# Patient Record
Sex: MALE | Race: BLACK OR AFRICAN AMERICAN | Employment: OTHER | URBAN - METROPOLITAN AREA
[De-identification: names, ages, dates, MRNs, and addresses within clinical notes are randomized per-mention and may not be internally consistent; named-entity substitution may affect disease eponyms.]

---

## 2017-10-22 PROBLEM — N18.6 ESRD (END STAGE RENAL DISEASE) (HCC): Status: ACTIVE | Noted: 2017-10-22

## 2017-10-22 PROBLEM — E78.2 MIXED HYPERLIPIDEMIA: Status: ACTIVE | Noted: 2017-10-22

## 2017-10-22 PROBLEM — E55.9 VITAMIN D DEFICIENCY: Status: ACTIVE | Noted: 2017-10-22

## 2017-10-22 PROBLEM — I12.9 HYPERTENSION, RENAL DISEASE: Status: ACTIVE | Noted: 2017-10-22

## 2017-10-22 PROBLEM — K21.9 GASTROESOPHAGEAL REFLUX DISEASE WITHOUT ESOPHAGITIS: Status: ACTIVE | Noted: 2017-10-22

## 2017-10-25 ENCOUNTER — OFFICE VISIT (OUTPATIENT)
Dept: INTERNAL MEDICINE CLINIC | Age: 67
End: 2017-10-25

## 2017-10-25 VITALS
HEART RATE: 79 BPM | DIASTOLIC BLOOD PRESSURE: 85 MMHG | TEMPERATURE: 97.9 F | WEIGHT: 201 LBS | OXYGEN SATURATION: 97 % | SYSTOLIC BLOOD PRESSURE: 132 MMHG | BODY MASS INDEX: 29.77 KG/M2 | RESPIRATION RATE: 16 BRPM | HEIGHT: 69 IN

## 2017-10-25 DIAGNOSIS — Z12.11 COLON CANCER SCREENING: ICD-10-CM

## 2017-10-25 DIAGNOSIS — Z00.00 MEDICARE ANNUAL WELLNESS VISIT, INITIAL: ICD-10-CM

## 2017-10-25 DIAGNOSIS — E78.2 MIXED HYPERLIPIDEMIA: ICD-10-CM

## 2017-10-25 DIAGNOSIS — K21.9 GASTROESOPHAGEAL REFLUX DISEASE WITHOUT ESOPHAGITIS: ICD-10-CM

## 2017-10-25 DIAGNOSIS — Z12.5 PROSTATE CANCER SCREENING: ICD-10-CM

## 2017-10-25 DIAGNOSIS — I12.9 HYPERTENSION, RENAL DISEASE: Primary | ICD-10-CM

## 2017-10-25 DIAGNOSIS — E55.9 VITAMIN D DEFICIENCY: ICD-10-CM

## 2017-10-25 DIAGNOSIS — B18.2 CHRONIC HEPATITIS C WITHOUT HEPATIC COMA (HCC): ICD-10-CM

## 2017-10-25 DIAGNOSIS — N18.6 ESRD (END STAGE RENAL DISEASE) (HCC): ICD-10-CM

## 2017-10-25 DIAGNOSIS — M1A.09X0 CHRONIC GOUT OF MULTIPLE SITES, UNSPECIFIED CAUSE: ICD-10-CM

## 2017-10-25 PROBLEM — M10.9 GOUT OF MULTIPLE SITES: Status: ACTIVE | Noted: 2017-10-25

## 2017-10-25 RX ORDER — OMEPRAZOLE 40 MG/1
CAPSULE, DELAYED RELEASE ORAL
Refills: 11 | COMMUNITY
Start: 2017-10-11 | End: 2018-12-20 | Stop reason: SDUPTHER

## 2017-10-25 RX ORDER — AMOXICILLIN 500 MG/1
CAPSULE ORAL
Refills: 1 | COMMUNITY
Start: 2017-08-17 | End: 2019-06-26

## 2017-10-25 RX ORDER — AMLODIPINE BESYLATE 10 MG/1
TABLET ORAL
Refills: 11 | COMMUNITY
Start: 2017-10-11

## 2017-10-25 NOTE — PROGRESS NOTES
Identified pt with two pt identifiers(name and ). Reviewed record in preparation for visit and have obtained necessary documentation. Chief Complaint   Patient presents with    Hypertension     3 month follow up; patient would like PSA tested        Health Maintenance Due   Topic    Hepatitis C Screening     DTaP/Tdap/Td series (1 - Tdap)    FOBT Q 1 YEAR AGE 54-65     ZOSTER VACCINE AGE 60>     GLAUCOMA SCREENING Q2Y     Pneumococcal 65+ High/Highest Risk (1 of 2 - PCV13)    MEDICARE YEARLY EXAM     INFLUENZA AGE 9 TO ADULT    Patient received flu vaccination at dialysis in early 10/2017. Coordination of Care Questionnaire:  :   1) Have you been to an emergency room, urgent care clinic since your last visit? no   Hospitalized since your last visit? no             2. Have seen or consulted any other health care provider since your last visit? NO  If yes, where when, and reason for visit? 3) Do you have an Advanced Directive/ Living Will in place? NO  If yes, do we have a copy on file NO  If no, would you like information NO    Patient is accompanied by self I have received verbal consent from Bismark Tamayo to discuss any/all medical information while they are present in the room.

## 2017-10-25 NOTE — MR AVS SNAPSHOT
Visit Information Date & Time Provider Department Dept. Phone Encounter #  
 10/25/2017  9:50 AM Vaishali English MD [address] [place] [address] [facility] [phone] [id number] Upcoming Health Maintenance Date Due Hepatitis C Screening 1950 DTaP/Tdap/Td series (1 - Tdap) 9/21/1971 FOBT Q 1 YEAR AGE 50-75 9/21/2000 ZOSTER VACCINE AGE 60> 7/21/2010 GLAUCOMA SCREENING Q2Y 9/21/2015 Pneumococcal 65+ High/Highest Risk (1 of 2 - PCV13) 9/21/2015 MEDICARE YEARLY EXAM 9/21/2015 INFLUENZA AGE 9 TO ADULT 8/1/2017 Allergies as of 10/25/2017  Review Complete On: 85/79/7093 By: Estella Cates RN Severity Noted Reaction Type Reactions Codeine  09/08/2012    Hives Current Immunizations  Never Reviewed Name Date Pneumococcal Conjugate (PCV-13) 10/12/2015 Pneumococcal Polysaccharide (PPSV-23) 9/13/2013 Not reviewed this visit Vitals BP Pulse Temp Resp Height(growth percentile) Weight(growth percentile) 132/85 (BP 1 Location: Right arm, BP Patient Position: Sitting) 79 97.9 °F (36.6 °C) (Oral) 16 5' 9\" (1.753 m) 201 lb (91.2 kg) SpO2 BMI Smoking Status 97% 29.68 kg/m2 Never Smoker Vitals History BMI and BSA Data Body Mass Index Body Surface Area  
 29.68 kg/m 2 2.11 m 2 Your Updated Medication List  
  
   
This list is accurate as of: 10/25/17 11:08 AM.  Always use your most recent med list. amLODIPine 10 mg tablet Commonly known as:  Lucía Croon TAKE 1 TABLET BY MOUTH ONCE DAILY  
  
 amoxicillin 500 mg capsule Commonly known as:  AMOXIL TAKE 4 CAPSULES BY MOUTH 1 HOUR PRIOR TO DENTAL PROCEDURE  
  
 * PriLOSEC 20 mg capsule Generic drug:  omeprazole Take 20 mg by mouth daily. * omeprazole 40 mg capsule Commonly known as:  PRILOSEC  
TAKE 1 CAPSULE BY MOUTH ONCE DAILY RENVELA 800 mg Tab tab Generic drug:  sevelamer carbonate Take 1,600 mg by mouth three (3) times daily (with meals). VITAMIN D2 50,000 unit capsule Generic drug:  ergocalciferol Take 50,000 Units by mouth. * Notice: This list has 2 medication(s) that are the same as other medications prescribed for you. Read the directions carefully, and ask your doctor or other care provider to review them with you. Introducing \[facility]\"" & [facility] SERVICES! José Yeung introduces See[employer] patient portal. Now you can access parts of your medical record, email your doctor's office, and request medication refills online. 1. In your internet browser, go to [email or web address] 2. Click on the First Time User? Click Here link in the Sign In box. You will see the New Member Sign Up page. 3. Enter your See[employer] Access Code exactly as it appears below. You will not need to use this code after youve completed the sign-up process. If you do not sign up before the expiration date, you must request a new code. · See[employer] Access Code: [email or web address] Expires: 1/23/2018  9:51 AM 
 
4. Enter the last four digits of your Social Security Number (xxxx) and Date of Birth (mm/dd/yyyy) as indicated and click Submit. You will be taken to the next sign-up page. 5. Create a SeeMedia ID. This will be your SeeMedia login ID and cannot be changed, so think of one that is secure and easy to remember. 6. Create a See[employer] password. You can change your password at any time. 7. Enter your Password Reset Question and Answer. This can be used at a later time if you forget your password. 8. Enter your e-mail address. You will receive e-mail notification when new information is available in [address] 9. Click Sign Up. You can now view and download portions of your medical record. 10. Click the Download Summary menu link to download a portable copy of your medical information.  
 
If you have questions, please visit the Frequently Asked Questions section of the [employer]. Remember, [facility] is NOT to be used for urgent needs. For medical emergencies, dial 911. Now available from your iPhone and Android! Please provide this summary of care documentation to your next provider. Your primary care clinician is listed as Lucy. If you have any questions after today's visit, please call [phone].

## 2017-10-25 NOTE — PROGRESS NOTES
This is an Initial Medicare Annual Wellness Exam (AWV) (Performed 12 months after IPPE or effective date of Medicare Part B enrollment, Once in a lifetime)    I have reviewed the patient's medical history in detail and updated the computerized patient record. He returns today for his initial annual Medicare wellness examination screening questionnaire. He is also here in follow-up of his medical problems to include hypertension, end-stage renal disease, DJD, gout, GERD, as well as vitamin D deficiency. He is taking his medications and trying to follow his diet. He is still on dialysis 3 times weekly. He denies any  complaints and there are no GI complaints. He denies chest pain shortness breath or cardiorespiratory complaints. He denies headaches or neurologic complaints. There are no other complaints on complete review of systems.     History     Past Medical History:   Diagnosis Date    Arthritis     Chronic kidney disease     Dialysis T, Thur, Sat @ [facility]    Gout     Hepatitis C     Hypertension     Liver disease     Hep C    Other and unspecified alcohol dependence, unspecified drinking behavior     Acid reflux    Other ill-defined conditions(799.89)     gout    Other ill-defined conditions(799.89)     blood transfusion hx      Past Surgical History:   Procedure Laterality Date    HX OTHER SURGICAL      liver biopsy    HX VASCULAR ACCESS      [place] [facility] [place] [facility] [place]    HX VASCULAR ACCESS  7/15/13    CREATION LEFT UPPER ARM BASILIC VEIN TRANSPOSITION    [facility]ULAR [facility]ESS      [facility]     Current Outpatient Prescriptions   Medication Sig Dispense Refill    amLODIPine (NORVASC) 10 mg tablet TAKE 1 TABLET BY MOUTH ONCE DAILY  11    amoxicillin (AMOXIL) 500 mg capsule TAKE 4 CAPSULES BY MOUTH 1 HOUR PRIOR TO DENTAL PROCEDURE  1    omeprazole (PRILOSEC) 40 mg capsule TAKE 1 CAPSULE BY MOUTH ONCE DAILY  11    sevelamer carbonate (RENVELA) 800 mg Tab tab Take 1,600 mg by mouth three (3) times daily (with meals). Allergies   Allergen Reactions    Codeine Hives     History reviewed. No pertinent family history. Social History   Substance Use Topics    Smoking status: Never Smoker    Smokeless tobacco: Never Used    Alcohol use No     Patient Active Problem List   Diagnosis Code    Hypertension, renal disease I12.9    Mixed hyperlipidemia E78.2    ESRD (end stage renal disease) ([facility] N18.6    Gastroesophageal reflux disease without esophagitis K21.9    Vitamin D deficiency E55.9    Prostate cancer screening Z12.5    Gout of multiple sites M10.9    Chronic hepatitis C without hepatic coma ([facility] B18.2    Medicare annual wellness visit, initial Z00.00       Depression Risk Factor Screening:     PHQ over the last two weeks 10/25/2017   Little interest or pleasure in doing things Not at all   Feeling down, depressed or hopeless Not at all   Total Score PHQ 2 0     Alcohol Risk Factor Screening: You do not drink alcohol or very rarely. Functional Ability and Level of Safety:     Hearing Loss  Hearing is good. Activities of Daily Living  The home contains: no safety equipment. Patient does total self care    Fall RiskFall Risk Assessment, last 12 mths 10/25/2017   Able to walk? Yes   Fall in past 12 months? No       Abuse Screen  Patient is not abused    Cognitive Screening   Evaluation of Cognitive Function:  Has your family/caregiver stated any concerns about your memory: no  Normal     ROS:    Constitutional: He denies fevers, weight loss, sweats. Eyes: No blurred or double vision. ENT: No difficulty with swallowing, taste, speech or smell. Neck: no stiffness or swelling  Respiratory: No cough wheezing or shortness of breath. Cardiovascular: Denies chest pain, palpitations, unexplained indigestion or syncope. Gastrointestinal:  No changes in bowel movements, no abdominal pain, no bloating. Genitourinary:  He denies frequency, nocturia or stranguria.   Extremities: No joint pain, stiffness or swelling. Neurological:  No numbness, tingling, burring paresthesias or loss of motor strength. No syncope, dizziness or frequent headache  Lymphatic: no adenopathy noted  Hematologic: no easy bruising or bleeding gums  Skin:  No recent rashes or mole changes. Psychiatric/Behavioral:  Negative for depression. Vitals:    10/25/17 1039   BP: 132/85   Pulse: 79   Resp: 16   Temp: 97.9 °F (36.6 °C)   TempSrc: Oral   SpO2: 97%   Weight: 201 lb (91.2 kg)   Height: 5' 9\" (1.753 m)   PainSc:   0 - No pain        PHYSICAL EXAM:    General appearance - alert, well appearing, and in no distress  Mental status - alert, oriented to person, place, and time  HEENT:  Ears - bilateral TM's and external ear canals clear  Eyes - pupillary responses were normal.  Extraocular muscle function intact. Lids and conjunctiva not injected. Fundoscopic exam revealed sharp disc margins. eye movements intact  Pharynx- clear with teeth in good repair. No masses were noted  Neck - supple without thyromegaly or burit. No JVD noted  Lungs - clear to auscultation and percussion  Cardiac- normal rate, regular rhythm without murmurs. PMI not displaced. No gallop, rub or click  Abdomen - flat, soft, non-tender without palpable organomegaly or mass. No pulsatile mass was felt, and not bruit was heard. Bowel sounds were active  : Circumcised, Testes descended w/o masses  Rectal: normal sphincter tone, prostate normal, no masses, stool brown and hemacult negative  Extremities -  no clubbing cyanosis or edema  Lymphatics - no palpable lymphadenopathy, no hepatosplenomegaly  Hematologic: no petechiae or purpura  Peripheral vascular -Femoral, Dorsalis pedis and posterior tibial pulses felt without difficulty  Skin - no rash or unusual mole change noted  Neurological - Cranial nerves II-XII grossly intact. Motor strength 5/5. DTR's 2+ and symmetric.   Station and gait normal  Back exam - full range of motion, no tenderness, palpable spasm or pain on motion  Musculoskeletal - no joint tenderness, deformity or swelling      Patient Care Team   Patient Care Team:  Terrell Pastor MD as PCP - General (Internal Medicine)    Assessment/Plan   Education and counseling provided:  Are appropriate based on today's review and evaluation    ASSESSMENT:   1. Hypertension, renal disease    2. Mixed hyperlipidemia    3. ESRD (end stage renal disease) ([facility]    4. Gastroesophageal reflux disease without esophagitis    5. Vitamin D deficiency    6. Prostate cancer screening    7. Colon cancer screening    8. Chronic gout of multiple sites, unspecified cause    9. [facility]    10. Medicare annual wellness visit, initial      Impression  1. Hypertension that is controlled we will continue current therapy reviewed with him  2. Hyperlipidemia we will see what the status is and make further recommendations adjustments if necessary. Prior labs reviewed  3. End-stage renal disease on dialysis and which she will continue  4. GERD currently stable  5. Vitamin D deficiency repeat status pending  6. Gout currently has not had any recent symptoms  7. Chronic hepatitis C he does not seem to be having any symptoms from it presently  Medicare annual wellness examination screening questionnaire completed today. The results were reviewed with him and his questions were answered. Lifestyle recommendations and modifications suggested and made. Flu shot is already been received at dialysis. Labs are pending as noted will make further recommendations based on those. Follow stable continue same and recheck schedule III months or sooner should there be a problem.     PLAN:  .  Orders Placed This Encounter    AMB POC T4, FREE    AMB POC LIPID PROFILE    AMB POC TSH    AMB POC URINALYSIS DIP STICK AUTO W/ MICRO     AMB POC COMPREHENSIVE METABOLIC PANEL    AMB POC COMPLETE CBC,AUTOMATED ENTER    AMB POC CK (CPK)    AMB POC PSA  (MEDICARE)    AMB POC VITAMIN D    AMB POC BLOOD OCCULT QUAL FECAL HEMGLBN 1-3    amLODIPine (NORVASC) 10 mg tablet    amoxicillin (AMOXIL) 500 mg capsule    omeprazole (PRILOSEC) 40 mg capsule         ATTENTION:   This medical record was transcribed using an electronic medical records system. Although proofread, it may and can contain electronic and spelling errors. Other human spelling and other errors may be present. Corrections may be executed at a later time. Please feel free to contact us for any clarifications as needed. Follow-up Disposition:  Return in about 3 months (around 1/25/2018).       Melani Fox MD     Health Maintenance Due   Topic Date Due    DTaP/Tdap/Td series (1 - Tdap) 09/21/1971    FOBT Q 1 YEAR AGE 50-75  09/21/2000    ZOSTER VACCINE AGE 60>  07/21/2010    GLAUCOMA SCREENING Q2Y  09/21/2015

## 2017-10-26 LAB
ALBUMIN SERPL-MCNC: 4.1 G/DL (ref 3.9–5.4)
ALKALINE PHOS POC: 146 U/L (ref 38–126)
ALT SERPL-CCNC: 40 U/L (ref 9–52)
AST SERPL-CCNC: 43 U/L (ref 14–36)
BACTERIA UA POCT, BACTPOCT: ABNORMAL
BILIRUB UR QL STRIP: NEGATIVE
BUN BLD-MCNC: 31 MG/DL (ref 9–20)
CALCIUM BLD-MCNC: 9.4 MG/DL (ref 8.4–10.2)
CASTS UA POCT: ABNORMAL
CHLORIDE BLD-SCNC: 100 MMOL/L (ref 98–107)
CHOLEST SERPL-MCNC: 182 MG/DL (ref 0–200)
CK (CPK) POC: 140 U/L (ref 30–135)
CLUE CELLS, CLUEPOCT: NEGATIVE
CO2 POC: 29 MMOL/L (ref 22–32)
CREAT BLD-MCNC: 6 MG/DL (ref 0.8–1.5)
CRYSTALS UA POCT, CRYSPOCT: NEGATIVE
EGFR (POC): 8.9
EPITHELIAL CELLS POCT: ABNORMAL
GLUCOSE POC: 88 MG/DL (ref 75–110)
GLUCOSE UR-MCNC: NEGATIVE MG/DL
GRAN# POC: 4.8 K/UL (ref 2–7.8)
GRAN% POC: 66.7 % (ref 37–92)
HCT VFR BLD CALC: 44.3 % (ref 37–51)
HDLC SERPL-MCNC: 61 MG/DL (ref 35–130)
HGB BLD-MCNC: 14.9 G/DL (ref 12–18)
KETONES P FAST UR STRIP-MCNC: NEGATIVE MG/DL
LDL CHOLESTEROL POC: 99.4 MG/DL (ref 0–130)
LY# POC: 1.8 K/UL (ref 0.6–4.1)
LY% POC: 26.1 % (ref 10–58.5)
MCH RBC QN: 33.7 PG (ref 26–32)
MCHC RBC-ENTMCNC: 33.7 G/DL (ref 30–36)
MCV RBC: 100 FL (ref 80–97)
MID #, POC: 0.5 K/UL (ref 0–1.8)
MID% POC: 7.2 % (ref 0.1–24)
MUCUS UA POCT, MUCPOCT: ABNORMAL
PH UR STRIP: 8 [PH] (ref 5–7)
PLATELET # BLD: 231 K/UL (ref 140–440)
POTASSIUM SERPL-SCNC: 4.4 MMOL/L (ref 3.6–5)
PROT SERPL-MCNC: 9 G/DL (ref 6.3–8.2)
PROT UR QL STRIP: ABNORMAL MG/DL
PSA SERPL-MCNC: 1.3 NG/ML (ref 0–4)
RBC # BLD: 4.43 M/UL (ref 4.2–6.3)
RBC UA POCT, RBCPOCT: ABNORMAL
SODIUM SERPL-SCNC: 142 MMOL/L (ref 137–145)
SP GR UR STRIP: 1.01 (ref 1.01–1.02)
T4 FREE SERPL-MCNC: 1.17 NG/DL (ref 0.71–1.85)
TCHOL/HDL RATIO (POC): 3 (ref 0–4)
TOTAL BILIRUBIN POC: 1.2 MG/DL (ref 0.2–1.3)
TRICH UA POCT, TRICHPOC: NEGATIVE
TRIGL SERPL-MCNC: 108 MG/DL (ref 0–200)
TSH BLD-ACNC: 1.33 UIU/ML (ref 0.4–4.2)
UA UROBILINOGEN AMB POC: NORMAL (ref 0.2–1)
URINALYSIS CLARITY POC: CLEAR
URINALYSIS COLOR POC: ABNORMAL
URINE BLOOD POC: ABNORMAL
URINE CULT COMMENT, POCT: ABNORMAL
URINE LEUKOCYTES POC: ABNORMAL
URINE NITRITES POC: NEGATIVE
VITAMIN D POC: 17.2 NG/ML (ref 30–96)
VLDLC SERPL CALC-MCNC: 21.6 MG/DL
WBC # BLD: 7.1 K/UL (ref 4.1–10.9)
WBC UA POCT, WBCPOCT: ABNORMAL
YEAST UA POCT, YEASTPOC: NEGATIVE

## 2017-11-06 NOTE — PROGRESS NOTES
Lab is okay except vitamin D level is very low so start vitamin D 50,000 units weekly for 12 weeks. Patient informed. Says he will discuss with his dialysis doctor and call back regarding.

## 2017-11-13 ENCOUNTER — HOSPITAL ENCOUNTER (EMERGENCY)
Age: 67
Discharge: HOME | End: 2017-11-13
Attending: EMERGENCY MEDICINE | Admitting: EMERGENCY MEDICINE
Payer: MEDICARE

## 2017-11-13 ENCOUNTER — APPOINTMENT (OUTPATIENT)
Dept: GENERAL RADIOLOGY | Age: 67
End: 2017-11-13
Attending: EMERGENCY MEDICINE
Payer: MEDICARE

## 2017-11-13 VITALS
DIASTOLIC BLOOD PRESSURE: 80 MMHG | TEMPERATURE: 98.6 F | SYSTOLIC BLOOD PRESSURE: 143 MMHG | BODY MASS INDEX: 29.33 KG/M2 | HEIGHT: 69 IN | RESPIRATION RATE: 14 BRPM | HEART RATE: 92 BPM | OXYGEN SATURATION: 100 % | WEIGHT: 198 LBS

## 2017-11-13 DIAGNOSIS — R07.9 ACUTE CHEST PAIN: Primary | ICD-10-CM

## 2017-11-13 LAB
ALBUMIN SERPL-MCNC: 3.4 G/DL (ref 3.5–5)
ALBUMIN/GLOB SERPL: 0.6 {RATIO} (ref 1.1–2.2)
ALP SERPL-CCNC: 134 U/L (ref 45–117)
ALT SERPL-CCNC: 34 U/L (ref 12–78)
ANION GAP SERPL CALC-SCNC: 8 MMOL/L (ref 5–15)
AST SERPL-CCNC: 33 U/L (ref 15–37)
ATRIAL RATE: 89 BPM
BASOPHILS # BLD: 0 K/UL (ref 0–0.1)
BASOPHILS NFR BLD: 0 % (ref 0–1)
BILIRUB SERPL-MCNC: 0.7 MG/DL (ref 0.2–1)
BUN SERPL-MCNC: 42 MG/DL (ref 6–20)
BUN/CREAT SERPL: 5 (ref 12–20)
CALCIUM SERPL-MCNC: 9.1 MG/DL (ref 8.5–10.1)
CALCULATED P AXIS, ECG09: 52 DEGREES
CALCULATED R AXIS, ECG10: -42 DEGREES
CALCULATED T AXIS, ECG11: 56 DEGREES
CHLORIDE SERPL-SCNC: 103 MMOL/L (ref 97–108)
CK MB CFR SERPL CALC: 0.9 % (ref 0–2.5)
CK MB SERPL-MCNC: 1.6 NG/ML (ref 5–25)
CK SERPL-CCNC: 186 U/L (ref 39–308)
CK SERPL-CCNC: 202 U/L (ref 39–308)
CO2 SERPL-SCNC: 27 MMOL/L (ref 21–32)
CREAT SERPL-MCNC: 7.75 MG/DL (ref 0.7–1.3)
DIAGNOSIS, 93000: NORMAL
EOSINOPHIL # BLD: 0.1 K/UL (ref 0–0.4)
EOSINOPHIL NFR BLD: 2 % (ref 0–7)
ERYTHROCYTE [DISTWIDTH] IN BLOOD BY AUTOMATED COUNT: 12.8 % (ref 11.5–14.5)
GLOBULIN SER CALC-MCNC: 5.9 G/DL (ref 2–4)
GLUCOSE SERPL-MCNC: 91 MG/DL (ref 65–100)
HCT VFR BLD AUTO: 44.4 % (ref 36.6–50.3)
HGB BLD-MCNC: 15 G/DL (ref 12.1–17)
LYMPHOCYTES # BLD: 2.3 K/UL (ref 0.8–3.5)
LYMPHOCYTES NFR BLD: 38 % (ref 12–49)
MCH RBC QN AUTO: 32.8 PG (ref 26–34)
MCHC RBC AUTO-ENTMCNC: 33.8 G/DL (ref 30–36.5)
MCV RBC AUTO: 96.9 FL (ref 80–99)
MONOCYTES # BLD: 0.7 K/UL (ref 0–1)
MONOCYTES NFR BLD: 12 % (ref 5–13)
NEUTS SEG # BLD: 2.9 K/UL (ref 1.8–8)
NEUTS SEG NFR BLD: 48 % (ref 32–75)
P-R INTERVAL, ECG05: 146 MS
PLATELET # BLD AUTO: 227 K/UL (ref 150–400)
POTASSIUM SERPL-SCNC: 4.2 MMOL/L (ref 3.5–5.1)
PROT SERPL-MCNC: 9.3 G/DL (ref 6.4–8.2)
Q-T INTERVAL, ECG07: 368 MS
QRS DURATION, ECG06: 84 MS
QTC CALCULATION (BEZET), ECG08: 447 MS
RBC # BLD AUTO: 4.58 M/UL (ref 4.1–5.7)
SODIUM SERPL-SCNC: 138 MMOL/L (ref 136–145)
TROPONIN I SERPL-MCNC: <0.04 NG/ML
TROPONIN I SERPL-MCNC: <0.04 NG/ML
VENTRICULAR RATE, ECG03: 89 BPM
WBC # BLD AUTO: 6 K/UL (ref 4.1–11.1)

## 2017-11-13 PROCEDURE — 36415 COLL VENOUS BLD VENIPUNCTURE: CPT | Performed by: EMERGENCY MEDICINE

## 2017-11-13 PROCEDURE — 82550 ASSAY OF CK (CPK): CPT | Performed by: EMERGENCY MEDICINE

## 2017-11-13 PROCEDURE — 85025 COMPLETE CBC W/AUTO DIFF WBC: CPT | Performed by: EMERGENCY MEDICINE

## 2017-11-13 PROCEDURE — 74011250636 HC RX REV CODE- 250/636: Performed by: EMERGENCY MEDICINE

## 2017-11-13 PROCEDURE — 99284 EMERGENCY DEPT VISIT MOD MDM: CPT

## 2017-11-13 PROCEDURE — 96374 THER/PROPH/DIAG INJ IV PUSH: CPT

## 2017-11-13 PROCEDURE — 93005 ELECTROCARDIOGRAM TRACING: CPT

## 2017-11-13 PROCEDURE — 84484 ASSAY OF TROPONIN QUANT: CPT | Performed by: EMERGENCY MEDICINE

## 2017-11-13 PROCEDURE — 71020 XR CHEST PA LAT: CPT

## 2017-11-13 PROCEDURE — 82553 CREATINE MB FRACTION: CPT | Performed by: EMERGENCY MEDICINE

## 2017-11-13 PROCEDURE — 80053 COMPREHEN METABOLIC PANEL: CPT | Performed by: EMERGENCY MEDICINE

## 2017-11-13 RX ORDER — MORPHINE SULFATE 2 MG/ML
2 INJECTION, SOLUTION INTRAMUSCULAR; INTRAVENOUS
Status: COMPLETED | OUTPATIENT
Start: 2017-11-13 | End: 2017-11-13

## 2017-11-13 RX ORDER — TRAMADOL HYDROCHLORIDE 50 MG/1
50 TABLET ORAL
Qty: 20 TAB | Refills: 0 | Status: SHIPPED | OUTPATIENT
Start: 2017-11-13 | End: 2017-11-20 | Stop reason: ALTCHOICE

## 2017-11-13 RX ADMIN — MORPHINE SULFATE 2 MG: 2 INJECTION, SOLUTION INTRAMUSCULAR; INTRAVENOUS at 09:21

## 2017-11-13 NOTE — ED NOTES
Patient discharged by Dr. Corrinne Mitts. Patient provided with discharge instructions Rx and instructions on follow up care. Patient out of ED ambulatory accompanied by family.

## 2017-11-13 NOTE — DISCHARGE INSTRUCTIONS
Chest Pain: Care Instructions  Your Care Instructions    There are many things that can cause chest pain. Some are not serious and will get better on their own in a few days. But some kinds of chest pain need more testing and treatment. Your doctor may have recommended a follow-up visit in the next 8 to 12 hours. If you are not getting better, you may need more tests or treatment. Even though your doctor has released you, you still need to watch for any problems. The doctor carefully checked you, but sometimes problems can develop later. If you have new symptoms or if your symptoms do not get better, get medical care right away. If you have worse or different chest pain or pressure that lasts more than 5 minutes or you passed out (lost consciousness), call 911 or seek other emergency help right away. A medical visit is only one step in your treatment. Even if you feel better, you still need to do what your doctor recommends, such as going to all suggested follow-up appointments and taking medicines exactly as directed. This will help you recover and help prevent future problems. How can you care for yourself at home? · Rest until you feel better. · Take your medicine exactly as prescribed. Call your doctor if you think you are having a problem with your medicine. · Do not drive after taking a prescription pain medicine. When should you call for help? Call 911 if:  ? · You passed out (lost consciousness). ? · You have severe difficulty breathing. ? · You have symptoms of a heart attack. These may include:  ¨ Chest pain or pressure, or a strange feeling in your chest.  ¨ Sweating. ¨ Shortness of breath. ¨ Nausea or vomiting. ¨ Pain, pressure, or a strange feeling in your back, neck, jaw, or upper belly or in one or both shoulders or arms. ¨ Lightheadedness or sudden weakness. ¨ A fast or irregular heartbeat.   After you call 911, the  may tell you to chew 1 adult-strength or 2 to 4 low-dose aspirin. Wait for an ambulance. Do not try to drive yourself. ?Call your doctor today if:  ? · You have any trouble breathing. ? · Your chest pain gets worse. ? · You are dizzy or lightheaded, or you feel like you may faint. ? · You are not getting better as expected. ? · You are having new or different chest pain. Where can you learn more? Go to http://kirk-angelika.info/. Enter A120 in the search box to learn more about \"Chest Pain: Care Instructions. \"  Current as of: March 20, 2017  Content Version: 11.4  © 1341-0520 [employer]. Care instructions adapted under license by "[employer]" (which disclaims liability or warranty for this information). If you have questions about a medical condition or this instruction, always ask your healthcare professional. [address] any warranty or liability for your use of this information.

## 2017-11-13 NOTE — ED NOTES
Pt arrives to ED via triage c/o CP and LEFT arm pain since Weds. Pt states he did have mild sensations in his LEFT jaw but dismissed it. Pt a/o x 4, monitor x 3, ambulates with steady gait. Pt is a T/TH/S HD patient- compliant.

## 2017-11-13 NOTE — ED PROVIDER NOTES
[facility].  EMERGENCY DEPARTMENT HISTORY AND PHYSICAL EXAM       Date of Service: 11/13/2017   Patient Name: Dulce Maria Zhu   YOB: 1950  Medical Record Number: [id number]    History of Presenting Illness     Chief Complaint   Patient presents with    Arm Pain     left arm pain onset several days ago, denies injury, states he was seen by MD and advised fistual wasn't problem    Chest Pain     intermittent        History Provided By:  patient    Additional History:   Dulce Maria Zhu is a 79 y.o. male who presents ambulatory to the ED with cc of intermittent L arm pain and intermittent mid to left sided CP/pressure x five days. He states both symptoms have been same in intensity and frequency over the five days. Pt reports CP appears gradually after onset of L arm pain. Pt states CP is similar to pain associated with GERD. He states pain is worse when lying down and improves when sitting up. Pt denies having any CP currently but notes having L arm pain now. Pt notes he is scheduled for dialysis TRS. Pt reports getting an US which resulted negative for any blockages. He states he has been taking tylenol three times daily in addition to applying ice as instructed. He also notes he is taking omeprazole. Pt specifically denies any fevers, diaphoresis, SOB, leg swelling, N/V/D, constipation, dysuria, numbness, lightheadedness, weakness, and rashes. PMHx & Surgical hx: HTN, CKD, Hepatitis C, GERD, vascular access  Social Hx: - Tobacco, - EtOH, +(marijuana) Illicit Drugs    There are no other complaints, changes or physical findings at this time.     Primary Care Provider: Terrell Pastor MD     Past History     Past Medical History:   Past Medical History:   Diagnosis Date    Arthritis     Chronic kidney disease     Dialysis T, Thur, Sat @ [facility]    Gout     Hepatitis C     Hypertension     Liver disease     Hep C    Other and unspecified alcohol dependence, unspecified drinking behavior     Acid reflux    Other ill-defined conditions(799.89)     gout    Other ill-defined conditions(799.89)     blood transfusion hx        Past Surgical History:   Past Surgical History:   Procedure Laterality Date    HX OTHER SURGICAL      liver biopsy    [facility]ASCULAR ACCESS      [facility]    HX VASCULAR ACCESS  7/15/13    CREATION LEFT UPPER ARM BASILIC VEIN TRANSPOSITION    [facility]      [facility]        Family History:   No family history on file. Social History:   Social History   Substance Use Topics    Smoking status: Never Smoker    Smokeless tobacco: Never Used    Alcohol use No        Allergies: Allergies   Allergen Reactions    Codeine Hives         Review of Systems   Review of Systems   Constitutional: Negative for diaphoresis, fatigue and fever. HENT: Negative. Eyes: Negative. Respiratory: Negative for shortness of breath and wheezing. Cardiovascular: Positive for chest pain (mid to left). Negative for leg swelling. Gastrointestinal: Negative for blood in stool, constipation, diarrhea, nausea and vomiting. Endocrine: Negative. Genitourinary: Negative for difficulty urinating and dysuria. Musculoskeletal: Positive for myalgias (L arm). Skin: Negative for rash. Allergic/Immunologic: Negative. Neurological: Negative for weakness, light-headedness and numbness. Hematological: Negative. Psychiatric/Behavioral: Negative. Physical Exam  Physical Exam   Constitutional: He is oriented to person, place, and time. He appears well-developed and well-nourished. No distress. HENT:   Head: Normocephalic and atraumatic. Mouth/Throat: Oropharynx is clear and moist.   Eyes: Conjunctivae and EOM are normal.   Neck: Neck supple. No JVD present. No tracheal deviation present. Cardiovascular: Normal rate, regular rhythm and intact distal pulses. Exam reveals no gallop and no friction rub.     No murmur heard.  Pulmonary/Chest: Effort normal and breath sounds normal. No stridor. No respiratory distress. He has no wheezes. Abdominal: Soft. Bowel sounds are normal. He exhibits no distension and no mass. There is no tenderness. There is no guarding. Musculoskeletal: Normal range of motion. He exhibits no edema or tenderness. No peripheral edema. Fistula at the left upper extremity with a palpable thrill. Neurological: He is alert and oriented to person, place, and time. He has normal strength. No focal deficits   Skin: Skin is warm, dry and intact. No rash noted. Psychiatric: He has a normal mood and affect. His behavior is normal. Judgment and thought content normal.   Nursing note and vitals reviewed. Medical Decision Making   I am the first provider for this patient. I reviewed the vital signs, available nursing notes, past medical history, past surgical history, family history and social history. Old Medical Records: Old medical records. Provider Notes:   Pt presenting with intermittent chest pain and L arm pain for the past several days. No cardiac history. DDx includes acs, atypical chest pain, GERD, pneumonia, pulmonary edema, pleural effusion. Will check labs, cardiac enzymes, ekg. Pt already had an US done of his fistula and it was fine. ED Course:  9:01 AM   Initial assessment performed. The patients presenting problems have been discussed, and they are in agreement with the care plan formulated and outlined with them. I have encouraged them to ask questions as they arise throughout their visit.         Diagnostic Study Results   Labs -  Recent Results (from the past 12 hour(s))   EKG, 12 LEAD, INITIAL    Collection Time: 11/13/17  8:27 AM   Result Value Ref Range    Ventricular Rate 89 BPM    Atrial Rate 89 BPM    P-R Interval 146 ms    QRS Duration 84 ms    Q-T Interval 368 ms    QTC Calculation (Bezet) 447 ms    Calculated P Axis 52 degrees    Calculated R Axis -42 degrees Calculated T Axis 56 degrees    Diagnosis       Normal sinus rhythm  Left axis deviation  Abnormal ECG  When compared with ECG of 04-JUN-2014 09:31,  premature ventricular complexes are no longer present     TROPONIN I    Collection Time: 11/13/17  8:37 AM   Result Value Ref Range    Troponin-I, Qt. <0.04 <0.05 ng/mL   CK W/ REFLX CKMB    Collection Time: 11/13/17  8:37 AM   Result Value Ref Range     39 - 308 U/L   CBC WITH AUTOMATED DIFF    Collection Time: 11/13/17  8:37 AM   Result Value Ref Range    WBC 6.0 4.1 - 11.1 K/uL    RBC 4.58 4.10 - 5.70 M/uL    HGB 15.0 12.1 - 17.0 g/dL    HCT 44.4 36.6 - 50.3 %    MCV 96.9 80.0 - 99.0 FL    MCH 32.8 26.0 - 34.0 PG    MCHC 33.8 30.0 - 36.5 g/dL    RDW 12.8 11.5 - 14.5 %    PLATELET 644 180 - 511 K/uL    NEUTROPHILS 48 32 - 75 %    LYMPHOCYTES 38 12 - 49 %    MONOCYTES 12 5 - 13 %    EOSINOPHILS 2 0 - 7 %    BASOPHILS 0 0 - 1 %    ABS. NEUTROPHILS 2.9 1.8 - 8.0 K/UL    ABS. LYMPHOCYTES 2.3 0.8 - 3.5 K/UL    ABS. MONOCYTES 0.7 0.0 - 1.0 K/UL    ABS. EOSINOPHILS 0.1 0.0 - 0.4 K/UL    ABS. BASOPHILS 0.0 0.0 - 0.1 K/UL   METABOLIC PANEL, COMPREHENSIVE    Collection Time: 11/13/17  8:37 AM   Result Value Ref Range    Sodium 138 136 - 145 mmol/L    Potassium 4.2 3.5 - 5.1 mmol/L    Chloride 103 97 - 108 mmol/L    CO2 27 21 - 32 mmol/L    Anion gap 8 5 - 15 mmol/L    Glucose 91 65 - 100 mg/dL    BUN 42 (H) 6 - 20 MG/DL    Creatinine 7.75 (H) 0.70 - 1.30 MG/DL    BUN/Creatinine ratio 5 (L) 12 - 20      GFR est AA 9 (L) >60 ml/min/1.73m2    GFR est non-AA 7 (L) >60 ml/min/1.73m2    Calcium 9.1 8.5 - 10.1 MG/DL    Bilirubin, total 0.7 0.2 - 1.0 MG/DL    ALT (SGPT) 34 12 - 78 U/L    AST (SGOT) 33 15 - 37 U/L    Alk.  phosphatase 134 (H) 45 - 117 U/L    Protein, total 9.3 (H) 6.4 - 8.2 g/dL    Albumin 3.4 (L) 3.5 - 5.0 g/dL    Globulin 5.9 (H) 2.0 - 4.0 g/dL    A-G Ratio 0.6 (L) 1.1 - 2.2     CK W/ CKMB & INDEX    Collection Time: 11/13/17 11:36 AM   Result Value Ref Range  39 - 308 U/L    CK - MB 1.6 <3.6 NG/ML    CK-MB Index 0.9 0 - 2.5     TROPONIN I    Collection Time: 11/13/17 11:36 AM   Result Value Ref Range    Troponin-I, Qt. <0.04 <0.05 ng/mL       Radiologic Studies -  The following have been ordered and reviewed:    CXR Results  (Last 48 hours)               11/13/17 0854  XR CHEST PA LAT Final result    Impression:  IMPRESSION: No acute cardiopulmonary disease. Narrative: Indication: Chest pain. Exam: PA and lateral views of the chest.       Direct comparison is made to prior CXR dated October 2009. Findings: Cardiomediastinal silhouette is within normal limits. Lungs are clear   bilaterally. Pleural spaces are normal. Osseous structures are intact. Vital Signs-Reviewed the patient's vital signs. Patient Vitals for the past 12 hrs:   Temp Pulse Resp BP SpO2   11/13/17 0828 98.6 °F (37 °C) 92 14 - 100 %   11/13/17 0826 - - - 143/80 -       Medications Given in the ED:  Medications   morphine injection 2 mg (2 mg IntraVENous Given 11/13/17 0921)       EKG interpretation: (Preliminary)  8:27 AM  Rhythm: normal sinus rhythm; and regular . Rate (approx.): 89 bpm; Axis: left axis deviation; WV interval: normal; QRS interval: normal ; ST/T wave: no ST changes;   Written by Tania Albert, ED Scribe, as dictated by Lucas Hawkins DO    Diagnosis   Clinical Impression:   1.  Acute chest pain         Plan:  1:   Follow-up Information     Follow up With Details Comments Contact Info    Julieth Noguera MD Schedule an appointment as soon as possible for a visit  [facility]  [phone]      [facility] Schedule an appointment as soon as possible for a visit  [address] [address] EMERGENCY DEPT  As needed, If symptoms worsen [address] [place] [address]  [phone]        2:   Discharge Medication List as of 11/13/2017 12:25 PM      CONTINUE these medications which have NOT CHANGED    Details   amLODIPine (NORVASC) 10 mg tablet TAKE 1 TABLET BY MOUTH ONCE DAILY, Historical Med, R-11      amoxicillin (AMOXIL) 500 mg capsule TAKE 4 CAPSULES BY MOUTH 1 HOUR PRIOR TO DENTAL PROCEDURE, Historical Med, R-1      omeprazole (PRILOSEC) 40 mg capsule TAKE 1 CAPSULE BY MOUTH ONCE DAILY, Historical Med, R-11      sevelamer carbonate (RENVELA) 800 mg Tab tab Take 1,600 mg by mouth three (3) times daily (with meals). , Historical Med           Return to ED if Worse    Disposition Note:  DISCHARGE NOTE  12:25 PM  The patient has been re-evaluated and is ready for discharge. Reviewed available results with patient. Counseled pt on diagnosis and care plan. Pt has expressed understanding, and all questions have been answered. Pt agrees with plan and agrees to F/U as recommended, or return to the ED if their sxs worsen. Discharge instructions have been provided and explained to the pt, along with reasons to return to the ED.    _______________________________     Attestations: This note is prepared by Mary Crabtree acting as Scribe for Jennifer Conner DO. The scribe's documentation has been prepared under my direction and personally reviewed by me in its entirety. I confirm that the note above accurately reflects all work, treatment, procedures, and medical decision making performed by me.   Jennifer Conner DO    _______________________________

## 2017-11-20 ENCOUNTER — OFFICE VISIT (OUTPATIENT)
Dept: INTERNAL MEDICINE CLINIC | Age: 67
End: 2017-11-20

## 2017-11-20 VITALS
TEMPERATURE: 98.1 F | HEART RATE: 84 BPM | SYSTOLIC BLOOD PRESSURE: 138 MMHG | BODY MASS INDEX: 30.24 KG/M2 | HEIGHT: 69 IN | OXYGEN SATURATION: 98 % | RESPIRATION RATE: 16 BRPM | WEIGHT: 204.2 LBS | DIASTOLIC BLOOD PRESSURE: 82 MMHG

## 2017-11-20 DIAGNOSIS — R07.2 PRECORDIAL PAIN: Primary | ICD-10-CM

## 2017-11-20 DIAGNOSIS — M79.602 PAIN IN INFERIOR LEFT UPPER EXTREMITY: ICD-10-CM

## 2017-11-20 DIAGNOSIS — M54.2 NECK PAIN: ICD-10-CM

## 2017-11-20 NOTE — PROGRESS NOTES
1. Have you been to the ER, urgent care clinic since your last visit? Hospitalized since your last visit? Yes- See below. 2. Have you seen or consulted any other health care providers outside of the [address] [place] [address] Cristiano since your last visit? Include any pap smears or colon screening. Dialysis Dr. Grossman Round 11/2017. Chief Complaint   Patient presents with    Arm Pain     For almost 2 weeks, patient has been having left arm pain. Patient states pain is worse with walking.  Chest Pain     Last Monday, patient experienced some chest discomfort; went to \[facility]\"" ER on 11/13/2017 and a workup was done and nothing was found. Fasting    Eye exam was done about 1 month ago at [facility].

## 2017-11-20 NOTE — MR AVS SNAPSHOT
Visit Information Date & Time Provider Department Dept. Phone Encounter #  
 11/20/2017  8:10 AM Mary Lou Wallace MD [place] [address] [phone] [id number] Follow-up Instructions Return if symptoms worsen or fail to improve. Your Appointments 1/29/2018  9:50 AM  
FOLLOW UP 10 with MD MIKHAIL Noriega [facility] ([address]) Appt Note: [address] [facility] [address] [address] [address] [facility] [address] Upcoming Health Maintenance Date Due DTaP/Tdap/Td series (1 - Tdap) 9/21/1971 ZOSTER VACCINE AGE 60> 7/21/2010 GLAUCOMA SCREENING Q2Y 9/21/2015 Pneumococcal 65+ High/Highest Risk (2 of 2 - PPSV23) 9/13/2018 MEDICARE YEARLY EXAM 10/26/2018 COLONOSCOPY 12/18/2023 Allergies as of 11/20/2017  Review Complete On: 11/20/2017 By: Mary Lou Wallace MD  
  
 Severity Noted Reaction Type Reactions Codeine  09/08/2012    Hives Current Immunizations  Never Reviewed Name Date Pneumococcal Conjugate (PCV-13) 10/12/2015 Pneumococcal Polysaccharide (PPSV-23) 9/13/2013 Not reviewed this visit You Were Diagnosed With   
  
 Codes Comments Precordial pain    -  Primary ICD-10-CM: R07.2 ICD-9-CM: 786.51 Pain in inferior left upper extremity     ICD-10-CM: P63.939 ICD-9-CM: 729.5 Neck pain     ICD-10-CM: M54.2 ICD-9-CM: 723.1 Vitals BP Pulse Temp Resp Height(growth percentile) Weight(growth percentile) 138/82 (BP 1 Location: Right arm, BP Patient Position: Sitting) 84 98.1 °F (36.7 °C) (Oral) 16 5' 9\" (1.753 m) 204 lb 3.2 oz (92.6 kg) SpO2 BMI Smoking Status 98% 30.16 kg/m2 Never Smoker Vitals History BMI and BSA Data Body Mass Index Body Surface Area  
 30.16 kg/m 2 2.12 m 2 Preferred Pharmacy Pharmacy Name Phone [facility] [address] [place] [address] [facility], [facility] [address] [place] [address] [phone] Your Updated Medication List  
  
   
This list is accurate as of: 11/20/17  9:21 AM.  Always use your most recent med list. amLODIPine 10 mg tablet Commonly known as:  Voncile [place] TAKE 1 TABLET BY MOUTH ONCE DAILY  
  
 amoxicillin 500 mg capsule Commonly known as:  AMOXIL TAKE 4 CAPSULES BY MOUTH 1 HOUR PRIOR TO DENTAL PROCEDURE  
  
 omeprazole 40 mg capsule Commonly known as:  PRILOSEC  
TAKE 1 CAPSULE BY MOUTH ONCE DAILY RENVELA 800 mg Tab tab Generic drug:  sevelamer carbonate Take 1,600 mg by mouth three (3) times daily (with meals). We Performed the Following AMB POC EKG ROUTINE W/ 12 LEADS, INTER & REP [24909 CPT(R)] REFERRAL TO CARDIOLOGY [SLB98 Custom] Comments:  
 Does not need formal consult but needs urgent stress Cardiolite Follow-up Instructions Return if symptoms worsen or fail to improve. To-Do List   
 11/20/2017 Imaging:  XR HUMERUS LT   
  
 11/20/2017 Imaging:  XR SPINE CERV PA LAT ODONT 3 V MAX Referral Information Referral ID Referred By Referred To  
  
 [id number] Shruthi Paz MD   
   [id number] [address], [address] Phone: [phone] Fax: [phone] Visits Status Start Date End Date 1 New Request 11/20/17 11/20/18 If your referral has a status of pending review or denied, additional information will be sent to support the outcome of this decision. Introducing hospitals & HEALTH SERVICES! [facility] introduces [email or web address] patient portal. Now you can access parts of your medical record, email your doctor's office, and request medication refills online. 1. In your internet browser, go to [email or web address]. Tot[employer] Fit For A Kid!t 2. Click on the First Time User? Click Here link in the Sign In box.  You will see the New Member Sign Up page. 3. Enter your [employer] Access Code exactly as it appears below. You will not need to use this code after youve completed the sign-up process. If you do not sign up before the expiration date, you must request a new code. · [employer] Access Code: [email or web address] Expires: 1/23/2018  8:51 AM 
 
4. Enter the last four digits of your Social Security Number (xxxx) and Date of Birth (mm/dd/yyyy) as indicated and click Submit. You will be taken to the next sign-up page. 5. Create a [employer] ID. This will be your [employer] login ID and cannot be changed, so think of one that is secure and easy to remember. 6. Create a [employer] password. You can change your password at any time. 7. Enter your Password Reset Question and Answer. This can be used at a later time if you forget your password. 8. Enter your e-mail address. You will receive e-mail notification when new information is available in [address] 9. Click Sign Up. You can now view and download portions of your medical record. 10. Click the Download Summary menu link to download a portable copy of your medical information. If you have questions, please visit the Frequently Asked Questions section of the [employer] website. Remember, [employer] is NOT to be used for urgent needs. For medical emergencies, dial 911. Now available from your iPhone and Android! Please provide this summary of care documentation to your next provider. Your primary care clinician is listed as Lucy. If you have any questions after today's visit, please call [phone].

## 2017-11-20 NOTE — PROGRESS NOTES
Chief Complaint   Patient presents with    Arm Pain     For almost 2 weeks, patient has been having left arm pain. Patient states pain is worse with walking.  Chest Pain     Last Monday, patient experienced some chest discomfort; went to [facility] ER on 11/13/2017 and a workup was done and nothing was found. SUBJECTIVE:    Sanna Owens is a 79 y.o. male who presents today complaining of some left arm pain his left arm pain is been going on and off for couple weeks. He actually went to the emergency room a week ago today at 11/13/2017 and had evaluation of the left arm pain because he was also having some intermittent precordial chest pain. He noted that it does seem to come on when he is physically active. But also seems to be position related to his arm hanging down or when he is laying on his left arm. He is also noting some left neck discomfort but that that does not really seem to be exertional.  He denies any associated palpitations shortness of breath PND orthopnea denies any diaphoresis nausea vomiting or dyspnea on exertion. He had workup in the emergency room which I reviewed that today CPK and troponin ×2 were normal EKG was normal as well as a chest x-ray which was normal and CBC. He is concerned because they did not evaluate his arm but he does note that the chest discomfort that he had went to emergency room seems to have gone away. He denies any other complaints on complete review of systems. Current Outpatient Prescriptions   Medication Sig Dispense Refill    amLODIPine (NORVASC) 10 mg tablet TAKE 1 TABLET BY MOUTH ONCE DAILY  11    amoxicillin (AMOXIL) 500 mg capsule TAKE 4 CAPSULES BY MOUTH 1 HOUR PRIOR TO DENTAL PROCEDURE  1    omeprazole (PRILOSEC) 40 mg capsule TAKE 1 CAPSULE BY MOUTH ONCE DAILY  11    sevelamer carbonate (RENVELA) 800 mg Tab tab Take 1,600 mg by mouth three (3) times daily (with meals).        Past Medical History:   Diagnosis Date    Arthritis     Chronic kidney disease     Dialysis T, Thur, Sat @ [facility]    Gout     Hepatitis C     Hypertension     Liver disease     Hep C    Other and unspecified alcohol dependence, unspecified drinking behavior     Acid reflux    Other ill-defined conditions(799.89)     gout    Other ill-defined conditions(799.89)     blood transfusion hx     Past Surgical History:   Procedure Laterality Date    HX OTHER SURGICAL      liver biopsy    HX VASCULAR ACCESS      [facility]    HX VASCULAR ACCESS  7/15/13    CREATION LEFT UPPER ARM BASILIC VEIN TRANSPOSITION    HX VASCULAR ACCESS      [facility]     Allergies   Allergen Reactions    Codeine Hives       REVIEW OF SYSTEMS:  General: negative for - chills or fever, or weight loss or gain  ENT: negative for - headaches, nasal congestion or tinnitus  Eyes: no blurred or visual changes  Neck: No stiffness or swollen nodes. Some pain is noted  Respiratory: negative for - cough, hemoptysis, shortness of breath or wheezing  Cardiovascular : Positive for some chest pain as described above which seems to have improved now. Negative for -  edema, palpitations or shortness of breath  Gastrointestinal: negative for - abdominal pain, blood in stools, heartburn or nausea/vomiting  Genito-Urinary: no dysuria, trouble voiding, or hematuria  Musculoskeletal: negative for - gait disturbance, joint pain, joint stiffness or joint swelling.   Some left upper arm pain  Neurological: no TIA or stroke symptoms  Hematologic: no bruises, no bleeding  Lymphatic: no swollen glands  Integument: no lumps, mole changes, nail changes or rash  Endocrine:no malaise/lethargy poly uria or polydipsia or unexpected weight changes        Social History     Social History    Marital status: SINGLE     Spouse name: N/A    Number of children: N/A    Years of education: N/A     Social History Main Topics    Smoking status: Never Smoker    Smokeless tobacco: Never Used    Alcohol use No    Drug use: Yes     Special: Prescription, Marijuana      Comment: smoked pot 4 years ago    Sexual activity: Not Asked     Other Topics Concern    None     Social History Narrative     History reviewed. No pertinent family history. OBJECTIVE:     Visit Vitals    /82 (BP 1 Location: Right arm, BP Patient Position: Sitting)    Pulse 84    Temp 98.1 °F (36.7 °C) (Oral)    Resp 16    Ht 5' 9\" (1.753 m)    Wt 204 lb 3.2 oz (92.6 kg)    SpO2 98%    BMI 30.16 kg/m2     CONSTITUTIONAL:   well nourished, appears age appropriate  EYES: sclera anicteric, PERRL, EOMI  ENMT:nars clear, moist mucous membranes, pharynx clear  NECK: supple. Thyroid normal, No JVD or bruits  RESPIRATORY: Chest: clear to ascultation and percussion, normal inspiratory effort  CARDIOVASCULAR: Heart: regular rate and rhythm no murmurs, rubs or gallops, PMI not displaced, No thrills  GASTROINTESTINAL: Abdomen: non distended, soft, non tender, bowel sounds normal  HEMATOLOGIC: no purpura, petechiae or bruising  LYMPHATIC: No lymph node enlargemant  MUSCULOSKELETAL: Extremities: no edema or active synovitis, pulse 1+. Particular attention the left shoulder is normal there is dialysis access fistula in his left upper arm which he says was recently evaluated and found to be functioning perfectly normal that was done by ultrasound. INTEGUMENT: No unusual rashes or suspicious skin lesions noted. Nails appear normal.  PERIPHERAL VASCULAR: normal pulses femoral, PT and DP  NEUROLOGIC: non-focal exam, A & O X 3  PSYCHIATRIC:, appropriate affect     ASSESSMENT:   1. Precordial pain    2. Pain in inferior left upper extremity    3. Neck pain      Pression  1. Chest pain at it and EKG today that has new Q waves in leads in the anterior region that were not present before so I will set him up for a stress Cardiolite  2.   Left arm pain could be referred from his chest if the stress Cardiolite is negative then will go with Tylenol as needed for this  3. Neck pain some arthritic changes are noted on the plain x-ray of the cervical spine particularly at the C5-6 and C6-7 regions await official radiology reading on that and if all is negative with the cardiac evaluation may have to pursue that with an MRI. Follow-up to be determined based on results of the stress Cardiolite. I did tell him that should he develop any increased discomfort prior to the stress Cardiolite being done call me or go to emergency room immediately. PLAN:  .  Orders Placed This Encounter    XR SPINE CERV PA LAT ODONT 3 V MAX    XR HUMERUS LT    AMB POC EKG ROUTINE W/ 12 LEADS, INTER & REP         ATTENTION:   This medical record was transcribed using an electronic medical records system. Although proofread, it may and can contain electronic and spelling errors. Other human spelling and other errors may be present. Corrections may be executed at a later time. Please feel free to contact us for any clarifications as needed. Follow-up Disposition:  Return if symptoms worsen or fail to improve. No results found for any visits on 11/20/17. Myrtha Bence, MD    The patient verbalized understanding of the problems and plans as explained.

## 2017-12-06 ENCOUNTER — TELEPHONE (OUTPATIENT)
Dept: INTERNAL MEDICINE CLINIC | Age: 67
End: 2017-12-06

## 2017-12-06 DIAGNOSIS — M54.2 CERVICAL PAIN (NECK): Primary | ICD-10-CM

## 2017-12-06 NOTE — TELEPHONE ENCOUNTER
Informed patient that stress cardiolite was negative. Patient continues to c/o neck and left arm pain. Dr. Hogan Been said it was okay to schedule the MRI of the c spine. Informed patient that the hospital would schedule and call him within 48 hours. Advised to call back if they did not contact him.

## 2017-12-20 ENCOUNTER — HOSPITAL ENCOUNTER (OUTPATIENT)
Dept: MRI IMAGING | Age: 67
Discharge: HOME | End: 2017-12-20
Attending: INTERNAL MEDICINE
Payer: MEDICARE

## 2017-12-20 DIAGNOSIS — M54.2 CERVICAL PAIN (NECK): ICD-10-CM

## 2017-12-20 PROCEDURE — 72141 MRI NECK SPINE W/O DYE: CPT

## 2017-12-20 NOTE — PROGRESS NOTES
MRI shows mild to moderate stenosis at several levels left worse than right so surgical evaluation needs to be arranged

## 2017-12-22 DIAGNOSIS — M48.02 STENOSIS OF CERVICAL SPINE: Primary | ICD-10-CM

## 2017-12-22 NOTE — PROGRESS NOTES
MRI shows mild to moderate stenosis at several levels left worse than right so surgical evaluation needs to be arranged. Patient informed.   Appointment to be scheduled by referral coordinator and call patient with data and time.

## 2018-01-23 RX ORDER — DIPHENHYDRAMINE HCL 25 MG
25 CAPSULE ORAL
COMMUNITY
End: 2018-08-29 | Stop reason: ALTCHOICE

## 2018-06-07 PROBLEM — E66.09 CLASS 1 OBESITY DUE TO EXCESS CALORIES WITHOUT SERIOUS COMORBIDITY WITH BODY MASS INDEX (BMI) OF 32.0 TO 32.9 IN ADULT: Status: ACTIVE | Noted: 2018-06-07

## 2018-06-08 ENCOUNTER — OFFICE VISIT (OUTPATIENT)
Dept: INTERNAL MEDICINE CLINIC | Age: 68
End: 2018-06-08

## 2018-06-08 VITALS
TEMPERATURE: 98.5 F | HEIGHT: 69 IN | DIASTOLIC BLOOD PRESSURE: 80 MMHG | OXYGEN SATURATION: 98 % | BODY MASS INDEX: 30.69 KG/M2 | SYSTOLIC BLOOD PRESSURE: 130 MMHG | RESPIRATION RATE: 16 BRPM | WEIGHT: 207.2 LBS | HEART RATE: 76 BPM

## 2018-06-08 DIAGNOSIS — B18.2 CHRONIC HEPATITIS C WITHOUT HEPATIC COMA (HCC): ICD-10-CM

## 2018-06-08 DIAGNOSIS — I12.9 HYPERTENSION, RENAL DISEASE: Primary | ICD-10-CM

## 2018-06-08 DIAGNOSIS — E78.2 MIXED HYPERLIPIDEMIA: ICD-10-CM

## 2018-06-08 DIAGNOSIS — K21.9 GASTROESOPHAGEAL REFLUX DISEASE WITHOUT ESOPHAGITIS: ICD-10-CM

## 2018-06-08 DIAGNOSIS — E66.09 CLASS 1 OBESITY DUE TO EXCESS CALORIES WITHOUT SERIOUS COMORBIDITY WITH BODY MASS INDEX (BMI) OF 32.0 TO 32.9 IN ADULT: ICD-10-CM

## 2018-06-08 DIAGNOSIS — N18.6 ESRD (END STAGE RENAL DISEASE) (HCC): ICD-10-CM

## 2018-06-08 LAB
CHOLEST SERPL-MCNC: 171 MG/DL (ref 0–200)
HDLC SERPL-MCNC: 61 MG/DL (ref 35–130)
LDL CHOLESTEROL POC: 86 MG/DL (ref 0–130)
TCHOL/HDL RATIO (POC): 2.8 (ref 0–4)
TRIGL SERPL-MCNC: 120 MG/DL (ref 0–200)
VLDLC SERPL CALC-MCNC: 24 MG/DL

## 2018-06-08 NOTE — PROGRESS NOTES
Chief Complaint   Patient presents with    Hypertension     3 month follow up      1. Have you been to the ER, urgent care clinic since your last visit? Hospitalized since your last visit? No    2. Have you seen or consulted any other health care providers outside of the [address] [place] [address] since your last visit? Include any pap smears or colon screening.  No     Fasting

## 2018-06-08 NOTE — PROGRESS NOTES
Chief Complaint   Patient presents with    Hypertension     3 month follow up        SUBJECTIVE:    Keaton Smith is a 79 y.o. male who returns for follow-up of his medical problems include hypertension, end-stage renal disease, GERD, hyperlipidemia, DJD, and chronic hepatitis C. He is taking his medications trying to follow his diet and getting his dialysis Monday Wednesdays and Fridays. He currently denies any chest pain, shortness breath, palpitations or cardiorespiratory commands. He denies any GI or  complaints. He denies any headaches, dizziness or neurologic planes. There are no current arthritic complaints no other complaints on complete review of systems. Current Outpatient Prescriptions   Medication Sig Dispense Refill    ERGOCALCIFEROL, VITAMIN D2, (VITAMIN D2 PO) Take  by mouth.  diphenhydrAMINE (BENADRYL) 25 mg capsule Take 25 mg by mouth every six (6) hours as needed.  amLODIPine (NORVASC) 10 mg tablet TAKE 1 TABLET BY MOUTH ONCE DAILY  11    amoxicillin (AMOXIL) 500 mg capsule TAKE 4 CAPSULES BY MOUTH 1 HOUR PRIOR TO DENTAL PROCEDURE  1    omeprazole (PRILOSEC) 40 mg capsule TAKE 1 CAPSULE BY MOUTH ONCE DAILY  11    sevelamer carbonate (RENVELA) 800 mg Tab tab Take 1,600 mg by mouth three (3) times daily (with meals).        Past Medical History:   Diagnosis Date    Arthritis     Chronic kidney disease     Dialysis T, Thur, Sat @ [facility]    Gout     Hepatitis C     Hypertension     Liver disease     Hep C    Other and unspecified alcohol dependence, unspecified drinking behavior     Acid reflux    Other ill-defined conditions(799.89)     gout    Other ill-defined conditions(799.89)     blood transfusion hx     Past Surgical History:   Procedure Laterality Date    HX OTHER SURGICAL      liver biopsy    HX VASCULAR ACCESS      [place] [facility]    HX VASCULAR ACCESS  7/15/13    CREATION LEFT UPPER ARM BASILIC VEIN TRANSPOSITION    HX VASCULAR ACCESS olivia catgorge     Allergies   Allergen Reactions    Codeine Hives       REVIEW OF SYSTEMS:  General: negative for - chills or fever, or weight loss or gain  ENT: negative for - headaches, nasal congestion or tinnitus  Eyes: no blurred or visual changes  Neck: No stiffness or swollen nodes  Respiratory: negative for - cough, hemoptysis, shortness of breath or wheezing  Cardiovascular : negative for - chest pain, edema, palpitations or shortness of breath  Gastrointestinal: negative for - abdominal pain, blood in stools, heartburn or nausea/vomiting  Genito-Urinary: no dysuria, trouble voiding, or hematuria  Musculoskeletal: negative for - gait disturbance, joint pain, joint stiffness or joint swelling  Neurological: no TIA or stroke symptoms  Hematologic: no bruises, no bleeding  Lymphatic: no swollen glands  Integument: no lumps, mole changes, nail changes or rash  Endocrine:no malaise/lethargy poly uria or polydipsia or unexpected weight changes        Social History     Social History    Marital status: SINGLE     Spouse name: N/A    Number of children: N/A    Years of education: N/A     Social History Main Topics    Smoking status: Never Smoker    Smokeless tobacco: Never Used    Alcohol use No    Drug use: Yes     Special: Prescription, Marijuana      Comment: smoked pot 4 years ago    Sexual activity: Not Asked     Other Topics Concern    None     Social History Narrative     History reviewed. No pertinent family history. OBJECTIVE:     Visit Vitals    /80 (BP 1 Location: Left arm, BP Patient Position: Sitting)    Pulse 76    Temp 98.5 °F (36.9 °C) (Oral)    Resp 16    Ht 5' 9\" (1.753 m)    Wt 207 lb 3.2 oz (94 kg)    SpO2 98%    BMI 30.6 kg/m2     CONSTITUTIONAL:   well nourished, appears age appropriate  EYES: sclera anicteric, PERRL, EOMI  ENMT:nares clear, moist mucous membranes, pharynx clear  NECK: supple.  Thyroid normal, No JVD or bruits  RESPIRATORY: Chest: clear to ascultation and percussion, normal inspiratory effort  CARDIOVASCULAR: Heart: regular rate and rhythm no murmurs, rubs or gallops, PMI not displaced, No thrills  GASTROINTESTINAL: Abdomen: non distended, soft, non tender, bowel sounds normal  HEMATOLOGIC: no purpura, petechiae or bruising  LYMPHATIC: No lymph node enlargemant  MUSCULOSKELETAL: Extremities: no edema or active synovitis, pulse 1+   INTEGUMENT: No unusual rashes or suspicious skin lesions noted. Nails appear normal.  PERIPHERAL VASCULAR: normal pulses femoral, PT and DP  NEUROLOGIC: non-focal exam, A & O X 3  PSYCHIATRIC:, appropriate affect     ASSESSMENT:   1. Hypertension, renal disease    2. Mixed hyperlipidemia    3. Gastroesophageal reflux disease without esophagitis    4. ESRD (end stage renal disease) (HCC)    5. Class 1 obesity due to excess calories without serious comorbidity with body mass index (BMI) of 32.0 to 32.9 in adult    6. Chronic hepatitis C without hepatic coma (HCC)      Impression  1. Hypertension that is controlled continue current therapy reviewed with him  2. Hyperlipidemia reviewed prior labs repeat status pending will make adjustments if necessary  3. GERD that is stable  4. End-stage renal disease continue to be followed by renal with dialysis  5. Obesity discussed diet exercise and weight reduction for [facility] benefit and note weight is up 3 pounds today  6. Chronic hepatitis C followed by Dr. Karlee Rain  I will call the lab make further recommendations adjustments if necessary. Follow stable continue same and follow-up schedule III months or sooner should there be a problem. PLAN:  .  Orders Placed This Encounter    AMB POC LIPID PROFILE         ATTENTION:   This medical record was transcribed using an electronic medical records system. Although proofread, it may and can contain electronic and spelling errors. Other human spelling and other errors may be present.   Corrections may be executed at a later time.  Please feel free to contact us for any clarifications as needed. Follow-up Disposition:  Return in about 3 months (around 9/8/2018). No results found for any visits on 06/08/18. Kenzie Lindsay MD    The patient verbalized understanding of the problems and plans as explained.

## 2018-06-08 NOTE — MR AVS SNAPSHOT
[address] [facility] [address] [id number] [phone] Patient: Thu Hernandez MRN: [id number] Visit Information Date & Time Provider Department Dept. Phone Encounter #  
 6/8/2018  9:10 AM Jimenez Mcpherson MD [place] [address] [phone] [id number] Follow-up Instructions Return in about 3 months (around 9/8/2018). Your Appointments 8/10/2018  9:30 AM  
Any with Edgar Carrera MD  
[facility] [address] [facility]) Appt Note: np, Dr. Tari Sylvester [phone], hep-c  
 [address] [facility] [phone] [facility] [address]  
[phone]  
  
   
 [place] [address]  
  
    
 8/29/2018 10:40 AM  
FOLLOW UP 10 with Jimenez Mcpherson MD  
[facility] ([address]) Appt Note: [address] [id number] [address] [facility] [id number] Upcoming Health Maintenance Date Due DTaP/Tdap/Td series (1 - Tdap) 9/21/1971 ZOSTER VACCINE AGE 60> 7/21/2010 GLAUCOMA SCREENING Q2Y 9/21/2015 Influenza Age 5 to Adult 8/1/2018 Pneumococcal 65+ High/Highest Risk (2 of 2 - PPSV23) 9/13/2018 MEDICARE YEARLY EXAM 10/26/2018 COLONOSCOPY 12/18/2023 Allergies as of 6/8/2018  Review Complete On: 6/8/2018 By: Jimenez Mcpherson MD  
  
 Severity Noted Reaction Type Reactions Codeine  09/08/2012    Hives Current Immunizations  Never Reviewed Name Date Influenza Vaccine 10/12/2015 Pneumococcal Conjugate (PCV-13) 10/12/2015 Pneumococcal Polysaccharide (PPSV-23) 9/13/2013 Not reviewed this visit You Were Diagnosed With   
  
 Codes Comments Hypertension, renal disease    -  Primary ICD-10-CM: I12.9 ICD-9-CM: 403.90, 585.9 Mixed hyperlipidemia     ICD-10-CM: E78.2 ICD-9-CM: 272.2 Gastroesophageal reflux disease without esophagitis     ICD-10-CM: K21.9 ICD-9-CM: 530.81 ESRD (end stage renal disease) ([facility]     ICD-10-CM: N18.6 ICD-9-CM: 659. 6 Class 1 obesity due to excess calories without serious comorbidity with body mass index (BMI) of 32.0 to 32.9 in adult     ICD-10-CM: E66.09, Z68.32 
ICD-9-CM: 278.00, V85.32 Chronic hepatitis C without hepatic coma (HCC)     ICD-10-CM: B18.2 ICD-9-CM: 070.54 Vitals BP Pulse Temp Resp Height(growth percentile) Weight(growth percentile) 130/80 (BP 1 Location: Left arm, BP Patient Position: Sitting) 76 98.5 °F (36.9 °C) (Oral) 16 5' 9\" (1.753 m) 207 lb 3.2 oz (94 kg) SpO2 BMI Smoking Status 98% 30.6 kg/m2 Never Smoker Vitals History BMI and BSA Data Body Mass Index Body Surface Area  
 30.6 kg/m 2 2.14 m 2 Preferred Pharmacy Pharmacy Name Phone [facility] [address] [place] [address] [phone] Your Updated Medication List  
  
   
This list is accurate as of 6/8/18 10:17 AM.  Always use your most recent med list. amLODIPine 10 mg tablet Commonly known as:  Janie Chimes TAKE 1 TABLET BY MOUTH ONCE DAILY  
  
 amoxicillin 500 mg capsule Commonly known as:  AMOXIL TAKE 4 CAPSULES BY MOUTH 1 HOUR PRIOR TO DENTAL PROCEDURE  
  
 BENADRYL 25 mg capsule Generic drug:  diphenhydrAMINE Take 25 mg by mouth every six (6) hours as needed. omeprazole 40 mg capsule Commonly known as:  PRILOSEC  
TAKE 1 CAPSULE BY MOUTH ONCE DAILY RENVELA 800 mg Tab tab Generic drug:  sevelamer carbonate Take 1,600 mg by mouth three (3) times daily (with meals). VITAMIN D2 PO Take  by mouth. We Performed the Following AMB POC LIPID PROFILE [77090 CPT(R)] Follow-up Instructions Return in about 3 months (around 9/8/2018). Patient Instructions Gastroesophageal Reflux Disease (GERD): Care Instructions Your Care Instructions Gastroesophageal reflux disease (GERD) is the backward flow of stomach acid into the esophagus. The esophagus is the tube that leads from your throat to your stomach. A one-way valve prevents the stomach acid from moving up into this tube. When you have GERD, this valve does not close tightly enough. If you have mild GERD symptoms including heartburn, you may be able to control the problem with antacids or over-the-counter medicine. Changing your diet, losing weight, and making other lifestyle changes can also help reduce symptoms. Follow-up care is a key part of your treatment and safety. Be sure to make and go to all appointments, and call your doctor if you are having problems. It's also a good idea to know your test results and keep a list of the medicines you take. How can you care for yourself at home? · Take your medicines exactly as prescribed. Call your doctor if you think you are having a problem with your medicine. · Your doctor may recommend over-the-counter medicine. For mild or occasional indigestion, antacids, such as Tums, Gaviscon, Mylanta, or Maalox, may help. Your doctor also may recommend over-the-counter acid reducers, such as Pepcid AC, Tagamet HB, Zantac 75, or Prilosec. Read and follow all instructions on the label. If you use these medicines often, talk with your doctor. · Change your eating habits. ¨ It's best to eat several small meals instead of two or three large meals. ¨ After you eat, wait 2 to 3 hours before you lie down. ¨ Chocolate, mint, and alcohol can make GERD worse. ¨ Spicy foods, foods that have a lot of acid (like tomatoes and oranges), and coffee can make GERD symptoms worse in some people. If your symptoms are worse after you eat a certain food, you may want to stop eating that food to see if your symptoms get better. · Do not smoke or chew tobacco. Smoking can make GERD worse. If you need help quitting, talk to your doctor about stop-smoking programs and medicines. These can increase your chances of quitting for good. · If you have GERD symptoms at night, raise the head of your bed 6 to 8 inches by putting the frame on blocks or placing a foam wedge under the head of your mattress. (Adding extra pillows does not work.) · Do not wear tight clothing around your middle. · Lose weight if you need to. Losing just 5 to 10 pounds can help. When should you call for help? Call your doctor now or seek immediate medical care if: 
? · You have new or different belly pain. ? · Your stools are black and tarlike or have streaks of blood. ? Watch closely for changes in your health, and be sure to contact your doctor if: 
? · Your symptoms have not improved after 2 days. ? · Food seems to catch in your throat or chest.  
Where can you learn more? Go to http://kirk-angelika.info/. Enter S191 in the search box to learn more about \"Gastroesophageal Reflux Disease (GERD): Care Instructions. \" Current as of: May 12, 2017 Content Version: 11.4 © 9332-7184 [employer]. Care instructions adapted under license by [employer] (which disclaims liability or warranty for this information). If you have questions about a medical condition or this instruction, always ask your healthcare professional. [place] [address] any warranty or liability for your use of this information. Introducing [facility]! Dear aJyleen Nuñez: Thank you for requesting a CloudSponge account. Our records indicate that you already have an active CloudSponge account. You can access your account anytime at [email or web address] Did you know that you can access your hospital and ER discharge instructions at any time in CloudSponge?   You can also review all of your test results from your hospital stay or ER visit. Additional Information If you have questions, please visit the Frequently Asked Questions section of the Pict website at [email or web address] Remember, Pict is NOT to be used for urgent needs. For medical emergencies, dial 911. Now available from your iPhone and Android! Please provide this summary of care documentation to your next provider. Your primary care clinician is listed as Lucy. If you have any questions after today's visit, please call [phone].

## 2018-08-10 ENCOUNTER — OFFICE VISIT (OUTPATIENT)
Dept: HEMATOLOGY | Age: 68
End: 2018-08-10

## 2018-08-10 VITALS
HEIGHT: 69 IN | SYSTOLIC BLOOD PRESSURE: 138 MMHG | OXYGEN SATURATION: 99 % | RESPIRATION RATE: 19 BRPM | HEART RATE: 83 BPM | WEIGHT: 201.2 LBS | BODY MASS INDEX: 29.8 KG/M2 | TEMPERATURE: 98.4 F | DIASTOLIC BLOOD PRESSURE: 78 MMHG

## 2018-08-10 DIAGNOSIS — Z11.59 ENCOUNTER FOR SCREENING FOR OTHER VIRAL DISEASES: ICD-10-CM

## 2018-08-10 DIAGNOSIS — B18.2 CHRONIC HEPATITIS C WITHOUT HEPATIC COMA (HCC): Primary | ICD-10-CM

## 2018-08-10 PROBLEM — M54.2 NECK PAIN: Status: RESOLVED | Noted: 2017-11-20 | Resolved: 2018-08-10

## 2018-08-10 PROBLEM — Z00.00 MEDICARE ANNUAL WELLNESS VISIT, INITIAL: Status: RESOLVED | Noted: 2017-10-25 | Resolved: 2018-08-10

## 2018-08-10 PROBLEM — Z12.5 PROSTATE CANCER SCREENING: Status: RESOLVED | Noted: 2017-10-25 | Resolved: 2018-08-10

## 2018-08-10 PROBLEM — M79.602 PAIN IN INFERIOR LEFT UPPER EXTREMITY: Status: RESOLVED | Noted: 2017-11-20 | Resolved: 2018-08-10

## 2018-08-10 NOTE — MR AVS SNAPSHOT
[address] [phone] [address] [facility] [address] 
[phone] Patient: Troy Mata MRN: [id number] IWQ:2/32/0549 Visit Information Date & Time Provider Department Dept. Phone Encounter #  
 8/10/2018  9:30 AM Alexandra Stevenson Raoul Hammond [place] [address] [id number] Follow-up Instructions Return in about 4 weeks (around 9/7/2018) for April with FS. Your Appointments 8/29/2018 10:40 AM  
FOLLOW UP 10 with MD HEIDI Matthew [facility] ([facility]) Appt Note: [address] [facility] [address] [address] [address] [facility] [address] Upcoming Health Maintenance Date Due DTaP/Tdap/Td series (1 - Tdap) 9/21/1971 ZOSTER VACCINE AGE 60> 7/21/2010 GLAUCOMA SCREENING Q2Y 9/21/2015 Influenza Age 5 to Adult 8/1/2018 Pneumococcal 65+ High/Highest Risk (2 of 2 - PPSV23) 9/13/2018 MEDICARE YEARLY EXAM 10/26/2018 COLONOSCOPY 12/18/2023 Allergies as of 8/10/2018  Review Complete On: 8/10/2018 By: Clay Adams MD  
  
 Severity Noted Reaction Type Reactions Codeine  09/08/2012    Hives Current Immunizations  Never Reviewed Name Date Influenza Vaccine 10/12/2015 Pneumococcal Conjugate (PCV-13) 10/12/2015 Pneumococcal Polysaccharide (PPSV-23) 9/13/2013 Not reviewed this visit You Were Diagnosed With   
  
 Codes Comments Chronic hepatitis C without hepatic coma (HCC)    -  Primary ICD-10-CM: B18.2 ICD-9-CM: 070.54 Encounter for screening for other viral diseases     ICD-10-CM: Z11.59 
ICD-9-CM: V73.89 Vitals BP Pulse Temp Resp Height(growth percentile) Weight(growth percentile) 138/78 (BP 1 Location: Right arm, BP Patient Position: Sitting) 83 98.4 °F (36.9 °C) (Oral) 19 5' 9\" (1.753 m) 201 lb 3.2 oz (91.3 kg) SpO2 BMI Smoking Status 99% 29.71 kg/m2 Never Smoker Vitals History BMI and BSA Data Body Mass Index Body Surface Area  
 29.71 kg/m 2 2.11 m 2 Preferred Pharmacy [facility] Phone [facility] [address] [place] [address] [phone] Your Updated Medication List  
  
   
This list is accurate as of 8/10/18 10:42 AM.  Always use your most recent med list. amLODIPine 10 mg tablet Commonly known as:  Sha Alstrom TAKE 1 TABLET BY MOUTH ONCE DAILY  
  
 amoxicillin 500 mg capsule Commonly known as:  AMOXIL TAKE 4 CAPSULES BY MOUTH 1 HOUR PRIOR TO DENTAL PROCEDURE  
  
 BENADRYL 25 mg capsule Generic drug:  diphenhydrAMINE Take 25 mg by mouth every six (6) hours as needed. omeprazole 40 mg capsule Commonly known as:  PRILOSEC  
TAKE 1 CAPSULE BY MOUTH ONCE DAILY RENVELA 800 mg Tab tab Generic drug:  sevelamer carbonate Take 1,600 mg by mouth three (3) times daily (with meals). VITAMIN D2 PO Take  by mouth. We Performed the Following CBC WITH AUTOMATED DIFF [56522 CPT(R)] HCV RNA BY AMY QL,RFLX TO QT [27486 CPT(R)] HEP A AB, TOTAL [id number] CPT(R)] HEP B SURFACE AB [id number] CPT(R)] HEP B SURFACE AG [id number] CPT(R)] HEP C GENOTYPE [77458 CPT(R)] HEPATIC FUNCTION PANEL [76668 CPT(R)] HEPATITIS B CORE AB, TOTAL [id number] CPT(R)] METABOLIC PANEL, BASIC [60465 CPT(R)] Follow-up Instructions Return in about 4 weeks (around 9/7/2018) for April with FS. To-Do List   
 08/10/2018 Imaging:  US ABD LTD   
  
 10/17/2018 10:30 AM  
  Appointment with Kayce Koehler NP at [place] [address] ([phone]) Introducing [facility]! Dear Ana Darnell: Thank you for requesting a Datahero account. Our records indicate that you already have an active Datahero account. You can access your account anytime at [email or web address]/[email or web address] Did you know that you can access your hospital and ER discharge instructions at any time in SpongeFish? You can also review all of your test results from your hospital stay or ER visit. Additional Information If you have questions, please visit the Frequently Asked Questions section of the SpongeFish website at [email or web address] Remember, SpongeFish is NOT to be used for urgent needs. For medical emergencies, dial 911. Now available from your iPhone and Android! Please provide this summary of care documentation to your next provider. Your primary care clinician is listed as Lucy. If you have any questions after today's visit, please call [phone].

## 2018-08-10 NOTE — PROGRESS NOTES
70 Pricilla Soliz MD, FACP, [place]ite Leela Gucci, [place] April Li Flynn, VEGA Boone ACNP-BC   NAIF DemarcogetNAIF Darren De Tomlin 136    at [place] [id number], [address] [address]    [address] [facility], [facility].    [phone]    FAX: [address]    at [facility]    [address] [facility], [address], [address]    [phone]    FAX: [phone]         Patient Care Team:  Karyna Ogden MD as PCP - General (Internal Medicine)      Problem List  Date Reviewed: 9/3/2018          Codes Class Noted    Gout of multiple sites ICD-10-CM: M10.9  ICD-9-CM: 274.9  10/25/2017        Chronic hepatitis C without hepatic coma ([facility].) ICD-10-CM: B18.2  ICD-9-CM: 070.54  10/25/2017        Hypertension, renal disease ICD-10-CM: I12.9  ICD-9-CM: 403.90, 585.9  10/22/2017        Mixed hyperlipidemia ICD-10-CM: E78.2  ICD-9-CM: 272.2  10/22/2017        ESRD (end stage renal disease) ([facility].) ICD-10-CM: N18.6  ICD-9-CM: 585.6  10/22/2017        Gastroesophageal reflux disease without esophagitis ICD-10-CM: K21.9  ICD-9-CM: 530.81  10/22/2017        Vitamin D deficiency ICD-10-CM: E55.9  ICD-9-CM: 268.9  10/22/2017                The clinicians listed above have asked me to see Abdiel Ayon in consultation regarding chronic HCV and its management. All medical records sent by the referring physicians were reviewed including imaging studies     The patient is a 79 y.o. Black male who was noted to have abnormalities in liver chemistries and subsequently tested positive for chronic HCV in the 2000s. Risk factors for acquiring HCV are IV drug use in 1970s. There was no history of acute icteric hepatitis at the time of these risk factors.       Imaging of the liver was not recently performed. A liver biopsy the early 2000s when I cared for him at [facility]. The results are not known at this time. The patient was treated with peginterferon and ribavirin in the mid-2000s. The patient was treated for 24 weeks. The patient tolerated treatment poorly and had to prematurely discontinue therapy. HCV RNA levels obtained during treatment are not available at this time. The patient notes fatigue,     The patient has not experienced pain in the right side over the liver,     The patient has limitations in functional activities which can be attributed to the liver disease and to other medical problems that are not related to the liver disease. All of the issues listed in the Assessment and Plan were discussed with the patient. All questions were answered. The patient expressed a clear understanding of the above. [address] [facility] in 4 weeks for Fibroscan and to initiate HCV treatment. ASSESSMENT AND PLAN:  Chronic HCV   Chronic HCV of unclear severity. The most recent laboratory studies indicate that the liver transaminases are normal, alkaline phosphatase is normal, tests of hepatic synthetic and metabolic function are normal, and the platelet count is normal.    Based upon laboratory studies and imaging the patient does not appear to have advanced liver disease     Will perform and/or review results of HCV viral load and HCV genotype to define the specific treatment and duration of treatment that will be required. Will perform serologic and virologic studies to assess for other causes of chronic liver disease. The need to perform an assessment of liver fibrosis was discussed with the patient. The FibroScan can assess liver fibrosis and determine if a patient has advanced fibrosis or cirrhosis without the need for liver biopsy. The FibroScan is currently available at liver [place].  This will be performed at the next office visit. The patient has not previously been treated for HCV. The patient has HCV genotype 1B. Discussed the treatment alternatives. The SVR/cure rate for HCV now exceeds 90% with just oral anti-viral therapy and no interferon injections or significant side effects for most patients with HCV. The specific treatment is dependent upon genotype, viral load and histology. The patient should be treated with Harvoni (sofasbuvir and ledipasvir), Mavyret (glecaprevir and piprentasvir), Zepetier (Grazaprevir and Elbasvir) or Epclusa (sofosbuvir and valpitasvir). The SVR/cure rate for is over 95%. Discussed enrolling in the Target HCV database which is nationwide program into which the results of clinical treatment of HCV is reported. The patient has ESRD on dialysis. He is unsure if he is a candidate for a renal transplant. If he is a candidate for a renal transplant HCV treatment should be deferred until after the transplant. He should take an HCV positive donor liver and his wait time to get a renal transplant would be only 1 year compared to 5-7 years. If he is not a candidate for renal transplant he should be treated for HCV now. Treatment of other medical problems in patients with chronic liver disease  There are no contraindications for the patient to take any medications that are necessary for treatment of other medical issues. The patient does not consume alcohol daily. Normal doses of acetaminophen can be used for pain as needed. Normal doses of acetaminophen as recommended on the label are not hepatotoxic, even in patients with cirrhosis. Counseling for alcohol in patients with chronic liver disease  The patient was counseled regarding alcohol consumption and the effect of alcohol on chronic liver disease. The patient does not consume any significant amount of alcohol. Vaccinations   Vaccination for viral hepatitis A and B is not needed.   The patient has serologic evidence of prior exposure or vaccination with immunity. Routine vaccinations against other bacterial and viral agents can be performed as indicated. Annual flu vaccination should be administered if indicated. ALLERGIES  Allergies   Allergen Reactions    Codeine Hives       MEDICATIONS  Current Outpatient Prescriptions   Medication Sig    amLODIPine (NORVASC) 10 mg tablet TAKE 1 TABLET BY MOUTH ONCE DAILY    amoxicillin (AMOXIL) 500 mg capsule TAKE 4 CAPSULES BY MOUTH 1 HOUR PRIOR TO DENTAL PROCEDURE    omeprazole (PRILOSEC) 40 mg capsule TAKE 1 CAPSULE BY MOUTH ONCE DAILY    sevelamer carbonate (RENVELA) 800 mg Tab tab Take 1,600 mg by mouth three (3) times daily (with meals).  ERGOCALCIFEROL, VITAMIN D2, (VITAMIN D2 PO) Take  by mouth. No current facility-administered medications for this visit. SYSTEM REVIEW NOT RELATED TO LIVER DISEASE OR REVIEWED ABOVE:  Constitution systems: Negative for fever, chills, weight gain, weight loss. Eyes: Negative for visual changes. ENT: Negative for sore throat, painful swallowing. Respiratory: Negative for cough, hemoptysis, SOB. Cardiology: Negative for chest pain, palpitations. GI:  Negative for constipation or diarrhea. : Negative for urinary frequency, dysuria, hematuria, nocturia. Skin: Negative for rash. Hematology: Negative for easy bruising, blood clots. Musculo-skelatal: Negative for back pain, muscle pain, weakness. Neurologic: Negative for headaches, dizziness, vertigo, memory problems not related to HE. Psychology: Negative for anxiety, depression. FAMILY HISTORY:  The patient has no knowledge of the father's medical condition. The mother  of CVA. There is no family history of liver disease. SOCIAL HISTORY:  The patient is . The patient has 2 children, and 9 grandchildren. The patient has never used tobacco products. The patient has never consumed significant amounts of alcohol. 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 PHYSICAL EXAMINATION:  Visit Vitals    /78 (BP 1 Location: Right arm, BP Patient Position: Sitting)    Pulse 83    Temp 98.4 °F (36.9 °C) (Oral)    Resp 19    Ht 5' 9\" (1.753 m)    Wt 201 lb 3.2 oz (91.3 kg)    SpO2 99%    BMI 29.71 kg/m2     General: No acute distress. Eyes: Sclera anicteric. ENT: No oral lesions. Thyroid normal.  Nodes: No adenopathy. Skin: No spider angiomata. No jaundice. No palmar erythema. Respiratory: Lungs clear to auscultation. Cardiovascular: Regular heart rate. No murmurs. No JVD. Abdomen: Soft non-tender. Liver size normal to percussion/palpation. Spleen not palpable. No obvious ascites. Extremities: No edema. No muscle wasting. No gross arthritic changes. Neurologic: Alert and oriented. Cranial nerves grossly intact. No asterixis.     LABORATORY STUDIES:  Liver [place] of [address] Units 8/10/2018 11/13/2017   WBC 3.4 - 10.8 x10E3/uL 7.2 6.0   ANC 1.4 - 7.0 x10E3/uL 3.6 2.9   HGB 13.0 - 17.7 g/dL 13.9 15.0   HGB 12.0 - 18.0 g/dL     HGB (iSTAT) 12.0 - 18.0 g/dL      - 379 x10E3/uL 243 227   AST 0 - 40 IU/L 43 (H) 33   ALT 0 - 44 IU/L 25 34   Alk Phos 39 - 117 IU/L 117 134 (H)   Bili, Total 0.0 - 1.2 mg/dL 0.8 0.7   Bili, Direct 0.00 - 0.40 mg/dL 0.23    Albumin 3.6 - 4.8 g/dL 4.2 3.4 (L)   BUN 8 - 27 mg/dL 24 42 (H)   BUN (iSTAT) 9 - 20 mg/dL     Creat 0.76 - 1.27 mg/dL 6.67 (H) 7.75 (H)   Creat (iSTAT) 0.8 - 1.5 mg/dL     Na 134 - 144 mmol/L 139 138   K 3.5 - 5.2 mmol/L 4.6 4.2   Cl 96 - 106 mmol/L 95 (L) 103   CO2 20 - 29 mmol/L 27 27   Glucose 65 - 99 mg/dL 95 91     SEROLOGIES:  Serologies Latest Ref Rng & Units 8/10/2018   Hep A Ab, Total Negative Positive (A)   Hep B Surface Ag Negative Negative   Hep B Core Ab, Total Negative Positive (A)   Hep B Surface AB QL  Reactive   Hep C Genotype  1b   HCV RT-PCR, Quant IU/mL [id number]     LIVER HISTOLOGY:  Not available or performed    ENDOSCOPIC PROCEDURES:  Not available or performed    RADIOLOGY:  8/2018. Ultrasound of liver. Normal appearing liver. No liver mass lesions.     OTHER TESTING:  Not available or performed      MD Regulo Olivia [address] of [address] of [address] [address] [address]  [place]  22.  [phone]

## 2018-08-10 NOTE — PROGRESS NOTES
Health Maintenance Due   Topic Date Due    DTaP/Tdap/Td series (1 - Tdap) 09/21/1971    ZOSTER VACCINE AGE 60>  07/21/2010    GLAUCOMA SCREENING Q2Y  09/21/2015    Influenza Age 5 to Adult  08/01/2018       Chief Complaint   Patient presents with    Hepatitis C       1. Have you been to the ER, urgent care clinic since your last visit? Hospitalized since your last visit? No    2. Have you seen or consulted any other health care providers outside of the [address] [place] [address] since your last visit? Include any pap smears or colon screening. No    3) Do you have an Advance Directive on file? no    4) Are you interested in receiving information on Advance Directives? NO      Patient is accompanied by self I have received verbal consent from Frandy Thurston to discuss any/all medical information while they are present in the room.

## 2018-08-15 LAB
ALBUMIN SERPL-MCNC: 4.2 G/DL (ref 3.6–4.8)
ALP SERPL-CCNC: 117 IU/L (ref 39–117)
ALT SERPL-CCNC: 25 IU/L (ref 0–44)
AST SERPL-CCNC: 43 IU/L (ref 0–40)
BASOPHILS # BLD AUTO: 0 X10E3/UL (ref 0–0.2)
BASOPHILS NFR BLD AUTO: 0 %
BILIRUB DIRECT SERPL-MCNC: 0.23 MG/DL (ref 0–0.4)
BILIRUB SERPL-MCNC: 0.8 MG/DL (ref 0–1.2)
BUN SERPL-MCNC: 24 MG/DL (ref 8–27)
BUN/CREAT SERPL: 4 (ref 10–24)
CALCIUM SERPL-MCNC: 8.8 MG/DL (ref 8.6–10.2)
CHLORIDE SERPL-SCNC: 95 MMOL/L (ref 96–106)
CO2 SERPL-SCNC: 27 MMOL/L (ref 20–29)
CREAT SERPL-MCNC: 6.67 MG/DL (ref 0.76–1.27)
EOSINOPHIL # BLD AUTO: 0.1 X10E3/UL (ref 0–0.4)
EOSINOPHIL NFR BLD AUTO: 2 %
ERYTHROCYTE [DISTWIDTH] IN BLOOD BY AUTOMATED COUNT: 13.6 % (ref 12.3–15.4)
GLUCOSE SERPL-MCNC: 95 MG/DL (ref 65–99)
HAV AB SER QL IA: POSITIVE
HBV CORE AB SERPL QL IA: POSITIVE
HBV SURFACE AB SER QL: REACTIVE
HBV SURFACE AG SERPL QL IA: NEGATIVE
HCT VFR BLD AUTO: 41.8 % (ref 37.5–51)
HCV GENTYP SERPL NAA+PROBE: NORMAL
HCV RNA SERPL NAA+PROBE-ACNC: NORMAL IU/ML
HCV RNA SERPL NAA+PROBE-LOG IU: 6.12 LOG10 IU/ML
HCV RNA SERPL QL NAA+PROBE: POSITIVE
HGB BLD-MCNC: 13.9 G/DL (ref 13–17.7)
IMM GRANULOCYTES # BLD: 0 X10E3/UL (ref 0–0.1)
IMM GRANULOCYTES NFR BLD: 0 %
LYMPHOCYTES # BLD AUTO: 2.4 X10E3/UL (ref 0.7–3.1)
LYMPHOCYTES NFR BLD AUTO: 33 %
MCH RBC QN AUTO: 33.2 PG (ref 26.6–33)
MCHC RBC AUTO-ENTMCNC: 33.3 G/DL (ref 31.5–35.7)
MCV RBC AUTO: 100 FL (ref 79–97)
MONOCYTES # BLD AUTO: 1.1 X10E3/UL (ref 0.1–0.9)
MONOCYTES NFR BLD AUTO: 15 %
NEUTROPHILS # BLD AUTO: 3.6 X10E3/UL (ref 1.4–7)
NEUTROPHILS NFR BLD AUTO: 50 %
PLATELET # BLD AUTO: 243 X10E3/UL (ref 150–379)
PLEASE NOTE, 550474: NORMAL
POTASSIUM SERPL-SCNC: 4.6 MMOL/L (ref 3.5–5.2)
PROT SERPL-MCNC: 8.7 G/DL (ref 6–8.5)
RBC # BLD AUTO: 4.19 X10E6/UL (ref 4.14–5.8)
SODIUM SERPL-SCNC: 139 MMOL/L (ref 134–144)
TEST INFORMATION: NORMAL
WBC # BLD AUTO: 7.2 X10E3/UL (ref 3.4–10.8)

## 2018-08-22 ENCOUNTER — HOSPITAL ENCOUNTER (OUTPATIENT)
Dept: ULTRASOUND IMAGING | Age: 68
Discharge: HOME | End: 2018-08-22
Attending: INTERNAL MEDICINE
Payer: MEDICARE

## 2018-08-22 DIAGNOSIS — B18.2 CHRONIC HEPATITIS C WITHOUT HEPATIC COMA (HCC): ICD-10-CM

## 2018-08-22 PROCEDURE — 76705 ECHO EXAM OF ABDOMEN: CPT

## 2018-08-29 ENCOUNTER — OFFICE VISIT (OUTPATIENT)
Dept: INTERNAL MEDICINE CLINIC | Age: 68
End: 2018-08-29

## 2018-08-29 VITALS
HEART RATE: 83 BPM | RESPIRATION RATE: 16 BRPM | DIASTOLIC BLOOD PRESSURE: 80 MMHG | OXYGEN SATURATION: 97 % | BODY MASS INDEX: 29.21 KG/M2 | WEIGHT: 197.2 LBS | SYSTOLIC BLOOD PRESSURE: 138 MMHG | HEIGHT: 69 IN

## 2018-08-29 DIAGNOSIS — N18.6 ESRD (END STAGE RENAL DISEASE) (HCC): ICD-10-CM

## 2018-08-29 DIAGNOSIS — I12.9 HYPERTENSION, RENAL DISEASE: Primary | ICD-10-CM

## 2018-08-29 DIAGNOSIS — E78.2 MIXED HYPERLIPIDEMIA: ICD-10-CM

## 2018-08-29 DIAGNOSIS — K21.9 GASTROESOPHAGEAL REFLUX DISEASE WITHOUT ESOPHAGITIS: ICD-10-CM

## 2018-08-29 DIAGNOSIS — E66.09 CLASS 1 OBESITY DUE TO EXCESS CALORIES WITHOUT SERIOUS COMORBIDITY WITH BODY MASS INDEX (BMI) OF 32.0 TO 32.9 IN ADULT: ICD-10-CM

## 2018-08-29 NOTE — MR AVS SNAPSHOT
[address] [facility] [address] [address] [phone] Patient: Silvia Matthew MRN: [id number] RRE:[id number] Visit Information Date & Time Provider Department Dept. Phone Encounter #  
 8/29/2018 10:40 AM Katharine Gaspar MD Alva Terrazas  [phone] [id number] Follow-up Instructions Return in about 3 months (around 11/29/2018). Follow-up and Disposition History Your Appointments 11/30/2018 10:30 AM  
FOLLOW UP 10 with MD MIKHAIL Palomo [facility] ([address]) Appt Note: [address] [address] [address] [facility] [address] [address] Upcoming Health Maintenance Date Due DTaP/Tdap/Td series (1 - Tdap) 9/21/1971 ZOSTER VACCINE AGE 60> 7/21/2010 GLAUCOMA SCREENING Q2Y 9/21/2015 Influenza Age 5 to Adult 8/1/2018 Pneumococcal 65+ High/Highest Risk (2 of 2 - PPSV23) 9/13/2018 MEDICARE YEARLY EXAM 10/26/2018 COLONOSCOPY 12/18/2023 Allergies as of 8/29/2018  Review Complete On: 8/29/2018 By: Katharine Gaspar MD  
  
 Severity Noted Reaction Type Reactions Codeine  09/08/2012    Hives Current Immunizations  Never Reviewed Name Date Influenza Vaccine 10/12/2015 Pneumococcal Conjugate (PCV-13) 10/12/2015 Pneumococcal Polysaccharide (PPSV-23) 9/13/2013 Not reviewed this visit You Were Diagnosed With   
  
 Codes Comments Hypertension, renal disease    -  Primary ICD-10-CM: I12.9 ICD-9-CM: 403.90, 585.9 Mixed hyperlipidemia     ICD-10-CM: E78.2 ICD-9-CM: 272.2 Gastroesophageal reflux disease without esophagitis     ICD-10-CM: K21.9 ICD-9-CM: 530.81 ESRD (end stage renal disease) ([facility]     ICD-10-CM: N18.6 ICD-9-CM: 724. 6  Class 1 obesity due to excess calories without serious comorbidity with body mass index (BMI) of 32.0 to 32.9 in adult     ICD-10-CM: E66.09, Z68.32 
ICD-9-CM: 278.00, V85.32 Vitals BP Pulse Resp Height(growth percentile) Weight(growth percentile) SpO2  
 138/80 (BP 1 Location: Right arm, BP Patient Position: Sitting) 83 16 5' 9\" (1.753 m) 197 lb 3.2 oz (89.4 kg) 97% BMI Smoking Status 29.12 kg/m2 Never Smoker BMI and BSA Data Body Mass Index Body Surface Area  
 29.12 kg/m 2 2.09 m 2 Preferred Pharmacy Pharm[facility] [address] [phone] Your Updated Medication List  
  
   
This list is accurate as of 8/29/18 11:45 AM.  Always use your most recent med list. amLODIPine 10 mg tablet Commonly known as:  José Conine TAKE 1 TABLET BY MOUTH ONCE DAILY  
  
 amoxicillin 500 mg capsule Commonly known as:  AMOXIL TAKE 4 CAPSULES BY MOUTH 1 HOUR PRIOR TO DENTAL PROCEDURE  
  
 omeprazole 40 mg capsule Commonly known as:  PRILOSEC  
TAKE 1 CAPSULE BY MOUTH ONCE DAILY RENVELA 800 mg Tab tab Generic drug:  sevelamer carbonate Take 1,600 mg by mouth three (3) times daily (with meals). VITAMIN D2 PO Take  by mouth. We Performed the Following LIPID PANEL [14262 CPT(R)] Follow-up Instructions Return in about 3 months (around 11/29/2018). To-Do List   
 10/17/2018 10:30 AM  
  Appointment with Kayce Kaplan NP at [place] [address] ([phone]) Patient Instructions Gastroesophageal Reflux Disease (GERD): Care Instructions Your Care Instructions Gastroesophageal reflux disease (GERD) is the backward flow of stomach acid into the esophagus. The esophagus is the tube that leads from your throat to your stomach. A one-way valve prevents the stomach acid from moving up into this tube. When you have GERD, this valve does not close tightly enough. If you have mild GERD symptoms including heartburn, you may be able to control the problem with antacids or over-the-counter medicine. Changing your diet, losing weight, and making other lifestyle changes can also help reduce symptoms. Follow-up care is a key part of your treatment and safety. Be sure to make and go to all appointments, and call your doctor if you are having problems. It's also a good idea to know your test results and keep a list of the medicines you take. How can you care for yourself at home? · Take your medicines exactly as prescribed. Call your doctor if you think you are having a problem with your medicine. · Your doctor may recommend over-the-counter medicine. For mild or occasional indigestion, antacids, such as Tums, Gaviscon, Mylanta, or Maalox, may help. Your doctor also may recommend over-the-counter acid reducers, such as Pepcid AC, Tagamet HB, Zantac 75, or Prilosec. Read and follow all instructions on the label. If you use these medicines often, talk with your doctor. · Change your eating habits. ¨ It's best to eat several small meals instead of two or three large meals. ¨ After you eat, wait 2 to 3 hours before you lie down. ¨ Chocolate, mint, and alcohol can make GERD worse. ¨ Spicy foods, foods that have a lot of acid (like tomatoes and oranges), and coffee can make GERD symptoms worse in some people. If your symptoms are worse after you eat a certain food, you may want to stop eating that food to see if your symptoms get better. · Do not smoke or chew tobacco. Smoking can make GERD worse. If you need help quitting, talk to your doctor about stop-smoking programs and medicines. These can increase your chances of quitting for good. · If you have GERD symptoms at night, raise the head of your bed 6 to 8 inches by putting the frame on blocks or placing a foam wedge under the head of your mattress. (Adding extra pillows does not work.) · Do not wear tight clothing around your middle. · Lose weight if you need to. Losing just 5 to 10 pounds can help. When should you call for help? Call your doctor now or seek immediate medical care if: 
  · You have new or different belly pain.  
  · Your stools are black and tarlike or have streaks of blood.  
 Watch closely for changes in your health, and be sure to contact your doctor if: 
  · Your symptoms have not improved after 2 days.  
  · Food seems to catch in your throat or chest.  
Where can you learn more? Go to http://kirk-angelika.info/. Enter W827 in the search box to learn more about \"Gastroesophageal Reflux Disease (GERD): Care Instructions. \" Current as of: May 12, 2017 Content Version: 11.7 © 0261-2434 [employer]. Care instructions adapted under license by Hitsbook (which disclaims liability or warranty for this information). If you have questions about a medical condition or this instruction, always ask your healthcare professional. [place] [address] any warranty or liability for your use of this information. Patient Instructions History Introducing [address]! Dear Erica Ledezma: Thank you for requesting a SensorWave account. Our records indicate that you already have an active SensorWave account. You can access your account anytime at [email or web address] Did you know that you can access your hospital and ER discharge instructions at any time in SensorWave? You can also review all of your test results from your hospital stay or ER visit. Additional Information If you have questions, please visit the Frequently Asked Questions section of the SensorWave website at [email or web address] Remember, SensorWave is NOT to be used for urgent needs. For medical emergencies, dial 911. Now available from your iPhone and Android! Please provide this summary of care documentation to your next provider. Your primary care clinician is listed as Lucy. If you have any questions after today's visit, please call [phone].

## 2018-08-29 NOTE — PROGRESS NOTES
Chief Complaint Patient presents with  Hypertension 3 month follow up SUBJECTIVE: 
 
John Adams is a 79 y.o. male who returns for follow-up of his medical problems include hypertension, end-stage renal disease, GERD, hyperlipidemia, DJD, and chronic hepatitis C. He is taking his medications trying to follow his diet and getting his dialysis Monday Wednesdays and Fridays. He currently denies any chest pain, shortness breath, palpitations or cardiorespiratory commands. He denies any GI or  complaints. He denies any headaches, dizziness or neurologic planes. There are no current arthritic complaints no other complaints on complete review of systems. Current Outpatient Prescriptions Medication Sig Dispense Refill  ERGOCALCIFEROL, VITAMIN D2, (VITAMIN D2 PO) Take  by mouth.  amLODIPine (NORVASC) 10 mg tablet TAKE 1 TABLET BY MOUTH ONCE DAILY  11  
 amoxicillin (AMOXIL) 500 mg capsule TAKE 4 CAPSULES BY MOUTH 1 HOUR PRIOR TO DENTAL PROCEDURE  1  
 omeprazole (PRILOSEC) 40 mg capsule TAKE 1 CAPSULE BY MOUTH ONCE DAILY  11  
 sevelamer carbonate (RENVELA) 800 mg Tab tab Take 1,600 mg by mouth three (3) times daily (with meals). Past Medical History:  
Diagnosis Date  Arthritis  Chronic kidney disease Dialysis T, Thur, Sat @ [facility]  Gout  Hepatitis C   
 Hypertension  Liver disease Hep C  
 Other and unspecified alcohol dependence, unspecified drinking behavior Acid reflux  Other ill-defined conditions(799.89)   
 gout  Other ill-defined conditions(799.89)   
 blood transfusion hx Past Surgical History:  
Procedure Laterality Date  HX OTHER SURGICAL    
 liver biopsy  HX VASCULAR ACCESS [place] [facility]  HX VASCULAR ACCESS  7/15/13 CREATION LEFT UPPER ARM BASILIC VEIN TRANSPOSITION  
 HX VASCULAR ACCESS    
 perma catlh Allergies Allergen Reactions  Codeine Hives REVIEW OF SYSTEMS: 
 General: negative for - chills or fever, or weight loss or gain ENT: negative for - headaches, nasal congestion or tinnitus Eyes: no blurred or visual changes Neck: No stiffness or swollen nodes Respiratory: negative for - cough, hemoptysis, shortness of breath or wheezing Cardiovascular : negative for - chest pain, edema, palpitations or shortness of breath Gastrointestinal: negative for - abdominal pain, blood in stools, heartburn or nausea/vomiting Genito-Urinary: no dysuria, trouble voiding, or hematuria Musculoskeletal: negative for - gait disturbance, joint pain, joint stiffness or joint swelling Neurological: no TIA or stroke symptoms Hematologic: no bruises, no bleeding Lymphatic: no swollen glands Integument: no lumps, mole changes, nail changes or rash Endocrine:no malaise/lethargy poly uria or polydipsia or unexpected weight changes Social History Social History  Marital status: SINGLE Spouse name: N/A  
 Number of children: N/A  
 Years of education: N/A Social History Main Topics  Smoking status: Never Smoker  Smokeless tobacco: Never Used  Alcohol use No  
 Drug use: Yes Special: Prescription, Marijuana Comment: smoked pot 4 years ago  Sexual activity: Not Asked Other Topics Concern  None Social History Narrative History reviewed. No pertinent family history. OBJECTIVE:  
 
Visit Vitals  /80 (BP 1 Location: Right arm, BP Patient Position: Sitting)  Pulse 83  Resp 16  
 Ht 5' 9\" (1.753 m)  Wt 197 lb 3.2 oz (89.4 kg)  SpO2 97%  BMI 29.12 kg/m2 CONSTITUTIONAL:   well nourished, appears age appropriate EYES: sclera anicteric, PERRL, EOMI 
ENMT:nares clear, moist mucous membranes, pharynx clear NECK: supple. Thyroid normal, No JVD or bruits RESPIRATORY: Chest: clear to ascultation and percussion, normal inspiratory effort CARDIOVASCULAR: Heart: regular rate and rhythm no murmurs, rubs or gallops, PMI not displaced, No thrills GASTROINTESTINAL: Abdomen: non distended, soft, non tender, bowel sounds normal 
HEMATOLOGIC: no purpura, petechiae or bruising LYMPHATIC: No lymph node enlargemant MUSCULOSKELETAL: Extremities: no edema or active synovitis, pulse 1+ INTEGUMENT: No unusual rashes or suspicious skin lesions noted. Nails appear normal. 
PERIPHERAL VASCULAR: normal pulses femoral, PT and DP NEUROLOGIC: non-focal exam, A & O X 3 PSYCHIATRIC:, appropriate affect ASSESSMENT:  
1. Hypertension, renal disease 2. Mixed hyperlipidemia 3. Gastroesophageal reflux disease without esophagitis 4. ESRD (end stage renal disease) ([facility] [address] 5. Class 1 obesity due to excess calories without serious comorbidity with body mass index (BMI) of 32.0 to 32.9 in adult Impression 1. Hypertension that is controlled so continue current therapy reviewed with him 2. Hyperlipidemia reviewed prior labs repeat status pending and I will make adjustments if necessary 3. GERD that is stable 4. End-stage renal disease continue to be followed by renal with dialysis 5. Obesity discussed diet exercise and weight reduction for overall health benefit and note weight is down 3 pounds today 6. Chronic hepatitis C followed by Dr. Cornell Galvez I will call the lab make further recommendations adjustments if necessary. Follow stable continue same and follow-up schedule III months or sooner should there be a problem. PLAN: 
. Orders Placed This Encounter  LIPID PANEL  
 
 
 
ATTENTION:  
This medical record was transcribed using an electronic medical records system. Although proofread, it may and can contain electronic and spelling errors. Other human spelling and other errors may be present. Corrections may be executed at a later time. Please feel free to contact us for any clarifications as needed. Follow-up Disposition: 
Return in about 3 months (around 11/29/2018). No results found for any visits on 08/29/18. Marla Cisse MD 
 
The patient verbalized understanding of the problems and plans as explained.

## 2018-08-30 LAB
CHOLEST SERPL-MCNC: 174 MG/DL (ref 100–199)
HDLC SERPL-MCNC: 57 MG/DL
LDLC SERPL CALC-MCNC: 94 MG/DL (ref 0–99)
TRIGL SERPL-MCNC: 116 MG/DL (ref 0–149)
VLDLC SERPL CALC-MCNC: 23 MG/DL (ref 5–40)

## 2018-09-03 PROBLEM — E66.09 CLASS 1 OBESITY DUE TO EXCESS CALORIES WITHOUT SERIOUS COMORBIDITY WITH BODY MASS INDEX (BMI) OF 32.0 TO 32.9 IN ADULT: Status: RESOLVED | Noted: 2018-06-07 | Resolved: 2018-09-03

## 2018-09-03 PROBLEM — R07.2 PRECORDIAL PAIN: Status: RESOLVED | Noted: 2017-11-20 | Resolved: 2018-09-03

## 2018-10-08 ENCOUNTER — OFFICE VISIT (OUTPATIENT)
Dept: INTERNAL MEDICINE CLINIC | Age: 68
End: 2018-10-08

## 2018-10-08 VITALS
TEMPERATURE: 99.1 F | OXYGEN SATURATION: 97 % | DIASTOLIC BLOOD PRESSURE: 81 MMHG | HEART RATE: 84 BPM | BODY MASS INDEX: 29.47 KG/M2 | WEIGHT: 199 LBS | HEIGHT: 69 IN | SYSTOLIC BLOOD PRESSURE: 137 MMHG

## 2018-10-08 DIAGNOSIS — J02.9 ACUTE PHARYNGITIS, UNSPECIFIED ETIOLOGY: Primary | ICD-10-CM

## 2018-10-08 RX ORDER — AZITHROMYCIN 250 MG/1
250 TABLET, FILM COATED ORAL SEE ADMIN INSTRUCTIONS
Qty: 5 TAB | Refills: 0 | Status: SHIPPED | OUTPATIENT
Start: 2018-10-08 | End: 2018-10-13

## 2018-10-08 NOTE — MR AVS SNAPSHOT
[facility] 
 
 
 [address] [id number] [phone] Patient: Veronica Hamilton MRN: [id number] Visit Information Date & Time Provider Department Dept. Phone Encounter #  
 10/8/2018 11:00 AM Satya Manuel NP [facility] [phone] [id number] Follow-up Instructions Return if symptoms worsen or fail to improve. Your Appointments 11/30/2018 10:30 AM  
FOLLOW UP 10 with MD MIKHAIL Block [facility] ([address]) Appt Note: [address] [id number] [address] [phone] Upcoming Health Maintenance Date Due DTaP/Tdap/Td series (1 - Tdap) 9/21/1971 Shingrix Vaccine Age 50> (1 of 2) 9/21/2000 GLAUCOMA SCREENING Q2Y 9/21/2015 Influenza Age 5 to Adult 8/1/2018 Pneumococcal 65+ High/Highest Risk (2 of 2 - PPSV23) 9/13/2018 MEDICARE YEARLY EXAM 10/26/2018 COLONOSCOPY 12/18/2023 Allergies as of 10/8/2018  Review Complete On: 10/8/2018 By: Satya Manuel NP Severity Noted Reaction Type Reactions Codeine  09/08/2012    Hives Current Immunizations  Never Reviewed Name Date Influenza Vaccine 10/12/2015 Pneumococcal Conjugate (PCV-13) 10/12/2015 Pneumococcal Polysaccharide (PPSV-23) 9/13/2013 Not reviewed this visit You Were Diagnosed With   
  
 Codes Comments Acute pharyngitis, unspecified etiology    -  Primary ICD-10-CM: J02.9 ICD-9-CM: 398 Vitals BP Pulse Temp Height(growth percentile) Weight(growth percentile) SpO2  
 137/81 (BP 1 Location: Right arm, BP Patient Position: Sitting) 84 99.1 °F (37.3 °C) (Oral) 5' 9\" (1.753 m) 199 lb (90.3 kg) 97% BMI Smoking Status 29.39 kg/m2 Never Smoker BMI and BSA Data Body Mass Index Body Surface Area  
 29.39 kg/m 2 2.1 m 2 Preferred Pharmacy Pharmacy Name Phone [facility] [address] [phone] Your Updated Medication List  
  
   
This list is accurate as of 10/8/18 12:56 PM.  Always use your most recent med list. amLODIPine 10 mg tablet Commonly known as:  Jilda Sierras TAKE 1 TABLET BY MOUTH ONCE DAILY  
  
 amoxicillin 500 mg capsule Commonly known as:  AMOXIL TAKE 4 CAPSULES BY MOUTH 1 HOUR PRIOR TO DENTAL PROCEDURE  
  
 azithromycin 250 mg tablet Commonly known as:  Emily Roup Take 1 Tab by mouth See Admin Instructions for 5 days. omeprazole 40 mg capsule Commonly known as:  PRILOSEC  
TAKE 1 CAPSULE BY MOUTH ONCE DAILY PARSABIV 5 mg/mL Soln Generic drug:  etelcalcetide  
by IntraVENous route. Indications: 3 x per week RENVELA 800 mg Tab tab Generic drug:  sevelamer carbonate Take 1,600 mg by mouth three (3) times daily (with meals). VITAMIN D2 PO Take  by mouth. Prescriptions Sent to Pharmacy Refills  
 azithromycin (ZITHROMAX) 250 mg tablet 0 Sig: Take 1 Tab by mouth See Admin Instructions for 5 days. Class: Normal  
 Pharmacy: [address] [place] [address] #: [phone] Route: Oral  
  
Follow-up Instructions Return if symptoms worsen or fail to improve. To-Do List   
 10/17/2018 10:30 AM  
  Appointment with Kayce Novak NP at [place] [address] ([phone]) Patient Instructions Sore Throat: Care Instructions Your Care Instructions Infection by bacteria or a virus causes most sore throats. Cigarette smoke, dry air, air pollution, allergies, and yelling can also cause a sore throat. Sore throats can be painful and annoying. Fortunately, most sore throats go away on their own.  If you have a bacterial infection, your doctor may prescribe antibiotics. Follow-up care is a key part of your treatment and safety. Be sure to make and go to all appointments, and call your doctor if you are having problems. It's also a good idea to know your test results and keep a list of the medicines you take. How can you care for yourself at home? · If your doctor prescribed antibiotics, take them as directed. Do not stop taking them just because you feel better. You need to take the full course of antibiotics. · Gargle with warm salt water once an hour to help reduce swelling and relieve discomfort. Use 1 teaspoon of salt mixed in 1 cup of warm water. · Take an over-the-counter pain medicine, such as acetaminophen (Tylenol), ibuprofen (Advil, Motrin), or naproxen (Aleve). Read and follow all instructions on the label. · Be careful when taking over-the-counter cold or flu medicines and Tylenol at the same time. Many of these medicines have acetaminophen, which is Tylenol. Read the labels to make sure that you are not taking more than the recommended dose. Too much acetaminophen (Tylenol) can be harmful. · Drink plenty of fluids. Fluids may help soothe an irritated throat. Hot fluids, such as tea or soup, may help decrease throat pain. · Use over-the-counter throat lozenges to soothe pain. Regular cough drops or hard candy may also help. These should not be given to young children because of the risk of choking. · Do not smoke or allow others to smoke around you. If you need help quitting, talk to your doctor about stop-smoking programs and medicines. These can increase your chances of quitting for good. · Use a vaporizer or humidifier to add moisture to your bedroom. Follow the directions for cleaning the machine. When should you call for help? Call your doctor now or seek immediate medical care if: 
  · You have new or worse trouble swallowing.  
  · Your sore throat gets much worse on one side.  Watch closely for changes in your health, and be sure to contact your doctor if you do not get better as expected. Where can you learn more? Go to http://kirk-angelika.info/. Enter 062 [id number] in the search box to learn more about \"Sore Throat: Care Instructions. \" Current as of: March 28, 2018 Content Version: 11.8 © 5352-5749 [employer]vment. Care instructions adapted under license by [employer] (which disclaims liability or warranty for this information). If you have questions about a medical condition or this instruction, always ask your healthcare professional. [place] [address] any warranty or liability for your use of this information. Introducing [facility] [place] [facility]! Dear Shakeel Loco: Thank you for requesting a Tripeese account. Our records indicate that you already have an active Tripeese account. You can access your account anytime at [email or web address] [email or web address]/[email or web address] IO Did you know that you can access your hospital and ER discharge instructions at any time in Tripeese? You can also review all of your test results from your hospital stay or ER visit. Additional Information If you have questions, please visit the Frequently Asked Questions section of the Tripeese website at [email or web address]/. Remember, Tripeese is NOT to be used for urgent needs. For medical emergencies, dial 911. Now available from your iPhone and Android! Please provide this summary of care documentation to your next provider. Your primary care clinician is listed as Lucy. If you have any questions after today's visit, please call [phone].

## 2018-10-08 NOTE — PATIENT INSTRUCTIONS
Sore Throat: Care Instructions  Your Care Instructions    Infection by bacteria or a virus causes most sore throats. Cigarette smoke, dry air, air pollution, allergies, and yelling can also cause a sore throat. Sore throats can be painful and annoying. Fortunately, most sore throats go away on their own. If you have a bacterial infection, your doctor may prescribe antibiotics. Follow-up care is a key part of your treatment and safety. Be sure to make and go to all appointments, and call your doctor if you are having problems. It's also a good idea to know your test results and keep a list of the medicines you take. How can you care for yourself at home? · If your doctor prescribed antibiotics, take them as directed. Do not stop taking them just because you feel better. You need to take the full course of antibiotics. · Gargle with warm salt water once an hour to help reduce swelling and relieve discomfort. Use 1 teaspoon of salt mixed in 1 cup of warm water. · Take an over-the-counter pain medicine, such as acetaminophen (Tylenol), ibuprofen (Advil, Motrin), or naproxen (Aleve). Read and follow all instructions on the label. · Be careful when taking over-the-counter cold or flu medicines and Tylenol at the same time. Many of these medicines have acetaminophen, which is Tylenol. Read the labels to make sure that you are not taking more than the recommended dose. Too much acetaminophen (Tylenol) can be harmful. · Drink plenty of fluids. Fluids may help soothe an irritated throat. Hot fluids, such as tea or soup, may help decrease throat pain. · Use over-the-counter throat lozenges to soothe pain. Regular cough drops or hard candy may also help. These should not be given to young children because of the risk of choking. · Do not smoke or allow others to smoke around you. If you need help quitting, talk to your doctor about stop-smoking programs and medicines.  These can increase your chances of quitting for good. · Use a vaporizer or humidifier to add moisture to your bedroom. Follow the directions for cleaning the machine. When should you call for help? Call your doctor now or seek immediate medical care if:    · You have new or worse trouble swallowing.     · Your sore throat gets much worse on one side.    Watch closely for changes in your health, and be sure to contact your doctor if you do not get better as expected. Where can you learn more? Go to http://kirk-angelika.info/. Enter 062 [id number] in the search box to learn more about \"Sore Throat: Care Instructions. \"  Current as of: March 28, 2018  Content Version: 11.8  © 6437-6543 Healthwise, Incorporated. Care instructions adapted under license by [employer] (which disclaims liability or warranty for this information). If you have questions about a medical condition or this instruction, always ask your healthcare professional. [address] any warranty or liability for your use of this information.

## 2018-10-08 NOTE — PROGRESS NOTES
Subjective:  Mr. Griffin Adjutant is a pleasant 76year old gentleman, patient of Dr. Elroy Puente, with end stage renal disease, on dialysis [facility]. Comes in today with a six day history of a sore throat and swollen lymph nodes. He's had a little bit of postnasal drainage. He denies any earache. He denies any shortness of breath, cough, wheezing or hemoptysis. He denies any chills or fever. He denies nausea or vomiting. Past Medical History:   Diagnosis Date    Arthritis     Chronic kidney disease     Dialysis T, Thur, Sat @ [facility]    Gout     Hepatitis C     Hypertension     Liver disease     Hep C    Other and unspecified alcohol dependence, unspecified drinking behavior     Acid reflux    Other ill-defined conditions(799.89)     gout    Other ill-defined conditions(799.89)     blood transfusion hx     Past Surgical History:   Procedure Laterality Date    HX OTHER SURGICAL      liver biopsy    HX VASCULAR ACCESS      [place] [facility]    HX VASCULAR ACCESS  7/15/13    CREATION LEFT UPPER ARM BASILIC VEIN TRANSPOSITION    HX VASCULAR ACCESS      [facility] cat[facility]       Current Outpatient Prescriptions on File Prior to Visit   Medication Sig Dispense Refill    amLODIPine (NORVASC) 10 mg tablet TAKE 1 TABLET BY MOUTH ONCE DAILY  11    omeprazole (PRILOSEC) 40 mg capsule TAKE 1 CAPSULE BY MOUTH ONCE DAILY  11    sevelamer carbonate (RENVELA) 800 mg Tab tab Take 1,600 mg by mouth three (3) times daily (with meals).  ERGOCALCIFEROL, VITAMIN D2, (VITAMIN D2 PO) Take  by mouth.  amoxicillin (AMOXIL) 500 mg capsule TAKE 4 CAPSULES BY MOUTH 1 HOUR PRIOR TO DENTAL PROCEDURE  1     No current facility-administered medications on file prior to visit. Allergies   Allergen Reactions    Codeine Hives   Physical Examination:  GENERAL:  Pleasant gentleman in no acute distress. He is alert and oriented. He answers my questions appropriately. VITALS:  BP: 137/81.   P: 84. O2 sat: 97%.  HEENT:  Normocephalic, atraumatic. TMs normal.  Mouth mucosa pink. Tongue midline. Pharynx erythematous without presence of exudates. No sinus tenderness. NECK:  Supple with bilateral mildly tender tonsillar adenopathy. CHEST:  Lungs clear to auscultation, no rales or wheezes. CV:  Heart regular rhythm without murmur or gallop. EXTREMITIES:  No edema or calf tenderness. Distal pulses were present. Impression:  1. Acute pharyngitis. Plan:  1. It was opted to start him on Zithromax 250 mg daily x five days. 2. He is to gargle with salty water. 3. He may use Tylenol as needed. 4. He certainly will contact us should he fail to improve or if his condition worsens.

## 2018-10-17 ENCOUNTER — OFFICE VISIT (OUTPATIENT)
Dept: HEMATOLOGY | Age: 68
End: 2018-10-17

## 2018-10-17 VITALS
HEIGHT: 69 IN | BODY MASS INDEX: 29.21 KG/M2 | DIASTOLIC BLOOD PRESSURE: 73 MMHG | TEMPERATURE: 97.2 F | HEART RATE: 64 BPM | SYSTOLIC BLOOD PRESSURE: 136 MMHG | OXYGEN SATURATION: 99 % | WEIGHT: 197.2 LBS

## 2018-10-17 DIAGNOSIS — B18.2 CHRONIC HEPATITIS C WITHOUT HEPATIC COMA (HCC): Primary | ICD-10-CM

## 2018-10-17 RX ORDER — LISINOPRIL 2.5 MG/1
TABLET ORAL
Refills: 6 | COMMUNITY
Start: 2018-09-28 | End: 2019-10-07 | Stop reason: SDUPTHER

## 2018-10-17 NOTE — PROGRESS NOTES
1. Have you been to the ER, urgent care clinic since your last visit? Hospitalized since your last visit? No    2. Have you seen or consulted any other health care providers outside of the [address] [place] [address] since your last visit? Include any pap smears or colon screening. No     Chief Complaint   Patient presents with    Follow-up     [place]     Visit Vitals  /73 (BP 1 Location: Right arm, BP Patient Position: Sitting)   Pulse 64   Temp 97.2 °F (36.2 °C) (Tympanic)   Ht 5' 9\" (1.753 m)   Wt 197 lb 3.2 oz (89.4 kg)   SpO2 99%   BMI 29.12 kg/m²     PHQ over the last two weeks 10/17/2018   Little interest or pleasure in doing things Not at all   Feeling down, depressed, irritable, or hopeless Not at all   Total Score PHQ 2 0     Fall Risk Assessment, last 12 mths 10/17/2018   Able to walk? Yes   Fall in past 12 months? No       Learning Assessment 10/17/2018   PRIMARY LEARNER Patient   HIGHEST LEVEL OF EDUCATION - PRIMARY LEARNER  -   BARRIERS PRIMARY LEARNER NONE   CO-LEARNER CAREGIVER -   PRIMARY LANGUAGE ENGLISH   LEARNER PREFERENCE PRIMARY LISTENING   ANSWERED BY patient   RELATIONSHIP SELF     Abuse Screening Questionnaire 10/17/2018   Do you ever feel afraid of your partner? N   Are you in a relationship with someone who physically or mentally threatens you? N   Is it safe for you to go home?  Michelet Diaz

## 2018-10-17 NOTE — PROGRESS NOTES
70 Pricilla Soliz MD, [address], [place], [place]       April Sandee Guy, NP Claudene Lobo, PA-C Burnett Peach, RAMÓN-BC   Wiley Hamman, NP Ignacia Fortes, NP [address] [facility] [place] [address]    at [address], [address] Maureen [address]  22.    [phone]    FAX: [phone] hospitals    at [address], [address]    [phone]    FAX: [phone]     Patient Care Team:  Robert Frances MD as PCP - General (Internal Medicine)  Army MD Eric (Nephrology)    Patient Active Problem List   Diagnosis Code    Hypertension, renal disease I12.9    Mixed hyperlipidemia E78.2    ESRD (end stage renal disease) ([address] N18.6    Gastroesophageal reflux disease without esophagitis K21.9    Vitamin D deficiency E55.9    Gout of multiple sites M10.9    Chronic hepatitis C without hepatic coma ([address] B18.2       Tutu Sola returns to the [address] [address] regarding chronic HCV and its management. The active problem list, all pertinent past medical history, medications, radiologic findings and laboratory findings related to the liver disorder were reviewed with the patient. The patient is a 76 y.o.  male who was noted to have abnormalities in liver chemistries and subsequently tested positive for chronic HCV in the 2000s. Risk factors for acquiring HCV are IV drug use in 1970s. There was no history of acute icteric hepatitis at the time of these risk factors. Imaging of the liver was recently performed in August 2018. This ruled out [address] and ascites. Patient presents to the clinic today for a Fibroscan to assess liver fibrosis or scarring.     The patient was previously treated with peginterferon and ribavirin in the mid-2000s. The patient was treated for 24 weeks. The patient tolerated treatment poorly and had to prematurely discontinue therapy. He remains HCV RNA positive. Patient returns to the clinic today to discuss current HCV treatment options. The patient notes fatigue but otherwise feels well. Today, patient denies abdominal pain, change in bowel habits, dark urine, myalgias, arthralgias, pruritus and problems concentrating. The patient has limitations in functional activities which can be attributed to the liver disease and to other medical problems that are not related to the liver disease. ASSESSMENT AND PLAN:  Chronic HCV   Chronic HCV with bridging fibrosis. This was confirmed with FibroScan today. Results were discussed in detail with patient. Liver function is normal. Liver transaminases are mildly elevated. HCV treatment  HCV treatment. The patient has not previously been treated for HCV. The patient has HCV genotype 1b. Discussed treatment alternatives. Recommended treatment with glecaprevir/pibrentasvir for 8 weeks. Side effects, cure rate, visit schedule, duration of therapy and pharmacy process were discussed thoroughly with patient today (25 minutes total, more than half the office vist). Handouts were given. Shaunna Kayser will be ordered. Discussed enrolling in the [employer] HCV database which is nationwide program into which the results of clinical treatment of HCV is reported. Informed consent was signed today. ESRD  The patient has ESRD on dialysis. He is unsure if he is a candidate for a renal transplant. Nephrologist encouraged HCV treatment. Treatment of other medical problems in patients with chronic liver disease  There are no contraindications for the patient to take any medications that are necessary for treatment of other medical issues. The patient does not consume alcohol daily. Normal doses of acetaminophen can be used for pain as needed.   Normal doses of acetaminophen as recommended on the label are not hepatotoxic, even in patients with cirrhosis. Counseling for alcohol in patients with chronic liver disease  The patient was counseled regarding alcohol consumption and the effect of alcohol on chronic liver disease. The patient does not consume any significant amount of alcohol. Vaccinations   Vaccination for viral hepatitis A and B is not needed. The patient has serologic evidence of prior exposure or vaccination with immunity. Routine vaccinations against other bacterial and viral agents can be performed as indicated. Annual flu vaccination should be administered if indicated. ALLERGIES  Allergies   Allergen Reactions    Codeine Hives     MEDICATIONS  Current Outpatient Medications   Medication Sig    lisinopril (PRINIVIL, ZESTRIL) 2.5 mg tablet TAKE 1 TABLET BY MOUTH DAILY. TAKE AFTER DIALYSIS ON HEMODIALYSIS DAYS.  etelcalcetide (PARSABIV) 5 mg/mL soln by IntraVENous route. Indications: 3 x per week    ERGOCALCIFEROL, VITAMIN D2, (VITAMIN D2 PO) Take  by mouth.  amLODIPine (NORVASC) 10 mg tablet TAKE 1 TABLET BY MOUTH ONCE DAILY    amoxicillin (AMOXIL) 500 mg capsule TAKE 4 CAPSULES BY MOUTH 1 HOUR PRIOR TO DENTAL PROCEDURE    omeprazole (PRILOSEC) 40 mg capsule TAKE 1 CAPSULE BY MOUTH ONCE DAILY    sevelamer carbonate (RENVELA) 800 mg Tab tab Take 1,600 mg by mouth three (3) times daily (with meals). No current facility-administered medications for this visit. SYSTEM REVIEW NOT RELATED TO LIVER DISEASE OR REVIEWED ABOVE:  Constitution systems: Negative for fever, chills, weight gain, weight loss. Eyes: Negative for visual changes. ENT: Negative for sore throat, painful swallowing. Respiratory: Negative for cough, hemoptysis, SOB. Cardiology: Negative for chest pain, palpitations. GI:  Negative for constipation or diarrhea. : Negative for urinary frequency, dysuria, hematuria, nocturia. Skin: Negative for rash.   Hematology: Negative for easy bruising, blood clots. Musculo-skelatal: Negative for back pain, muscle pain, weakness. Neurologic: Negative for headaches, dizziness, vertigo, memory problems not related to HE. Psychology: Negative for anxiety, depression. FAMILY HISTORY:  The patient has no knowledge of the father's medical condition. The mother  of CVA. There is no family history of liver disease. SOCIAL HISTORY:  The patient is . The patient has 2 children and 9 grandchildren. The patient has never used tobacco products. The patient has never consumed significant amounts of alcohol. 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 PHYSICAL EXAMINATION:  Visit Vitals  /73 (BP 1 Location: Right arm, BP Patient Position: Sitting)   Pulse 64   Temp 97.2 °F (36.2 °C) (Tympanic)   Ht 5' 9\" (1.753 m)   Wt 197 lb 3.2 oz (89.4 kg)   SpO2 99%   BMI 29.12 kg/m²     General: No acute distress. Eyes: Sclera anicteric. ENT: No oral lesions. Thyroid normal.  Nodes: No adenopathy. Skin: No spider angiomata. No jaundice. No palmar erythema. Respiratory: Lungs clear to auscultation. Cardiovascular: Regular heart rate. No murmurs. No JVD. Abdomen: Soft non-tender. Liver size normal to percussion/palpation. Spleen not palpable. No obvious ascites. Extremities: No edema. No muscle wasting. No gross arthritic changes. Neurologic: Alert and oriented. Cranial nerves grossly intact. No asterixis.     LABORATORY STUDIES:  Liver [place] of [address] Units 8/10/2018 2017   WBC 3.4 - 10.8 x10E3/uL 7.2 6.0   ANC 1.4 - 7.0 x10E3/uL 3.6 2.9   HGB 13.0 - 17.7 g/dL 13.9 15.0   HGB 12.0 - 18.0 g/dL     HGB (iSTAT) 12.0 - 18.0 g/dL      - 379 x10E3/uL 243 227   AST 0 - 40 IU/L 43 (H) 33   ALT 0 - 44 IU/L 25 34   Alk Phos 39 - 117 IU/L 117 134 (H)   Bili, Total 0.0 - 1.2 mg/dL 0.8 0.7   Bili, Direct 0.00 - 0.40 mg/dL 0.23    Albumin 3.6 - 4.8 g/dL 4.2 3.4 (L) BUN 8 - 27 mg/dL 24 42 (H)   BUN (iSTAT) 9 - 20 mg/dL     Creat 0.76 - 1.27 mg/dL 6.67 (H) 7.75 (H)   Creat (iSTAT) 0.8 - 1.5 mg/dL     Na 134 - 144 mmol/L 139 138   K 3.5 - 5.2 mmol/L 4.6 4.2   Cl 96 - 106 mmol/L 95 (L) 103   CO2 20 - 29 mmol/L 27 27   Glucose 65 - 99 mg/dL 95 91     SEROLOGIES:  Serologies Latest Ref Rng & Units 8/10/2018   Hep A Ab, Total Negative Positive (A)   Hep B Surface Ag Negative Negative   Hep B Core Ab, Total Negative Positive (A)   Hep B Surface AB QL  Reactive   Hep C Genotype  1b   HCV RT-PCR, Quant IU/mL [id number]     LIVER HISTOLOGY:  10/2018. FibroScan performed at [address]. EkPa was 9.8. Suggested fibrosis level is F3. CAP score is 345, suggesting significant steatosis. ENDOSCOPIC PROCEDURES:  Not available or performed    RADIOLOGY:  8/2018. Ultrasound of liver. Normal appearing liver. No liver mass lesions. OTHER TESTING:  Not available or performed    All of the issues listed in the Assessment and Plan were discussed with the patient. All questions were answered. The patient expressed a clear understanding of the above. [address] [address] in 4 weeks after initiating HCV treatment.     Veronica Márquez NP  Liver [place] [address], [address] [place] [address]  Ph: [phone]  Fax: [phone]

## 2018-11-30 ENCOUNTER — OFFICE VISIT (OUTPATIENT)
Dept: INTERNAL MEDICINE CLINIC | Age: 68
End: 2018-11-30

## 2018-11-30 DIAGNOSIS — I12.9 HYPERTENSION, RENAL DISEASE: Primary | ICD-10-CM

## 2018-11-30 DIAGNOSIS — Z00.00 MEDICARE ANNUAL WELLNESS VISIT, SUBSEQUENT: ICD-10-CM

## 2018-11-30 DIAGNOSIS — E55.9 VITAMIN D DEFICIENCY: ICD-10-CM

## 2018-11-30 DIAGNOSIS — E78.2 MIXED HYPERLIPIDEMIA: ICD-10-CM

## 2018-11-30 DIAGNOSIS — N18.6 ESRD (END STAGE RENAL DISEASE) (HCC): ICD-10-CM

## 2018-11-30 DIAGNOSIS — M1A.09X0 CHRONIC GOUT OF MULTIPLE SITES, UNSPECIFIED CAUSE: ICD-10-CM

## 2018-11-30 DIAGNOSIS — K21.9 GASTROESOPHAGEAL REFLUX DISEASE WITHOUT ESOPHAGITIS: ICD-10-CM

## 2018-12-03 ENCOUNTER — OFFICE VISIT (OUTPATIENT)
Dept: HEMATOLOGY | Age: 68
End: 2018-12-03

## 2018-12-03 VITALS
DIASTOLIC BLOOD PRESSURE: 78 MMHG | OXYGEN SATURATION: 98 % | BODY MASS INDEX: 28.71 KG/M2 | WEIGHT: 193.8 LBS | HEART RATE: 89 BPM | SYSTOLIC BLOOD PRESSURE: 149 MMHG | HEIGHT: 69 IN | TEMPERATURE: 97.2 F

## 2018-12-03 DIAGNOSIS — B18.2 CHRONIC HEPATITIS C WITHOUT HEPATIC COMA (HCC): Primary | ICD-10-CM

## 2018-12-03 DIAGNOSIS — N18.6 ESRD (END STAGE RENAL DISEASE) (HCC): ICD-10-CM

## 2018-12-03 NOTE — PROGRESS NOTES
70 Pricilla Soliz MD, [address], [place] [facility], [place]       Galilea Leslie, NP    VEGA Del Cid, [facility]   NAIF Goodwin NP [facility] [address] [place] [address]    at [facility]    [address], [address] [address]    [phone]    FAX: [address] [place] [address], [address]    [phone]    FAX: [phone]     Patient Care Team:  Esha Alberto MD as PCP - General (Internal Medicine)  Jeff Mondragon MD (Nephrology)    Patient Active Problem List   Diagnosis Code    Hypertension, renal disease I12.9    Mixed hyperlipidemia E78.2    ESRD (end stage renal disease) ([address] N18.6    Gastroesophageal reflux disease without esophagitis K21.9    Vitamin D deficiency E55.9    Gout of multiple sites M10.9    Chronic hepatitis C without hepatic coma ([address] B18.2    Medicare annual wellness visit, subsequent Z00.00       Blanch Peabody returns to the [facility] regarding chronic HCV and its management. The active problem list, all pertinent past medical history, medications, radiologic findings and laboratory findings related to the liver disorder were reviewed with the patient. The patient is a 76 y.o.  male who was noted to have abnormalities in liver chemistries and subsequently tested positive for chronic HCV in the 2000s. Risk factors for acquiring HCV are IV drug use in 1970s. There was no history of acute icteric hepatitis at the time of these risk factors. Imaging of the liver was recently performed in August 2018. This ruled out [address] and ascites. Fibroscan in October 2018 suggested bridging fibrosis.     The patient was previously treated with peginterferon and ribavirin in the mid-2000s. The patient was treated for 24 weeks. The patient tolerated treatment poorly and had to prematurely discontinue therapy. Patient is currently undergoing HCV treatment with Zepatier x 4 weeks (started-11/3/2018). He has done well on treatment with no significant side effects. He has missed no medications since starting treatment. He returns to the clinic today to monitor his response to treatment. The patient notes fatigue but otherwise feels well. Today, patient denies abdominal pain, change in bowel habits, dark urine, myalgias, arthralgias, pruritus and problems concentrating. The patient has limitations in functional activities which can be attributed to the liver disease and to other medical problems that are not related to the liver disease. ASSESSMENT AND PLAN:  Chronic HCV   Chronic HCV with bridging fibrosis. Liver enzymes are now normal on treatment. Liver function is normal.    HCV treatment  HCV treatment. Currently on treatment with Zepatier x 4 weeks. He is tolerating treatment well. Will review HCV RNA when available to monitor response to treatment. He will complete 12 weeks of therapy. ESRD  The patient has ESRD on dialysis. He is unsure if he is a candidate for a renal transplant. Nephrologist encouraged HCV treatment. Treatment of other medical problems in patients with chronic liver disease  There are no contraindications for the patient to take any medications that are necessary for treatment of other medical issues. The patient does not consume alcohol daily. Normal doses of acetaminophen can be used for pain as needed. Normal doses of acetaminophen as recommended on the label are not hepatotoxic, even in patients with cirrhosis. Counseling for alcohol in patients with chronic liver disease  The patient was counseled regarding alcohol consumption and the effect of alcohol on chronic liver disease.   The patient does not consume any significant amount of alcohol. Vaccinations   Vaccination for viral hepatitis A and B is not needed. The patient has serologic evidence of prior exposure or vaccination with immunity. Routine vaccinations against other bacterial and viral agents can be performed as indicated. Annual flu vaccination should be administered if indicated. ALLERGIES  Allergies   Allergen Reactions    Codeine Hives     MEDICATIONS  Current Outpatient Medications   Medication Sig    elbasvir-grazoprevir (ZEPATIER)  mg tab Take 1 Tab by mouth daily.  lisinopril (PRINIVIL, ZESTRIL) 2.5 mg tablet TAKE 1 TABLET BY MOUTH DAILY. TAKE AFTER DIALYSIS ON HEMODIALYSIS DAYS.  etelcalcetide (PARSABIV) 5 mg/mL soln by IntraVENous route. Indications: 3 x per week    ERGOCALCIFEROL, VITAMIN D2, (VITAMIN D2 PO) Take  by mouth.  amLODIPine (NORVASC) 10 mg tablet TAKE 1 TABLET BY MOUTH ONCE DAILY    amoxicillin (AMOXIL) 500 mg capsule TAKE 4 CAPSULES BY MOUTH 1 HOUR PRIOR TO DENTAL PROCEDURE    omeprazole (PRILOSEC) 40 mg capsule TAKE 1 CAPSULE BY MOUTH ONCE DAILY    sevelamer carbonate (RENVELA) 800 mg Tab tab Take 1,600 mg by mouth three (3) times daily (with meals). No current facility-administered medications for this visit. SYSTEM REVIEW NOT RELATED TO LIVER DISEASE OR REVIEWED ABOVE:  Constitution systems: Negative for fever, chills, weight gain, weight loss. Eyes: Negative for visual changes. ENT: Negative for sore throat, painful swallowing. Respiratory: Negative for cough, hemoptysis, SOB. Cardiology: Negative for chest pain, palpitations. GI:  Negative for constipation or diarrhea. : Negative for urinary frequency, dysuria, hematuria, nocturia. Skin: Negative for rash. Hematology: Negative for easy bruising, blood clots. Musculo-skelatal: Negative for back pain, muscle pain, weakness. Neurologic: Negative for headaches, dizziness, vertigo, memory problems not related to HE.   Psychology: Negative for anxiety, depression. FAMILY HISTORY:  The patient has no knowledge of the father's medical condition. The mother  of CVA. There is no family history of liver disease. SOCIAL HISTORY:  The patient is . The patient has 2 children and 9 grandchildren. The patient has never used tobacco products. The patient has never consumed significant amounts of alcohol. 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 PHYSICAL EXAMINATION:  Visit Vitals  /78 (BP 1 Location: Left arm, BP Patient Position: Sitting)   Pulse 89   Temp 97.2 °F (36.2 °C) (Tympanic)   Ht 5' 9\" (1.753 m)   Wt 193 lb 12.8 oz (87.9 kg)   SpO2 98%   BMI 28.62 kg/m²     General: No acute distress. Eyes: Sclera anicteric. ENT: No oral lesions. Thyroid normal.  Nodes: No adenopathy. Skin: No spider angiomata. No jaundice. No palmar erythema. Respiratory: Lungs clear to auscultation. Cardiovascular: Regular heart rate. No murmurs. No JVD. Abdomen: Soft non-tender. Liver size normal to percussion/palpation. Spleen not palpable. No obvious ascites. Extremities: No edema. No muscle wasting. No gross arthritic changes. Neurologic: Alert and oriented. Cranial nerves grossly intact. No asterixis.     LABORATORY STUDIES:  Liver [place] of [address] Units 12/3/2018 8/10/2018   WBC 3.4 - 10.8 x10E3/uL  7.2   ANC 1.4 - 7.0 x10E3/uL  3.6   HGB 13.0 - 17.7 g/dL  13.9   HGB 12.0 - 18.0 g/dL     HGB (iSTAT) 12.0 - 18.0 g/dL      - 379 x10E3/uL  243   AST 0 - 40 IU/L 23 43 (H)   ALT 0 - 44 IU/L 10 25   Alk Phos 39 - 117 IU/L 120 (H) 117   Bili, Total 0.0 - 1.2 mg/dL 0.5 0.8   Bili, Direct 0.00 - 0.40 mg/dL 0.17 0.23   Albumin 3.6 - 4.8 g/dL 4.1 4.2   BUN 8 - 27 mg/dL  24   BUN (iSTAT) 9 - 20 mg/dL     Creat 0.76 - 1.27 mg/dL  6.67 (H)   Creat (iSTAT) 0.8 - 1.5 mg/dL     Na 134 - 144 mmol/L  139   K 3.5 - 5.2 mmol/L  4.6   Cl 96 - 106 mmol/L  95 (L)   CO2 20 - 29 mmol/L  27   Glucose 65 - 99 mg/dL  95     SEROLOGIES:  Serologies Latest Ref Rng & Units 8/10/2018   Hep A Ab, Total Negative Positive (A)   Hep B Surface Ag Negative Negative   Hep B Core Ab, Total Negative Positive (A)   Hep B Surface AB QL  Reactive   Hep C Genotype  1b   HCV RT-PCR, Quant IU/mL [id number]     LIVER HISTOLOGY:  10/2018. FibroScan performed at [facility]. EkPa was 9.8. Suggested fibrosis level is F3. CAP score is 345, suggesting significant steatosis. ENDOSCOPIC PROCEDURES:  Not available or performed    RADIOLOGY:  8/2018. Ultrasound of liver. Normal appearing liver. No liver mass lesions. OTHER TESTING:  Not available or performed    All of the issues listed in the Assessment and Plan were discussed with the patient. All questions were answered. The patient expressed a clear understanding of the above. [address] [facility] 87 in 2 months.     Brian Rogers NP  Liver [place] of [address], [address] [place] [address]  Ph: [phone]  Fax: [phone]

## 2018-12-03 NOTE — PROGRESS NOTES
1. Have you been to the ER, urgent care clinic since your last visit? Hospitalized since your last visit? No    2. Have you seen or consulted any other health care providers outside of the [address] [place] [address] since your last visit? Include any pap smears or colon screening. No     Chief Complaint   Patient presents with    Follow-up     Visit Vitals  /78 (BP 1 Location: Left arm, BP Patient Position: Sitting)   Pulse 89   Temp 97.2 °F (36.2 °C) (Tympanic)   Ht 5' 9\" (1.753 m)   Wt 193 lb 12.8 oz (87.9 kg)   SpO2 98%   BMI 28.62 kg/m²     PHQ over the last two weeks 12/3/2018   Little interest or pleasure in doing things Not at all   Feeling down, depressed, irritable, or hopeless Not at all   Total Score PHQ 2 0     Fall Risk Assessment, last 12 mths 12/3/2018   Able to walk? Yes   Fall in past 12 months? No       Learning Assessment 12/3/2018   PRIMARY LEARNER Patient   HIGHEST LEVEL OF EDUCATION - PRIMARY LEARNER  -   BARRIERS PRIMARY LEARNER NONE   CO-LEARNER CAREGIVER No   PRIMARY LANGUAGE ENGLISH   LEARNER PREFERENCE PRIMARY LISTENING   ANSWERED BY patient   RELATIONSHIP SELF     Abuse Screening Questionnaire 12/3/2018   Do you ever feel afraid of your partner? N   Are you in a relationship with someone who physically or mentally threatens you? N   Is it safe for you to go home?  Humberto Zheng

## 2018-12-04 LAB
ALBUMIN SERPL-MCNC: 4.1 G/DL (ref 3.6–4.8)
ALP SERPL-CCNC: 120 IU/L (ref 39–117)
ALT SERPL-CCNC: 10 IU/L (ref 0–44)
AST SERPL-CCNC: 23 IU/L (ref 0–40)
BILIRUB DIRECT SERPL-MCNC: 0.17 MG/DL (ref 0–0.4)
BILIRUB SERPL-MCNC: 0.5 MG/DL (ref 0–1.2)
PROT SERPL-MCNC: 8.3 G/DL (ref 6–8.5)

## 2018-12-06 LAB — HCV RNA SERPL QL NAA+PROBE: NEGATIVE

## 2018-12-18 NOTE — PROGRESS NOTES
This is a Subsequent Medicare Annual Wellness Visit providing Personalized Prevention Plan Services (PPPS) (Performed 12 months after initial AWV and PPPS )    I have reviewed the patient's medical history in detail and updated the computerized patient record. He presents today for his Medicare subsequent annual wellness examination screening questionnaire. He is also here in follow-up of his multiple medical problems to include hypertension, end-stage renal disease on dialysis, GERD, hyperlipidemia, vitamin D deficiency, gout, chronic hepatitis C, and other medical problems. In addition to his chronic problems he has an acute problem today of noted some occasional fluttering in his chest like palpitations but no associated lightheadedness or dizziness or chest pain or chest tightness with that. He denies any shortness of breath, PND, orthopnea or other cardiorespiratory complaints. He denies any GI or  complaints. He denies any headaches, dizziness or neurologic complaints. There are no current change of his chronic arthritic complaints. He has no other complaints on complete review of systems.     History     Past Medical History:   Diagnosis Date    Arthritis     Chronic kidney disease     Dialysis T, Thur, Sat @ [facility]    Gout     Hepatitis C     Hypertension     Liver disease     Hep C    Other and unspecified alcohol dependence, unspecified drinking behavior     Acid reflux    Other ill-defined conditions(799.89)     gout    Other ill-defined conditions(799.89)     blood transfusion hx      Past Surgical History:   Procedure Laterality Date    HX OTHER SURGICAL      liver biopsy    HX VASCULAR ACCESS      [place] [facility] [place] [facility] [place]    HX VASCULAR ACCESS  7/15/13    CREATION LEFT UPPER ARM BASILIC VEIN TRANSPOSITION    [facility]      [facility]     Social History     Tobacco Use    Smoking status: Never Smoker    Smokeless tobacco: Never Used   Substance Use Topics    Alcohol use: No    Drug use: Yes     Types: Prescription, Marijuana     Comment: smoked pot 4 years ago     Current Outpatient Medications   Medication Sig Dispense Refill    elbasvir-grazoprevir (ZEPATIER)  mg tab Take 1 Tab by mouth daily. 28 Tab 2    lisinopril (PRINIVIL, ZESTRIL) 2.5 mg tablet TAKE 1 TABLET BY MOUTH DAILY. TAKE AFTER DIALYSIS ON HEMODIALYSIS DAYS. 6    etelcalcetide (PARSABIV) 5 mg/mL soln by IntraVENous route. Indications: 3 x per week      ERGOCALCIFEROL, VITAMIN D2, (VITAMIN D2 PO) Take  by mouth.  amLODIPine (NORVASC) 10 mg tablet TAKE 1 TABLET BY MOUTH ONCE DAILY  11    amoxicillin (AMOXIL) 500 mg capsule TAKE 4 CAPSULES BY MOUTH 1 HOUR PRIOR TO DENTAL PROCEDURE  1    omeprazole (PRILOSEC) 40 mg capsule TAKE 1 CAPSULE BY MOUTH ONCE DAILY  11    sevelamer carbonate (RENVELA) 800 mg Tab tab Take 1,600 mg by mouth three (3) times daily (with meals). Allergies   Allergen Reactions    Codeine Hives     History reviewed. No pertinent family history. Patient Active Problem List    Diagnosis    Hypertension, renal disease    Mixed hyperlipidemia    [facility]) [facility] [address]    Gastroesophageal reflux disease without esophagitis    Palpitations    Bruit of left carotid artery    Gout of multiple sites    Chronic hepatitis C without hepatic coma ([facility] [address] [facility]    Medicare annual wellness visit, subsequent    Vitamin D deficiency       Patient Care Team:  Arianne Hernandez MD as PCP - General (Internal Medicine)  Yaneli Ruiz MD (Nephrology)    Depression Risk Factor Screening:     PHQ over the last two weeks 12/19/2018   Little interest or pleasure in doing things Not at all   Feeling down, depressed, irritable, or hopeless Not at all   Total Score PHQ 2 0     Alcohol Risk Factor Screening: You do not drink alcohol or very rarely. Functional Ability and Level of Safety:     Fall Risk     Fall Risk Assessment, last 12 mths 12/19/2018   Able to walk?  Yes Fall in past 12 months? No       Hearing Loss   mild    Activities of Daily Living   Self-care. ADL Assessment 11/20/2017   Feeding yourself No Help Needed   Getting from bed to chair No Help Needed   Getting dressed No Help Needed   Bathing or showering No Help Needed   Walk across the room (includes cane/walker) No Help Needed   Using the telphone No Help Needed   Taking your medications No Help Needed   Preparing meals No Help Needed   Managing money (expenses/bills) No Help Needed   Moderately strenuous housework (laundry) No Help Needed   Shopping for personal items (toiletries/medicines) No Help Needed   Shopping for groceries No Help Needed   Driving No Help Needed   Climbing a flight of stairs No Help Needed   Getting to places beyond walking distances No Help Needed       Abuse Screen   Patient is not abused    Social History     Social History Narrative    Not on file       Review of Systems    ROS:    Constitutional: He denies fevers, weight loss, sweats. Eyes: No blurred or double vision. ENT: No difficulty with swallowing, taste, speech or smell. Neck: no stiffness or swelling  Respiratory: No cough wheezing or shortness of breath. Cardiovascular: Denies chest pain, unexplained indigestion or syncope. Positive for occasional fluttering in his chest without other associated symptoms  Gastrointestinal:  No changes in bowel movements, no abdominal pain, no bloating. Genitourinary:  He denies frequency, nocturia or stranguria. Extremities: No joint pain, stiffness or swelling. Back: some chronic pain for which she would like to try Lidoderm patch  Neurological:  No numbness, tingling, burring paresthesias or loss of motor strength. No syncope, dizziness or frequent headache  Lymphatic: no adenopathy noted  Hematologic: no easy bruising or bleeding gums  Skin:  No recent rashes or mole changes. Psychiatric/Behavioral:  Negative for depression.       Physical Examination     Evaluation of Cognitive Function:  Mood/affect:  happy  Appearance: age appropriate  Family member/caregiver input: none    Visit Vitals  /74   Pulse 86   Resp 17   Ht 5' 9\" (1.753 m)   Wt 191 lb 6.4 oz (86.8 kg)   SpO2 96%   BMI 28.26 kg/m²     Vitals:    12/19/18 0948 12/19/18 1029   BP: 152/70 138/74   Pulse: 86    Resp: 17    SpO2: 96%    Weight: 191 lb 6.4 oz (86.8 kg)    Height: 5' 9\" (1.753 m)         PHYSICAL EXAM:    General appearance - alert, well appearing, and in no distress  Mental status - alert, oriented to person, place, and time  HEENT:  Ears - bilateral TM's and external ear canals clear  Eyes - pupillary responses were normal.  Extraocular muscle function intact. Lids and conjunctiva not injected. Fundoscopic exam revealed sharp disc margins. eye movements intact  Pharynx- clear with teeth in good repair. No masses were noted  Neck - supple without thyromegaly or burit on the right for left carotid bruit noted. No JVD noted  Lungs - clear to auscultation and percussion  Cardiac- normal rate, regular rhythm without murmurs. PMI not displaced. No gallop, rub or click  Abdomen - flat, soft, non-tender without palpable organomegaly or mass. No pulsatile mass was felt, and not bruit was heard. Bowel sounds were active  : Circumcised, Testes descended w/o masses  Rectal: normal sphincter tone, prostate normal, no masses, stool brown and hemacult negative  Extremities -  no clubbing cyanosis or edema  Lymphatics - no palpable lymphadenopathy, no hepatosplenomegaly  Hematologic: no petechiae or purpura  Peripheral vascular -Femoral, Dorsalis pedis and posterior tibial pulses felt without difficulty  Skin - no rash or unusual mole change noted  Neurological - Cranial nerves II-XII grossly intact. Motor strength 5/5. DTR's 2+ and symmetric.   Station and gait normal  Back exam - full range of motion, no tenderness, palpable spasm or pain on motion  Musculoskeletal - no joint tenderness, deformity or swelling      Results for orders placed or performed in visit on 12/03/18   HEPATIC FUNCTION PANEL   Result Value Ref Range    Protein, total 8.3 6.0 - 8.5 g/dL    Albumin 4.1 3.6 - 4.8 g/dL    Bilirubin, total 0.5 0.0 - 1.2 mg/dL    Bilirubin, direct 0.17 0.00 - 0.40 mg/dL    Alk. phosphatase 120 (H) 39 - 117 IU/L    AST (SGOT) 23 0 - 40 IU/L    ALT (SGPT) 10 0 - 44 IU/L   HCV RNA BY AMY QL,RFLX TO QT   Result Value Ref Range    HCV RNA by AMY, QL Negative Negative       Advice/Referrals/Counseling   Education and counseling provided:  Are appropriate based on today's review and evaluation  End-of-Life planning (with patient's consent)  Pneumococcal Vaccine  Influenza Vaccine  Colorectal cancer screening tests      Assessment/Plan     ASSESSMENT:   1. Hypertension, renal disease    2. Mixed hyperlipidemia    3. Gastroesophageal reflux disease without esophagitis    4. ESRD (end stage renal disease) ([facility] [address]    5. Chronic hepatitis C without hepatic coma ([facility] [address]    6. Chronic gout of multiple sites, unspecified cause    7. Vitamin D deficiency    8. Medicare annual wellness visit, subsequent    9. Palpitations    10. Bruit of left carotid artery    11. Encounter for immunization      Impression  1. Hypertension that is controlled to continue current therapy reviewed with him  2. Hyperlipidemia prior lab reviewed and repeat status pending I will make adjustments if necessary. 3.  GERD that is currently stable  4. End-stage renal disease continue dialysis  5. Chronic hepatitis C currently asymptomatic  6. Gout without recent flares  7. Vitamin D deficiency repeat status pending  8. Palpitations EKG obtained today is normal palpitations, over a problem and then will get a Holter monitor  9. Left carotid bruit I will set him up for carotid Dopplers  Medicare subsequent annual wellness examination screening questionnaire is completed today. The results were reviewed with him and his questions were answered. Lifestyle recommendations and modifications discussed and made. I will call the lab results and make further recommendations or adjustments if necessary. I will also call with results of carotid Dopplers and if he is more palpitations he would notify me and we can set up a Holter monitor  We will use a Lidoderm patch if his insurance will cover it for his back pain  Pneumonia shot updated with pneumococcal 23 today. Follow-up scheduled again for 3 months or sooner should to be a problem. High complexity decision making on this office visit today which was 40 minutes not inclusive of the time spent on Medicare wellness examination. PLAN:  .  Orders Placed This Encounter    DUPLEX CAROTID BILATERAL    PNEUMOCOCCAL POLYSACCHARIDE VACCINE, 23-VALENT, ADULT OR IMMUNOSUPPRESSED PT DOSE,    T4, FREE    METABOLIC PANEL, COMPREHENSIVE    TSH 3RD GENERATION    LIPID PANEL    CK    VITAMIN D, 25 HYDROXY    AMB POC COMPLETE CBC,AUTOMATED ENTER    AMB POC URINALYSIS DIP STICK AUTO W/ MICRO     AMB POC EKG ROUTINE W/ 12 LEADS, INTER & REP         ATTENTION:   This medical record was transcribed using an electronic medical records system. Although proofread, it may and can contain electronic and spelling errors. Other human spelling and other errors may be present. Corrections may be executed at a later time. Please feel free to contact us for any clarifications as needed. Follow-up Disposition:  Return in about 3 months (around 3/19/2019). Ada Patton MD    Recommended healthy diet low in carbohydrates, fats, sodium and cholesterol. Recommended regular cardiovascular exercise 3-6 times per week for 30-60 minutes daily. Current Outpatient Medications   Medication Sig Dispense Refill    elbasvir-grazoprevir (ZEPATIER)  mg tab Take 1 Tab by mouth daily. 28 Tab 2    lisinopril (PRINIVIL, ZESTRIL) 2.5 mg tablet TAKE 1 TABLET BY MOUTH DAILY. TAKE AFTER DIALYSIS ON HEMODIALYSIS DAYS. 6    etelcalcetide (PARSABIV) 5 mg/mL soln by IntraVENous route. Indications: 3 x per week      ERGOCALCIFEROL, VITAMIN D2, (VITAMIN D2 PO) Take  by mouth.  amLODIPine (NORVASC) 10 mg tablet TAKE 1 TABLET BY MOUTH ONCE DAILY  11    amoxicillin (AMOXIL) 500 mg capsule TAKE 4 CAPSULES BY MOUTH 1 HOUR PRIOR TO DENTAL PROCEDURE  1    omeprazole (PRILOSEC) 40 mg capsule TAKE 1 CAPSULE BY MOUTH ONCE DAILY  11    sevelamer carbonate (RENVELA) 800 mg Tab tab Take 1,600 mg by mouth three (3) times daily (with meals). No results found for any visits on 12/19/18. Verbal and written instructions (see AVS) provided. Patient expresses understanding of diagnosis and treatment plan.     Marina Huang MD

## 2018-12-19 ENCOUNTER — OFFICE VISIT (OUTPATIENT)
Dept: INTERNAL MEDICINE CLINIC | Age: 68
End: 2018-12-19

## 2018-12-19 VITALS
RESPIRATION RATE: 17 BRPM | HEART RATE: 86 BPM | BODY MASS INDEX: 28.35 KG/M2 | WEIGHT: 191.4 LBS | HEIGHT: 69 IN | OXYGEN SATURATION: 96 % | SYSTOLIC BLOOD PRESSURE: 138 MMHG | DIASTOLIC BLOOD PRESSURE: 74 MMHG

## 2018-12-19 DIAGNOSIS — E78.2 MIXED HYPERLIPIDEMIA: ICD-10-CM

## 2018-12-19 DIAGNOSIS — K21.9 GASTROESOPHAGEAL REFLUX DISEASE WITHOUT ESOPHAGITIS: ICD-10-CM

## 2018-12-19 DIAGNOSIS — E55.9 VITAMIN D DEFICIENCY: ICD-10-CM

## 2018-12-19 DIAGNOSIS — N18.6 ESRD (END STAGE RENAL DISEASE) (HCC): ICD-10-CM

## 2018-12-19 DIAGNOSIS — Z23 ENCOUNTER FOR IMMUNIZATION: ICD-10-CM

## 2018-12-19 DIAGNOSIS — N39.0 URINARY TRACT INFECTION WITHOUT HEMATURIA, SITE UNSPECIFIED: ICD-10-CM

## 2018-12-19 DIAGNOSIS — Z00.00 MEDICARE ANNUAL WELLNESS VISIT, SUBSEQUENT: ICD-10-CM

## 2018-12-19 DIAGNOSIS — R09.89 BRUIT OF LEFT CAROTID ARTERY: ICD-10-CM

## 2018-12-19 DIAGNOSIS — B18.2 CHRONIC HEPATITIS C WITHOUT HEPATIC COMA (HCC): ICD-10-CM

## 2018-12-19 DIAGNOSIS — R00.2 PALPITATIONS: ICD-10-CM

## 2018-12-19 DIAGNOSIS — M1A.09X0 CHRONIC GOUT OF MULTIPLE SITES, UNSPECIFIED CAUSE: ICD-10-CM

## 2018-12-19 DIAGNOSIS — I12.9 HYPERTENSION, RENAL DISEASE: Primary | ICD-10-CM

## 2018-12-19 LAB
BACTERIA UA POCT, BACTPOCT: ABNORMAL
BILIRUB UR QL STRIP: NEGATIVE
CASTS UA POCT: ABNORMAL
CLUE CELLS, CLUEPOCT: NEGATIVE
CRYSTALS UA POCT, CRYSPOCT: NEGATIVE
EPITHELIAL CELLS POCT: ABNORMAL
GLUCOSE UR-MCNC: NEGATIVE MG/DL
GRAN# POC: 4 K/UL (ref 2–7.8)
GRAN% POC: 60.7 % (ref 37–92)
HCT VFR BLD CALC: 34.5 % (ref 37–51)
HGB BLD-MCNC: 11.7 G/DL (ref 12–18)
KETONES P FAST UR STRIP-MCNC: NEGATIVE MG/DL
LY# POC: 1.9 K/UL (ref 0.6–4.1)
LY% POC: 30 % (ref 10–58.5)
MCH RBC QN: 33 PG (ref 26–32)
MCHC RBC-ENTMCNC: 33.9 G/DL (ref 30–36)
MCV RBC: 98 FL (ref 80–97)
MID #, POC: 0.6 K/UL (ref 0–1.8)
MID% POC: 9.3 % (ref 0.1–24)
MUCUS UA POCT, MUCPOCT: ABNORMAL
PH UR STRIP: 8 [PH] (ref 5–7)
PLATELET # BLD: 243 K/UL (ref 140–440)
PROT UR QL STRIP: ABNORMAL
RBC # BLD: 3.53 M/UL (ref 4.2–6.3)
RBC UA POCT, RBCPOCT: ABNORMAL
SP GR UR STRIP: 1.01 (ref 1.01–1.02)
TRICH UA POCT, TRICHPOC: NEGATIVE
UA UROBILINOGEN AMB POC: NORMAL (ref 0.2–1)
URINALYSIS CLARITY POC: CLEAR
URINALYSIS COLOR POC: YELLOW
URINE BLOOD POC: ABNORMAL
URINE CULT COMMENT, POCT: ABNORMAL
URINE LEUKOCYTES POC: ABNORMAL
URINE NITRITES POC: NEGATIVE
WBC # BLD: 6.5 K/UL (ref 4.1–10.9)
WBC UA POCT, WBCPOCT: ABNORMAL
YEAST UA POCT, YEASTPOC: NEGATIVE

## 2018-12-19 RX ORDER — LIDOCAINE 50 MG/G
1 PATCH TOPICAL EVERY 24 HOURS
Qty: 30 EACH | Status: SHIPPED | OUTPATIENT
Start: 2018-12-19 | End: 2019-08-09 | Stop reason: SDUPTHER

## 2018-12-19 NOTE — PROGRESS NOTES
After obtaining consent, and per verbal order from Dr. Chiki Camacho, patient received pneumoccal 23 vaccine given by Miriam Byrne RN  in Right Deltoid. Influenza Vaccine 0.5 mL IM now. Patient was observed for 15 minutes post injection. Patient tolerated injection well with no adverse effects. VIS given.

## 2018-12-19 NOTE — PROGRESS NOTES
Chief Complaint   Patient presents with    Hypertension     3 month follow up      1. Have you been to the ER, urgent care clinic since your last visit? Hospitalized since your last visit? No    2. Have you seen or consulted any other health care providers outside of the [address] [place] [address] since your last visit? Include any pap smears or colon screening.  No  Visit Vitals  /70 (BP 1 Location: Right arm, BP Patient Position: Sitting)   Pulse 86   Resp 17   Ht 5' 9\" (1.753 m)   Wt 191 lb 6.4 oz (86.8 kg)   SpO2 96%   BMI 28.26 kg/m²

## 2018-12-19 NOTE — PATIENT INSTRUCTIONS

## 2018-12-20 LAB
25(OH)D3+25(OH)D2 SERPL-MCNC: 13.1 NG/ML (ref 30–100)
ALBUMIN SERPL-MCNC: 4.4 G/DL (ref 3.6–4.8)
ALBUMIN/GLOB SERPL: 0.9 {RATIO} (ref 1.2–2.2)
ALP SERPL-CCNC: 126 IU/L (ref 39–117)
ALT SERPL-CCNC: 10 IU/L (ref 0–44)
AST SERPL-CCNC: 19 IU/L (ref 0–40)
BACTERIA UR CULT: NORMAL
BILIRUB SERPL-MCNC: 0.8 MG/DL (ref 0–1.2)
BUN SERPL-MCNC: 25 MG/DL (ref 8–27)
BUN/CREAT SERPL: 3 (ref 10–24)
CALCIUM SERPL-MCNC: 9.4 MG/DL (ref 8.6–10.2)
CHLORIDE SERPL-SCNC: 93 MMOL/L (ref 96–106)
CHOLEST SERPL-MCNC: 200 MG/DL (ref 100–199)
CK SERPL-CCNC: 177 U/L (ref 24–204)
CO2 SERPL-SCNC: 23 MMOL/L (ref 20–29)
CREAT SERPL-MCNC: 7.49 MG/DL (ref 0.76–1.27)
GLOBULIN SER CALC-MCNC: 4.7 G/DL (ref 1.5–4.5)
GLUCOSE SERPL-MCNC: 97 MG/DL (ref 65–99)
HDLC SERPL-MCNC: 55 MG/DL
LDLC SERPL CALC-MCNC: 123 MG/DL (ref 0–99)
POTASSIUM SERPL-SCNC: 4.4 MMOL/L (ref 3.5–5.2)
PROT SERPL-MCNC: 9.1 G/DL (ref 6–8.5)
SODIUM SERPL-SCNC: 139 MMOL/L (ref 134–144)
T4 FREE SERPL-MCNC: 1.32 NG/DL (ref 0.82–1.77)
TRIGL SERPL-MCNC: 111 MG/DL (ref 0–149)
TSH SERPL DL<=0.005 MIU/L-ACNC: 1.83 UIU/ML (ref 0.45–4.5)
VLDLC SERPL CALC-MCNC: 22 MG/DL (ref 5–40)

## 2018-12-20 RX ORDER — OMEPRAZOLE 40 MG/1
CAPSULE, DELAYED RELEASE ORAL
Qty: 30 CAP | Refills: 11 | Status: SHIPPED | OUTPATIENT
Start: 2018-12-20 | End: 2019-02-15 | Stop reason: SDUPTHER

## 2018-12-20 NOTE — PROGRESS NOTES
Labs are okay except for the cholesterol is a little elevated so watch diet. The vitamin D level is very low so I would recommend we go with 50,000 units weekly for 12 weeks.

## 2018-12-21 ENCOUNTER — TELEPHONE (OUTPATIENT)
Dept: INTERNAL MEDICINE CLINIC | Age: 68
End: 2018-12-21

## 2018-12-21 ENCOUNTER — HOSPITAL ENCOUNTER (OUTPATIENT)
Dept: VASCULAR SURGERY | Age: 68
Discharge: HOME | End: 2018-12-21
Attending: INTERNAL MEDICINE
Payer: MEDICARE

## 2018-12-21 DIAGNOSIS — I65.23 BILATERAL CAROTID ARTERY STENOSIS: ICD-10-CM

## 2018-12-21 DIAGNOSIS — R09.89 RIGHT CAROTID BRUIT: ICD-10-CM

## 2018-12-21 DIAGNOSIS — R09.89 BRUIT OF LEFT CAROTID ARTERY: ICD-10-CM

## 2018-12-21 PROCEDURE — 93880 EXTRACRANIAL BILAT STUDY: CPT

## 2018-12-21 RX ORDER — ERGOCALCIFEROL 1.25 MG/1
50000 CAPSULE ORAL
Qty: 12 CAP | Refills: 0 | Status: SHIPPED | OUTPATIENT
Start: 2018-12-21 | End: 2019-03-11 | Stop reason: SDUPTHER

## 2018-12-21 NOTE — PROCEDURES
[facility]  *** FINAL REPORT ***    Name: Cydney Willoughby  MRN: [id number]    Outpatient  : 21 Sep 1950  HIS Order #: [id number]  [address] [facility] Visit #: [id number]  Date: 21 Dec 2018    TYPE OF TEST: Cerebrovascular Duplex    REASON FOR TEST  Carotid bruit (left hemisphere)    Right Carotid:-             Proximal               Mid                 Distal  cm/s  Systolic  Diastolic  Systolic  Diastolic  Systolic  Diastolic  CCA:     95.7      23.0                            72.0      13.0  Bulb:  ECA:     86.0       0.0  ICA:     44.0      13.0       75.0      18.0       83.0      21.0  ICA/CCA:  0.6       1.0    ICA Stenosis: <50%    Right Vertebral:-  Finding: Antegrade  Sys:       17.0  Natali:        7.0    Right Subclavian: Normal    Left Carotid:-            Proximal                Mid                 Distal  cm/s  Systolic  Diastolic  Systolic  Diastolic  Systolic  Diastolic  CCA:     34.1      14.0                            80.0      16.0  Bulb:  ECA:     80.0       7.0  ICA:     78.0      20.0       91.0      20.0       86.0      22.0  ICA/CCA:  1.0       1.3    ICA Stenosis: Normal    Left Vertebral:-  Finding: Antegrade  Sys:       62.0  Natali:       14.0    Left Subclavian:    INTERPRETATION/FINDINGS  PROCEDURE:  Carotid Duplex Examination using B-mode, color and  spectral Doppler of the extracranial cerebrovascular arteries. 1. <50% stenosis in the right internal carotid artery (Low end of  range). 2. No significant stenosis of the left internal carotid artery. 3. No significant stenosis in the external carotid arteries  bilaterally. 4. Antegrade flow in both vertebral arteries. 5. Normal flow in the right subclavian artery. Plaque Morphology:  1. Homogeneous plaque in the bulb and right ICA (Minimal). ADDITIONAL COMMENTS    I have personally reviewed the data relevant to the interpretation of  this  study.     TECHNOLOGIST: Kit Mari RVT  Signed: 2018 02:03 PM    PHYSICIAN: Maryuri Sommers MD  Signed: 12/21/2018 04:41 PM

## 2018-12-21 NOTE — TELEPHONE ENCOUNTER
----- Message from Bree Ramirez MD sent at 12/20/2018  3:52 PM EST -----  Labs are okay except for the cholesterol is a little elevated so watch diet. The vitamin D level is very low so I would recommend we go with 50,000 units weekly for 12 weeks.

## 2018-12-21 NOTE — TELEPHONE ENCOUNTER
RX refill request from the patient/pharmacy. Patient last seen 12- with labs, and next appt. scheduled for 03-  Requested Prescriptions     Pending Prescriptions Disp Refills    ergocalciferol (ERGOCALCIFEROL) 50,000 unit capsule 12 Cap 0     Sig: Take 1 Cap by mouth every seven (7) days. Leonila Pichardo

## 2019-01-02 ENCOUNTER — TELEPHONE (OUTPATIENT)
Dept: INTERNAL MEDICINE CLINIC | Age: 69
End: 2019-01-02

## 2019-01-02 NOTE — TELEPHONE ENCOUNTER
----- Message from Marge Marcus MD sent at 12/24/2018 12:50 PM EST -----  Carotid Dopplers less than 50% stenosis of we will continue to follow yearly

## 2019-01-21 ENCOUNTER — OFFICE VISIT (OUTPATIENT)
Dept: INTERNAL MEDICINE CLINIC | Age: 69
End: 2019-01-21

## 2019-01-21 VITALS
WEIGHT: 187 LBS | HEIGHT: 69 IN | RESPIRATION RATE: 18 BRPM | BODY MASS INDEX: 27.7 KG/M2 | HEART RATE: 97 BPM | DIASTOLIC BLOOD PRESSURE: 84 MMHG | OXYGEN SATURATION: 98 % | SYSTOLIC BLOOD PRESSURE: 138 MMHG

## 2019-01-21 DIAGNOSIS — H65.01 RIGHT ACUTE SEROUS OTITIS MEDIA, RECURRENCE NOT SPECIFIED: Primary | ICD-10-CM

## 2019-01-21 RX ORDER — CEFUROXIME AXETIL 250 MG/1
250 TABLET ORAL 2 TIMES DAILY
Qty: 20 TAB | Refills: 0 | Status: SHIPPED | OUTPATIENT
Start: 2019-01-21 | End: 2019-02-25 | Stop reason: ALTCHOICE

## 2019-01-21 NOTE — PROGRESS NOTES
Subjective:  
Daphne Paez is a 76 y.o. male Chief Complaint Patient presents with  Ear Pain  
  problem with jaw, feeling some unbalance History of present illness: He presents today complaining some problem with ear pain in the right side and feels like his balance is low but often feels like his get some discomfort at the angle of the right jaw. He also wants me to look in the left ear but that does not seem to hurt him. He notes no head or nasal congestion. There is been no fevers or chills. Patient Active Problem List  
Diagnosis Code  Hypertension, renal disease I12.9  Mixed hyperlipidemia E78.2  
 ESRD (end stage renal disease) ([facility] [address] N18.6  Gastroesophageal reflux disease without esophagitis K21.9  Vitamin D deficiency E55.9  Gout of multiple sites M10.9  Chronic hepatitis C without hepatic coma (HCC) B18.2  Medicare annual wellness visit, subsequent Z00.00  Palpitations R00.2  Bruit of left carotid artery R09.89  Bilateral carotid artery stenosis I65.23  
 Right carotid bruit R09.89  Right acute serous otitis media H65.01 Past Medical History:  
Diagnosis Date  Arthritis  Chronic kidney disease Dialysis T, Thur, Sat @ [facility]  Gout  Hepatitis C   
 Hypertension  Liver disease Hep C  
 Other and unspecified alcohol dependence, unspecified drinking behavior Acid reflux  Other ill-defined conditions(799.89)   
 gout  Other ill-defined conditions(799.89)   
 blood transfusion hx Allergies Allergen Reactions  Codeine Hives History reviewed. No pertinent family history. Social History Socioeconomic History  Marital status: SINGLE Spouse name: Not on file  Number of children: Not on file  Years of education: Not on file  Highest education level: Not on file Social Needs  Financial resource strain: Not on file  Food insecurity - worry: Not on file  Food insecurity - inability: Not on file  Transportation needs - medical: Not on file  Transportation needs - non-medical: Not on file Occupational History  Not on file Tobacco Use  Smoking status: Never Smoker  Smokeless tobacco: Never Used Substance and Sexual Activity  Alcohol use: No  
 Drug use: Yes Types: Prescription, Marijuana Comment: smoked pot 4 years ago  Sexual activity: Not on file Other Topics Concern  Not on file Social History Narrative  Not on file Prior to Admission medications Medication Sig Start Date End Date Taking? Authorizing Provider  
cefUROXime (CEFTIN) 250 mg tablet Take 1 Tab by mouth two (2) times a day. 1/21/19  Yes Jose Chaudhari MD  
ergocalciferol (ERGOCALCIFEROL) 50,000 unit capsule Take 1 Cap by mouth every seven (7) days. 12/21/18  Yes Jose Chaudhari MD  
omeprazole (PRILOSEC) 40 mg capsule TAKE 1 CAPSULE BY MOUTH ONCE DAILY 12/20/18  Yes Jose Chaudhari MD  
lidocaine (LIDODERM) 5 % 1 Patch by TransDERmal route every twenty-four (24) hours. Apply patch to the affected area for 12 hours a day and remove for 12 hours a day. 12/19/18  Yes Jose Chaudhari MD  
elbasvir-grazoprevir (ZEPATIER)  mg tab Take 1 Tab by mouth daily. 10/18/18  Yes Kayce Santa NP  
lisinopril (PRINIVIL, ZESTRIL) 2.5 mg tablet TAKE 1 TABLET BY MOUTH DAILY. TAKE AFTER DIALYSIS ON HEMODIALYSIS DAYS. 9/28/18  Yes Provider, Historical  
etelcalcetide (PARSABIV) 5 mg/mL soln by IntraVENous route. Indications: 3 x per week   Yes Provider, Historical  
ERGOCALCIFEROL, VITAMIN D2, (VITAMIN D2 PO) Take  by mouth.    Yes Provider, Historical  
amLODIPine (NORVASC) 10 mg tablet TAKE 1 TABLET BY MOUTH ONCE DAILY 10/11/17  Yes Provider, Historical  
amoxicillin (AMOXIL) 500 mg capsule TAKE 4 CAPSULES BY MOUTH 1 HOUR PRIOR TO DENTAL PROCEDURE 8/17/17  Yes Provider, Historical  
 sevelamer carbonate (RENVELA) 800 mg Tab tab Take 1,600 mg by mouth three (3) times daily (with meals). Yes Provider, Historical  
  
 
Review of Systems Constitutional:  He denies fever, weight loss, sweats or fatigue. EYES: No blurred or double vision, ENT: no nasal congestion, no headache or dizziness. No difficulty with swallowing, taste, speech or smell. Right ear ache and some problems with balance Respiratory:  No cough, wheezing or shortness of breath. No sputum production. Cardiac:  Denies chest pain, palpitations, unexplained indigestion, syncope, edema, PND or orthopnea. GI:  No changes in bowel movements, no abdominal pain, no bloating, anorexia, nausea, vomiting or heartburn. :  No frequency or dysuria. Denies incontinence or sexual dysfunction. Extremities:  No joint pain, stiffness or swelling Back:.no pain or soreness Skin:  No recent rashes or mole changes. Neurological:  No numbness, tingling, burning paresthesias or loss of motor strength. No syncope, dizziness, frequent headaches or memory loss. Hematologic:  No easy bruising Lymphatic: No lymph node enlargement Objective:  
 
Vitals:  
 01/21/19 1513 01/21/19 1553 BP: 160/80 138/84 Pulse: 97 Resp: 18 SpO2: 98% Weight: 187 lb (84.8 kg) Height: 5' 9\" (1.753 m) PainSc:   0 - No pain Body mass index is 27.62 kg/m². Physical Examination:  
           General Appearance:  Well-developed, well-nourished, no acute distress. HEENT:   
  Ears:  The TMs and ear canals were clear. There is marked tenderness in the superior aspect of the right anterior cervical chain consistent with lymphadenopathy from otitis Eyes:  The pupillary responses were normal.  Extraocular muscle function intact. Lids and conjunctiva not injected. Funduscopic exam revealed sharp disc margins. Nares: Clear w/o edema or erythema Pharynx:  Clear with teeth in good repair. No masses were noted. Neck:  Supple without thyromegaly or adenopathy. No JVD noted. No carotid                bruits. Lungs:  Clear to auscultation and percussion. Cardiac:  Regular rate and rhythm without murmur. PMI not displaced. No gallop, rub or click. Abdominal: Soft, non-tender, no hepata-spleenomegally or masses Extremities:  No clubbing, cyanosis or edema. Skin:  No rash or unusual mole changes noted. Lymph Nodes:  None felt in the cervical, supraclavicular, axillary or inguinal region. Neurological: . DTRs 2+ and symmetric. Station and gait normal.  
Hematologic:   No purpura or petechiae Assessment/Plan: 1. Right acute serous otitis media, recurrence not specified Impressions/Plan: 
Impression 1. Right serous Ceftin twice a day for 10 days and he would notify me if this is not effective. Orders Placed This Encounter  cefUROXime (CEFTIN) 250 mg tablet Follow-up Disposition: 
Return if symptoms worsen or fail to improve. No results found for any visits on 01/21/19. Misty Mcpherson MD 
 
The patient was given after the visit summary the patient verbalized an understanding of the plans and problems as explained.

## 2019-01-21 NOTE — PROGRESS NOTES
Chief Complaint Patient presents with  Ear Pain  
  problem with jaw, feeling some unbalance 1. Have you been to the ER, urgent care clinic since your last visit? Hospitalized since your last visit? No 
 
2. Have you seen or consulted any other health care providers outside of the [address] [place] [address] since your last visit? Include any pap smears or colon screening. No 
Visit Vitals /80 (BP 1 Location: Left arm, BP Patient Position: Sitting) Pulse 97 Resp 18 Ht 5' 9\" (1.753 m) Wt 187 lb (84.8 kg) SpO2 98% BMI 27.62 kg/m²

## 2019-01-21 NOTE — PATIENT INSTRUCTIONS
Middle Ear Fluid: Care Instructions Your Care Instructions Fluid often builds up inside the ear during a cold or allergies. Usually the fluid drains away, but sometimes a small tube in the ear, called the eustachian tube, stays blocked for months. Symptoms of fluid buildup may include: · Popping, ringing, or a feeling of fullness or pressure in the ear. · Trouble hearing. · Balance problems and dizziness. In most cases, you can treat yourself at home. Follow-up care is a key part of your treatment and safety. Be sure to make and go to all appointments, and call your doctor if you are having problems. It's also a good idea to know your test results and keep a list of the medicines you take. How can you care for yourself at home? · In most cases, the fluid clears up within a few months without treatment. You may need more tests if the fluid does not clear up after 3 months. · If your doctor prescribed antibiotics, take them as directed. Do not stop taking them just because you feel better. You need to take the full course of antibiotics. When should you call for help? Call your doctor now or seek immediate medical care if: 
  · You have symptoms of infection, such as: 
? Increased pain, swelling, warmth, or redness. ? Pus draining from the area. ? A fever.  
 Watch closely for changes in your health, and be sure to contact your doctor if: 
  · You notice changes in hearing.  
  · You do not get better as expected. Where can you learn more? Go to http://kirk-angelika.info/. Enter U502 in the search box to learn more about \"Middle Ear Fluid: Care Instructions. \" Current as of: March 27, 2018 Content Version: 11.9 © 9060-7890 [employer]. Care instructions adapted under license by [employer] (which disclaims liability or warranty for this information).  If you have questions about a medical condition or this instruction, always ask your healthcare professional. [place] [address] any warranty or liability for your use of this information.

## 2019-02-04 ENCOUNTER — OFFICE VISIT (OUTPATIENT)
Dept: HEMATOLOGY | Age: 69
End: 2019-02-04

## 2019-02-04 VITALS
OXYGEN SATURATION: 100 % | TEMPERATURE: 97 F | WEIGHT: 190.4 LBS | HEART RATE: 90 BPM | SYSTOLIC BLOOD PRESSURE: 163 MMHG | BODY MASS INDEX: 28.12 KG/M2 | DIASTOLIC BLOOD PRESSURE: 80 MMHG

## 2019-02-04 DIAGNOSIS — I10 ESSENTIAL HYPERTENSION: ICD-10-CM

## 2019-02-04 DIAGNOSIS — B18.2 CHRONIC HEPATITIS C WITHOUT HEPATIC COMA (HCC): Primary | ICD-10-CM

## 2019-02-04 LAB
ERYTHROCYTE [DISTWIDTH] IN BLOOD BY AUTOMATED COUNT: 14.6 % (ref 12.3–15.4)
HCT VFR BLD AUTO: 29.9 % (ref 37.5–51)
HGB BLD-MCNC: 10.2 G/DL (ref 13–17.7)
MCH RBC QN AUTO: 32.3 PG (ref 26.6–33)
MCHC RBC AUTO-ENTMCNC: 34.1 G/DL (ref 31.5–35.7)
MCV RBC AUTO: 95 FL (ref 79–97)
PLATELET # BLD AUTO: 175 X10E3/UL (ref 150–379)
RBC # BLD AUTO: 3.16 X10E6/UL (ref 4.14–5.8)
WBC # BLD AUTO: 5.4 X10E3/UL (ref 3.4–10.8)

## 2019-02-04 NOTE — PROGRESS NOTES
1. Have you been to the ER, urgent care clinic since your last visit? Hospitalized since your last visit? No    2. Have you seen or consulted any other health care providers outside of the [address] [place] [address] since your last visit? Include any pap smears or colon screening. No   Chief Complaint   Patient presents with    Follow-up     Follow- Up      Visit Vitals  /80 (BP 1 Location: Right arm, BP Patient Position: Sitting)   Pulse 90   Temp 97 °F (36.1 °C) (Tympanic)   Wt 190 lb 6.4 oz (86.4 kg)   SpO2 100%   BMI 28.12 kg/m²     PHQ over the last two weeks 2/4/2019   Little interest or pleasure in doing things Not at all   Feeling down, depressed, irritable, or hopeless Not at all   Total Score PHQ 2 0     Learning Assessment 2/4/2019   PRIMARY LEARNER Patient   HIGHEST LEVEL OF EDUCATION - PRIMARY LEARNER  -   BARRIERS PRIMARY LEARNER NONE   CO-LEARNER CAREGIVER No   PRIMARY LANGUAGE ENGLISH   LEARNER PREFERENCE PRIMARY LISTENING   ANSWERED BY patient    RELATIONSHIP SELF     Abuse Screening Questionnaire 2/4/2019   Do you ever feel afraid of your partner? N   Are you in a relationship with someone who physically or mentally threatens you? N   Is it safe for you to go home? Y     Fall Risk Assessment, last 12 mths 2/4/2019   Able to walk? Yes   Fall in past 12 months?  No

## 2019-02-04 NOTE — PROGRESS NOTES
[address] [facility] [address] [facility] MD [place], [address], [place], [place]       NAIF Ochoa PA-C [facility], [facility]   NAIF Hopper NP [facility] [address]    at [place] [id number], [address] [address]  22.    [phone]    FAX: [address] [place] [address] Avenue    [address], [address]    [phone]    FAX: [phone]     Patient Care Team:  Lona Agarwal MD as PCP - General (Internal Medicine)  Raj Ontiveros MD (Nephrology)    Patient Active Problem List   Diagnosis Code    Hypertension, renal disease I12.9    Mixed hyperlipidemia E78.2    ESRD (end stage renal disease) ([address] N18.6    Gastroesophageal reflux disease without esophagitis K21.9    Vitamin D deficiency E55.9    Gout of multiple sites M10.9    Chronic hepatitis C without hepatic coma ([address] B18.2    Medicare annual wellness visit, subsequent Z00.00    Palpitations R00.2    Bruit of left carotid artery R09.89    Bilateral carotid artery stenosis I65.23    Right carotid bruit R09.89    Right acute serous otitis media H65.01       Ginger Hall returns to the [address] [place] [address] regarding chronic HCV and its management. The active problem list, all pertinent past medical history, medications, radiologic findings and laboratory findings related to the liver disorder were reviewed with the patient. The patient is a 76 y.o.  male who was noted to have abnormalities in liver chemistries and subsequently tested positive for chronic HCV in the 2000s. Risk factors for acquiring HCV are IV drug use in 1970s. Imaging of the liver was recently performed in August 2018. This ruled out [address] and ascites.       Fibroscan in October 2018 suggested bridging fibrosis. The patient was previously treated with peginterferon and ribavirin in the mid-2000s. The patient was treated for 24 weeks. The patient tolerated treatment poorly and had to prematurely discontinue therapy. Patient completed 12 weeks of HCV treatment with Zepatier (11/2018-2/2019). He did well on treatment with no significant side effects. He returns to the clinic today to monitor his response to treatment. The patient notes fatigue but otherwise feels well. Today, patient denies abdominal pain, change in bowel habits, dark urine, myalgias, arthralgias, pruritus and problems concentrating. The patient has limitations in functional activities which can be attributed to the liver disease and to other medical problems that are not related to the liver disease. ASSESSMENT AND PLAN:  Chronic HCV   Chronic HCV with bridging fibrosis. Liver enzymes are normal. Liver function is normal.    HCV treatment  Completed 12 weeks of Zepatier treatment today (February 2019). Tolerated treatment very well. Will review HCV RNA when available to monitor response to treatment. ESRD  The patient has ESRD on dialysis. He is unsure if he is a candidate for a renal transplant. Nephrologist encouraged HCV treatment. Treatment of other medical problems in patients with chronic liver disease  There are no contraindications for the patient to take any medications that are necessary for treatment of other medical issues. The patient does not consume alcohol daily. Normal doses of acetaminophen can be used for pain as needed. Normal doses of acetaminophen as recommended on the label are not hepatotoxic, even in patients with cirrhosis. Counseling for alcohol in patients with chronic liver disease  The patient was counseled regarding alcohol consumption and the effect of alcohol on chronic liver disease. The patient does not consume any significant amount of alcohol.     Vaccinations Vaccination for viral hepatitis A and B is not needed. The patient has serologic evidence of prior exposure or vaccination with immunity. Routine vaccinations against other bacterial and viral agents can be performed as indicated. Annual flu vaccination should be administered if indicated. ALLERGIES  Allergies   Allergen Reactions    Codeine Hives     MEDICATIONS  Current Outpatient Medications   Medication Sig    cefUROXime (CEFTIN) 250 mg tablet Take 1 Tab by mouth two (2) times a day.  ergocalciferol (ERGOCALCIFEROL) 50,000 unit capsule Take 1 Cap by mouth every seven (7) days.  omeprazole (PRILOSEC) 40 mg capsule TAKE 1 CAPSULE BY MOUTH ONCE DAILY    lidocaine (LIDODERM) 5 % 1 Patch by TransDERmal route every twenty-four (24) hours. Apply patch to the affected area for 12 hours a day and remove for 12 hours a day.  lisinopril (PRINIVIL, ZESTRIL) 2.5 mg tablet TAKE 1 TABLET BY MOUTH DAILY. TAKE AFTER DIALYSIS ON HEMODIALYSIS DAYS.  etelcalcetide (PARSABIV) 5 mg/mL soln by IntraVENous route. Indications: 3 x per week    ERGOCALCIFEROL, VITAMIN D2, (VITAMIN D2 PO) Take  by mouth.  amLODIPine (NORVASC) 10 mg tablet TAKE 1 TABLET BY MOUTH ONCE DAILY    amoxicillin (AMOXIL) 500 mg capsule TAKE 4 CAPSULES BY MOUTH 1 HOUR PRIOR TO DENTAL PROCEDURE    sevelamer carbonate (RENVELA) 800 mg Tab tab Take 1,600 mg by mouth three (3) times daily (with meals). No current facility-administered medications for this visit. SYSTEM REVIEW NOT RELATED TO LIVER DISEASE OR REVIEWED ABOVE:  Constitution systems: Negative for fever, chills, weight gain, weight loss. Eyes: Negative for visual changes. ENT: Negative for sore throat, painful swallowing. Respiratory: Negative for cough, hemoptysis, SOB. Cardiology: Negative for chest pain, palpitations. GI:  Negative for constipation or diarrhea. : Negative for urinary frequency, dysuria, hematuria, nocturia.    Skin: Negative for rash.  Hematology: Negative for easy bruising, blood clots. Musculo-skelatal: Negative for back pain, muscle pain, weakness. Neurologic: Negative for headaches, dizziness, vertigo, memory problems not related to HE. Psychology: Negative for anxiety, depression. FAMILY HISTORY:  The patient has no knowledge of the father's medical condition. The mother  of CVA. There is no family history of liver disease. SOCIAL HISTORY:  The patient is . The patient has 2 children and 9 grandchildren. The patient has never used tobacco products. The patient has never consumed significant amounts of alcohol. 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 PHYSICAL EXAMINATION:  Visit Vitals  /80 (BP 1 Location: Right arm, BP Patient Position: Sitting)   Pulse 90   Temp 97 °F (36.1 °C) (Tympanic)   Wt 190 lb 6.4 oz (86.4 kg)   SpO2 100%   BMI 28.12 kg/m²     General: No acute distress. Eyes: Sclera anicteric. ENT: No oral lesions. Thyroid normal.  Nodes: No adenopathy. Skin: No spider angiomata. No jaundice. No palmar erythema. Respiratory: Lungs clear to auscultation. Cardiovascular: Regular heart rate. No murmurs. No JVD. Abdomen: Soft non-tender. Liver size normal to percussion/palpation. Spleen not palpable. No obvious ascites. Extremities: No edema. No muscle wasting. No gross arthritic changes. Neurologic: Alert and oriented. Cranial nerves grossly intact. No asterixis.     LABORATORY STUDIES:  [facility] [place] of [address] Units 2018   WBC 3.4 - 10.8 x10E3/uL 5.4    ANC 1.4 - 7.0 x10E3/uL     HGB 13.0 - 17.7 g/dL 10.2 (L)    HGB 12.0 - 18.0 g/dL  11.7 (A)   HGB (iSTAT) 12.0 - 18.0 g/dL  11.7 (A)    - 379 x10E3/uL 175    AST 0 - 40 IU/L 22 19   ALT 0 - 44 IU/L 12 10   Alk Phos 39 - 117 IU/L 85 126 (H)   Bili, Total 0.0 - 1.2 mg/dL 0.4 0.8   Bili, Direct 0.00 - 0.40 mg/dL     Albumin 3.6 - 4.8 g/dL 4.2 4.4   BUN 8 - 27 mg/dL 45 (H) 25   BUN (iSTAT) 9 - 20 mg/dL     Creat 0.76 - 1.27 mg/dL 8.90 (H) 7.49 (H)   Creat (iSTAT) 0.8 - 1.5 mg/dL     Na 134 - 144 mmol/L 141 139   K 3.5 - 5.2 mmol/L 4.5 4.4   Cl 96 - 106 mmol/L 98 93 (L)   CO2 20 - 29 mmol/L 24 23   Glucose 65 - 99 mg/dL 83 97     SEROLOGIES:  Serologies Latest Ref Rng & Units 8/10/2018   Hep A Ab, Total Negative Positive (A)   Hep B Surface Ag Negative Negative   Hep B Core Ab, Total Negative Positive (A)   Hep B Surface AB QL  Reactive   Hep C Genotype  1b   HCV RT-PCR, Quant IU/mL [id number]     LIVER HISTOLOGY:  10/2018. FibroScan performed at [address]. EkPa was 9.8. Suggested fibrosis level is F3. CAP score is 345, suggesting significant steatosis. ENDOSCOPIC PROCEDURES:  Not available or performed    RADIOLOGY:  8/2018. Ultrasound of liver. Normal appearing liver. No liver mass lesions. OTHER TESTING:  Not available or performed    All of the issues listed in the Assessment and Plan were discussed with the patient. All questions were answered. The patient expressed a clear understanding of the above. [address] [place] [address] in 3 months.     Bryant Christensen NP  Liver [place] of [address]  Ph: [phone]  Fax: [phone]

## 2019-02-05 LAB
AFP L3 MFR SERPL: 5.9 % (ref 0–9.9)
AFP SERPL-MCNC: 5.3 NG/ML (ref 0–8)
ALBUMIN SERPL-MCNC: 4.2 G/DL (ref 3.6–4.8)
ALBUMIN/GLOB SERPL: 1.1 {RATIO} (ref 1.2–2.2)
ALP SERPL-CCNC: 85 IU/L (ref 39–117)
ALT SERPL-CCNC: 12 IU/L (ref 0–44)
AST SERPL-CCNC: 22 IU/L (ref 0–40)
BILIRUB SERPL-MCNC: 0.4 MG/DL (ref 0–1.2)
BUN SERPL-MCNC: 45 MG/DL (ref 8–27)
BUN/CREAT SERPL: 5 (ref 10–24)
CALCIUM SERPL-MCNC: 8.9 MG/DL (ref 8.6–10.2)
CHLORIDE SERPL-SCNC: 98 MMOL/L (ref 96–106)
CO2 SERPL-SCNC: 24 MMOL/L (ref 20–29)
CREAT SERPL-MCNC: 8.9 MG/DL (ref 0.76–1.27)
GLOBULIN SER CALC-MCNC: 3.8 G/DL (ref 1.5–4.5)
GLUCOSE SERPL-MCNC: 83 MG/DL (ref 65–99)
POTASSIUM SERPL-SCNC: 4.5 MMOL/L (ref 3.5–5.2)
PROT SERPL-MCNC: 8 G/DL (ref 6–8.5)
SODIUM SERPL-SCNC: 141 MMOL/L (ref 134–144)

## 2019-02-07 LAB — HCV RNA SERPL QL NAA+PROBE: NEGATIVE

## 2019-02-15 RX ORDER — OMEPRAZOLE 40 MG/1
CAPSULE, DELAYED RELEASE ORAL
Qty: 90 CAP | Refills: 3 | Status: SHIPPED | OUTPATIENT
Start: 2019-02-15 | End: 2020-03-16

## 2019-02-15 NOTE — TELEPHONE ENCOUNTER
RX refill request from the patient/pharmacy. Patient last seen 02- with labs, and next appt. scheduled for 03-  Requested Prescriptions     Pending Prescriptions Disp Refills    omeprazole (PRILOSEC) 40 mg capsule 90 Cap 3     Sig: TAKE 1 CAPSULE BY MOUTH ONCE DAILY   .

## 2019-02-25 ENCOUNTER — OFFICE VISIT (OUTPATIENT)
Dept: INTERNAL MEDICINE CLINIC | Age: 69
End: 2019-02-25

## 2019-02-25 VITALS
BODY MASS INDEX: 27.52 KG/M2 | HEIGHT: 69 IN | TEMPERATURE: 98.5 F | HEART RATE: 92 BPM | RESPIRATION RATE: 19 BRPM | WEIGHT: 185.8 LBS | OXYGEN SATURATION: 98 % | DIASTOLIC BLOOD PRESSURE: 80 MMHG | SYSTOLIC BLOOD PRESSURE: 150 MMHG

## 2019-02-25 DIAGNOSIS — I12.9 HYPERTENSION, RENAL DISEASE: ICD-10-CM

## 2019-02-25 DIAGNOSIS — N45.1 EPIDIDYMITIS: Primary | ICD-10-CM

## 2019-02-25 RX ORDER — DOXYCYCLINE HYCLATE 100 MG
100 TABLET ORAL 2 TIMES DAILY
Qty: 20 TAB | Refills: 0 | Status: SHIPPED | OUTPATIENT
Start: 2019-02-25 | End: 2019-03-22 | Stop reason: ALTCHOICE

## 2019-02-25 NOTE — PROGRESS NOTES
Subjective:  
Jorge Read is a 76 y.o. male Chief Complaint Patient presents with  Groin Pain History of present illness: He presents today noting we discussed ongoing pain in the left scrotal area that started about 3 days ago and is persisted. It is a little better than it was then. He notes no associated fevers or chills and no urinary frequency or dysuria. There is no history of trauma. Patient Active Problem List  
Diagnosis Code  Hypertension, renal disease I12.9  Mixed hyperlipidemia E78.2  
 ESRD (end stage renal disease) ([facility] N18.6  Gastroesophageal reflux disease without esophagitis K21.9  Vitamin D deficiency E55.9  Gout of multiple sites M10.9  Chronic hepatitis C without hepatic coma ([facility]) B18.2  Medicare annual wellness visit, subsequent Z00.00  Palpitations R00.2  Bruit of left carotid artery R09.89  Bilateral carotid artery stenosis I65.23  
 Right carotid bruit R09.89  Right acute serous otitis media H65.01  
 Epididymitis N45.1 Past Medical History:  
Diagnosis Date  Arthritis  Chronic kidney disease [facility]  Gout  Hepatitis C   
 Hypertension  Liver disease Hep C  
 Other and unspecified alcohol dependence, unspecified drinking behavior Acid reflux  Other ill-defined conditions(799.89)   
 gout  Other ill-defined conditions(799.89)   
 blood transfusion hx Allergies Allergen Reactions  Codeine Hives History reviewed. No pertinent family history. Social History Socioeconomic History  Marital status: SINGLE Spouse name: Not on file  Number of children: Not on file  Years of education: Not on file  Highest education level: Not on file Social Needs  Financial resource strain: Not on file  Food insecurity - worry: Not on file  Food insecurity - inability: Not on file  Transportation needs - medical: Not on file  Transportation needs - non-medical: Not on file Occupational History  Not on file Tobacco Use  Smoking status: Never Smoker  Smokeless tobacco: Never Used Substance and Sexual Activity  Alcohol use: No  
 Drug use: No  
  Comment: smoked pot 4 years ago  Sexual activity: Not on file Other Topics Concern  Not on file Social History Narrative  Not on file Prior to Admission medications Medication Sig Start Date End Date Taking? Authorizing Provider  
doxycycline (VIBRA-TABS) 100 mg tablet Take 1 Tab by mouth two (2) times a day. 2/25/19  Yes Pradeep Nieto MD  
omeprazole (PRILOSEC) 40 mg capsule TAKE 1 CAPSULE BY MOUTH ONCE DAILY 2/15/19  Yes Pradeep Nieto MD  
ergocalciferol (ERGOCALCIFEROL) 50,000 unit capsule Take 1 Cap by mouth every seven (7) days. 12/21/18  Yes Pradeep Nieto MD  
lidocaine (LIDODERM) 5 % 1 Patch by TransDERmal route every twenty-four (24) hours. Apply patch to the affected area for 12 hours a day and remove for 12 hours a day. 12/19/18  Yes Pradeep Nieto MD  
lisinopril (PRINIVIL, ZESTRIL) 2.5 mg tablet TAKE 1 TABLET BY MOUTH DAILY. TAKE AFTER DIALYSIS ON HEMODIALYSIS DAYS. 9/28/18  Yes Provider, Historical  
etelcalcetide (PARSABIV) 5 mg/mL soln by IntraVENous route. Indications: 3 x per week   Yes Provider, Historical  
ERGOCALCIFEROL, VITAMIN D2, (VITAMIN D2 PO) Take  by mouth. Yes Provider, Historical  
amLODIPine (NORVASC) 10 mg tablet TAKE 1 TABLET BY MOUTH ONCE DAILY 10/11/17  Yes Provider, Historical  
amoxicillin (AMOXIL) 500 mg capsule TAKE 4 CAPSULES BY MOUTH 1 HOUR PRIOR TO DENTAL PROCEDURE 8/17/17  Yes Provider, Historical  
sevelamer carbonate (RENVELA) 800 mg Tab tab Take 1,600 mg by mouth three (3) times daily (with meals). Yes Provider, Historical  
  
 
Review of Systems Constitutional:  He denies fever, weight loss, sweats or fatigue.  
            EYES: No blurred or double vision,  
 ENT: no nasal congestion, no headache or dizziness. No difficulty with               swallowing, taste, speech or smell. Respiratory:  No cough, wheezing or shortness of breath. No sputum production. Cardiac:  Denies chest pain, palpitations, unexplained indigestion, syncope, edema, PND or orthopnea. GI:  No changes in bowel movements, no abdominal pain, no bloating, anorexia, nausea, vomiting or heartburn. :  No frequency or dysuria. Denies incontinence or sexual dysfunction. Tenderness left groin Extremities:  No joint pain, stiffness or swelling Back:.no pain or soreness Skin:  No recent rashes or mole changes. Neurological:  No numbness, tingling, burning paresthesias or loss of motor strength. No syncope, dizziness, frequent headaches or memory loss. Hematologic:  No easy bruising Lymphatic: No lymph node enlargement Objective:  
 
Vitals:  
 02/25/19 1358 BP: 150/80 Pulse: 92 Resp: 19 Temp: 98.5 °F (36.9 °C) TempSrc: Oral  
SpO2: 98% Weight: 185 lb 12.8 oz (84.3 kg) Height: 5' 9\" (1.753 m) PainSc:   7 PainLoc: Groin Body mass index is 27.44 kg/m². Physical Examination:  
           General Appearance:  Well-developed, well-nourished, no acute distress. HEENT:   
  Ears:  The TMs and ear canals were clear. Eyes:  The pupillary responses were normal.  Extraocular muscle function intact. Lids and conjunctiva not injected. Funduscopic exam revealed sharp disc margins. Nares: Clear w/o edema or erythema Pharynx:  Clear with teeth in good repair. No masses were noted. Neck:  Supple without thyromegaly or adenopathy. No JVD noted. No carotid                bruits. Lungs:  Clear to auscultation and percussion. Cardiac:  Regular rate and rhythm without murmur. PMI not displaced. No gallop, rub or click. Abdominal: Soft, non-tender, no hepata-spleenomegally or masses Genitourinary: Tenderness left epididymis is slightly swollen Extremities:  No clubbing, cyanosis or edema. Skin:  No rash or unusual mole changes noted. Lymph Nodes:  None felt in the cervical, supraclavicular, axillary or inguinal region. Neurological: . DTRs 2+ and symmetric. Station and gait normal.  
Hematologic:   No purpura or petechiae Assessment/Plan: 1. Epididymitis 2. Hypertension, renal disease Impressions/Plan: 
Impression 1. Epididymitis we will treat this with doxycycline twice a day for 10 days 2. Hypertension blood pressure is up today but some this may be anxiety related so we will see what it is at his next regular scheduled appointment. Orders Placed This Encounter  doxycycline (VIBRA-TABS) 100 mg tablet Follow-up Disposition: Not on File No results found for any visits on 02/25/19. Esthela Jane MD 
 
The patient was given after the visit summary the patient verbalized an understanding of the plans and problems as explained.

## 2019-02-25 NOTE — PROGRESS NOTES
[place]  Identified pt with two pt identifiers(name and ). Chief Complaint Patient presents with  Groin Pain 1. Have you been to the ER, urgent care clinic since your last visit? Hospitalized since your last visit? No 
 
2. Have you seen or consulted any other health care providers outside of the [address] [place] [address] since your last visit? Include any pap smears or colon screening. No 
 
 
Health Maintenance Topics with due status: Overdue Topic Date Due DTaP/Tdap/Td series 1971 Shingrix Vaccine Age 50> 2000 GLAUCOMA SCREENING Q2Y 2015 Health Maintenance Topics with due status: Not Due Topic Last Completion Date COLONOSCOPY 2013 MEDICARE YEARLY EXAM 2018 Health Maintenance Topics with due status: Completed Topic Last Completion Date Influenza Age 5 to Adult 10/15/2018 Pneumococcal 65+ High/Highest Risk 2018 Medication reconciliation up to date and corrected with patient at this time. Today's provider has been notified of reason for visit, vitals and flowsheets obtained on patients. Reviewed record in preparation for visit, huddled with provider and have obtained necessary documentation. Wt Readings from Last 3 Encounters:  
19 185 lb 12.8 oz (84.3 kg) 19 190 lb 6.4 oz (86.4 kg) 19 187 lb (84.8 kg) Temp Readings from Last 3 Encounters:  
19 98.5 °F (36.9 °C) (Oral) 19 97 °F (36.1 °C) (Tympanic) 18 97.2 °F (36.2 °C) (Tympanic) BP Readings from Last 3 Encounters:  
19 150/80  
19 163/80  
19 138/84 Pulse Readings from Last 3 Encounters:  
19 92  
19 90  
19 97 Vitals:  
 19 1358 BP: 150/80 Pulse: 92 Resp: 19 Temp: 98.5 °F (36.9 °C) TempSrc: Oral  
SpO2: 98% Weight: 185 lb 12.8 oz (84.3 kg) Height: 5' 9\" (1.753 m) PainSc:   7 PainLoc: Groin Learning Assessment: 
:  
 
Learning Assessment 2/4/2019 12/3/2018 10/17/2018 11/20/2017 PRIMARY LEARNER Patient Patient Patient Patient HIGHEST LEVEL OF EDUCATION - PRIMARY LEARNER  - - - SOME COLLEGE  
BARRIERS PRIMARY LEARNER NONE NONE NONE NONE  
CO-LEARNER CAREGIVER No No - No  
PRIMARY LANGUAGE ENGLISH ENGLISH ENGLISH ENGLISH  
LEARNER PREFERENCE PRIMARY LISTENING LISTENING LISTENING DEMONSTRATION  
ANSWERED BY patient  patient patient patient RELATIONSHIP SELF SELF SELF SELF Depression Screening: 
:  
 
3 most recent PHQ Screens 2/4/2019 Little interest or pleasure in doing things Not at all Feeling down, depressed, irritable, or hopeless Not at all Total Score PHQ 2 0 No flowsheet data found. Fall Risk Assessment: 
:  
 
Fall Risk Assessment, last 12 mths 2/4/2019 Able to walk? Yes Fall in past 12 months? No  
 
 
Abuse Screening: 
:  
 
Abuse Screening Questionnaire 2/4/2019 12/3/2018 10/17/2018 8/10/2018 11/20/2017 Do you ever feel afraid of your partner? N N N N N Are you in a relationship with someone who physically or mentally threatens you? N N N N N Is it safe for you to go home? Y Y Y Y Y  
 
 
ADL Screening: 
:  
 

## 2019-02-25 NOTE — PATIENT INSTRUCTIONS
Epididymitis and Orchitis: Care Instructions Your Care Instructions Epididymitis is pain and swelling of the tube that is attached to each testicle. This tube is called the epididymis. Orchitis is pain and swelling of the testicle. Infection with bacteria often causes these conditions. Sexually transmitted infections (STIs) also can cause both conditions. This is often the case in men younger than 28. Other causes in boys and older men are infections from surgery or having a catheter that drains urine. The mumps virus also can cause orchitis. Anti-inflammatory or pain medicines can help with the pain. Antibiotics are used if the problem is caused by bacteria. They are not used if a virus is the cause. Your testicle may stay swollen for many days or even a few weeks. The doctor has checked you carefully, but problems can develop later. If you notice any problems or new symptoms, get medical treatment right away. Follow-up care is a key part of your treatment and safety. Be sure to make and go to all appointments, and call your doctor if you are having problems. It's also a good idea to know your test results and keep a list of the medicines you take. How can you care for yourself at home? · If your doctor prescribed antibiotics, take them as directed. Do not stop taking them just because you feel better. You need to take the full course of antibiotics. · Ask your doctor if you can take an over-the-counter pain medicine, such as acetaminophen (Tylenol), ibuprofen (Advil, Motrin), or naproxen (Aleve). Be safe with medicines. Read and follow all instructions on the label. · Limit your activity to what is comfortable. · Wear snug underwear or an athletic supporter. This can help reduce pain. · Apply either cold or heat to the swollen area. Use the one that works best for your pain. Sitting in a warm bath for 15 minutes twice a day will help reduce the swelling more quickly. · If you have been told that an STI may have caused your condition, do not have sex until your doctor says it is safe. This will prevent spreading the infection. Tell your sex partner or partners that they need to be checked. They may need treatment. When should you call for help? Call your doctor now or seek immediate medical care if: 
  · Your pain gets worse.  
  · You have a new or higher fever.  
  · You have new or more swelling of your testicle.  
  · You have new belly pain, or your pain gets worse.  
 Watch closely for changes in your health, and be sure to contact your doctor if: 
  · You do not get better as expected. Where can you learn more? Go to http://kirk-angelika.info/. Enter S360 in the search box to learn more about \"Epididymitis and Orchitis: Care Instructions. \" Current as of: March 20, 2018 Content Version: 11.9 © 6842-5006 [employer]. Care instructions adapted under license by [employer] (which disclaims liability or warranty for this information). If you have questions about a medical condition or this instruction, always ask your healthcare professional. [address] any warranty or liability for your use of this information.

## 2019-03-11 RX ORDER — ERGOCALCIFEROL 1.25 MG/1
50000 CAPSULE ORAL
Qty: 12 CAP | Refills: 3 | Status: SHIPPED | OUTPATIENT
Start: 2019-03-11 | End: 2019-06-26

## 2019-03-11 NOTE — TELEPHONE ENCOUNTER
RX refill request from the patient/pharmacy. Patient last seen 02- with labs, and next appt. scheduled for 03-  Requested Prescriptions     Pending Prescriptions Disp Refills    ergocalciferol (ERGOCALCIFEROL) 50,000 unit capsule 12 Cap 3     Sig: Take 1 Cap by mouth every seven (7) days. James Valero

## 2019-03-21 NOTE — PROGRESS NOTES
Chief Complaint Patient presents with  Hypertension 3 MONTH FOLLOW UP  
 
 
SUBJECTIVE: 
 
Montrell Durán is a 76 y.o. male who returns in follow-up for his medical problems include hypertension, hyperlipidemia, end-stage renal disease on dialysis Tuesday Thursday Friday, GERD, DJD and other medical problems. He is taking his medications and trying to follow his diet and trying to get some exercise on a regular basis. He currently denies any chest pain, shortness of breath, palpitations, PND, orthopnea or other cardiorespiratory complaints. There are no GI or  complaints. He denies any headaches, dizziness or neurologic complaints. He has no other complaints on complete review of systems. Current Outpatient Medications Medication Sig Dispense Refill  ergocalciferol (ERGOCALCIFEROL) 50,000 unit capsule Take 1 Cap by mouth every seven (7) days. 12 Cap 3  
 omeprazole (PRILOSEC) 40 mg capsule TAKE 1 CAPSULE BY MOUTH ONCE DAILY 90 Cap 3  
 lidocaine (LIDODERM) 5 % 1 Patch by TransDERmal route every twenty-four (24) hours. Apply patch to the affected area for 12 hours a day and remove for 12 hours a day. 30 Each prn  lisinopril (PRINIVIL, ZESTRIL) 2.5 mg tablet TAKE 1 TABLET BY MOUTH DAILY. TAKE AFTER DIALYSIS ON HEMODIALYSIS DAYS. 6  
 etelcalcetide (PARSABIV) 5 mg/mL soln by IntraVENous route. Indications: 3 x per week  ERGOCALCIFEROL, VITAMIN D2, (VITAMIN D2 PO) Take  by mouth.  amLODIPine (NORVASC) 10 mg tablet TAKE 1 TABLET BY MOUTH ONCE DAILY  11  
 amoxicillin (AMOXIL) 500 mg capsule TAKE 4 CAPSULES BY MOUTH 1 HOUR PRIOR TO DENTAL PROCEDURE  1  
 sevelamer carbonate (RENVELA) 800 mg Tab tab Take 1,600 mg by mouth three (3) times daily (with meals). Past Medical History:  
Diagnosis Date  Arthritis  Chronic kidney disease Dialysis T, Thfranci, Sat @ [facility]  Gout  Hepatitis C   
 Hypertension  Liver disease  Hep C  
  Other and unspecified alcohol dependence, unspecified drinking behavior Acid reflux  Other ill-defined conditions(799.89)   
 gout  Other ill-defined conditions(799.89)   
 blood transfusion hx Past Surgical History:  
Procedure Laterality Date  HX OTHER SURGICAL    
 liver biopsy  HX VASCULAR ACCESS [facility] / [facility]  HX VASCULAR ACCESS  7/15/13 CREATION LEFT UPPER ARM BASILIC VEIN TRANSPOSITION  
 HX VASCULAR ACCESS    
 [facility] Allergies Allergen Reactions  Codeine Hives REVIEW OF SYSTEMS: 
General: negative for - chills or fever, or weight loss or gain ENT: negative for - headaches, nasal congestion or tinnitus Eyes: no blurred or visual changes Neck: No stiffness or swollen nodes Respiratory: negative for - cough, hemoptysis, shortness of breath or wheezing Cardiovascular : negative for - chest pain, edema, palpitations or shortness of breath Gastrointestinal: negative for - abdominal pain, blood in stools, heartburn or nausea/vomiting Genito-Urinary: no dysuria, trouble voiding, or hematuria Musculoskeletal: negative for - gait disturbance, joint pain, joint stiffness or joint swelling Neurological: no TIA or stroke symptoms Hematologic: no bruises, no bleeding Lymphatic: no swollen glands Integument: no lumps, mole changes, nail changes or rash Endocrine:no malaise/lethargy poly uria or polydipsia or unexpected weight changes Social History Socioeconomic History  Marital status: SINGLE Spouse name: Not on file  Number of children: Not on file  Years of education: Not on file  Highest education level: Not on file Tobacco Use  Smoking status: Never Smoker  Smokeless tobacco: Never Used Substance and Sexual Activity  Alcohol use: No  
 Drug use: No  
  Types: Marijuana Comment: smoked pot 4 years ago History reviewed. No pertinent family history. OBJECTIVE:  
 
Visit Vitals /80 (BP 1 Location: Right arm, BP Patient Position: Sitting) Pulse 78 Temp 98.3 °F (36.8 °C) (Oral) Resp 18 Ht 5' 9\" (1.753 m) Wt 187 lb (84.8 kg) SpO2 98% BMI 27.62 kg/m² CONSTITUTIONAL:   well nourished, appears age appropriate EYES: sclera anicteric, PERRL, EOMI 
ENMT:nares clear, moist mucous membranes, pharynx clear NECK: supple. Thyroid normal, No JVD or bruits RESPIRATORY: Chest: clear to ascultation and percussion, normal inspiratory effort CARDIOVASCULAR: Heart: regular rate and rhythm no murmurs, rubs or gallops, PMI not displaced, No thrills GASTROINTESTINAL: Abdomen: non distended, soft, non tender, bowel sounds normal 
HEMATOLOGIC: no purpura, petechiae or bruising LYMPHATIC: No lymph node enlargemant MUSCULOSKELETAL: Extremities: no edema or active synovitis, pulse 1+ INTEGUMENT: No unusual rashes or suspicious skin lesions noted. Nails appear normal. 
PERIPHERAL VASCULAR: normal pulses femoral, PT and DP NEUROLOGIC: non-focal exam, A & O X 3 PSYCHIATRIC:, appropriate affect ASSESSMENT:  
1. Hypertension, renal disease 2. Mixed hyperlipidemia 3. Gastroesophageal reflux disease without esophagitis 4. ESRD (end stage renal disease) ([facility] [address] 5. Chronic hepatitis C without hepatic coma (HCC) Impression 1. Hypertension that is not controlled. He is on maximum dose of Norvasc at 10 mg daily. He is only on lisinopril 2.5 to help with the loss of protein in his urine and I would like to increase that for blood pressure control but have asked him to check with his dialysis doctor to make sure that it will not affect anything they are doing otherwise I would use hydralazine if it is blood pressure control. 2.  Hyperlipidemia prior lab reviewed and repeat status pending I will make adjustments as necessary. 3   GERD that is stable 4. End-stage renal disease on dialysis 5   Chronic hepatitis C stable I will call with lab results and make further recommendations or adjustments if necessary. Follow-up as scheduled for 3 months or sooner if there is a problem. PLAN: 
. Orders Placed This Encounter  METABOLIC PANEL, COMPREHENSIVE ([facility])  LIPID PANEL ([facility]) ATTENTION:  
This medical record was transcribed using an electronic medical records system. Although proofread, it may and can contain electronic and spelling errors. Other human spelling and other errors may be present. Corrections may be executed at a later time. Please feel free to contact us for any clarifications as needed. No results found for any visits on 03/22/19. Neela Gonzalez MD 
 
The patient verbalized understanding of the problems and plans as explained.

## 2019-03-22 ENCOUNTER — OFFICE VISIT (OUTPATIENT)
Dept: INTERNAL MEDICINE CLINIC | Age: 69
End: 2019-03-22

## 2019-03-22 VITALS
TEMPERATURE: 98.3 F | HEART RATE: 78 BPM | DIASTOLIC BLOOD PRESSURE: 80 MMHG | HEIGHT: 69 IN | WEIGHT: 187 LBS | BODY MASS INDEX: 27.7 KG/M2 | RESPIRATION RATE: 18 BRPM | OXYGEN SATURATION: 98 % | SYSTOLIC BLOOD PRESSURE: 151 MMHG

## 2019-03-22 DIAGNOSIS — K21.9 GASTROESOPHAGEAL REFLUX DISEASE WITHOUT ESOPHAGITIS: ICD-10-CM

## 2019-03-22 DIAGNOSIS — B18.2 CHRONIC HEPATITIS C WITHOUT HEPATIC COMA (HCC): ICD-10-CM

## 2019-03-22 DIAGNOSIS — N18.6 ESRD (END STAGE RENAL DISEASE) (HCC): ICD-10-CM

## 2019-03-22 DIAGNOSIS — I12.9 HYPERTENSION, RENAL DISEASE: Primary | ICD-10-CM

## 2019-03-22 DIAGNOSIS — E78.2 MIXED HYPERLIPIDEMIA: ICD-10-CM

## 2019-03-22 LAB
A-G RATIO,AGRAT: 1 RATIO
ALBUMIN SERPL-MCNC: 4.2 G/DL (ref 3.9–5.4)
ALP SERPL-CCNC: 93 U/L (ref 38–126)
ALT SERPL-CCNC: 16 U/L (ref 9–52)
ANION GAP SERPL CALC-SCNC: 19 MMOL/L
AST SERPL W P-5'-P-CCNC: 28 U/L (ref 14–36)
BILIRUB SERPL-MCNC: 0.6 MG/DL (ref 0.2–1.3)
BUN SERPL-MCNC: 30 MG/DL (ref 9–20)
BUN/CREATININE RATIO,BUCR: 4 RATIO
CALCIUM SERPL-MCNC: 9 MG/DL (ref 8.4–10.2)
CHLORIDE SERPL-SCNC: 95 MMOL/L (ref 98–107)
CHOL/HDL RATIO,CHHD: 3 RATIO (ref 0–4)
CHOLEST SERPL-MCNC: 165 MG/DL (ref 0–200)
CO2 SERPL-SCNC: 26 MMOL/L (ref 22–32)
CREAT SERPL-MCNC: 7.9 MG/DL (ref 0.8–1.5)
GLOBULIN,GLOB: 4.3
GLUCOSE SERPL-MCNC: 97 MG/DL (ref 75–110)
HDLC SERPL-MCNC: 58 MG/DL (ref 35–130)
LDL/HDL RATIO,LDHD: 2 RATIO
LDLC SERPL CALC-MCNC: 95 MG/DL (ref 0–130)
POTASSIUM SERPL-SCNC: 4.9 MMOL/L (ref 3.6–5)
PROT SERPL-MCNC: 8.5 G/DL (ref 6.3–8.2)
SODIUM SERPL-SCNC: 140 MMOL/L (ref 137–145)
TRIGL SERPL-MCNC: 61 MG/DL (ref 0–200)
VLDLC SERPL CALC-MCNC: 12 MG/DL

## 2019-03-22 NOTE — PROGRESS NOTES
Identified pt with two pt identifiers(name and ). Reviewed record in preparation for visit and have obtained necessary documentation. Chief Complaint Patient presents with  Hypertension 3 MONTH FOLLOW UP Health Maintenance Due Topic  DTaP/Tdap/Td series (1 - Tdap)  Shingrix Vaccine Age 50> (1 of 2)  GLAUCOMA SCREENING Q2Y Visit Vitals /80 (BP 1 Location: Right arm, BP Patient Position: Sitting) Pulse 78 Temp 98.3 °F (36.8 °C) (Oral) Resp 18 Ht 5' 9\" (1.753 m) Wt 187 lb (84.8 kg) SpO2 98% BMI 27.62 kg/m² Coordination of Care Questionnaire: 
:  
1) Have you been to an emergency room, urgent care, or hospitalized since your last visit? If yes, where when, and reason for visit? no  
 
 
2. Have seen or consulted any other health care provider since your last visit? If yes, where when, and reason for visit? NO 
 
 
3) Do you have an Advanced Directive/ Living Will in place? NO If yes, do we have a copy on file NO If no, would you like information NO Patient is accompanied by self I have received verbal consent from Avelina Cid to discuss any/all medical information while they are present in the room.

## 2019-03-22 NOTE — PATIENT INSTRUCTIONS
Gastroesophageal Reflux Disease (GERD): Care Instructions Your Care Instructions Gastroesophageal reflux disease (GERD) is the backward flow of stomach acid into the esophagus. The esophagus is the tube that leads from your throat to your stomach. A one-way valve prevents the stomach acid from moving up into this tube. When you have GERD, this valve does not close tightly enough. If you have mild GERD symptoms including heartburn, you may be able to control the problem with antacids or over-the-counter medicine. Changing your diet, losing weight, and making other lifestyle changes can also help reduce symptoms. Follow-up care is a key part of your treatment and safety. Be sure to make and go to all appointments, and call your doctor if you are having problems. It's also a good idea to know your test results and keep a list of the medicines you take. How can you care for yourself at home? · Take your medicines exactly as prescribed. Call your doctor if you think you are having a problem with your medicine. · Your doctor may recommend over-the-counter medicine. For mild or occasional indigestion, antacids, such as Tums, Gaviscon, Mylanta, or Maalox, may help. Your doctor also may recommend over-the-counter acid reducers, such as Pepcid AC, Tagamet HB, Zantac 75, or Prilosec. Read and follow all instructions on the label. If you use these medicines often, talk with your doctor. · Change your eating habits. ? It's best to eat several small meals instead of two or three large meals. ? After you eat, wait 2 to 3 hours before you lie down. ? Chocolate, mint, and alcohol can make GERD worse. ? Spicy foods, foods that have a lot of acid (like tomatoes and oranges), and coffee can make GERD symptoms worse in some people. If your symptoms are worse after you eat a certain food, you may want to stop eating that food to see if your symptoms get better. · Do not smoke or chew tobacco. Smoking can make GERD worse. If you need help quitting, talk to your doctor about stop-smoking programs and medicines. These can increase your chances of quitting for good. · If you have GERD symptoms at night, raise the head of your bed 6 to 8 inches by putting the frame on blocks or placing a foam wedge under the head of your mattress. (Adding extra pillows does not work.) · Do not wear tight clothing around your middle. · Lose weight if you need to. Losing just 5 to 10 pounds can help. When should you call for help? Call your doctor now or seek immediate medical care if: 
  · You have new or different belly pain.  
  · Your stools are black and tarlike or have streaks of blood.  
 Watch closely for changes in your health, and be sure to contact your doctor if: 
  · Your symptoms have not improved after 2 days.  
  · Food seems to catch in your throat or chest.  
Where can you learn more? Go to http://kirk-angelika.info/. Enter J244 in the search box to learn more about \"Gastroesophageal Reflux Disease (GERD): Care Instructions. \" Current as of: March 27, 2018 Content Version: 11.9 © 5206-8063 [employer]. Care instructions adapted under license by [employer] (which disclaims liability or warranty for this information). If you have questions about a medical condition or this instruction, always ask your healthcare professional. [place] [address] any warranty or liability for your use of this information.

## 2019-05-07 ENCOUNTER — TELEPHONE (OUTPATIENT)
Dept: INTERNAL MEDICINE CLINIC | Age: 69
End: 2019-05-07

## 2019-05-07 RX ORDER — ONDANSETRON 4 MG/1
4 TABLET, ORALLY DISINTEGRATING ORAL
Qty: 10 TAB | Refills: 0 | Status: SHIPPED | OUTPATIENT
Start: 2019-05-07 | End: 2019-06-26

## 2019-05-07 NOTE — TELEPHONE ENCOUNTER
Patient called to report he started with loose stools this morning. Recently started with nausea and vomiting. Requesting something to help with the nausea. Discussed with Dr. Angella Blue and per verbal order given Zofran ODT 4 mg q 6 hours prn #10 called to [facility].  Advised to go to ER if symptoms don't resolve or worsen.

## 2019-05-07 NOTE — TELEPHONE ENCOUNTER
RX refill request from the patient/pharmacy. Patient last seen 03- with labs, and next appt. scheduled for 07-  Requested Prescriptions     Pending Prescriptions Disp Refills    ondansetron (ZOFRAN ODT) 4 mg disintegrating tablet 10 Tab 0     Sig: Take 1 Tab by mouth every six (6) hours as needed for Nausea. Nhi Wren

## 2019-05-13 ENCOUNTER — OFFICE VISIT (OUTPATIENT)
Dept: INTERNAL MEDICINE CLINIC | Age: 69
End: 2019-05-13

## 2019-05-13 VITALS
BODY MASS INDEX: 26.66 KG/M2 | TEMPERATURE: 98.5 F | DIASTOLIC BLOOD PRESSURE: 80 MMHG | HEART RATE: 75 BPM | WEIGHT: 180 LBS | HEIGHT: 69 IN | SYSTOLIC BLOOD PRESSURE: 130 MMHG | RESPIRATION RATE: 17 BRPM | OXYGEN SATURATION: 98 %

## 2019-05-13 DIAGNOSIS — M54.50 MIDLINE LOW BACK PAIN WITHOUT SCIATICA, UNSPECIFIED CHRONICITY: ICD-10-CM

## 2019-05-13 DIAGNOSIS — M54.2 NECK PAIN: Primary | ICD-10-CM

## 2019-05-13 RX ORDER — PREDNISONE 5 MG/1
TABLET ORAL
Qty: 48 TAB | Refills: 0 | Status: SHIPPED | OUTPATIENT
Start: 2019-05-13 | End: 2019-06-26

## 2019-05-13 NOTE — PROGRESS NOTES
Troy Mata  Identified pt with two pt identifiers(name and ). Chief Complaint Patient presents with  Neck Pain  Back Pain 1. Have you been to the ER, urgent care clinic since your last visit? Hospitalized since your last visit? No 
 
2. Have you seen or consulted any other health care providers outside of the [address] [place] [address] since your last visit? Include any pap smears or colon screening. No 
 
Health Maintenance Topics with due status: Overdue Topic Date Due DTaP/Tdap/Td series 1971 Shingrix Vaccine Age 50> 2000 GLAUCOMA SCREENING Q2Y 2015 Health Maintenance Topics with due status: Not Due Topic Last Completion Date COLONOSCOPY 2013 Influenza Age 5 to Adult 10/15/2018 MEDICARE YEARLY EXAM 2018 Health Maintenance Topics with due status: Completed Topic Last Completion Date Pneumococcal 65+ years 2018 Medication reconciliation up to date and corrected with patient at this time. Today's provider has been notified of reason for visit, vitals and flowsheets obtained on patients. Reviewed record in preparation for visit, huddled with provider and have obtained necessary documentation. Wt Readings from Last 3 Encounters:  
19 180 lb (81.6 kg) 19 187 lb (84.8 kg) 19 185 lb 12.8 oz (84.3 kg) Temp Readings from Last 3 Encounters:  
19 98.5 °F (36.9 °C) (Oral) 19 98.3 °F (36.8 °C) (Oral) 19 98.5 °F (36.9 °C) (Oral) BP Readings from Last 3 Encounters:  
19 130/80  
19 151/80  
19 150/80 Pulse Readings from Last 3 Encounters:  
19 75  
19 78  
19 92 Vitals:  
 19 1444 BP: 130/80 Pulse: 75 Resp: 17 Temp: 98.5 °F (36.9 °C) TempSrc: Oral  
SpO2: 98% Weight: 180 lb (81.6 kg) Height: 5' 9\" (1.753 m) PainSc:   8 PainLoc: Neck Learning Assessment: 
:  
 
 Learning Assessment 2/4/2019 12/3/2018 10/17/2018 11/20/2017 PRIMARY LEARNER Patient Patient Patient Patient HIGHEST LEVEL OF EDUCATION - PRIMARY LEARNER  - - - SOME COLLEGE  
BARRIERS PRIMARY LEARNER NONE NONE NONE NONE  
CO-LEARNER CAREGIVER No No - No  
PRIMARY LANGUAGE ENGLISH ENGLISH ENGLISH ENGLISH  
LEARNER PREFERENCE PRIMARY LISTENING LISTENING LISTENING DEMONSTRATION  
ANSWERED BY patient  patient patient patient RELATIONSHIP SELF SELF SELF SELF Depression Screening: 
:  
 
3 most recent PHQ Screens 3/22/2019 Little interest or pleasure in doing things Not at all Feeling down, depressed, irritable, or hopeless Not at all Total Score PHQ 2 0 No flowsheet data found. Fall Risk Assessment: 
:  
 
Fall Risk Assessment, last 12 mths 3/22/2019 Able to walk? Yes Fall in past 12 months? No  
 
 
Abuse Screening: 
:  
 
Abuse Screening Questionnaire 2/4/2019 12/3/2018 10/17/2018 8/10/2018 11/20/2017 Do you ever feel afraid of your partner? N N N N N Are you in a relationship with someone who physically or mentally threatens you? N N N N N Is it safe for you to go home? Y Y Y Y Y  
 
 
ADL Screening: 
:  
 

## 2019-05-13 NOTE — PATIENT INSTRUCTIONS
Back Pain: Care Instructions Your Care Instructions Back pain has many possible causes. It is often related to problems with muscles and ligaments of the back. It may also be related to problems with the nerves, discs, or bones of the back. Moving, lifting, standing, sitting, or sleeping in an awkward way can strain the back. Sometimes you don't notice the injury until later. Arthritis is another common cause of back pain. Although it may hurt a lot, back pain usually improves on its own within several weeks. Most people recover in 12 weeks or less. Using good home treatment and being careful not to stress your back can help you feel better sooner. Follow-up care is a key part of your treatment and safety. Be sure to make and go to all appointments, and call your doctor if you are having problems. It's also a good idea to know your test results and keep a list of the medicines you take. How can you care for yourself at home? · Sit or lie in positions that are most comfortable and reduce your pain. Try one of these positions when you lie down: ? Lie on your back with your knees bent and supported by large pillows. ? Lie on the floor with your legs on the seat of a sofa or chair. ? Lie on your side with your knees and hips bent and a pillow between your legs. ? Lie on your stomach if it does not make pain worse. · Do not sit up in bed, and avoid soft couches and twisted positions. Bed rest can help relieve pain at first, but it delays healing. Avoid bed rest after the first day of back pain. · Change positions every 30 minutes. If you must sit for long periods of time, take breaks from sitting. Get up and walk around, or lie in a comfortable position. · Try using a heating pad on a low or medium setting for 15 to 20 minutes every 2 or 3 hours. Try a warm shower in place of one session with the heating pad. · You can also try an ice pack for 10 to 15 minutes every 2 to 3 hours. Put a thin cloth between the ice pack and your skin. · Take pain medicines exactly as directed. ? If the doctor gave you a prescription medicine for pain, take it as prescribed. ? If you are not taking a prescription pain medicine, ask your doctor if you can take an over-the-counter medicine. · Take short walks several times a day. You can start with 5 to 10 minutes, 3 or 4 times a day, and work up to longer walks. Walk on level surfaces and avoid hills and stairs until your back is better. · Return to work and other activities as soon as you can. Continued rest without activity is usually not good for your back. · To prevent future back pain, do exercises to stretch and strengthen your back and stomach. Learn how to use good posture, safe lifting techniques, and proper body mechanics. When should you call for help? Call your doctor now or seek immediate medical care if: 
  · You have new or worsening numbness in your legs.  
  · You have new or worsening weakness in your legs. (This could make it hard to stand up.)  
  · You lose control of your bladder or bowels.  
 Watch closely for changes in your health, and be sure to contact your doctor if: 
  · You have a fever, lose weight, or don't feel well.  
  · You do not get better as expected. Where can you learn more? Go to http://kirk-angelika.info/. Enter U045 in the search box to learn more about \"Back Pain: Care Instructions. \" Current as of: September 20, 2018 Content Version: 11.9 © 2417-5741 [employer]. Care instructions adapted under license by [employer] (which disclaims liability or warranty for this information). If you have questions about a medical condition or this instruction, always ask your healthcare professional. [place] [address] any warranty or liability for your use of this information.

## 2019-05-13 NOTE — PROGRESS NOTES
Subjective:  
Jemima Cam is a 76 y.o. male Chief Complaint Patient presents with  Neck Pain  Back Pain History of present illness: He presents today complaining of some neck pain that seems to have been progressive since he was last evaluated for that as well as now developed some low back pain. The neck pain he was seen and evaluated in 2010 and again in 2017 at which time he had progression of C3-4 spinal stenosis. He was sent to Dr. Pam Bernstein at that time so was referred for physical therapy for which he has continued physical therapy. At that time he was having some radiation to the left arm there is noting if he twists his neck to the right he gets some numbness and tingling in the right arm that will resolve once he straightens his neck back looking forward. He notes no weakness in either arm. He now has developed some low back pain which seems to intermittently radiate to his legs depending on how he moves. There is no bowel or bladder dysfunction. He has had no falls or trauma. Patient Active Problem List  
Diagnosis Code  Hypertension, renal disease I12.9  Mixed hyperlipidemia E78.2  
 ESRD (end stage renal disease) ([facility] [address] N18.6  Gastroesophageal reflux disease without esophagitis K21.9  Vitamin D deficiency E55.9  Gout of multiple sites M10.9  Chronic hepatitis C without hepatic coma (HCC) B18.2  Medicare annual wellness visit, subsequent Z00.00  
 Neck pain M54.2  Palpitations R00.2  Bruit of left carotid artery R09.89  Bilateral carotid artery stenosis I65.23  
 Right carotid bruit R09.89  Right acute serous otitis media H65.01  
 Epididymitis N45.1  Midline low back pain without sciatica M54.5 Past Medical History:  
Diagnosis Date  Arthritis  Chronic kidney disease Dialysis T, Thur, Sat @ [facility]  Gout  Hepatitis C   
 Hypertension  Liver disease  Hep C  
  Other and unspecified alcohol dependence, unspecified drinking behavior Acid reflux  Other ill-defined conditions(799.89)   
 gout  Other ill-defined conditions(799.89)   
 blood transfusion hx Allergies Allergen Reactions  Codeine Hives History reviewed. No pertinent family history. Social History Socioeconomic History  Marital status: SINGLE Spouse name: Not on file  Number of children: Not on file  Years of education: Not on file  Highest education level: Not on file Occupational History  Not on file Social Needs  Financial resource strain: Not on file  Food insecurity:  
  Worry: Not on file Inability: Not on file  Transportation needs:  
  Medical: Not on file Non-medical: Not on file Tobacco Use  Smoking status: Never Smoker  Smokeless tobacco: Never Used Substance and Sexual Activity  Alcohol use: No  
 Drug use: No  
  Types: Marijuana Comment: smoked pot 4 years ago  Sexual activity: Not on file Lifestyle  Physical activity:  
  Days per week: Not on file Minutes per session: Not on file  Stress: Not on file Relationships  Social connections:  
  Talks on phone: Not on file Gets together: Not on file Attends Mandaen service: Not on file Active member of club or organization: Not on file Attends meetings of clubs or organizations: Not on file Relationship status: Not on file  Intimate partner violence:  
  Fear of current or ex partner: Not on file Emotionally abused: Not on file Physically abused: Not on file Forced sexual activity: Not on file Other Topics Concern  Not on file Social History Narrative  Not on file Prior to Admission medications Medication Sig Start Date End Date Taking?  Authorizing Provider  
predniSONE (STERAPRED) 5 mg dose pack See administration instruction per 5mg dose pack 5/13/19  Yes Kelly Daniel MD  
 ondansetron (ZOFRAN ODT) 4 mg disintegrating tablet Take 1 Tab by mouth every six (6) hours as needed for Nausea. 5/7/19  Yes Rossi Merlos MD  
ergocalciferol (ERGOCALCIFEROL) 50,000 unit capsule Take 1 Cap by mouth every seven (7) days. 3/11/19  Yes Rossi Merlos MD  
omeprazole (PRILOSEC) 40 mg capsule TAKE 1 CAPSULE BY MOUTH ONCE DAILY 2/15/19  Yes Rossi Merlos MD  
lidocaine (LIDODERM) 5 % 1 Patch by TransDERmal route every twenty-four (24) hours. Apply patch to the affected area for 12 hours a day and remove for 12 hours a day. 12/19/18  Yes Rossi Merlos MD  
lisinopril (PRINIVIL, ZESTRIL) 2.5 mg tablet TAKE 1 TABLET BY MOUTH DAILY. TAKE AFTER DIALYSIS ON HEMODIALYSIS DAYS. 9/28/18  Yes Provider, Historical  
etelcalcetide (PARSABIV) 5 mg/mL soln by IntraVENous route. Indications: 3 x per week   Yes Provider, Historical  
ERGOCALCIFEROL, VITAMIN D2, (VITAMIN D2 PO) Take  by mouth. Yes Provider, Historical  
amLODIPine (NORVASC) 10 mg tablet TAKE 1 TABLET BY MOUTH ONCE DAILY 10/11/17  Yes Provider, Historical  
amoxicillin (AMOXIL) 500 mg capsule TAKE 4 CAPSULES BY MOUTH 1 HOUR PRIOR TO DENTAL PROCEDURE 8/17/17  Yes Provider, Historical  
sevelamer carbonate (RENVELA) 800 mg Tab tab Take 1,600 mg by mouth three (3) times daily (with meals). Yes Provider, Historical  
  
 
Review of Systems Constitutional:  He denies fever, weight loss, sweats or fatigue. EYES: No blurred or double vision, ENT: no nasal congestion, no headache or dizziness. No difficulty with               swallowing, taste, speech or smell. Respiratory:  No cough, wheezing or shortness of breath. No sputum production. Cardiac:  Denies chest pain, palpitations, unexplained indigestion, syncope, edema, PND or orthopnea. GI:  No changes in bowel movements, no abdominal pain, no bloating, anorexia, nausea, vomiting or heartburn. :  No frequency or dysuria. Denies incontinence or sexual dysfunction. Extremities:  No joint pain, stiffness or swelling Back:. Positive for both low back pain and neck pain as described above Skin:  No recent rashes or mole changes. Neurological:  No numbness, tingling, burning paresthesias or loss of motor strength. No syncope, dizziness, frequent headaches or memory loss. Hematologic:  No easy bruising Lymphatic: No lymph node enlargement Objective:  
 
Vitals:  
 05/13/19 1444 BP: 130/80 Pulse: 75 Resp: 17 Temp: 98.5 °F (36.9 °C) TempSrc: Oral  
SpO2: 98% Weight: 180 lb (81.6 kg) Height: 5' 9\" (1.753 m) PainSc:   8 PainLoc: Neck Body mass index is 26.58 kg/m². Physical Examination:  
           General Appearance:  Well-developed, well-nourished, no acute distress. HEENT:   
  Ears:  The TMs and ear canals were clear. Eyes:  The pupillary responses were normal.  Extraocular muscle function intact. Lids and conjunctiva not injected. Funduscopic exam revealed sharp disc margins. Nares: Clear w/o edema or erythema Pharynx:  Clear with teeth in good repair. No masses were noted. Neck:  Supple without thyromegaly or adenopathy but he does have reproducible numbness in his right arm with rotary motion to the right about 35 to 40 degrees beyond midline. No JVD noted. No carotid                bruits. Lungs:  Clear to auscultation and percussion. Cardiac:  Regular rate and rhythm without murmur. PMI not displaced. No gallop, rub or click. Abdominal: Soft, non-tender, no hepata-spleenomegally or masses Extremities:  No clubbing, cyanosis or edema. Skin:  No rash or unusual mole changes noted. Lymph Nodes:  None felt in the cervical, supraclavicular, axillary or inguinal region. Neurological: . DTRs 2+ and symmetric. Station and gait normal.  
Hematologic:   No purpura or petechiae Assessment/Plan: 1. Neck pain 2. Midline low back pain without sciatica, unspecified chronicity Impressions/Plan: 
Impression 1. Cervicalgia repeat cervical spine film shows moderate severe degenerative changes. I will set him up with an appointment to see Dr. Farrah Mcneil again 2. Low back pain lumbar spine films showed mild degenerative changes I will put him on a prednisone 5 mg 12-day Dosepak in the interim Recheck here at his previously scheduled appointment or sooner if there is a problem. Orders Placed This Encounter  XR SPINE CERV PA LAT ODONT 3 V MAX  XR SPINE LUMB 2 OR 3 V  
 REFERRAL TO NEUROSURGERY  predniSONE (STERAPRED) 5 mg dose pack Follow-up and Dispositions · Return TBD. Results for orders placed or performed in visit on 05/13/19 XR SPINE CERV PA LAT ODONT 3 V MAX Narrative EXAM:  XR SPINE CERV PA LAT ODONT 3 V MAX 
 
INDICATION:  pain COMPARISON: 11/20/2017. FINDINGS:  
 
AP, lateral, and open mouth odontoid views of the cervical spine were obtained. There is unchanged 3 mm degenerative anterolisthesis of C3 on C4 alignment 
elsewhere in the cervical spine is normal..  There is unchanged degenerative 
disc disease at C5-6, C6-7, and to a lesser extent at C4-5 there is facet joint 
hypertrophy at C7-T1. . The prevertebral soft tissues are normal.  The odontoid 
process is intact and the C1-C2 relationship is normal.  .  
  
 Impression IMPRESSION:  
Degenerative changes with a similar appearance when compared to 11/20/2017. Results for orders placed or performed in visit on 05/13/19 XR SPINE LUMB 2 OR 3 V Narrative EXAM:  XR SPINE LUMB 2 OR 3 V 
 
INDICATION:  pain COMPARISON: None. FINDINGS:  
AP, lateral and spot lateral views of the lumbar spine demonstrate normal height 
of the vertebral bodies and discs. There is no fracture, or focal bone 
destruction. Alignment in the lumbar spine in normal. There is minimal spurring at L3-4 without loss of disc height. Impression IMPRESSION:   
No significant abnormalities. Archana Duarte MD 
 
The patient was given after the visit summary the patient verbalized an understanding of the plans and problems as explained.

## 2019-05-16 ENCOUNTER — APPOINTMENT (OUTPATIENT)
Dept: GENERAL RADIOLOGY | Age: 69
End: 2019-05-16
Attending: EMERGENCY MEDICINE
Payer: MEDICARE

## 2019-05-16 ENCOUNTER — HOSPITAL ENCOUNTER (EMERGENCY)
Age: 69
Discharge: HOME | End: 2019-05-16
Attending: EMERGENCY MEDICINE
Payer: MEDICARE

## 2019-05-16 VITALS
SYSTOLIC BLOOD PRESSURE: 162 MMHG | BODY MASS INDEX: 26.78 KG/M2 | TEMPERATURE: 98.9 F | DIASTOLIC BLOOD PRESSURE: 89 MMHG | RESPIRATION RATE: 17 BRPM | HEART RATE: 95 BPM | OXYGEN SATURATION: 100 % | HEIGHT: 69 IN | WEIGHT: 180.78 LBS

## 2019-05-16 DIAGNOSIS — K59.00 CONSTIPATION, UNSPECIFIED CONSTIPATION TYPE: Primary | ICD-10-CM

## 2019-05-16 PROCEDURE — 74011250637 HC RX REV CODE- 250/637: Performed by: EMERGENCY MEDICINE

## 2019-05-16 PROCEDURE — 99284 EMERGENCY DEPT VISIT MOD MDM: CPT

## 2019-05-16 PROCEDURE — 74018 RADEX ABDOMEN 1 VIEW: CPT

## 2019-05-16 RX ORDER — LIDOCAINE 3% TOPICAL ANESTHETIC CREAM 0.03 G/G
1 CREAM TOPICAL 2 TIMES DAILY
Qty: 28.35 G | Refills: 0 | Status: SHIPPED | OUTPATIENT
Start: 2019-05-16 | End: 2020-01-24 | Stop reason: ALTCHOICE

## 2019-05-16 RX ORDER — MAGNESIUM CITRATE
SOLUTION, ORAL ORAL
Qty: 2 BOTTLE | Refills: 0 | Status: SHIPPED | OUTPATIENT
Start: 2019-05-16 | End: 2020-01-24 | Stop reason: ALTCHOICE

## 2019-05-16 RX ORDER — MAGNESIUM CITRATE
296 SOLUTION, ORAL ORAL
Status: COMPLETED | OUTPATIENT
Start: 2019-05-16 | End: 2019-05-16

## 2019-05-16 RX ORDER — SENNOSIDES 8.6 MG/1
1 TABLET ORAL DAILY
Qty: 10 TAB | Refills: 0 | Status: SHIPPED | OUTPATIENT
Start: 2019-05-16 | End: 2020-01-24 | Stop reason: ALTCHOICE

## 2019-05-16 RX ADMIN — MAGNESIUM CITRATE 296 ML: 1.75 LIQUID ORAL at 06:05

## 2019-05-16 NOTE — DISCHARGE INSTRUCTIONS
Patient Education        Constipation: Care Instructions  Your Care Instructions    Constipation means that you have a hard time passing stools (bowel movements). People pass stools from 3 times a day to once every 3 days. What is normal for you may be different. Constipation may occur with pain in the rectum and cramping. The pain may get worse when you try to pass stools. Sometimes there are small amounts of bright red blood on toilet paper or the surface of stools. This is because of enlarged veins near the rectum (hemorrhoids). A few changes in your diet and lifestyle may help you avoid ongoing constipation. Your doctor may also prescribe medicine to help loosen your stool. Some medicines can cause constipation. These include pain medicines and antidepressants. Tell your doctor about all the medicines you take. Your doctor may want to make a medicine change to ease your symptoms. Follow-up care is a key part of your treatment and safety. Be sure to make and go to all appointments, and call your doctor if you are having problems. It's also a good idea to know your test results and keep a list of the medicines you take. How can you care for yourself at home? · Drink plenty of fluids, enough so that your urine is light yellow or clear like water. If you have kidney, heart, or liver disease and have to limit fluids, talk with your doctor before you increase the amount of fluids you drink. · Include high-fiber foods in your diet each day. These include fruits, vegetables, beans, and whole grains. · Get at least 30 minutes of exercise on most days of the week. Walking is a good choice. You also may want to do other activities, such as running, swimming, cycling, or playing tennis or team sports. · Take a fiber supplement, such as Citrucel or Metamucil, every day. Read and follow all instructions on the label. · Schedule time each day for a bowel movement. A daily routine may help.  Take your time having your bowel movement. · Support your feet with a small step stool when you sit on the toilet. This helps flex your hips and places your pelvis in a squatting position. · Your doctor may recommend an over-the-counter laxative to relieve your constipation. Examples are Milk of Magnesia and MiraLax. Read and follow all instructions on the label. Do not use laxatives on a long-term basis. When should you call for help? Call your doctor now or seek immediate medical care if:    · You have new or worse belly pain.     · You have new or worse nausea or vomiting.     · You have blood in your stools.    Watch closely for changes in your health, and be sure to contact your doctor if:    · Your constipation is getting worse.     · You do not get better as expected. Where can you learn more? Go to http://kirk-angelika.info/. Enter 21 [phone] in the search box to learn more about \"Constipation: Care Instructions. \"  Current as of: September 23, 2018  Content Version: 11.9  © 8636-3894 [employer]. Care instructions adapted under license by [employer] (which disclaims liability or warranty for this information). If you have questions about a medical condition or this instruction, always ask your healthcare professional. [place] [address] any warranty or liability for your use of this information.

## 2019-05-16 NOTE — ED PROVIDER NOTES
EMERGENCY DEPARTMENT HISTORY AND PHYSICAL EXAM 
     
 
Date: 5/16/2019 Patient Name: Juvenal Yeboah History of Presenting Illness Chief Complaint Patient presents with  Constipation Pt presents to ED c/o constipation. Unable to have BM for x 3 days. History Provided By: Patient HPI: Juvenal Yeboah is a 76 y.o. male, pmhx end-stage renal disease on dialysis Tuesday Thursday and Saturday, hypertension, hepatitis C, alcohol dependence and gout, who presents ambulatory to the ED c/o decreased stool output Patient was diagnosed with spinal stenosis and given his back pain he was recently started on prednisone. He started the prednisone 3 days ago and since that time has not had a bowel movement. States he has not had any stool output at all in the past 3 days but is still passing gas. He notes he has not lost his appetite and he does not complain of any fevers, chills, nausea or vomiting but notes he is having some intermittent pain across his lower abdomen left side greater than right. Patient is due for dialysis this morning at 6:10 but denies any chest pain, shortness of breath, and leg swelling. He has been taking an over-the-counter stool softener which she started yesterday as well as a Dulcolax which she also started yesterday; they have not relieved his symptoms. PCP: Ilan Gonzalez MD 
 
Allergies: Codeine Social Hx: -tobacco, past EtOH, past illicit Drugs There are no other complaints, changes, or physical findings at this time. Current Outpatient Medications Medication Sig Dispense Refill  magnesium citrate solution Drink one bottle. If you have not had large stool output, drink the second bottle. 2 Bottle 0  
 predniSONE (STERAPRED) 5 mg dose pack See administration instruction per 5mg dose pack 48 Tab 0  
 ondansetron (ZOFRAN ODT) 4 mg disintegrating tablet Take 1 Tab by mouth every six (6) hours as needed for Nausea.  10 Tab 0  
  ergocalciferol (ERGOCALCIFEROL) 50,000 unit capsule Take 1 Cap by mouth every seven (7) days. 12 Cap 3  
 omeprazole (PRILOSEC) 40 mg capsule TAKE 1 CAPSULE BY MOUTH ONCE DAILY 90 Cap 3  
 lidocaine (LIDODERM) 5 % 1 Patch by TransDERmal route every twenty-four (24) hours. Apply patch to the affected area for 12 hours a day and remove for 12 hours a day. 30 Each prn  lisinopril (PRINIVIL, ZESTRIL) 2.5 mg tablet TAKE 1 TABLET BY MOUTH DAILY. TAKE AFTER DIALYSIS ON HEMODIALYSIS DAYS. 6  
 etelcalcetide (PARSABIV) 5 mg/mL soln by IntraVENous route. Indications: 3 x per week  ERGOCALCIFEROL, VITAMIN D2, (VITAMIN D2 PO) Take  by mouth.  amLODIPine (NORVASC) 10 mg tablet TAKE 1 TABLET BY MOUTH ONCE DAILY  11  
 amoxicillin (AMOXIL) 500 mg capsule TAKE 4 CAPSULES BY MOUTH 1 HOUR PRIOR TO DENTAL PROCEDURE  1  
 sevelamer carbonate (RENVELA) 800 mg Tab tab Take 1,600 mg by mouth three (3) times daily (with meals). Past History Past Medical History: 
Past Medical History:  
Diagnosis Date  Arthritis  Chronic kidney disease Dialysis T, Thur, Sat @ [facility]  Gout  Hepatitis C   
 Hypertension  Liver disease Hep C  
 Other and unspecified alcohol dependence, unspecified drinking behavior Acid reflux  Other ill-defined conditions(799.89)   
 gout  Other ill-defined conditions(799.89)   
 blood transfusion hx Past Surgical History: 
Past Surgical History:  
Procedure Laterality Date  HX OTHER SURGICAL    
 liver biopsy  HX VASCULAR ACCESS [facility]  HX VASCULAR ACCESS  7/15/13 CREATION LEFT UPPER ARM BASILIC VEIN TRANSPOSITION  
 HX VASCULAR ACCESS    
 [facility] Family History: 
History reviewed. No pertinent family history. Social History: 
Social History Tobacco Use  Smoking status: Never Smoker  Smokeless tobacco: Never Used Substance Use Topics  Alcohol use: No  
 Drug use:  Yes  
 Types: Marijuana, Heroin Comment: smoked pot 4 years ago, herion in 25s Allergies: Allergies Allergen Reactions  Codeine Hives Review of Systems Review of Systems Constitutional: Negative for activity change, appetite change, chills, fever and unexpected weight change. HENT: Negative for congestion. Eyes: Negative for pain and visual disturbance. Respiratory: Negative for cough and shortness of breath. Cardiovascular: Negative for chest pain. Gastrointestinal: Positive for constipation. Negative for abdominal distention, abdominal pain, anal bleeding, blood in stool, diarrhea, nausea and vomiting. Genitourinary: Negative for dysuria. Musculoskeletal: Negative for back pain. Skin: Negative for rash. Neurological: Negative for headaches. Physical Exam  
Physical Exam  
Constitutional: He is oriented to person, place, and time. He appears well-developed and well-nourished. This is a well-appearing elderly male currently in minimal distress HENT:  
Head: Normocephalic and atraumatic. Mouth/Throat: Oropharynx is clear and moist.  
Eyes: Pupils are equal, round, and reactive to light. Conjunctivae and EOM are normal. Right eye exhibits no discharge. Left eye exhibits no discharge. Neck: Normal range of motion. Neck supple. Cardiovascular: Normal rate, regular rhythm and normal heart sounds. No murmur heard. Pulmonary/Chest: Effort normal and breath sounds normal. No respiratory distress. He has no wheezes. He has no rales. Abdominal: Soft. Bowel sounds are normal. He exhibits no distension and no mass. There is no tenderness. There is no rebound and no guarding. Lower abdominal pain without evidence of focal site of tenderness during palpation Musculoskeletal: Normal range of motion. He exhibits no edema. Neurological: He is alert and oriented to person, place, and time. No cranial nerve deficit. He exhibits normal muscle tone. Skin: Skin is warm and dry. No rash noted. He is not diaphoretic. Nursing note and vitals reviewed. Diagnostic Study Results Labs - No results found for this or any previous visit (from the past 12 hour(s)). Radiologic Studies -  
XR ABD (KUB) Final Result IMPRESSION: Moderate stool. CT Results  (Last 48 hours) None CXR Results  (Last 48 hours) None Medical Decision Making I am the first provider for this patient. I reviewed the vital signs, available nursing notes, past medical history, past surgical history, family history and social history. Vital Signs-Reviewed the patient's vital signs. Patient Vitals for the past 12 hrs: 
 Temp Pulse Resp BP SpO2  
05/16/19 0431 98.9 °F (37.2 °C) 95 17 162/89 100 % Pulse Oximetry Analysis - 100% on RA Records Reviewed: Nursing Notes and Old Medical Records Provider Notes (Medical Decision Making): MDM: Middle-aged male dialysis patient presenting with decreased stool output currently without evidence of peritonitis and sepsis. Patient appears extremely compliant with medical recommendations and does not overuse the emergency room. Recommend he call the dialysis center and attempt to obtain a later chair times today so we can fully evaluate his symptoms. ED Course:  
Initial assessment performed. The patients presenting problems have been discussed, and they are in agreement with the care plan formulated and outlined with them. I have encouraged them to ask questions as they arise throughout their visit. PROGRESS NOTE: 
 5:40 AM 
Pt was able to contact his dialysis center and delay his time for today. X-rays been reviewed without evidence of air-fluid levels but notable for left-sided constipation with large amount of stool in the rectum.   On rectal exam which was chaperoned by nurse Josselyn Perez there was soft yellow-brown stool in the rectum without any obvious hemorrhoids or bleeding. Patient did not tolerate disimpaction will initiate antibiotic and oral medications and attempt to improve symptoms. Discharge note: 
8:28 AM 
 
Pt re-evaluated and noted to be feeling better after large bowel movement following disimpaction, ready for discharge. Updated pt on all final radiology findings. Will follow up as instructed at dialysis today. All questions have been answered, pt voiced understanding and agreement with plan. Specific return precautions provided as well as instructions to return to the ED should sx worsen at any time. Vital signs stable for discharge. Diagnosis Clinical Impression: 1. Constipation, unspecified constipation type PLAN: 
1. Current Discharge Medication List  
  
START taking these medications Details  
magnesium citrate solution Drink one bottle. If you have not had large stool output, drink the second bottle. Qty: 2 Bottle, Refills: 0  
  
  
 
2. Follow-up Information Follow up With Specialties Details Why Contact Info Ra Nuñez MD Internal Medicine  As needed if [facility] not [facility] 
[phone] [facility] EMERGENCY DEPT Emergency Medicine  If symptoms worsen [address] [place] [address] 
[phone] Return to ED if worse Disposition: 
Home Please note, this dictation was completed with [employer], the [employer] voice recognition software. Quite often unanticipated grammatical, syntax, homophones, and other interpretive errors are inadvertently transcribed by the computer software. Please disregard these errors. Please excuse any errors that have escaped final proof reading.

## 2019-05-16 NOTE — ED NOTES
Patient has sat on bedside commode attempting to have a BM, is unable too. Dr. Soo Manrique notified.

## 2019-05-16 NOTE — ED NOTES
Pt called dialysis center for reschedule treatment time, pt told to called back at 530 to speak with scheduling staff.

## 2019-05-16 NOTE — ED TRIAGE NOTES
Pt presents to ED c/o constipation x 3 days. Pt states that he began prednisone treatment for arthritis by primary provider. Since treatment started unable to have BM and denies \"passing a lot of gas\". Pt visibly in discomfort. Pt reports pain 10/10in lower abdominal/rectal area. Pt describes pain as pressure and heaviness. Pt on dialysis and is scheduled later today. Pt unable to provide UA at this time. Denies CP, denies SOB, denies n/v. Pt AOX4 and ambulatory.

## 2019-05-16 NOTE — ED TRIAGE NOTES
Pt arrived ambulatory, pt has not had a BM in 3 days. PT c/o pain in rectum and lower abd (L and R). PT reports starting prednisone 3 days ago. PT states taking OTC stool softeners with no relief. PT has dialysis TRS. Pt reports going to dialysis Tuesday for normal time. Pt stated he has dialysis scheduled for later this morning. Pt reports nausea. PT denies SOB, CP. Dr. Gail Hurtado at bedside.

## 2019-05-16 NOTE — ED NOTES
Pt received enema, during enema pt began to have a red-tinged liquid. Enema stopped 50% complete. Pt sat on bedside chair, had very small BM including 2 cesar the size of a half dollar. Pt states he has more in him. Dr. Heidy Del Cid is aware.

## 2019-06-26 ENCOUNTER — OFFICE VISIT (OUTPATIENT)
Dept: HEMATOLOGY | Age: 69
End: 2019-06-26

## 2019-06-26 VITALS
HEART RATE: 90 BPM | DIASTOLIC BLOOD PRESSURE: 91 MMHG | BODY MASS INDEX: 26.29 KG/M2 | SYSTOLIC BLOOD PRESSURE: 171 MMHG | WEIGHT: 178 LBS | TEMPERATURE: 97 F | OXYGEN SATURATION: 100 %

## 2019-06-26 DIAGNOSIS — B18.2 CHRONIC HEPATITIS C WITHOUT HEPATIC COMA (HCC): Primary | ICD-10-CM

## 2019-06-26 NOTE — PROGRESS NOTES
[address] [facility] MD Rafa, MD Susanne Guevara PA-C Lindi Flank, [facility]     April S [facility]   NAIF [place] NP [facility] [place] [address]    at [address], [address] [facility], [facility] 22.    [phone]    FAX: [address] [place] [address], [address]    [phone]    FAX: [phone]     Patient Care Team:  Tete Case MD as PCP - General (Internal Medicine)  Juan Stover MD (Nephrology)    Patient Active Problem List   Diagnosis Code    Hypertension, renal disease I12.9    Mixed hyperlipidemia E78.2    ESRD (end stage renal disease) ([facility] [address] [facility] N18.6    Gastroesophageal reflux disease without esophagitis K21.9    Vitamin D deficiency E55.9    Gout of multiple sites M10.9    Chronic hepatitis C without hepatic coma ([facility] [address] B18.2    Medicare annual wellness visit, subsequent Z00.00    Neck pain M54.2    Palpitations R00.2    Bruit of left carotid artery R09.89    Bilateral carotid artery stenosis I65.23    Right carotid bruit R09.89    Right acute serous otitis media H65.01    Epididymitis N45.1    Midline low back pain without sciatica M54.5       Miles Lacey returns to the [employer] regarding chronic HCV and its management. The active problem list, all pertinent past medical history, medications, radiologic findings and laboratory findings related to the liver disorder were reviewed with the patient. The patient is a 76 y.o.  male who was noted to have abnormalities in liver chemistries and subsequently tested positive for chronic HCV in the 2000s. Risk factors for acquiring HCV are IV drug use in 1970s. Imaging of the liver was recently performed in August 2018. This showed normal liver texture. Fibroscan in October 2018 suggested bridging fibrosis prior to the initiation of medications for HCV. The patient was previously treated with peginterferon and ribavirin in the mid-2000s. The patient was treated for 24 weeks. The patient tolerated treatment poorly and had to prematurely discontinue therapy. Patient completed 12 weeks of HCV treatment with Zepatier (11/2018-2/2019). He did well on treatment with no significant side effects. He returns to the clinic today to document his response to treatment. The patient notes fatigue but otherwise feels well. Today, patient denies abdominal pain, change in bowel habits, dark urine, myalgias, arthralgias, pruritus and problems concentrating. The patient has limitations in functional activities which can be attributed to the liver disease and to other medical problems that are not related to the liver disease. He reports that dialysis continues to go reasonably well for him, although he has had some access issues with his fistula lately. He is not at this time pursuing renal transplant. ASSESSMENT AND PLAN:  Chronic HCV   Chronic HCV with bridging fibrosis. Liver enzymes are normal. Liver function is normal.    HCV treatment  Completed 12 weeks of Zepatier treatment today (February 2019). Tolerated treatment very well. Will review HCV RNA when available to monitor document that he has achieved a sustained  response to treatment. Will plan for his return to this office in 6 months with repeat Fibroscan at that time as that will signify a full year of viral negativity. We hope to see regression of fibrosis then. ESRD  The patient has ESRD on dialysis. He is not currently pursuing renal transplant. Nephrologist encouraged HCV treatment.      Treatment of other medical problems in patients with chronic liver disease  There are no contraindications for the patient to take any medications that are necessary for treatment of other medical issues. The patient does not consume alcohol daily. Normal doses of acetaminophen can be used for pain as needed. Normal doses of acetaminophen as recommended on the label are not hepatotoxic, even in patients with cirrhosis. Counseling for alcohol in patients with chronic liver disease  The patient was counseled regarding alcohol consumption and the effect of alcohol on chronic liver disease. The patient does not consume any significant amount of alcohol. Vaccinations   Vaccination for viral hepatitis A and B is not needed. The patient has serologic evidence of prior exposure or vaccination with immunity. Routine vaccinations against other bacterial and viral agents can be performed as indicated. Annual flu vaccination should be administered if indicated. ALLERGIES  Allergies   Allergen Reactions    Codeine Hives     MEDICATIONS  Current Outpatient Medications   Medication Sig    magnesium citrate solution Drink one bottle. If you have not had large stool output, drink the second bottle.  senna (SENOKOT) 8.6 mg tablet Take 1 Tab by mouth daily. Indications: constipation    lidocaine 3 % crea 1 Scoop by Apply Externally route two (2) times a day. Apply small amount to anus twice daily for external relief of pain from constipation.  omeprazole (PRILOSEC) 40 mg capsule TAKE 1 CAPSULE BY MOUTH ONCE DAILY    lidocaine (LIDODERM) 5 % 1 Patch by TransDERmal route every twenty-four (24) hours. Apply patch to the affected area for 12 hours a day and remove for 12 hours a day.  lisinopril (PRINIVIL, ZESTRIL) 2.5 mg tablet TAKE 1 TABLET BY MOUTH DAILY. TAKE AFTER DIALYSIS ON HEMODIALYSIS DAYS.  etelcalcetide (PARSABIV) 5 mg/mL soln by IntraVENous route. Indications: 3 x per week    ERGOCALCIFEROL, VITAMIN D2, (VITAMIN D2 PO) Take  by mouth.     amLODIPine (NORVASC) 10 mg tablet TAKE 1 TABLET BY MOUTH ONCE DAILY    sevelamer carbonate (RENVELA) 800 mg Tab tab Take 1,600 mg by mouth three (3) times daily (with meals). No current facility-administered medications for this visit. SYSTEM REVIEW NOT RELATED TO LIVER DISEASE OR REVIEWED ABOVE:  Constitution systems: Negative for fever, chills, weight gain, weight loss. Eyes: Negative for visual changes. ENT: Negative for sore throat, painful swallowing. Respiratory: Negative for cough, hemoptysis, SOB. Cardiology: Negative for chest pain, palpitations. GI:  Negative for constipation or diarrhea. : Negative for urinary frequency, dysuria, hematuria, nocturia. Skin: Negative for rash. Hematology: Negative for easy bruising, blood clots. Musculo-skeletal: Negative for back pain, muscle pain, weakness. Neurologic: Negative for headaches, dizziness, vertigo, memory problems not related to HE. Psychology: Negative for anxiety, depression. FAMILY HISTORY:  The patient has no knowledge of the father's medical condition. The mother  of CVA. There is no family history of liver disease. SOCIAL HISTORY:  The patient is . The patient has 2 children and 9 grandchildren. The patient has never used tobacco products. The patient has never consumed significant amounts of alcohol. 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 PHYSICAL EXAMINATION:  Visit Vitals  BP (!) 171/91 (BP 1 Location: Right arm, BP Patient Position: Sitting)   Pulse 90   Temp 97 °F (36.1 °C) (Tympanic)   Wt 178 lb (80.7 kg)   SpO2 100%   BMI 26.29 kg/m²     General: No acute distress. Eyes: Sclera anicteric. ENT: No oral lesions. Thyroid normal.  Nodes: No adenopathy. Skin: No spider angiomata. No jaundice. No palmar erythema. Respiratory: Lungs clear to auscultation. Cardiovascular: Regular heart rate. No murmurs. No JVD. Abdomen: Soft non-tender. Liver size normal to percussion/palpation. Spleen not palpable.  No obvious ascites. Extremities: No edema. No muscle wasting. No gross arthritic changes. Neurologic: Alert and oriented. Cranial nerves grossly intact. No asterixis. LABORATORY STUDIES:  [address] & Units 3/22/2019 2/4/2019   WBC 3.4 - 10.8 x10E3/uL  5.4   ANC 1.4 - 7.0 x10E3/uL     HGB 13.0 - 17.7 g/dL  10.2 (L)   HGB 12.0 - 18.0 g/dL     HGB (iSTAT) 12.0 - 18.0 g/dL      - 379 x10E3/uL  175   AST 14.0 - 36.0 U/L 28.0 22   ALT 9 - 52 U/L 16 12   Alk Phos 38 - 126 U/L 93 85   Bili, Total 0.2 - 1.3 mg/dL 0.6 0.4   Bili, Direct 0.00 - 0.40 mg/dL     Albumin 3.9 - 5.4 g/dL 4.2 4.2   BUN 9.0 - 20.0 mg/dL 30.0 (H) 45 (H)   BUN (iSTAT) 9 - 20 mg/dL     Creat 0.8 - 1.5 mg/dL 7.9 (H) 8.90 (H)   Creat (iSTAT) 0.8 - 1.5 mg/dL     Na 137 - 145 mmol/L 140 141   K 3.6 - 5.0 mmol/L 4.9 4.5   Cl 98 - 107 mmol/L 95 (L) 98   CO2 22.0 - 32.0 mmol/L 26.0 24   Glucose 75 - 110 mg/dL 97 83     Liver [facility] Latest Ref Rng & Units 12/19/2018   WBC 3.4 - 10.8 x10E3/uL    ANC 1.4 - 7.0 x10E3/uL    HGB 13.0 - 17.7 g/dL    HGB 12.0 - 18.0 g/dL 11.7 (A)   HGB (iSTAT) 12.0 - 18.0 g/dL 11.7 (A)    - 379 x10E3/uL    AST 14.0 - 36.0 U/L 19   ALT 9 - 52 U/L 10   Alk Phos 38 - 126 U/L 126 (H)   Bili, Total 0.2 - 1.3 mg/dL 0.8   Bili, Direct 0.00 - 0.40 mg/dL    Albumin 3.9 - 5.4 g/dL 4.4   BUN 9.0 - 20.0 mg/dL 25   BUN (iSTAT) 9 - 20 mg/dL    Creat 0.8 - 1.5 mg/dL 7.49 (H)   Creat (iSTAT) 0.8 - 1.5 mg/dL    Na 137 - 145 mmol/L 139   K 3.6 - 5.0 mmol/L 4.4   Cl 98 - 107 mmol/L 93 (L)   CO2 22.0 - 32.0 mmol/L 23   Glucose 75 - 110 mg/dL 97     Virology Latest Ref Rng & Units 2/4/2019 12/3/2018   HCV RNA, AMY, QL Negative Negative Negative   Additional lab values drawn at today's office visit are pending at the time of documentation.     SEROLOGIES:  Serologies Latest Ref Rng & Units 8/10/2018   Hep A Ab, Total Negative Positive (A)   Hep B Surface Ag Negative Negative   Hep B Core Ab, Total Negative Positive (A)   Hep B Surface AB QL  Reactive   Hep C Genotype  1b   HCV RT-PCR, Quant IU/mL [id number]     LIVER HISTOLOGY:  10/2018. [facility]. EkPa was 9.8. Suggested fibrosis level is F3. CAP score is 345, suggesting significant steatosis. ENDOSCOPIC PROCEDURES:  Not available or performed    RADIOLOGY:  8/2018. Ultrasound of liver. Normal appearing liver. No liver mass lesions. OTHER TESTING:  Not available or performed    All of the issues listed in the Assessment and Plan were discussed with the patient. All questions were answered. The patient expressed a clear understanding of the above. [address] [address] in 6 months with repeat Fibroscan at that time.     Alia Campbell PA-C  Liver [place] of [address] [place] [address], [address] [address]  22.  [phone]

## 2019-06-27 LAB
AFP L3 MFR SERPL: 5.7 % (ref 0–9.9)
AFP SERPL-MCNC: 5.5 NG/ML (ref 0–8)
ALBUMIN SERPL-MCNC: 4.4 G/DL (ref 3.6–4.8)
ALP SERPL-CCNC: 94 IU/L (ref 39–117)
ALT SERPL-CCNC: 10 IU/L (ref 0–44)
AST SERPL-CCNC: 17 IU/L (ref 0–40)
BILIRUB DIRECT SERPL-MCNC: 0.15 MG/DL (ref 0–0.4)
BILIRUB SERPL-MCNC: 0.6 MG/DL (ref 0–1.2)
BUN SERPL-MCNC: 27 MG/DL (ref 8–27)
BUN/CREAT SERPL: 4 (ref 10–24)
CALCIUM SERPL-MCNC: 8.9 MG/DL (ref 8.6–10.2)
CHLORIDE SERPL-SCNC: 99 MMOL/L (ref 96–106)
CO2 SERPL-SCNC: 27 MMOL/L (ref 20–29)
CREAT SERPL-MCNC: 6.9 MG/DL (ref 0.76–1.27)
ERYTHROCYTE [DISTWIDTH] IN BLOOD BY AUTOMATED COUNT: 12.3 % (ref 12.3–15.4)
GLUCOSE SERPL-MCNC: 93 MG/DL (ref 65–99)
HCT VFR BLD AUTO: 35.7 % (ref 37.5–51)
HGB BLD-MCNC: 11.7 G/DL (ref 13–17.7)
MCH RBC QN AUTO: 33.2 PG (ref 26.6–33)
MCHC RBC AUTO-ENTMCNC: 32.8 G/DL (ref 31.5–35.7)
MCV RBC AUTO: 101 FL (ref 79–97)
PLATELET # BLD AUTO: 230 X10E3/UL (ref 150–450)
POTASSIUM SERPL-SCNC: 4.8 MMOL/L (ref 3.5–5.2)
RBC # BLD AUTO: 3.52 X10E6/UL (ref 4.14–5.8)
SODIUM SERPL-SCNC: 140 MMOL/L (ref 134–144)
WBC # BLD AUTO: 6.1 X10E3/UL (ref 3.4–10.8)

## 2019-06-29 LAB — HCV RNA SERPL QL NAA+PROBE: NEGATIVE

## 2019-07-02 NOTE — PROGRESS NOTES
Pt notified of negative HCV RNA indicating favorable response to therapy. Will return for confirmation in 6 months with FS.

## 2019-07-02 NOTE — PROGRESS NOTES
Chief Complaint   Patient presents with    Hypertension     3 month follow up    Cholesterol Problem       SUBJECTIVE:    Narendra Pandya is a 76 y.o. male who returns in follow-up of his medical problems to include hypertension, hyperlipidemia, end-stage renal disease on dialysis, GERD, DJD, hepatitis C for which she recently completed treatment and other medical problems. He is taking his medications and trying to follow his diet and trying to remain physically active. He currently denies any chest pain, shortness of breath, palpitations, PND, orthopnea or other cardiorespiratory complaints. He notes no GI or  complaints. He notes no headaches, dizziness or neurologic complaints. There are no current change of his chronic arthritic complaints and no other complaints on complete review of systems. Current Outpatient Medications   Medication Sig Dispense Refill    magnesium citrate solution Drink one bottle. If you have not had large stool output, drink the second bottle. 2 Bottle 0    senna (SENOKOT) 8.6 mg tablet Take 1 Tab by mouth daily. Indications: constipation 10 Tab 0    lidocaine 3 % crea 1 Scoop by Apply Externally route two (2) times a day. Apply small amount to anus twice daily for external relief of pain from constipation. 28.35 g 0    omeprazole (PRILOSEC) 40 mg capsule TAKE 1 CAPSULE BY MOUTH ONCE DAILY 90 Cap 3    lidocaine (LIDODERM) 5 % 1 Patch by TransDERmal route every twenty-four (24) hours. Apply patch to the affected area for 12 hours a day and remove for 12 hours a day. 30 Each prn    lisinopril (PRINIVIL, ZESTRIL) 2.5 mg tablet TAKE 1 TABLET BY MOUTH DAILY. TAKE AFTER DIALYSIS ON HEMODIALYSIS DAYS. 6    etelcalcetide (PARSABIV) 5 mg/mL soln by IntraVENous route. Indications: 3 x per week      ERGOCALCIFEROL, VITAMIN D2, (VITAMIN D2 PO) Take  by mouth.       amLODIPine (NORVASC) 10 mg tablet TAKE 1 TABLET BY MOUTH ONCE DAILY  11    sevelamer carbonate (RENVELA) 800 mg Tab tab Take 1,600 mg by mouth three (3) times daily (with meals).        Past Medical History:   Diagnosis Date    Arthritis     Chronic kidney disease     Dialysis Og PARIKH, Sat [facility]    Gout     Hepatitis C     Hypertension     Liver disease     Hep C    Other and unspecified alcohol dependence, unspecified drinking behavior     Acid reflux    Other ill-defined conditions(799.89)     gout    Other ill-defined conditions(799.89)     blood transfusion hx     Past Surgical History:   Procedure Laterality Date    HX OTHER SURGICAL      liver biopsy    HX VASCULAR ACCESS      [facility]    HX VASCULAR ACCESS  7/15/13    CREATION LEFT UPPER ARM BASILIC VEIN TRANSPOSITION    HX VASCULAR ACCESS      [facility]     Allergies   Allergen Reactions    Codeine Hives       REVIEW OF SYSTEMS:  General: negative for - chills or fever, or weight loss or gain  ENT: negative for - headaches, nasal congestion or tinnitus  Eyes: no blurred or visual changes  Neck: No stiffness or swollen nodes  Respiratory: negative for - cough, hemoptysis, shortness of breath or wheezing  Cardiovascular : negative for - chest pain, edema, palpitations or shortness of breath  Gastrointestinal: negative for - abdominal pain, blood in stools, heartburn or nausea/vomiting  Genito-Urinary: no dysuria, trouble voiding, or hematuria  Musculoskeletal: negative for - gait disturbance, change of his chronic joint pain, joint stiffness or joint swelling  Neurological: no TIA or stroke symptoms  Hematologic: no bruises, no bleeding  Lymphatic: no swollen glands  Integument: no lumps, mole changes, nail changes or rash  Endocrine:no malaise/lethargy poly uria or polydipsia or unexpected weight changes        Social History     Socioeconomic History    Marital status: SINGLE     Spouse name: Not on file    Number of children: Not on file    Years of education: Not on file    Highest education level: Not on file   Tobacco Use    Smoking status: Never Smoker    Smokeless tobacco: Never Used   Substance and Sexual Activity    Alcohol use: No    Drug use: Not Currently     Types: Marijuana, Heroin     Comment: smoked pot 4 years ago, herion in 25s     History reviewed. No pertinent family history. OBJECTIVE:     Visit Vitals  /82   Pulse 75   Temp 98 °F (36.7 °C) (Oral)   Resp 17   Ht 5' 9\" (1.753 m)   Wt 178 lb 9.6 oz (81 kg)   SpO2 98%   BMI 26.37 kg/m²     CONSTITUTIONAL:   well nourished, appears age appropriate  EYES: sclera anicteric, PERRL, EOMI  ENMT:nares clear, moist mucous membranes, pharynx clear  NECK: supple. Thyroid normal, No JVD or bruits  RESPIRATORY: Chest: clear to ascultation and percussion, normal inspiratory effort  CARDIOVASCULAR: Heart: regular rate and rhythm no murmurs, rubs or gallops, PMI not displaced, No thrills  GASTROINTESTINAL: Abdomen: non distended, soft, non tender, bowel sounds normal  HEMATOLOGIC: no purpura, petechiae or bruising  LYMPHATIC: No lymph node enlargemant  MUSCULOSKELETAL: Extremities: no edema or active synovitis, pulse 1+   INTEGUMENT: No unusual rashes or suspicious skin lesions noted. Nails appear normal.  PERIPHERAL VASCULAR: normal pulses femoral, PT and DP  NEUROLOGIC: non-focal exam, A & O X 3  PSYCHIATRIC:, appropriate affect     ASSESSMENT:   1. Hypertension, renal disease    2. Mixed hyperlipidemia    3. Gastroesophageal reflux disease without esophagitis    4. ESRD (end stage renal disease) ([facility] [address]        1. Hypertension is controlled so continue current therapy reviewed with him. 2.  Hyperlipidemia prior lab reviewed and repeat status pending I will adjust if needed. 3.  GERD that is stable   4   End-stage renal disease currently on dialysis 3 times weekly he will continue that  I will call with lab results and make further recommendations or adjustments if necessary.   Follow stable continue same and I will recheck him again in 3 months or sooner if there is a problem. PLAN:  .  Orders Placed This Encounter    LIPID PANEL ([facility])         ATTENTION:   This medical record was transcribed using an electronic medical records system. Although proofread, it may and can contain electronic and spelling errors. Other human spelling and other errors may be present. Corrections may be executed at a later time. Please feel free to contact us for any clarifications as needed. Follow-up and Dispositions    · Return in about 3 months (around 10/3/2019). No results found for any visits on 07/03/19. Ninfa Claude, MD    The patient verbalized understanding of the problems and plans as explained.

## 2019-07-03 ENCOUNTER — OFFICE VISIT (OUTPATIENT)
Dept: INTERNAL MEDICINE CLINIC | Age: 69
End: 2019-07-03

## 2019-07-03 VITALS
WEIGHT: 178.6 LBS | DIASTOLIC BLOOD PRESSURE: 82 MMHG | OXYGEN SATURATION: 98 % | SYSTOLIC BLOOD PRESSURE: 138 MMHG | HEART RATE: 75 BPM | RESPIRATION RATE: 17 BRPM | TEMPERATURE: 98 F | HEIGHT: 69 IN | BODY MASS INDEX: 26.45 KG/M2

## 2019-07-03 DIAGNOSIS — I12.9 HYPERTENSION, RENAL DISEASE: Primary | ICD-10-CM

## 2019-07-03 DIAGNOSIS — E78.2 MIXED HYPERLIPIDEMIA: ICD-10-CM

## 2019-07-03 DIAGNOSIS — N18.6 ESRD (END STAGE RENAL DISEASE) (HCC): ICD-10-CM

## 2019-07-03 DIAGNOSIS — K21.9 GASTROESOPHAGEAL REFLUX DISEASE WITHOUT ESOPHAGITIS: ICD-10-CM

## 2019-07-03 LAB
CHOL/HDL RATIO,CHHD: 3 RATIO (ref 0–4)
CHOLEST SERPL-MCNC: 195 MG/DL (ref 0–200)
HDLC SERPL-MCNC: 70 MG/DL (ref 35–130)
LDL/HDL RATIO,LDHD: 2 RATIO
LDLC SERPL CALC-MCNC: 111 MG/DL (ref 0–130)
TRIGL SERPL-MCNC: 72 MG/DL (ref 0–200)
VLDLC SERPL CALC-MCNC: 14 MG/DL

## 2019-07-03 NOTE — PATIENT INSTRUCTIONS
Back Pain: Care Instructions Your Care Instructions Back pain has many possible causes. It is often related to problems with muscles and ligaments of the back. It may also be related to problems with the nerves, discs, or bones of the back. Moving, lifting, standing, sitting, or sleeping in an awkward way can strain the back. Sometimes you don't notice the injury until later. Arthritis is another common cause of back pain. Although it may hurt a lot, back pain usually improves on its own within several weeks. Most people recover in 12 weeks or less. Using good home treatment and being careful not to stress your back can help you feel better sooner. Follow-up care is a key part of your treatment and safety. Be sure to make and go to all appointments, and call your doctor if you are having problems. It's also a good idea to know your test results and keep a list of the medicines you take. How can you care for yourself at home? · Sit or lie in positions that are most comfortable and reduce your pain. Try one of these positions when you lie down: ? Lie on your back with your knees bent and supported by large pillows. ? Lie on the floor with your legs on the seat of a sofa or chair. ? Lie on your side with your knees and hips bent and a pillow between your legs. ? Lie on your stomach if it does not make pain worse. · Do not sit up in bed, and avoid soft couches and twisted positions. Bed rest can help relieve pain at first, but it delays healing. Avoid bed rest after the first day of back pain. · Change positions every 30 minutes. If you must sit for long periods of time, take breaks from sitting. Get up and walk around, or lie in a comfortable position. · Try using a heating pad on a low or medium setting for 15 to 20 minutes every 2 or 3 hours. Try a warm shower in place of one session with the heating pad. · You can also try an ice pack for 10 to 15 minutes every 2 to 3 hours. Put a thin cloth between the ice pack and your skin. · Take pain medicines exactly as directed. ? If the doctor gave you a prescription medicine for pain, take it as prescribed. ? If you are not taking a prescription pain medicine, ask your doctor if you can take an over-the-counter medicine. · Take short walks several times a day. You can start with 5 to 10 minutes, 3 or 4 times a day, and work up to longer walks. Walk on level surfaces and avoid hills and stairs until your back is better. · Return to work and other activities as soon as you can. Continued rest without activity is usually not good for your back. · To prevent future back pain, do exercises to stretch and strengthen your back and stomach. Learn how to use good posture, safe lifting techniques, and proper body mechanics. When should you call for help? Call your doctor now or seek immediate medical care if: 
  · You have new or worsening numbness in your legs.  
  · You have new or worsening weakness in your legs. (This could make it hard to stand up.)  
  · You lose control of your bladder or bowels.  
 Watch closely for changes in your health, and be sure to contact your doctor if: 
  · You have a fever, lose weight, or don't feel well.  
  · You do not get better as expected. Where can you learn more? Go to http://kirk-angelika.info/. Enter Z787 in the search box to learn more about \"Back Pain: Care Instructions. \" Current as of: September 20, 2018 Content Version: 11.9 © 4743-9927 [employer]. Care instructions adapted under license by [employer] (which disclaims liability or warranty for this information). If you have questions about a medical condition or this instruction, always ask your healthcare professional. [address] [address] any warranty or liability for your use of this information.

## 2019-07-03 NOTE — PROGRESS NOTES
Chief Complaint   Patient presents with    Hypertension     3 month follow up    Cholesterol Problem     Visit Vitals  /78 (BP 1 Location: Right arm, BP Patient Position: Sitting)   Pulse 75   Temp 98 °F (36.7 °C) (Oral)   Resp 17   Ht 5' 9\" (1.753 m)   Wt 178 lb 9.6 oz (81 kg)   SpO2 98%   BMI 26.37 kg/m²     1. Have you been to the ER, urgent care clinic since your last visit? Hospitalized since your last visit? No    2. Have you seen or consulted any other health care providers outside of the [address] [place] [address] since your last visit? Include any pap smears or colon screening.  No

## 2019-07-31 ENCOUNTER — OFFICE VISIT (OUTPATIENT)
Dept: INTERNAL MEDICINE CLINIC | Age: 69
End: 2019-07-31

## 2019-07-31 VITALS
BODY MASS INDEX: 25.36 KG/M2 | RESPIRATION RATE: 17 BRPM | TEMPERATURE: 97.9 F | DIASTOLIC BLOOD PRESSURE: 86 MMHG | HEIGHT: 69 IN | WEIGHT: 171.2 LBS | SYSTOLIC BLOOD PRESSURE: 138 MMHG | OXYGEN SATURATION: 98 % | HEART RATE: 88 BPM

## 2019-07-31 DIAGNOSIS — F41.9 ANXIETY: Primary | ICD-10-CM

## 2019-07-31 DIAGNOSIS — M54.2 NECK PAIN: ICD-10-CM

## 2019-07-31 RX ORDER — LORAZEPAM 0.5 MG/1
0.5 TABLET ORAL
Qty: 30 TAB | Refills: 0 | Status: SHIPPED | OUTPATIENT
Start: 2019-07-31 | End: 2019-10-28 | Stop reason: SDUPTHER

## 2019-07-31 NOTE — PROGRESS NOTES
Subhash Mendoza  Identified pt with two pt identifiers(name and ). Chief Complaint   Patient presents with    Medication Evaluation     discuss medication       1. Have you been to the ER, urgent care clinic since your last visit? Hospitalized since your last visit? No    2. Have you seen or consulted any other health care providers outside of the [address] [place] [address] since your last visit? Include any pap smears or colon screening. No      Health Maintenance Topics with due status: Overdue       Topic Date Due    DTaP/Tdap/Td series 1971    Shingrix Vaccine Age 50> 2000    GLAUCOMA SCREENING Q2Y 2015     Health Maintenance Topics with due status: Not Due       Topic Last Completion Date    COLONOSCOPY 2013    Influenza Age 9 to Adult 10/15/2018    MEDICARE YEARLY EXAM 2018     Health Maintenance Topics with due status: Completed       Topic Last Completion Date    Pneumococcal 65+ years 2018           Medication reconciliation up to date and corrected with patient at this time. Today's provider has been notified of reason for visit, vitals and flowsheets obtained on patients. Reviewed record in preparation for visit, huddled with provider and have obtained necessary documentation.         Wt Readings from Last 3 Encounters:   19 171 lb 3.2 oz (77.7 kg)   19 178 lb 9.6 oz (81 kg)   19 178 lb (80.7 kg)     Temp Readings from Last 3 Encounters:   19 97.9 °F (36.6 °C) (Oral)   19 98 °F (36.7 °C) (Oral)   19 97 °F (36.1 °C) (Tympanic)     BP Readings from Last 3 Encounters:   19 160/90   19 138/82   19 (!) 171/91     Pulse Readings from Last 3 Encounters:   19 88   19 75   19 90     Vitals:    19 1352   BP: 160/90   Pulse: 88   Resp: 17   Temp: 97.9 °F (36.6 °C)   TempSrc: Oral   SpO2: 98%   Weight: 171 lb 3.2 oz (77.7 kg)   Height: 5' 9\" (1.753 m)   PainSc:   0 - No pain         Learning Assessment:  :     Learning Assessment 6/26/2019 2/4/2019 12/3/2018 10/17/2018 11/20/2017   PRIMARY LEARNER Patient Patient Patient Patient Patient   HIGHEST LEVEL OF EDUCATION - PRIMARY LEARNER  - - - - SOME COLLEGE   BARRIERS PRIMARY LEARNER NONE NONE NONE NONE NONE   CO-LEARNER CAREGIVER No No No - No   PRIMARY LANGUAGE ENGLISH ENGLISH ENGLISH ENGLISH ENGLISH   LEARNER PREFERENCE PRIMARY LISTENING LISTENING LISTENING LISTENING DEMONSTRATION   ANSWERED BY patient patient  patient patient patient   RELATIONSHIP SELF SELF SELF SELF SELF       Depression Screening:  :     3 most recent PHQ Screens 7/3/2019   Little interest or pleasure in doing things Several days   Feeling down, depressed, irritable, or hopeless Several days   Total Score PHQ 2 2       No flowsheet data found. Fall Risk Assessment:  :     Fall Risk Assessment, last 12 mths 6/26/2019   Able to walk? Yes   Fall in past 12 months? No   Fall with injury? No       Abuse Screening:  :     Abuse Screening Questionnaire 6/26/2019 2/4/2019 12/3/2018 10/17/2018 8/10/2018 11/20/2017   Do you ever feel afraid of your partner? N N N N N N   Are you in a relationship with someone who physically or mentally threatens you? N N N N N N   Is it safe for you to go home?  Y Y Y Y Y Y       ADL Screening:  :     ADL Assessment 2/25/2019   Feeding yourself No Help Needed   Getting from bed to chair No Help Needed   Getting dressed No Help Needed   Bathing or showering No Help Needed   Walk across the room (includes cane/walker) No Help Needed   Using the telphone No Help Needed   Taking your medications No Help Needed   Preparing meals No Help Needed   Managing money (expenses/bills) No Help Needed   Moderately strenuous housework (laundry) No Help Needed   Shopping for personal items (toiletries/medicines) No Help Needed   Shopping for groceries No Help Needed   Driving No Help Needed   Climbing a flight of stairs No Help Needed   Getting to places beyond walking distances No Help Needed

## 2019-07-31 NOTE — PROGRESS NOTES
Subjective:   Remedios Degroot is a 76 y.o. male      Chief Complaint   Patient presents with    Medication Evaluation     discuss medication        History of present illness: He presents today for evaluation of 2 issues one is he has had recurrence of his neck pain which he has had before. He used a lidocaine patch that seemed to work quite well that a friend had but when he went to the pharmacy to get some lidocaine patches his patches did not stick as well as the friends because it was a different brand and he would like us to try to get the brand sticks well. His other complaint seems to be regarding anxiety that same friend had some Ativan 0.5 needed for anxiety and it worked quite well and he is wondering if he can have a prescription for the Ativan to take on as-needed basis himself. He denies any chest pain, shortness of breath, palpitations, PND, orthopnea or cardiorespiratory complaints he just has a recurrence of his right-sided neck pain which he has had before.     Patient Active Problem List   Diagnosis Code    Hypertension, renal disease I12.9    Mixed hyperlipidemia E78.2    ESRD (end stage renal disease) ([facility] [address] N18.6    Gastroesophageal reflux disease without esophagitis K21.9    Vitamin D deficiency E55.9    Gout of multiple sites M10.9    Chronic hepatitis C without hepatic coma ([facility] [address] B18.2    Medicare annual wellness visit, subsequent Z00.00    Neck pain M54.2    Palpitations R00.2    Bruit of left carotid artery R09.89    Bilateral carotid artery stenosis I65.23    Right carotid bruit R09.89    Right acute serous otitis media H65.01    Epididymitis N45.1    Midline low back pain without sciatica M54.5    Anxiety F41.9      Past Medical History:   Diagnosis Date    Arthritis     Chronic kidney disease     Dialysis Og PARIKH, Sat [facility]    Gout     Hepatitis C     Hypertension     Liver disease     Hep C    Other and unspecified alcohol dependence, unspecified drinking behavior     Acid reflux    Other ill-defined conditions(799.89)     gout    Other ill-defined conditions(799.89)     blood transfusion hx      Allergies   Allergen Reactions    Codeine Hives      History reviewed. No pertinent family history. Social History     Socioeconomic History    Marital status: SINGLE     Spouse name: Not on file    Number of children: Not on file    Years of education: Not on file    Highest education level: Not on file   Occupational History    Not on file   Social Needs    Financial resource strain: Not on file    Food insecurity:     Worry: Not on file     Inability: Not on file    Transportation needs:     Medical: Not on file     Non-medical: Not on file   Tobacco Use    Smoking status: Never Smoker    Smokeless tobacco: Never Used   Substance and Sexual Activity    Alcohol use: No    Drug use: Not Currently     Types: Marijuana, Heroin     Comment: smoked pot 4 years ago, herion in 25s    Sexual activity: Not on file   Lifestyle    Physical activity:     Days per week: Not on file     Minutes per session: Not on file    Stress: Not on file   Relationships    Social connections:     Talks on phone: Not on file     Gets together: Not on file     Attends Rastafarian service: Not on file     Active member of club or organization: Not on file     Attends meetings of clubs or organizations: Not on file     Relationship status: Not on file    Intimate partner violence:     Fear of current or ex partner: Not on file     Emotionally abused: Not on file     Physically abused: Not on file     Forced sexual activity: Not on file   Other Topics Concern    Not on file   Social History Narrative    Not on file     Prior to Admission medications    Medication Sig Start Date End Date Taking? Authorizing Provider   LORazepam (ATIVAN) 0.5 mg tablet Take 1 Tab by mouth two (2) times daily as needed for Anxiety.  Max Daily Amount: 1 mg. 7/31/19  Yes Birder Krabbe, MD magnesium citrate solution Drink one bottle. If you have not had large stool output, drink the second bottle. 5/16/19  Yes Trent Dawson., MD   senna (SENOKOT) 8.6 mg tablet Take 1 Tab by mouth daily. Indications: constipation 5/16/19  Yes Keanu Hooker MD   lidocaine 3 % crea 1 Scoop by Apply Externally route two (2) times a day. Apply small amount to anus twice daily for external relief of pain from constipation. 5/16/19  Yes Keanu Hooker MD   omeprazole (PRILOSEC) 40 mg capsule TAKE 1 CAPSULE BY MOUTH ONCE DAILY 2/15/19  Yes Kristin Jiménez MD   lidocaine (LIDODERM) 5 % 1 Patch by TransDERmal route every twenty-four (24) hours. Apply patch to the affected area for 12 hours a day and remove for 12 hours a day. 12/19/18  Yes Kristin Jiménez MD   lisinopril (PRINIVIL, ZESTRIL) 2.5 mg tablet TAKE 1 TABLET BY MOUTH DAILY. TAKE AFTER DIALYSIS ON HEMODIALYSIS DAYS. 9/28/18  Yes Provider, Historical   etelcalcetide (PARSABIV) 5 mg/mL soln by IntraVENous route. Indications: 3 x per week   Yes Provider, Historical   ERGOCALCIFEROL, VITAMIN D2, (VITAMIN D2 PO) Take  by mouth. Yes Provider, Historical   amLODIPine (NORVASC) 10 mg tablet TAKE 1 TABLET BY MOUTH ONCE DAILY 10/11/17  Yes Provider, Historical   sevelamer carbonate (RENVELA) 800 mg Tab tab Take 1,600 mg by mouth three (3) times daily (with meals). Yes Provider, Historical        Review of Systems              Constitutional:  He denies fever, weight loss, sweats or fatigue. EYES: No blurred or double vision,               ENT: no nasal congestion, no headache or dizziness. No difficulty with               swallowing, taste, speech or smell. Respiratory:  No cough, wheezing or shortness of breath. No sputum production. Cardiac:  Denies chest pain, palpitations, unexplained indigestion, syncope, edema, PND or orthopnea.     GI:  No changes in bowel movements, no abdominal pain, no bloating, anorexia, nausea, vomiting or heartburn. :  No frequency or dysuria. Denies incontinence or sexual dysfunction. Extremities:  No joint pain, stiffness or swelling  Back:.no pain or soreness. Positive for neck pain  Skin:  No recent rashes or mole changes. Neurological:  No numbness, tingling, burning paresthesias or loss of motor strength. No syncope, dizziness, frequent headaches or memory loss. Hematologic:  No easy bruising  Lymphatic: No lymph node enlargement  Psychiatric: Increased anxiety    Objective:     Vitals:    07/31/19 1352 07/31/19 1511   BP: 160/90 138/86   Pulse: 88    Resp: 17    Temp: 97.9 °F (36.6 °C)    TempSrc: Oral    SpO2: 98%    Weight: 171 lb 3.2 oz (77.7 kg)    Height: 5' 9\" (1.753 m)    PainSc:   0 - No pain        Body mass index is 25.28 kg/m². Physical Examination:              General Appearance:  Well-developed, well-nourished, no acute distress. HEENT:      Ears:  The TMs and ear canals were clear. Eyes:  The pupillary responses were normal.  Extraocular muscle function intact. Lids and conjunctiva not injected. Funduscopic exam revealed sharp disc margins. Nares: Clear w/o edema or erythema  Pharynx:  Clear with teeth in good repair. No masses were noted. Neck:  Supple without thyromegaly or adenopathy. No JVD noted. No carotid                bruits. Lungs:  Clear to auscultation and percussion. Cardiac:  Regular rate and rhythm without murmur. PMI not displaced. No gallop, rub or click. Abdominal: Soft, non-tender, no hepata-spleenomegally or masses  Extremities:  No clubbing, cyanosis or edema. Skin:  No rash or unusual mole changes noted. Lymph Nodes:  None felt in the cervical, supraclavicular, axillary or inguinal region. Neurological: . DTRs 2+ and symmetric. Station and gait normal.   Hematologic:   No purpura or petechiae        Assessment/Plan:         1. Anxiety    2. Neck pain        Impressions/Plan:  Impression  1.   Anxiety we will try Ativan 0.5 twice daily as needed #30 given  2. Neck pain we have called his pharmacist to see if they can get the appropriate brand of lidocaine patches and see if that will be effective. I will recheck him again as previously scheduled. Orders Placed This Encounter    LORazepam (ATIVAN) 0.5 mg tablet           No results found for any visits on 07/31/19. Trnia Glynn MD    The patient was given after the visit summary the patient verbalized an understanding of the plans and problems as explained.

## 2019-07-31 NOTE — LETTER
Name:Conrad Freeman MARY:[birth year] MR #:[id number] Provider Duane Siegel MD  
*[id number]* BSMG-491 (5/16) Page 1 of 5 Initial [employer] CONTROLLED SUBSTANCE AGREEMENT I may be prescribed medications that are controlled substances as part  of my treatment plan for management of my medical condition(s). The goal of my treatment plan is to maintain and/or improve my health and wellbeing. Because controlled substances have an increased risk of abuse or harm, continual re-evaluation is needed determine if the goals of my treatment plan are being met for my safety and the safety of others. Erik Walker  am entering into this Controlled Substance Agreement with my provider, Patti Pretty MD at [address] [place] [address] . I understand that successful treatment requires mutual trust and honesty between me and my provider. I understand that there are state and federal laws and regulations which apply to the medications that my provider may prescribe that must be followed. I understand there are risks and benefits ts of taking the medicines that my provider may prescribe. I understand and agree that following this Agreement is necessary in continuing my provider-patient relationship and success of my treatment plan. As a part of my treatment plan, I agree to the following: COMMUNICATION: 
 
1. I will communicate fully with my provider about my medical condition(s), including the effect on my daily life and how well my medications are helping. I will tell my provider all of the medications that I take for any reason, including medications I receive from another health care provider, and will notify my provider about all issues, problems or concerns, including any side effects, which may be related to my medications.  
 
I understand that this information allows my provider to adjust my treatment plan to help manage my medical condition. I understand that this information will become part of my permanent medical record. 2. I will notify my provider if I have a history of alcohol/drug misuse/addiction or if I have had treatment for alcohol/drug addiction in the past, or if I have a new problem with or concern about alcohol/drug use/addiction, because this increases the likelihood of high risk behaviors and may lead to serious medical conditions. 3. Females Only: I will notify my provider if I am or become pregnant, or if I intend to become pregnant, or if I intend to breastfeed. I understand that communication of these issues with my provider is important, due to possible effects my medication could have on an unborn fetus or breastfeeding child. Name:.Estiven Iverson E:[birth year] MR #:[id number] Provider Danny Jamison MD  
*[id number]* BSMG-491 (5/16) Page 2 of 5 Initial [employer] MISUSE OF MEDICATIONS / DRUGS: 
 
1. I agree to take all controlled substances as prescribed, and will not misuse or abuse any controlled substances prescribed by my provider. For my safety, I will not increase the amount of medicine I take without first talking with and getting permission from my provider. 2. If I have a medical emergency, another health care provider may prescribe me medication. If I seek emergency treatment, I will notify my provider within seventy-two (72) hours. 3. I understand that my provider may discuss my use and/or possible misuse/abuse of controlled substances and alcohol, as appropriate, with any health care provider involved in my care, pharmacist or legal authority. ILLEGAL DRUGS: 
 
1. I will not use illegal drugs of any kind, including but not limited to marijuana, heroin, cocaine, or any prescription drug which is not prescribed to me.  
 
DRUG DIVERSION / PRESCRIPTION FRAUD: 
 
 1. I will not share, sell, trade, give away, or otherwise misuse my prescriptions or medications. 2. I will not alter any prescriptions provided to me by my provider. SINGLE PROVIDER: 
 
1. I agree that all controlled substances that I take will be prescribed only by my provider (or his/her covering provider) under this Agreement. This agreement does not prevent me from seeking emergency medical treatment or receiving pain management related to a surgery. PROTECTING MEDICATIONS: 
 
1. I am responsible for keeping my prescriptions and medications in a safe and secure place including safeguarding them from loss or theft. I understand that lost, stolen or damaged/destroyed prescriptions or medications will not be replaced. Name:.Estiven Baxter YVR:[birth year] MR #:[id number] Provider Sherry Donaldson MD  
*[id number]* BSMG-491 (5/16) Page 3 of 5 Initial [employer] PRESCRIPTION RENEWALS/REFILLS: 
 
 
1. I authorize my provider and my pharmacy to cooperate fully with any local, state, or federal law enforcement agency in the investigation of any possible misuse, sale, or other diversion of my controlled substance prescriptions or medications. RISKS: 
 
 
1. I understand that if I do not adhere to this Agreement in any way, my provider may change my prescriptions, stop prescribing controlled substances or end our provider-patient relationship. 2. If my provider decides to stop prescribing medication, or decides to end our provider-patient relationship,my provider may require that I taper my medications slowly. If necessary, my provider may also provide a prescription for other medications to treat my withdrawal symptoms. UNDERSTANDING THIS AGREEMENT: 
 
I understand that my provider may adjust or stop my prescriptions for controlled substances based on my medical condition and my treatment plan. I understand that this Agreement does not guarantee that I will be prescribed medications or controlled substances. I understand that controlled substances may be just one part 
of my treatment plan. My initial on each page and my signature below shows that I have read each page of this Agreement, I have had an opportunity to ask questions, and all of my questions have been answered to my satisfaction by my provider. By signing below, I agree to comply with this Agreement, and I understand that if I do not follow the Agreements listed above, my provider may stop 
 
 
 
_________________________________________  Date/Time 7/31/2019 3:14 PM   
             (Patient Signature)

## 2019-07-31 NOTE — PATIENT INSTRUCTIONS

## 2019-08-09 RX ORDER — LIDOCAINE 50 MG/G
1 PATCH TOPICAL EVERY 24 HOURS
Qty: 30 EACH | OUTPATIENT
Start: 2019-08-09 | End: 2020-05-23

## 2019-08-09 NOTE — TELEPHONE ENCOUNTER
PCP: Patti Pretty MD    Last appt: 2019  Future Appointments   Date Time Provider Major Barnett   10/2/2019  9:50 AM [facility]   2019 10:00 AM [facility]   2019 11:00 AM ELLYN [facility] SCHED       Requested Prescriptions     Pending Prescriptions Disp Refills    lidocaine (LIDODERM) 5 % 30 Each prn     Si Patch by TransDERmal route every twenty-four (24) hours. Apply patch to the affected area for 12 hours a day and remove for 12 hours a day.

## 2019-08-13 ENCOUNTER — OFFICE VISIT (OUTPATIENT)
Dept: INTERNAL MEDICINE CLINIC | Age: 69
End: 2019-08-13

## 2019-08-13 VITALS
TEMPERATURE: 98.6 F | SYSTOLIC BLOOD PRESSURE: 138 MMHG | BODY MASS INDEX: 25 KG/M2 | WEIGHT: 168.8 LBS | OXYGEN SATURATION: 99 % | DIASTOLIC BLOOD PRESSURE: 78 MMHG | HEART RATE: 84 BPM | HEIGHT: 69 IN | RESPIRATION RATE: 18 BRPM

## 2019-08-13 DIAGNOSIS — R31.21 ASYMPTOMATIC MICROSCOPIC HEMATURIA: ICD-10-CM

## 2019-08-13 DIAGNOSIS — M54.2 NECK PAIN: ICD-10-CM

## 2019-08-13 DIAGNOSIS — M54.50 MIDLINE LOW BACK PAIN WITHOUT SCIATICA, UNSPECIFIED CHRONICITY: Primary | ICD-10-CM

## 2019-08-13 DIAGNOSIS — G89.29 CHRONIC MIDLINE THORACIC BACK PAIN: ICD-10-CM

## 2019-08-13 DIAGNOSIS — M54.6 CHRONIC MIDLINE THORACIC BACK PAIN: ICD-10-CM

## 2019-08-13 LAB
BACTERIA,BACTU: ABNORMAL
BILIRUB UR QL: NEGATIVE
CLARITY: CLEAR
COLOR UR: ABNORMAL
GLUCOSE 24H UR-MRATE: NEGATIVE G/(24.H)
HGB UR QL STRIP: ABNORMAL
KETONES UR QL STRIP.AUTO: NEGATIVE
LEUKOCYTE ESTERASE: NEGATIVE
NITRITE UR QL STRIP.AUTO: NEGATIVE
PH UR STRIP: 8 [PH] (ref 5–7)
PROT UR STRIP-MCNC: ABNORMAL MG/DL
RBC #/AREA URNS HPF: ABNORMAL #/HPF
SP GR UR REFRACTOMETRY: 1.02 (ref 1–1.03)
UROBILINOGEN UR QL STRIP.AUTO: NEGATIVE
WBC URNS QL MICRO: 0 #/HPF

## 2019-08-13 RX ORDER — PREDNISOLONE ACETATE 10 MG/ML
1 SUSPENSION/ DROPS OPHTHALMIC 4 TIMES DAILY
COMMUNITY
End: 2020-07-01 | Stop reason: ALTCHOICE

## 2019-08-13 RX ORDER — CIPROFLOXACIN 500 MG/1
TABLET ORAL
Refills: 0 | COMMUNITY
Start: 2019-08-01 | End: 2019-08-16 | Stop reason: ALTCHOICE

## 2019-08-13 NOTE — PATIENT INSTRUCTIONS
Back Pain: Care Instructions Your Care Instructions Back pain has many possible causes. It is often related to problems with muscles and ligaments of the back. It may also be related to problems with the nerves, discs, or bones of the back. Moving, lifting, standing, sitting, or sleeping in an awkward way can strain the back. Sometimes you don't notice the injury until later. Arthritis is another common cause of back pain. Although it may hurt a lot, back pain usually improves on its own within several weeks. Most people recover in 12 weeks or less. Using good home treatment and being careful not to stress your back can help you feel better sooner. Follow-up care is a key part of your treatment and safety. Be sure to make and go to all appointments, and call your doctor if you are having problems. It's also a good idea to know your test results and keep a list of the medicines you take. How can you care for yourself at home? · Sit or lie in positions that are most comfortable and reduce your pain. Try one of these positions when you lie down: ? Lie on your back with your knees bent and supported by large pillows. ? Lie on the floor with your legs on the seat of a sofa or chair. ? Lie on your side with your knees and hips bent and a pillow between your legs. ? Lie on your stomach if it does not make pain worse. · Do not sit up in bed, and avoid soft couches and twisted positions. Bed rest can help relieve pain at first, but it delays healing. Avoid bed rest after the first day of back pain. · Change positions every 30 minutes. If you must sit for long periods of time, take breaks from sitting. Get up and walk around, or lie in a comfortable position. · Try using a heating pad on a low or medium setting for 15 to 20 minutes every 2 or 3 hours. Try a warm shower in place of one session with the heating pad. · You can also try an ice pack for 10 to 15 minutes every 2 to 3 hours. Put a thin cloth between the ice pack and your skin. · Take pain medicines exactly as directed. ? If the doctor gave you a prescription medicine for pain, take it as prescribed. ? If you are not taking a prescription pain medicine, ask your doctor if you can take an over-the-counter medicine. · Take short walks several times a day. You can start with 5 to 10 minutes, 3 or 4 times a day, and work up to longer walks. Walk on level surfaces and avoid hills and stairs until your back is better. · Return to work and other activities as soon as you can. Continued rest without activity is usually not good for your back. · To prevent future back pain, do exercises to stretch and strengthen your back and stomach. Learn how to use good posture, safe lifting techniques, and proper body mechanics. When should you call for help? Call your doctor now or seek immediate medical care if: 
  · You have new or worsening numbness in your legs.  
  · You have new or worsening weakness in your legs. (This could make it hard to stand up.)  
  · You lose control of your bladder or bowels.  
 Watch closely for changes in your health, and be sure to contact your doctor if: 
  · You have a fever, lose weight, or don't feel well.  
  · You do not get better as expected. Where can you learn more? Go to http://kirk-angelika.info/. Enter X818 in the search box to learn more about \"Back Pain: Care Instructions. \" Current as of: September 20, 2018 Content Version: 12.1 © 5061-9491 [employer]. Care instructions adapted under license by [employer] (which disclaims liability or warranty for this information). If you have questions about a medical condition or this instruction, always ask your healthcare professional. [place] [address] any warranty or liability for your use of this information.

## 2019-08-13 NOTE — PROGRESS NOTES
Subjective:   Narendra Pandya is a 76 y.o. male      Chief Complaint   Patient presents with    Back Pain    Shoulder Pain    Flank Pain     bilateral        History of present illness: He presents complaints of pain in his back and several of the areas he has noted some pain in his cervical region for some time where it hurts to turn toward the right and we previously did an MRI several years ago before referring him to neurosurgical evaluation at which time surgery was given as an option but he elected not to go with surgery. He recently went back and saw the neurosurgery nurse practitioner at which time they recommend taken some Tylenol arthritis since he did not want to proceed with surgery and he has not had a recent MRI. He still continues with that pain in the neck and hurts to move the right. He also notes some pain in the right side of his mid back area around the scapular region but it does not hurt to take a deep breath and there is no cough or chest pain other than that in the right scapular region. He is now noting some pain in the low back area when initially seem to have some discomfort in the left side and the nurse practitioner at dialysis told him she thought it was a kidney infection so she is treated with antibiotic for 2 weeks. That pain seemed to get better but now he has pain in the right side and is just recently finished antibiotics. He notes no urinary frequency or dysuria although his urine output overall was decreased because of his renal failure and dialysis. He has noted no fevers or chills. He notes no radiation of the pain into his legs.     Patient Active Problem List   Diagnosis Code    Hypertension, renal disease I12.9    Mixed hyperlipidemia E78.2    ESRD (end stage renal disease) ([facility] [address] N18.6    Gastroesophageal reflux disease without esophagitis K21.9    Vitamin D deficiency E55.9    Gout of multiple sites M10.9    Chronic hepatitis C without hepatic coma ([facility] [address] B18.2    Medicare annual wellness visit, subsequent Z00.00    Neck pain M54.2    Palpitations R00.2    Bruit of left carotid artery R09.89    Bilateral carotid artery stenosis I65.23    Right carotid bruit R09.89    Right acute serous otitis media H65.01    Epididymitis N45.1    Midline low back pain without sciatica M54.5    Anxiety F41.9    Chronic midline thoracic back pain M54.6, G89.29      Past Medical History:   Diagnosis Date    Arthritis     Chronic kidney disease     Dialysis T, Thur, Sat @ [facility]    Gout     Hepatitis C     Hypertension     Liver disease     Hep C    Other and unspecified alcohol dependence, unspecified drinking behavior     Acid reflux    Other ill-defined conditions(799.89)     gout    Other ill-defined conditions(799.89)     blood transfusion hx      Allergies   Allergen Reactions    Codeine Hives      History reviewed. No pertinent family history.    Social History     Socioeconomic History    Marital status: SINGLE     Spouse name: Not on file    Number of children: Not on file    Years of education: Not on file    Highest education level: Not on file   Occupational History    Not on file   Social Needs    Financial resource strain: Not on file    Food insecurity:     Worry: Not on file     Inability: Not on file    Transportation needs:     Medical: Not on file     Non-medical: Not on file   Tobacco Use    Smoking status: Never Smoker    Smokeless tobacco: Never Used   Substance and Sexual Activity    Alcohol use: No    Drug use: Not Currently     Types: Marijuana, Heroin     Comment: smoked pot 4 years ago, herion in 25s    Sexual activity: Not on file   Lifestyle    Physical activity:     Days per week: Not on file     Minutes per session: Not on file    Stress: Not on file   Relationships    Social connections:     Talks on phone: Not on file     Gets together: Not on file     Attends Islam service: Not on file     Active member of club or organization: Not on file     Attends meetings of clubs or organizations: Not on file     Relationship status: Not on file    Intimate partner violence:     Fear of current or ex partner: Not on file     Emotionally abused: Not on file     Physically abused: Not on file     Forced sexual activity: Not on file   Other Topics Concern    Not on file   Social History Narrative    Not on file     Prior to Admission medications    Medication Sig Start Date End Date Taking? Authorizing Provider   ciprofloxacin HCl (CIPRO) 500 mg tablet TAKE 1 TABLET BY MOUTH EVERY EVENING FOR 14 DAYS 8/1/19  Yes Provider, Historical   prednisoLONE acetate (PRED FORTE) 1 % ophthalmic suspension Administer 1 Drop to both eyes four (4) times daily. Yes Provider, Historical   lidocaine (LIDODERM) 5 % 1 Patch by TransDERmal route every twenty-four (24) hours. Apply patch to the affected area for 12 hours a day and remove for 12 hours a day. 8/9/19  Yes Toshia Smith MD   LORazepam (ATIVAN) 0.5 mg tablet Take 1 Tab by mouth two (2) times daily as needed for Anxiety. Max Daily Amount: 1 mg. 7/31/19  Yes Toshia Smith MD   magnesium citrate solution Drink one bottle. If you have not had large stool output, drink the second bottle. 5/16/19  Yes Allyson Dawson MD   senna (SENOKOT) 8.6 mg tablet Take 1 Tab by mouth daily. Indications: constipation 5/16/19  Yes Ruth Flores MD   lidocaine 3 % crea 1 Scoop by Apply Externally route two (2) times a day. Apply small amount to anus twice daily for external relief of pain from constipation. 5/16/19  Yes Ruth Flores MD   omeprazole (PRILOSEC) 40 mg capsule TAKE 1 CAPSULE BY MOUTH ONCE DAILY 2/15/19  Yes Toshia Smith MD   lisinopril (PRINIVIL, ZESTRIL) 2.5 mg tablet TAKE 1 TABLET BY MOUTH DAILY. TAKE AFTER DIALYSIS ON HEMODIALYSIS DAYS. 9/28/18  Yes Provider, Historical   etelcalcetide (PARSABIV) 5 mg/mL soln by IntraVENous route.  Indications: 3 x per week Yes Provider, Historical   ERGOCALCIFEROL, VITAMIN D2, (VITAMIN D2 PO) Take  by mouth. Yes Provider, Historical   amLODIPine (NORVASC) 10 mg tablet TAKE 1 TABLET BY MOUTH ONCE DAILY 10/11/17  Yes Provider, Historical   sevelamer carbonate (RENVELA) 800 mg Tab tab Take 1,600 mg by mouth three (3) times daily (with meals). Yes Provider, Historical        Review of Systems              Constitutional:  He denies fever, weight loss, sweats or fatigue. EYES: No blurred or double vision,               ENT: no nasal congestion, no headache or dizziness. No difficulty with               swallowing, taste, speech or smell. Respiratory:  No cough, wheezing or shortness of breath. No sputum production. Cardiac:  Denies chest pain, palpitations, unexplained indigestion, syncope, edema, PND or orthopnea. GI:  No changes in bowel movements, no abdominal pain, no bloating, anorexia, nausea, vomiting or heartburn. :  No frequency or dysuria. Denies incontinence or sexual dysfunction. Extremities:  No joint pain, stiffness or swelling  Back:.Pain as described above  Skin:  No recent rashes or mole changes. Neurological:  No numbness, tingling, burning paresthesias or loss of motor strength. No syncope, dizziness, frequent headaches or memory loss. Hematologic:  No easy bruising  Lymphatic: No lymph node enlargement    Objective:     Vitals:    08/13/19 1506   BP: 138/78   Pulse: 84   Resp: 18   Temp: 98.6 °F (37 °C)   TempSrc: Oral   SpO2: 99%   Weight: 168 lb 12.8 oz (76.6 kg)   Height: 5' 9\" (1.753 m)   PainSc:   7   PainLoc: Generalized       Body mass index is 24.93 kg/m². Physical Examination:              General Appearance:  Well-developed, well-nourished, no acute distress. HEENT:      Ears:  The TMs and ear canals were clear. Eyes:  The pupillary responses were normal.  Extraocular muscle function intact. Lids and conjunctiva not injected.   Funduscopic exam revealed sharp disc margins. Nares: Clear w/o edema or erythema  Pharynx:  Clear with teeth in good repair. No masses were noted. Neck:  Supple without thyromegaly or adenopathy mild discomfort on rotation to the right. No JVD noted. No carotid                bruits. Lungs:  Clear to auscultation and percussion. Cardiac:  Regular rate and rhythm without murmur. PMI not displaced. No gallop, rub or click. Abdominal: Soft, non-tender, no hepata-spleenomegally or masses  Extremities:  No clubbing, cyanosis or edema. Back: Mild discomfort in the area of the right posterior superior iliac crest region. Skin:  No rash or unusual mole changes noted. Lymph Nodes:  None felt in the cervical, supraclavicular, axillary or inguinal region. Neurological: . DTRs 2+ and symmetric. Station and gait normal.   Hematologic:   No purpura or petechiae        Assessment/Plan:         1. Midline low back pain without sciatica, unspecified chronicity    2. Chronic midline thoracic back pain    3. Neck pain    4. Asymptomatic microscopic hematuria        Impressions/Plan:  Impression  1. Low back pain x-ray obtained lumbar spine is normal  2. Thoracic back pain x-ray obtained thoracic spine reveals no acute abnormalities  3. Neck pain cervical spine film reviewed does not reveal any acute change from what was done 3 months ago when he has moderate arthritic changes. I again discussed with him doing an MRI of the cervical spine and he wants to think about that. 4.  Asymptomatic microscopic hematuria 5-10 red cells in the urine with bilateral flank pain I think we need to look at an ultrasound and if that is negative probably needs to have a cystoscopy. Note he did have a ultrasound on August 2018 that did not reveal any abnormalities but that there was a year ago. Recheck to be determined based upon the above findings.   I did send a urine culture    Orders Placed This Encounter    CULTURE, URINE    XR SPINE CERV PA LAT ODONT 3 V MAX    XR SPINE THORAC 2 V    XR SPINE LUMB 2 OR 3 V    US ABD COMP    URINALYSIS W/O MICRO ([facility])    URINALYSIS; MICROSCOPIC ONLY    ciprofloxacin HCl (CIPRO) 500 mg tablet    prednisoLONE acetate (PRED FORTE) 1 % ophthalmic suspension       Follow-up and Dispositions    · Return TBD. Results for orders placed or performed in visit on 08/13/19   URINALYSIS W/O MICRO   Result Value Ref Range    Color Pale Yellow Pale Yellow - Yellow    CLARITY clear Clear    Glucose urine, 24 hr Negative Negative    Ketone Negative Negative    Bilirubin Negative Negative    Specific gravity 1.020 1.000 - 1.030    pH (UA) 8 (A) 5 - 7    Blood 3+ (A) Negative    Protein 2+ (A) Negative    Urobilinogen Negative Negative    Nitrites Negative Negative    Leukocyte Esterase Negative Negative   URINALYSIS; MICROSCOPIC ONLY   Result Value Ref Range    WBC 0 0 #/HPF    RBC 5-10 (A) 0 #/HPF    Bacteria Occasional (A) None Seen         Maryuri Crenshaw MD    The patient was given after the visit summary the patient verbalized an understanding of the plans and problems as explained.

## 2019-08-13 NOTE — PROGRESS NOTES
Chief Complaint   Patient presents with    Back Pain    Shoulder Pain    Flank Pain     bilateral     Visit Vitals  /78 (BP 1 Location: Left arm, BP Patient Position: Sitting)   Pulse 84   Temp 98.6 °F (37 °C) (Oral)   Resp 18   Ht 5' 9\" (1.753 m)   Wt 168 lb 12.8 oz (76.6 kg)   SpO2 99%   BMI 24.93 kg/m²     1. Have you been to the ER, urgent care clinic since your last visit? Hospitalized since your last visit? No    2. Have you seen or consulted any other health care providers outside of the [address] [place] [address] since your last visit? Include any pap smears or colon screening.  Dr. Oconnell Prime

## 2019-08-14 ENCOUNTER — HOSPITAL ENCOUNTER (OUTPATIENT)
Dept: ULTRASOUND IMAGING | Age: 69
Discharge: HOME | End: 2019-08-14
Attending: INTERNAL MEDICINE
Payer: MEDICARE

## 2019-08-14 DIAGNOSIS — R31.21 ASYMPTOMATIC MICROSCOPIC HEMATURIA: ICD-10-CM

## 2019-08-14 PROCEDURE — 76700 US EXAM ABDOM COMPLETE: CPT

## 2019-08-14 NOTE — PROGRESS NOTES
Ultrasound is okay, it shows the known kidney disease so therefore no new findings. Patient informed.

## 2019-08-15 PROBLEM — R31.21 ASYMPTOMATIC MICROSCOPIC HEMATURIA: Status: ACTIVE | Noted: 2019-08-15

## 2019-08-15 LAB — BACTERIA UR CULT: NORMAL

## 2019-08-15 NOTE — PROGRESS NOTES
Chief Complaint   Patient presents with    Urinary Pain    Urinary Frequency    Weight Loss     pt states he has lost weight 20 lbs since December 2018       SUBJECTIVE:    Aileen Villavicencio is a 76 y.o. male who returns in follow-up for urinary frequency with some microscopic hematuria and weight loss and he also is concerned about a lot of joint aches and pains and when he has some connective tissue disease as he also has some dry eyes and dry mouth. He notes no nausea vomiting. Appetite is fairly good. He notes no change in bowel habits. He denies any other GI complaints. He denies any other  complaints. There are currently no complaints of chest pain, shortness of breath or cardiorespiratory complaints. He is receiving dialysis 3 times weekly for his end-stage renal disease. Current Outpatient Medications   Medication Sig Dispense Refill    prednisoLONE acetate (PRED FORTE) 1 % ophthalmic suspension Administer 1 Drop to both eyes four (4) times daily.  lidocaine (LIDODERM) 5 % 1 Patch by TransDERmal route every twenty-four (24) hours. Apply patch to the affected area for 12 hours a day and remove for 12 hours a day. 30 Each prn    LORazepam (ATIVAN) 0.5 mg tablet Take 1 Tab by mouth two (2) times daily as needed for Anxiety. Max Daily Amount: 1 mg. 30 Tab 0    magnesium citrate solution Drink one bottle. If you have not had large stool output, drink the second bottle. 2 Bottle 0    senna (SENOKOT) 8.6 mg tablet Take 1 Tab by mouth daily. Indications: constipation 10 Tab 0    lidocaine 3 % crea 1 Scoop by Apply Externally route two (2) times a day. Apply small amount to anus twice daily for external relief of pain from constipation. 28.35 g 0    omeprazole (PRILOSEC) 40 mg capsule TAKE 1 CAPSULE BY MOUTH ONCE DAILY 90 Cap 3    lisinopril (PRINIVIL, ZESTRIL) 2.5 mg tablet TAKE 1 TABLET BY MOUTH DAILY. TAKE AFTER DIALYSIS ON HEMODIALYSIS DAYS.   6    etelcalcetide (PARSABIV) 5 mg/mL soln by IntraVENous route. Indications: 3 x per week      ERGOCALCIFEROL, VITAMIN D2, (VITAMIN D2 PO) Take  by mouth.  amLODIPine (NORVASC) 10 mg tablet TAKE 1 TABLET BY MOUTH ONCE DAILY  11    sevelamer carbonate (RENVELA) 800 mg Tab tab Take 1,600 mg by mouth three (3) times daily (with meals). Past Medical History:   Diagnosis Date    Arthritis     Chronic kidney disease     [facility] T, Thur, Sat @ [facility]    Gout     Hepatitis C     Hypertension     Liver disease     Hep C    Other and unspecified alcohol dependence, unspecified drinking behavior     Acid reflux    Other ill-defined conditions(799.89)     gout    Other ill-defined conditions(799.89)     blood transfusion hx     Past Surgical History:   Procedure Laterality Date    HX OTHER SURGICAL      liver biopsy    [facility]      [facility]    HX VASCULAR ACCESS  7/15/13    CREATION LEFT UPPER ARM BASILIC VEIN TRANSPOSITION    H[facility] VASCULAR ACCESS      [facility]     Allergies   Allergen Reactions    Codeine Hives       REVIEW OF SYSTEMS:  General: negative for - chills or fever, or weight loss or gain  ENT: negative for - headaches, nasal congestion or tinnitus  Eyes: no blurred or visual changes  Neck: No stiffness or swollen nodes  Respiratory: negative for - cough, hemoptysis, shortness of breath or wheezing  Cardiovascular : negative for - chest pain, edema, palpitations or shortness of breath  Gastrointestinal: negative for - abdominal pain, blood in stools, heartburn or nausea/vomiting  Genito-Urinary:  trouble voiding, or hematuria.   Positive for some frequency  Musculoskeletal: negative for - gait disturbance, joint pain, joint stiffness or joint swelling  Neurological: no TIA or stroke symptoms  Hematologic: no bruises, no bleeding  Lymphatic: no swollen glands  Integument: no lumps, mole changes, nail changes or rash  Endocrine:no malaise/lethargy poly uria or polydipsia or unexpected weight changes        Social History     Socioeconomic History    Marital status: SINGLE     Spouse name: Not on file    Number of children: Not on file    Years of education: Not on file    Highest education level: Not on file   Tobacco Use    Smoking status: Never Smoker    Smokeless tobacco: Never Used   Substance and Sexual Activity    Alcohol use: No    Drug use: Not Currently     Types: Marijuana, Heroin     Comment: smoked pot 4 years ago, herion in 25s     History reviewed. No pertinent family history. OBJECTIVE:     Visit Vitals  /78   Pulse 87   Temp 98.6 °F (37 °C) (Oral)   Resp 17   Ht 5' 9\" (1.753 m)   Wt 171 lb (77.6 kg)   SpO2 98%   BMI 25.25 kg/m²     CONSTITUTIONAL:   well nourished, appears age appropriate  EYES: sclera anicteric, PERRL, EOMI  ENMT:nares clear, moist mucous membranes, pharynx clear  NECK: supple. Thyroid normal, No JVD or bruits  RESPIRATORY: Chest: clear to ascultation and percussion, normal inspiratory effort  CARDIOVASCULAR: Heart: regular rate and rhythm no murmurs, rubs or gallops, PMI not displaced, No thrills  GASTROINTESTINAL: Abdomen: non distended, soft, non tender, bowel sounds normal  : Prostate 1+ enlarged smooth nontender without nodules  HEMATOLOGIC: no purpura, petechiae or bruising  LYMPHATIC: No lymph node enlargemant  MUSCULOSKELETAL: Extremities: no edema or active synovitis, pulse 1+   INTEGUMENT: No unusual rashes or suspicious skin lesions noted. Nails appear normal.  PERIPHERAL VASCULAR: normal pulses femoral, PT and DP  NEUROLOGIC: non-focal exam, A & O X 3  PSYCHIATRIC:, appropriate affect     ASSESSMENT:   1. Asymptomatic microscopic hematuria    2. Arthralgia, unspecified joint      Impression  1. Asymptomatic microscopic hematuria except for some frequency prostate is consistent with BPH. PSA is pending. As well as urinalysis.   I still think he needs to have a cystoscopy and urology evaluation but will see what the urine looks like today and PSA. 2.  Arthralgias unclear etiology with dry mouth and dry eyes I will send him for rheumatology evaluation to make sure he does not have Sjogren's although I do not think that is the case. I will call results of PSA as well as urine other prostate exam is not markedly abnormal with age-related mild enlargement on exam.    PLAN:  .  Orders Placed This Encounter    PSA SCREENING (SCREENING) ([facility])    REFERRAL TO RHEUMATOLOGY         ATTENTION:   This medical record was transcribed using an electronic medical records system. Although proofread, it may and can contain electronic and spelling errors. Other human spelling and other errors may be present. Corrections may be executed at a later time. Please feel free to contact us for any clarifications as needed. No results found for any visits on 08/16/19. Kayla Chavez MD    The patient verbalized understanding of the problems and plans as explained.

## 2019-08-16 ENCOUNTER — OFFICE VISIT (OUTPATIENT)
Dept: INTERNAL MEDICINE CLINIC | Age: 69
End: 2019-08-16

## 2019-08-16 VITALS
WEIGHT: 171 LBS | OXYGEN SATURATION: 98 % | HEIGHT: 69 IN | SYSTOLIC BLOOD PRESSURE: 138 MMHG | RESPIRATION RATE: 17 BRPM | DIASTOLIC BLOOD PRESSURE: 78 MMHG | BODY MASS INDEX: 25.33 KG/M2 | HEART RATE: 87 BPM | TEMPERATURE: 98.6 F

## 2019-08-16 DIAGNOSIS — M25.50 ARTHRALGIA, UNSPECIFIED JOINT: ICD-10-CM

## 2019-08-16 DIAGNOSIS — R31.21 ASYMPTOMATIC MICROSCOPIC HEMATURIA: Primary | ICD-10-CM

## 2019-08-16 NOTE — PATIENT INSTRUCTIONS
Blood in the Urine: Care Instructions  Your Care Instructions    Blood in the urine, or hematuria, may make the urine look red, brown, or pink. There may be blood every time you urinate or just from time to time. You cannot always see blood in the urine, but it will show up in a urine test.  Blood in the urine may be serious. It should always be checked by a doctor. Your doctor may recommend more tests, including an X-ray, a CT scan, or a cystoscopy (which lets a doctor look inside the urethra and bladder). Blood in the urine can be a sign of another problem. Common causes are bladder infections and kidney stones. An injury to your groin or your genital area can also cause bleeding in the urinary tract. Very hard exercisesuch as running a marathoncan cause blood in the urine. Blood in the urine can also be a sign of kidney disease or cancer in the bladder or kidney. Many cases of blood in the urine are caused by a harmless condition that runs in families. This is called benign familial hematuria. It does not need any treatment. Sometimes your urine may look red or brown even though it does not contain blood. For example, not getting enough fluids (dehydration), taking certain medicines, or having a liver problem can change the color of your urine. Eating foods such as beets, rhubarb, or blackberries or foods with red food coloring can make your urine look red or pink. Follow-up care is a key part of your treatment and safety. Be sure to make and go to all appointments, and call your doctor if you are having problems. It's also a good idea to know your test results and keep a list of the medicines you take. When should you call for help? Call your doctor now or seek immediate medical care if:    · You have symptoms of a urinary infection. For example:  ? You have pus in your urine. ? You have pain in your back just below your rib cage. This is called flank pain. ?  You have a fever, chills, or body aches.  ? It hurts to urinate. ? You have groin or belly pain.     · You have more blood in your urine.    Watch closely for changes in your health, and be sure to contact your doctor if:    · You have new urination problems.     · You do not get better as expected. Where can you learn more? Go to http://kirk-angelika.info/. Enter Z310 in the search box to learn more about \"Blood in the Urine: Care Instructions. \"  Current as of: December 19, 2018  Content Version: 12.1  © 0655-6354 [employer]. Care instructions adapted under license by [employer] (which disclaims liability or warranty for this information). If you have questions about a medical condition or this instruction, always ask your healthcare professional. [address] any warranty or liability for your use of this information.

## 2019-08-16 NOTE — PROGRESS NOTES
Chief Complaint   Patient presents with    Urinary Pain    Urinary Frequency    Weight Loss     pt states he has lost weight 20 lbs since December 2018     Visit Vitals  /88 (BP 1 Location: Right arm, BP Patient Position: Sitting)   Pulse 87   Temp 98.6 °F (37 °C) (Oral)   Resp 17   Ht 5' 9\" (1.753 m)   Wt 171 lb (77.6 kg)   SpO2 98%   BMI 25.25 kg/m²     1. Have you been to the ER, urgent care clinic since your last visit? Hospitalized since your last visit? No    2. Have you seen or consulted any other health care providers outside of the [address] [place] [address] since your last visit? Include any pap smears or colon screening.  8/15/19 [facility]

## 2019-08-19 LAB
BILIRUB UR QL: NEGATIVE
CLARITY: CLEAR
COLOR UR: ABNORMAL
GLUCOSE 24H UR-MRATE: NEGATIVE G/(24.H)
HGB UR QL STRIP: ABNORMAL
KETONES UR QL STRIP.AUTO: NEGATIVE
LEUKOCYTE ESTERASE: NEGATIVE
NITRITE UR QL STRIP.AUTO: NEGATIVE
PH UR STRIP: 8 [PH] (ref 5–7)
PROT UR STRIP-MCNC: ABNORMAL MG/DL
RBC #/AREA URNS HPF: 0 #/HPF
SP GR UR REFRACTOMETRY: 1.01 (ref 1–1.03)
SPERM,SPRMU: ABNORMAL
UROBILINOGEN UR QL STRIP.AUTO: NEGATIVE
WBC URNS QL MICRO: 0 #/HPF

## 2019-08-20 LAB — PSA, TEST22: 0.9 NG/ML (ref 0–4)

## 2019-08-20 NOTE — PROGRESS NOTES
Follow-up urinalysis has no significant red blood cells however with recurrent red blood cells I still think urology performed cystoscopy is indicated. PSA is pending.

## 2019-08-20 NOTE — PROGRESS NOTES
PSA is well within normal limits once again I would recommend urology evaluation for consideration of a cystoscopy and further work-up of microscopic hematuria.

## 2019-08-21 DIAGNOSIS — R31.9 HEMATURIA, UNSPECIFIED TYPE: Primary | ICD-10-CM

## 2019-08-21 NOTE — PROGRESS NOTES
PSA is well within normal limits once again I would recommend urology evaluation for consideration of a cystoscopy and further work-up of microscopic hematuria. Discussed with earlier message with patient.

## 2019-08-21 NOTE — PROGRESS NOTES
Follow-up urinalysis has no significant red blood cells however with recurrent red blood cells I still think urology performed cystoscopy is indicated.  PSA is pending. Discussed with patient. Referral to urology generated.

## 2019-09-23 PROBLEM — Z00.00 MEDICARE ANNUAL WELLNESS VISIT, SUBSEQUENT: Status: RESOLVED | Noted: 2017-10-25 | Resolved: 2019-09-23

## 2019-10-07 ENCOUNTER — OFFICE VISIT (OUTPATIENT)
Dept: INTERNAL MEDICINE CLINIC | Age: 69
End: 2019-10-07

## 2019-10-07 VITALS
HEIGHT: 69 IN | SYSTOLIC BLOOD PRESSURE: 152 MMHG | OXYGEN SATURATION: 98 % | HEART RATE: 79 BPM | DIASTOLIC BLOOD PRESSURE: 78 MMHG | WEIGHT: 179.8 LBS | TEMPERATURE: 97.7 F | RESPIRATION RATE: 18 BRPM | BODY MASS INDEX: 26.63 KG/M2

## 2019-10-07 DIAGNOSIS — I12.9 HYPERTENSION, RENAL DISEASE: Primary | ICD-10-CM

## 2019-10-07 DIAGNOSIS — K21.9 GASTROESOPHAGEAL REFLUX DISEASE WITHOUT ESOPHAGITIS: ICD-10-CM

## 2019-10-07 DIAGNOSIS — E78.2 MIXED HYPERLIPIDEMIA: ICD-10-CM

## 2019-10-07 DIAGNOSIS — I65.23 BILATERAL CAROTID ARTERY STENOSIS: ICD-10-CM

## 2019-10-07 DIAGNOSIS — F41.9 ANXIETY: ICD-10-CM

## 2019-10-07 DIAGNOSIS — N18.6 ESRD (END STAGE RENAL DISEASE) (HCC): ICD-10-CM

## 2019-10-07 LAB
A-G RATIO,AGRAT: 1 RATIO
ALBUMIN SERPL-MCNC: 4.4 G/DL (ref 3.9–5.4)
ALP SERPL-CCNC: 110 U/L (ref 38–126)
ALT SERPL-CCNC: 15 U/L (ref 0–50)
ANION GAP SERPL CALC-SCNC: 15 MMOL/L
AST SERPL W P-5'-P-CCNC: 27 U/L (ref 14–36)
BILIRUB SERPL-MCNC: 0.4 MG/DL (ref 0.2–1.3)
BUN SERPL-MCNC: 62 MG/DL (ref 9–20)
BUN/CREATININE RATIO,BUCR: 7 RATIO
CALCIUM SERPL-MCNC: 8.7 MG/DL (ref 8.4–10.2)
CHLORIDE SERPL-SCNC: 97 MMOL/L (ref 98–107)
CHOL/HDL RATIO,CHHD: 3 RATIO (ref 0–4)
CHOLEST SERPL-MCNC: 184 MG/DL (ref 0–200)
CK SERPL-CCNC: 193 U/L (ref 30–135)
CO2 SERPL-SCNC: 28 MMOL/L (ref 22–32)
CREAT SERPL-MCNC: 9.3 MG/DL (ref 0.8–1.5)
GLOBULIN,GLOB: 4.3
GLUCOSE SERPL-MCNC: 94 MG/DL (ref 75–110)
HDLC SERPL-MCNC: 71 MG/DL (ref 35–130)
LDL/HDL RATIO,LDHD: 1 RATIO
LDLC SERPL CALC-MCNC: 99 MG/DL (ref 0–130)
POTASSIUM SERPL-SCNC: 6.1 MMOL/L (ref 3.6–5)
PROT SERPL-MCNC: 8.7 G/DL (ref 6.3–8.2)
SODIUM SERPL-SCNC: 140 MMOL/L (ref 137–145)
TRIGL SERPL-MCNC: 69 MG/DL (ref 0–200)
VLDLC SERPL CALC-MCNC: 14 MG/DL

## 2019-10-07 RX ORDER — LISINOPRIL 20 MG/1
20 TABLET ORAL DAILY
Qty: 30 TAB | Status: SHIPPED | OUTPATIENT
Start: 2019-10-07

## 2019-10-07 NOTE — PROGRESS NOTES
Chief Complaint   Patient presents with    Hypertension     3 month follow up    Cholesterol Problem     Visit Vitals  /78 (BP 1 Location: Right arm, BP Patient Position: Sitting)   Pulse 79   Temp 97.7 °F (36.5 °C) (Oral)   Resp 18   Ht 5' 9\" (1.753 m)   Wt 179 lb 12.8 oz (81.6 kg)   SpO2 98%   BMI 26.55 kg/m²     1. Have you been to the ER, urgent care clinic since your last visit? Hospitalized since your last visit? No    2. Have you seen or consulted any other health care providers outside of the [address] [place] [address] since your last visit? Include any pap smears or colon screening.  No

## 2019-10-07 NOTE — PATIENT INSTRUCTIONS

## 2019-10-07 NOTE — PROGRESS NOTES
Chief Complaint   Patient presents with    Hypertension     3 month follow up    Cholesterol Problem       SUBJECTIVE:    Beckie Valentino is a 71 y.o. male who returns in follow-up of his medical problems include hypertension, hyperlipidemia, end-stage renal disease on dialysis, GERD, bilateral carotid bruits with mild stenosis bilateral, anxiety and other medical problems. He is taking his medications and trying to follow his diet and trying to remain physically active. He currently denies any chest pain, shortness of breath, palpitations, PND, orthopnea or other cardiorespiratory complaints. There are no GI or  complaints. He notes no headaches, dizziness or neurologic complaints. He has no current arthritic complaints and no other complaints on complete review of systems. Current Outpatient Medications   Medication Sig Dispense Refill    lisinopril (PRINIVIL, ZESTRIL) 20 mg tablet Take 1 Tab by mouth daily. 30 Tab prn    prednisoLONE acetate (PRED FORTE) 1 % ophthalmic suspension Administer 1 Drop to both eyes four (4) times daily.  lidocaine (LIDODERM) 5 % 1 Patch by TransDERmal route every twenty-four (24) hours. Apply patch to the affected area for 12 hours a day and remove for 12 hours a day. 30 Each prn    LORazepam (ATIVAN) 0.5 mg tablet Take 1 Tab by mouth two (2) times daily as needed for Anxiety. Max Daily Amount: 1 mg. 30 Tab 0    magnesium citrate solution Drink one bottle. If you have not had large stool output, drink the second bottle. 2 Bottle 0    senna (SENOKOT) 8.6 mg tablet Take 1 Tab by mouth daily. Indications: constipation 10 Tab 0    lidocaine 3 % crea 1 Scoop by Apply Externally route two (2) times a day. Apply small amount to anus twice daily for external relief of pain from constipation. 28.35 g 0    omeprazole (PRILOSEC) 40 mg capsule TAKE 1 CAPSULE BY MOUTH ONCE DAILY 90 Cap 3    etelcalcetide (PARSABIV) 5 mg/mL soln by IntraVENous route.  Indications: 3 x per week      ERGOCALCIFEROL, VITAMIN D2, (VITAMIN D2 PO) Take  by mouth.  amLODIPine (NORVASC) 10 mg tablet TAKE 1 TABLET BY MOUTH ONCE DAILY  11    sevelamer carbonate (RENVELA) 800 mg Tab tab Take 1,600 mg by mouth three (3) times daily (with meals).        Past Medical History:   Diagnosis Date    Arthritis     Chronic kidney disease     Dialysis T, Thur, Sat @ [facility]    Gout     Hepatitis C     Hypertension     Liver disease     Hep C    Other and unspecified alcohol dependence, unspecified drinking behavior     Acid reflux    Other ill-defined conditions(799.89)     gout    Other ill-defined conditions(799.89)     blood transfusion hx     Past Surgical History:   Procedure Laterality Date    HX OTHER SURGICAL      liver biopsy    [facility]      [facility]    HX VASCULAR ACCESS  7/15/13    CREATION LEFT UPPER ARM BASILIC VEIN TRANSPOSITION    [facility]     Allergies   Allergen Reactions    Codeine Hives       REVIEW OF SYSTEMS:  General: negative for - chills or fever, or weight loss or gain  ENT: negative for - headaches, nasal congestion or tinnitus  Eyes: no blurred or visual changes  Neck: No stiffness or swollen nodes  Respiratory: negative for - cough, hemoptysis, shortness of breath or wheezing  Cardiovascular : negative for - chest pain, edema, palpitations or shortness of breath  Gastrointestinal: negative for - abdominal pain, blood in stools, heartburn or nausea/vomiting  Genito-Urinary: no dysuria, trouble voiding, or hematuria  Musculoskeletal: negative for - gait disturbance, joint pain, joint stiffness or joint swelling  Neurological: no TIA or stroke symptoms  Hematologic: no bruises, no bleeding  Lymphatic: no swollen glands  Integument: no lumps, mole changes, nail changes or rash  Endocrine:no malaise/lethargy poly uria or polydipsia or unexpected weight changes        Social History     Socioeconomic History    Marital status: SINGLE     Spouse name: Not on file    Number of children: Not on file    Years of education: Not on file    Highest education level: Not on file   Tobacco Use    Smoking status: Never Smoker    Smokeless tobacco: Never Used   Substance and Sexual Activity    Alcohol use: No    Drug use: Not Currently     Types: Marijuana, Heroin     Comment: smoked pot 4 years ago, herion in 25s     History reviewed. No pertinent family history. OBJECTIVE:     Visit Vitals  /78 (BP 1 Location: Right arm, BP Patient Position: Sitting)   Pulse 79   Temp 97.7 °F (36.5 °C) (Oral)   Resp 18   Ht 5' 9\" (1.753 m)   Wt 179 lb 12.8 oz (81.6 kg)   SpO2 98%   BMI 26.55 kg/m²     CONSTITUTIONAL:   well nourished, appears age appropriate  EYES: sclera anicteric, PERRL, EOMI  ENMT:nares clear, moist mucous membranes, pharynx clear  NECK: supple. Thyroid normal, No JVD or bruits  RESPIRATORY: Chest: clear to ascultation and percussion, normal inspiratory effort  CARDIOVASCULAR: Heart: regular rate and rhythm no murmurs, rubs or gallops, PMI not displaced, No thrills  GASTROINTESTINAL: Abdomen: non distended, soft, non tender, bowel sounds normal  HEMATOLOGIC: no purpura, petechiae or bruising  LYMPHATIC: No lymph node enlargemant  MUSCULOSKELETAL: Extremities: no edema or active synovitis, pulse 1+   INTEGUMENT: No unusual rashes or suspicious skin lesions noted. Nails appear normal.  PERIPHERAL VASCULAR: normal pulses femoral, PT and DP  NEUROLOGIC: non-focal exam, A & O X 3  PSYCHIATRIC:, appropriate affect     ASSESSMENT:   1. Hypertension, renal disease    2. Mixed hyperlipidemia    3. ESRD (end stage renal disease) ([facility] [address]    4. Gastroesophageal reflux disease without esophagitis    5. Bilateral carotid artery stenosis    6. Anxiety      Impression  1. Hypertension that is not controlled so increase lisinopril from 10 mg daily up to 20 mg daily.   2.  Hyperlipidemia prior lab reviewed and repeat status pending and I will adjust if needed. 3.  End-stage renal disease still on dialysis  4. GERD that is stable  5. Bilateral carotid artery stenosis we discussed that with him and told him we need to recheck his ultrasound of the carotids every 6 months or year  6. Anxiety now on  PRN Ativan so continue same  I will call with lab results and make further recommendations or adjustments if necessary. Follow-up in 3 months or sooner if there is a problem. I will check him in 1 month regarding his hypertension since I am increasing the dose of his medicine. PLAN:  .  Orders Placed This Encounter    METABOLIC PANEL, COMPREHENSIVE ([facility])    LIPID PANEL ([facility])    CK ([facility])    lisinopril (PRINIVIL, ZESTRIL) 20 mg tablet         ATTENTION:   This medical record was transcribed using an electronic medical records system. Although proofread, it may and can contain electronic and spelling errors. Other human spelling and other errors may be present. Corrections may be executed at a later time. Please feel free to contact us for any clarifications as needed. Follow-up and Dispositions    · Return in about 4 weeks (around 11/4/2019). No results found for any visits on 10/07/19. Phil Romero MD    The patient verbalized understanding of the problems and plans as explained.

## 2019-10-25 NOTE — PROGRESS NOTES
Chief Complaint   Patient presents with    Hypertension     1 month f/up       SUBJECTIVE:    Loi Monk is a 71 y.o. male who returns in follow-up of his hypertension not having been seen here recently which time his blood pressure was significantly elevated 152/78. We increase his lisinopril at that point from 10 mg daily to 20 mg daily and his blood pressures been running okay by his cuff at home. It has been somewhat variable on dialysis. He notes no headaches. He also needs a refill on his Ativan which is working well for him. Current Outpatient Medications   Medication Sig Dispense Refill    LORazepam (ATIVAN) 0.5 mg tablet Take 1 Tab by mouth two (2) times daily as needed for Anxiety. Max Daily Amount: 1 mg. 30 Tab 2    lisinopril (PRINIVIL, ZESTRIL) 20 mg tablet Take 1 Tab by mouth daily. 30 Tab prn    prednisoLONE acetate (PRED FORTE) 1 % ophthalmic suspension Administer 1 Drop to both eyes four (4) times daily.  lidocaine (LIDODERM) 5 % 1 Patch by TransDERmal route every twenty-four (24) hours. Apply patch to the affected area for 12 hours a day and remove for 12 hours a day. 30 Each prn    senna (SENOKOT) 8.6 mg tablet Take 1 Tab by mouth daily. Indications: constipation 10 Tab 0    lidocaine 3 % crea 1 Scoop by Apply Externally route two (2) times a day. Apply small amount to anus twice daily for external relief of pain from constipation. 28.35 g 0    omeprazole (PRILOSEC) 40 mg capsule TAKE 1 CAPSULE BY MOUTH ONCE DAILY 90 Cap 3    etelcalcetide (PARSABIV) 5 mg/mL soln by IntraVENous route. Indications: 3 x per week      ERGOCALCIFEROL, VITAMIN D2, (VITAMIN D2 PO) Take  by mouth.  amLODIPine (NORVASC) 10 mg tablet TAKE 1 TABLET BY MOUTH ONCE DAILY  11    sevelamer carbonate (RENVELA) 800 mg Tab tab Take 1,600 mg by mouth three (3) times daily (with meals).  magnesium citrate solution Drink one bottle.  If you have not had large stool output, drink the second bottle.  2 Bottle 0     Past Medical History:   Diagnosis Date    Arthritis     Chronic kidney disease     Dialysis Og PARIKH, Sat @ [facility]    Gout     Hepatitis C     Hypertension     Liver disease     Hep C    Other and unspecified alcohol dependence, unspecified drinking behavior     Acid reflux    Other ill-defined conditions(799.89)     gout    Other ill-defined conditions(799.89)     blood transfusion hx     Past Surgical History:   Procedure Laterality Date    HX OTHER SURGICAL      liver biopsy    [facility]    HX VASCULAR ACCESS  7/15/13    CREATION LEFT UPPER ARM BASILIC VEIN TRANSPOSITION    [facility]     Allergies   Allergen Reactions    Codeine Hives       REVIEW OF SYSTEMS:  General: negative for - chills or fever, or weight loss or gain  ENT: negative for - headaches, nasal congestion or tinnitus  Eyes: no blurred or visual changes  Neck: No stiffness or swollen nodes  Respiratory: negative for - cough, hemoptysis, shortness of breath or wheezing  Cardiovascular : negative for - chest pain, edema, palpitations or shortness of breath  Gastrointestinal: negative for - abdominal pain, blood in stools, heartburn or nausea/vomiting  Genito-Urinary: no dysuria, trouble voiding, or hematuria  Musculoskeletal: negative for - gait disturbance, joint pain, joint stiffness or joint swelling  Neurological: no TIA or stroke symptoms  Hematologic: no bruises, no bleeding  Lymphatic: no swollen glands  Integument: no lumps, mole changes, nail changes or rash  Endocrine:no malaise/lethargy poly uria or polydipsia or unexpected weight changes        Social History     Socioeconomic History    Marital status: SINGLE     Spouse name: Not on file    Number of children: Not on file    Years of education: Not on file    Highest education level: Not on file   Tobacco Use    Smoking status: Never Smoker    Smokeless tobacco: Never Used Substance and Sexual Activity    Alcohol use: No    Drug use: Not Currently     Types: Marijuana, Heroin     Comment: smoked pot 4 years ago, herion in 25s     No family history on file. OBJECTIVE:     Visit Vitals  /80   Pulse 80   Temp 98.7 °F (37.1 °C)   Resp 16   Ht 5' 9\" (1.753 m)   Wt 179 lb 12.8 oz (81.6 kg)   SpO2 98%   BMI 26.55 kg/m²     CONSTITUTIONAL:   well nourished, appears age appropriate  EYES: sclera anicteric, PERRL, EOMI  ENMT:nares clear, moist mucous membranes, pharynx clear  NECK: supple. Thyroid normal, No JVD or bruits  RESPIRATORY: Chest: clear to ascultation and percussion, normal inspiratory effort  CARDIOVASCULAR: Heart: regular rate and rhythm no murmurs, rubs or gallops, PMI not displaced, No thrills  GASTROINTESTINAL: Abdomen: non distended, soft, non tender, bowel sounds normal  HEMATOLOGIC: no purpura, petechiae or bruising  LYMPHATIC: No lymph node enlargemant  MUSCULOSKELETAL: Extremities: no edema or active synovitis, pulse 1+   INTEGUMENT: No unusual rashes or suspicious skin lesions noted. Nails appear normal.  PERIPHERAL VASCULAR: normal pulses femoral, PT and DP  NEUROLOGIC: non-focal exam, A & O X 3  PSYCHIATRIC:, appropriate affect     ASSESSMENT:   1. Hypertension, renal disease    2. ESRD (end stage renal disease) ([facility] [address]    3. Anxiety      Impression  1. Hypertension that is controlled by my check so continue current therapy  2. End-stage renal disease on dialysis  3. Anxiety stable with Ativan to renew that  I will recheck him as previously scheduled in December or sooner if there is a problem. PLAN:  .  Orders Placed This Encounter    LORazepam (ATIVAN) 0.5 mg tablet         ATTENTION:   This medical record was transcribed using an electronic medical records system. Although proofread, it may and can contain electronic and spelling errors. Other human spelling and other errors may be present. Corrections may be executed at a later time.   Please feel free to contact us for any clarifications as needed. Follow-up and Dispositions    · Return in about 2 months (around 12/28/2019). No results found for any visits on 10/28/19. Adalid Hernandez MD    The patient verbalized understanding of the problems and plans as explained.

## 2019-10-28 ENCOUNTER — OFFICE VISIT (OUTPATIENT)
Dept: INTERNAL MEDICINE CLINIC | Age: 69
End: 2019-10-28

## 2019-10-28 VITALS
RESPIRATION RATE: 16 BRPM | BODY MASS INDEX: 26.63 KG/M2 | HEART RATE: 80 BPM | HEIGHT: 69 IN | SYSTOLIC BLOOD PRESSURE: 134 MMHG | DIASTOLIC BLOOD PRESSURE: 80 MMHG | OXYGEN SATURATION: 98 % | WEIGHT: 179.8 LBS | TEMPERATURE: 98.7 F

## 2019-10-28 DIAGNOSIS — I12.9 HYPERTENSION, RENAL DISEASE: Primary | ICD-10-CM

## 2019-10-28 DIAGNOSIS — F41.9 ANXIETY: ICD-10-CM

## 2019-10-28 DIAGNOSIS — N18.6 ESRD (END STAGE RENAL DISEASE) (HCC): ICD-10-CM

## 2019-10-28 RX ORDER — LORAZEPAM 0.5 MG/1
0.5 TABLET ORAL
Qty: 30 TAB | Refills: 2 | Status: SHIPPED | OUTPATIENT
Start: 2019-10-28 | End: 2020-11-16 | Stop reason: SDUPTHER

## 2019-10-28 NOTE — PATIENT INSTRUCTIONS

## 2020-01-06 ENCOUNTER — OFFICE VISIT (OUTPATIENT)
Dept: HEMATOLOGY | Age: 70
End: 2020-01-06

## 2020-01-06 VITALS
BODY MASS INDEX: 26.98 KG/M2 | WEIGHT: 182.2 LBS | OXYGEN SATURATION: 100 % | RESPIRATION RATE: 17 BRPM | HEART RATE: 83 BPM | DIASTOLIC BLOOD PRESSURE: 80 MMHG | TEMPERATURE: 97.5 F | HEIGHT: 69 IN | SYSTOLIC BLOOD PRESSURE: 150 MMHG

## 2020-01-06 DIAGNOSIS — B18.2 CHRONIC HEPATITIS C WITHOUT HEPATIC COMA (HCC): Primary | ICD-10-CM

## 2020-01-06 NOTE — PROGRESS NOTES
Chief Complaint   Patient presents with    Follow-up     fibroscan     Visit Vitals  /80 (BP 1 Location: Left arm, BP Patient Position: Sitting)   Pulse 83   Temp 97.5 °F (36.4 °C) (Tympanic)   Resp 17   Ht 5' 9\" (1.753 m)   Wt 182 lb 3.2 oz (82.6 kg)   SpO2 100%   BMI 26.91 kg/m²     3 most recent PHQ Screens 10/28/2019   Little interest or pleasure in doing things Not at all   Feeling down, depressed, irritable, or hopeless Not at all   Total Score PHQ 2 0     Abuse Screening Questionnaire 6/26/2019   Do you ever feel afraid of your partner? N   Are you in a relationship with someone who physically or mentally threatens you? N   Is it safe for you to go home? Y     Learning Assessment 6/26/2019   PRIMARY LEARNER Patient   HIGHEST LEVEL OF EDUCATION - PRIMARY LEARNER  -   BARRIERS PRIMARY LEARNER NONE   CO-LEARNER CAREGIVER No   PRIMARY LANGUAGE ENGLISH   LEARNER PREFERENCE PRIMARY LISTENING   ANSWERED BY patient   RELATIONSHIP SELF     1. Have you been to the ER, urgent care clinic since your last visit? Hospitalized since your last visit? No    2. Have you seen or consulted any other health care providers outside of the [address] [place] [address] since your last visit? Include any pap smears or colon screening.  No

## 2020-01-06 NOTE — PROGRESS NOTES
[facility], MD, MD Rica Adam PA-C Suan Goes, ACNP-BC     Kayce Barrett [facility]-BC   NAIF Hess NP [address]    at [address], [id number] [place] [address], [address].    [phone]    FAX: [address] [place] [address], [address]    [phone]    FAX: [phone]     Patient Care Team:  Rey Esquivel MD as PCP - General (Internal Medicine)  Rey Esquivel MD as PCP - [facility]  Ralf Tomlinson MD (Nephrology)    Patient Active Problem List   Diagnosis Code    Hypertension, renal disease I12.9    Mixed hyperlipidemia E78.2    ESRD (end stage renal disease) ([facility] [address] N18.6    Gastroesophageal reflux disease without esophagitis K21.9    Vitamin D deficiency E55.9    Gout of multiple sites M10.9    Chronic hepatitis C without hepatic coma ([facility] [address] B18.2    Neck pain M54.2    Palpitations R00.2    Bruit of left carotid artery R09.89    Bilateral carotid artery stenosis I65.23    Right carotid bruit R09.89    Right acute serous otitis media H65.01    Epididymitis N45.1    Midline low back pain without sciatica M54.5    Anxiety F41.9    Chronic midline thoracic back pain M54.6, G89.29    Asymptomatic microscopic hematuria R31.21    Joint pain M25.50       Johnathan Rinne returns to the [facility] [employer] regarding chronic HCV and its management. The active problem list, all pertinent past medical history, medications, radiologic findings and laboratory findings related to the liver disorder were reviewed with the patient. The patient is a 71 y.o.   male who was noted to have abnormalities in liver chemistries and subsequently tested positive for chronic HCV in the 2000s. Risk factors for acquiring HCV are IV drug use in 1970s. The patient was previously treated with peginterferon and ribavirin in the mid-2000s. The patient was treated for 24 weeks. The patient tolerated treatment poorly and had to prematurely discontinue therapy. Patient completed 12 weeks of HCV treatment with Zepatier (11/2018-2/2019). He did well on treatment with no significant side effects. He has been shown at last office visit in 6/2019 to have achieved a sustained viral response to medications. Imaging of the liver was recently performed in August 2018. This showed normal liver texture. Fibroscan in October 2018 suggested bridging fibrosis prior to the initiation of medications for HCV. We plan to repeat this study today now that he is HCV negative. The patient notes fatigue but otherwise feels well. Today, patient denies abdominal pain, change in bowel habits, dark urine, myalgias, arthralgias, pruritus and problems concentrating. The patient has limitations in functional activities which can be attributed to the liver disease and to other medical problems that are not related to the liver disease. He reports that dialysis continues to go reasonably well for him. He is not at this time pursuing renal transplant. ASSESSMENT AND PLAN:  Chronic HCV   Chronic HCV with bridging fibrosis prior to the start of HCV medications. Completed 12 weeks of Zepatier treatment today (February 2019). Repeat Fibroscan in the office today shows a reduced score of 6.3, indicating that he appears to have had regression of fibrosis with eradication of HCV. Liver enzymes are normal. Liver function is normal. Will confirm HCV status at this time. If this remains undetected, will discharge patient from future follow-up in this office.      I have reviewed with him that he will continue to test positive for HCV Ab, but that this is not a protective antibody and that he could contract HCV again if exposed. We also discussed the importance of maintaining healthy weight and avoid fatty liver or alcohol injury in the future. I have recommended that he continue follow-up with her PCP for routine monitoring and if he has future elevation of liver enzymes, we would be happy to see him back in follow-up. He otherwise will be discharged from the practice at this point. He is in agreement with this plan. ESRD  The patient has ESRD on dialysis. He is not currently pursuing renal transplant. Nephrologist encouraged HCV treatment. Treatment of other medical problems in patients with chronic liver disease  There are no contraindications for the patient to take any medications that are necessary for treatment of other medical issues. The patient does not consume alcohol daily. Normal doses of acetaminophen can be used for pain as needed. Normal doses of acetaminophen as recommended on the label are not hepatotoxic, even in patients with cirrhosis. Counseling for alcohol in patients with chronic liver disease  The patient was counseled regarding alcohol consumption and the effect of alcohol on chronic liver disease. The patient does not consume any significant amount of alcohol. Vaccinations   Vaccination for viral hepatitis A and B is not needed. The patient has serologic evidence of prior exposure or vaccination with immunity. Routine vaccinations against other bacterial and viral agents can be performed as indicated. Annual flu vaccination should be administered if indicated. ALLERGIES  Allergies   Allergen Reactions    Codeine Hives     MEDICATIONS  Current Outpatient Medications   Medication Sig    LORazepam (ATIVAN) 0.5 mg tablet Take 1 Tab by mouth two (2) times daily as needed for Anxiety. Max Daily Amount: 1 mg.  lisinopril (PRINIVIL, ZESTRIL) 20 mg tablet Take 1 Tab by mouth daily.     prednisoLONE acetate (PRED FORTE) 1 % ophthalmic suspension Administer 1 Drop to both eyes four (4) times daily.  lidocaine (LIDODERM) 5 % 1 Patch by TransDERmal route every twenty-four (24) hours. Apply patch to the affected area for 12 hours a day and remove for 12 hours a day.  magnesium citrate solution Drink one bottle. If you have not had large stool output, drink the second bottle.  senna (SENOKOT) 8.6 mg tablet Take 1 Tab by mouth daily. Indications: constipation    lidocaine 3 % crea 1 Scoop by Apply Externally route two (2) times a day. Apply small amount to anus twice daily for external relief of pain from constipation.  omeprazole (PRILOSEC) 40 mg capsule TAKE 1 CAPSULE BY MOUTH ONCE DAILY    etelcalcetide (PARSABIV) 5 mg/mL soln by IntraVENous route. Indications: 3 x per week    ERGOCALCIFEROL, VITAMIN D2, (VITAMIN D2 PO) Take  by mouth.  amLODIPine (NORVASC) 10 mg tablet TAKE 1 TABLET BY MOUTH ONCE DAILY    sevelamer carbonate (RENVELA) 800 mg Tab tab Take 1,600 mg by mouth three (3) times daily (with meals). No current facility-administered medications for this visit. SYSTEM REVIEW NOT RELATED TO LIVER DISEASE OR REVIEWED ABOVE:  Constitution systems: Negative for fever, chills, weight gain, weight loss. Eyes: Negative for visual changes. ENT: Negative for sore throat, painful swallowing. Respiratory: Negative for cough, hemoptysis, SOB. Cardiology: Negative for chest pain, palpitations. GI:  Negative for constipation or diarrhea. : Negative for urinary frequency, dysuria, hematuria, nocturia. Skin: Negative for rash. Hematology: Negative for easy bruising, blood clots. Musculo-skeletal: Negative for back pain, muscle pain, weakness. Neurologic: Negative for headaches, dizziness, vertigo, memory problems not related to HE. Psychology: Negative for anxiety, depression. FAMILY HISTORY:  The patient has no knowledge of the father's medical condition. The mother  of CVA. There is no family history of liver disease. SOCIAL HISTORY:  The patient is . The patient has 2 children and 9 grandchildren. The patient has never used tobacco products. The patient has never consumed significant amounts of alcohol. 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 PHYSICAL EXAMINATION:  Visit Vitals  /80 (BP 1 Location: Left arm, BP Patient Position: Sitting)   Pulse 83   Temp 97.5 °F (36.4 °C) (Tympanic)   Resp 17   Ht 5' 9\" (1.753 m)   Wt 182 lb 3.2 oz (82.6 kg)   SpO2 100%   BMI 26.91 kg/m²     General: No acute distress. Eyes: Sclera anicteric. ENT: No oral lesions. Thyroid normal.  Nodes: No adenopathy. Skin: No spider angiomata. No jaundice. No palmar erythema. Respiratory: Lungs clear to auscultation. Cardiovascular: Regular heart rate. No murmurs. No JVD. Abdomen: Soft non-tender. Liver size normal to percussion/palpation. Spleen not palpable. No obvious ascites. Extremities: No edema. No muscle wasting. No gross arthritic changes. Neurologic: Alert and oriented. Cranial nerves grossly intact. No asterixis.     LABORATORY STUDIES:  [facility] [address] [place] [address] 10/7/2019 6/26/2019   WBC 3.4 - 10.8 x10E3/uL  6.1   ANC 1.4 - 7.0 x10E3/uL     HGB 13.0 - 17.7 g/dL  11.7 (L)   HGB 12.0 - 18.0 g/dL     HGB (iSTAT) 12.0 - 18.0 g/dL      - 450 x10E3/uL  230   AST 14.0 - 36.0 U/L 27.0 17   ALT 0 - 50 U/L 15 10   Alk Phos 38 - 126 U/L 110 94   Bili, Total 0.2 - 1.3 mg/dL 0.4 0.6   Bili, Direct 0.00 - 0.40 mg/dL  0.15   Albumin 3.9 - 5.4 g/dL 4.4 4.4   BUN 9.0 - 20.0 mg/dL 62.0 (H) 27   BUN (iSTAT) 9 - 20 mg/dL     Creat 0.8 - 1.5 mg/dL 9.3 (H) 6.90 (H)   Creat (iSTAT) 0.8 - 1.5 mg/dL     Na 137 - 145 mmol/L 140 140   K 3.6 - 5.0 mmol/L 6.1 (HH) 4.8   Cl 98 - 107 mmol/L 97 (L) 99   CO2 22.0 - 32.0 mmol/L 28.0 27   Glucose 75 - 110 mg/dL 94 93     [facility] [address] Latest Ref Rng & Units 3/22/2019   WBC 3.4 - 10.8 x10E3/uL    ANC 1.4 - 7.0 x10E3/uL    HGB 13.0 - 17.7 g/dL    HGB 12.0 - 18.0 g/dL    HGB (iSTAT) 12.0 - 18.0 g/dL     - 450 x10E3/uL    AST 14.0 - 36.0 U/L 28.0   ALT 0 - 50 U/L 16   Alk Phos 38 - 126 U/L 93   Bili, Total 0.2 - 1.3 mg/dL 0.6   Bili, Direct 0.00 - 0.40 mg/dL    Albumin 3.9 - 5.4 g/dL 4.2   BUN 9.0 - 20.0 mg/dL 30.0 (H)   BUN (iSTAT) 9 - 20 mg/dL    Creat 0.8 - 1.5 mg/dL 7.9 (H)   Creat (iSTAT) 0.8 - 1.5 mg/dL    Na 137 - 145 mmol/L 140   K 3.6 - 5.0 mmol/L 4.9   Cl 98 - 107 mmol/L 95 (L)   CO2 22.0 - 32.0 mmol/L 26.0   Glucose 75 - 110 mg/dL 97     Virology Latest Ref Rng & Units 6/26/2019   HCV RNA, AMY, QL Negative Negative   Additional lab values drawn at today's office visit are pending at the time of documentation. SEROLOGIES:  Serologies Latest Ref Rng & Units 8/10/2018   Hep A Ab, Total Negative Positive (A)   Hep B Surface Ag Negative Negative   Hep B Core Ab, Total Negative Positive (A)   Hep B Surface AB QL  Reactive   Hep C Genotype  1b   HCV RT-PCR, Quant IU/mL [id number]     LIVER HISTOLOGY:  10/2018. [facility] EkPa was 9.8. Suggested fibrosis level is F3. CAP score is 345, suggesting significant steatosis. 1/2020. FibroScan performed at [facility]. EkPa was 6.3. Suggested fibrosis level is F0-1. CAP score is 248, suggesting no significant steatosis. ENDOSCOPIC PROCEDURES:  Not available or performed    RADIOLOGY:  8/2018. Ultrasound of liver. Normal appearing liver. No liver mass lesions. OTHER TESTING:  Not available or performed    All of the issues listed in the Assessment and Plan were discussed with the patient. All questions were answered. The patient expressed a clear understanding of the above. Follow-up Miguel [address]all[address] 32 prn only pending review of labs.      Karena Looney PA-C  [facility] [place] [address] [address]  [phone]

## 2020-01-07 LAB
ALBUMIN SERPL-MCNC: 4.3 G/DL (ref 3.6–4.8)
ALP SERPL-CCNC: 129 IU/L (ref 39–117)
ALT SERPL-CCNC: 10 IU/L (ref 0–44)
AST SERPL-CCNC: 21 IU/L (ref 0–40)
BILIRUB DIRECT SERPL-MCNC: 0.12 MG/DL (ref 0–0.4)
BILIRUB SERPL-MCNC: 0.6 MG/DL (ref 0–1.2)

## 2020-01-08 LAB — HCV RNA SERPL QL NAA+PROBE: NEGATIVE

## 2020-01-09 NOTE — PROGRESS NOTES
Pt notified via letter of stable functions and continued negative HCV RNA, no indication for long-term follow-up in this office. D/c to PCP.

## 2020-01-24 ENCOUNTER — OFFICE VISIT (OUTPATIENT)
Dept: INTERNAL MEDICINE CLINIC | Age: 70
End: 2020-01-24

## 2020-01-24 VITALS
RESPIRATION RATE: 18 BRPM | HEART RATE: 82 BPM | OXYGEN SATURATION: 99 % | BODY MASS INDEX: 26.64 KG/M2 | WEIGHT: 179.9 LBS | DIASTOLIC BLOOD PRESSURE: 86 MMHG | HEIGHT: 69 IN | SYSTOLIC BLOOD PRESSURE: 136 MMHG | TEMPERATURE: 98.6 F

## 2020-01-24 DIAGNOSIS — E55.9 VITAMIN D DEFICIENCY: ICD-10-CM

## 2020-01-24 DIAGNOSIS — F41.9 ANXIETY: ICD-10-CM

## 2020-01-24 DIAGNOSIS — Z13.39 ALCOHOL SCREENING: ICD-10-CM

## 2020-01-24 DIAGNOSIS — N18.6 ESRD (END STAGE RENAL DISEASE) (HCC): ICD-10-CM

## 2020-01-24 DIAGNOSIS — R09.89 RIGHT CAROTID BRUIT: ICD-10-CM

## 2020-01-24 DIAGNOSIS — K21.9 GASTROESOPHAGEAL REFLUX DISEASE WITHOUT ESOPHAGITIS: ICD-10-CM

## 2020-01-24 DIAGNOSIS — M1A.09X0 CHRONIC GOUT OF MULTIPLE SITES, UNSPECIFIED CAUSE: ICD-10-CM

## 2020-01-24 DIAGNOSIS — Z00.00 MEDICARE ANNUAL WELLNESS VISIT, SUBSEQUENT: ICD-10-CM

## 2020-01-24 DIAGNOSIS — J01.00 ACUTE NON-RECURRENT MAXILLARY SINUSITIS: ICD-10-CM

## 2020-01-24 DIAGNOSIS — I12.9 HYPERTENSION, RENAL DISEASE: Primary | ICD-10-CM

## 2020-01-24 DIAGNOSIS — I65.23 BILATERAL CAROTID ARTERY STENOSIS: ICD-10-CM

## 2020-01-24 DIAGNOSIS — E78.2 MIXED HYPERLIPIDEMIA: ICD-10-CM

## 2020-01-24 DIAGNOSIS — R09.89 BRUIT OF LEFT CAROTID ARTERY: ICD-10-CM

## 2020-01-24 LAB
A-G RATIO,AGRAT: 1 RATIO
ALBUMIN SERPL-MCNC: 4.3 G/DL (ref 3.9–5.4)
ALP SERPL-CCNC: 147 U/L (ref 38–126)
ALT SERPL-CCNC: 11 U/L (ref 0–50)
ANION GAP SERPL CALC-SCNC: 15 MMOL/L
AST SERPL W P-5'-P-CCNC: 25 U/L (ref 14–36)
BILIRUB SERPL-MCNC: 0.9 MG/DL (ref 0.2–1.3)
BILIRUB UR QL: NEGATIVE
BUN SERPL-MCNC: 23 MG/DL (ref 9–20)
BUN/CREATININE RATIO,BUCR: 3 RATIO
CALCIUM SERPL-MCNC: 8.4 MG/DL (ref 8.4–10.2)
CHLORIDE SERPL-SCNC: 91 MMOL/L (ref 98–107)
CHOL/HDL RATIO,CHHD: 3 RATIO (ref 0–4)
CHOLEST SERPL-MCNC: 158 MG/DL (ref 0–200)
CK SERPL-CCNC: 198 U/L (ref 30–135)
CLARITY: CLEAR
CO2 SERPL-SCNC: 28 MMOL/L (ref 22–32)
COLOR UR: ABNORMAL
CREAT SERPL-MCNC: 8 MG/DL (ref 0.8–1.5)
GLOBULIN,GLOB: 4.3
GLUCOSE 24H UR-MRATE: NEGATIVE G/(24.H)
GLUCOSE SERPL-MCNC: 94 MG/DL (ref 75–110)
HDLC SERPL-MCNC: 49 MG/DL (ref 35–130)
HGB UR QL STRIP: ABNORMAL
KETONES UR QL STRIP.AUTO: NEGATIVE
LDL/HDL RATIO,LDHD: 2 RATIO
LDLC SERPL CALC-MCNC: 92 MG/DL (ref 0–130)
LEUKOCYTE ESTERASE: NEGATIVE
NITRITE UR QL STRIP.AUTO: NEGATIVE
PH UR STRIP: 8 [PH] (ref 5–7)
POTASSIUM SERPL-SCNC: 4.1 MMOL/L (ref 3.6–5)
PROT SERPL-MCNC: 8.6 G/DL (ref 6.3–8.2)
PROT UR STRIP-MCNC: ABNORMAL MG/DL
RBC #/AREA URNS HPF: ABNORMAL #/HPF
SODIUM SERPL-SCNC: 134 MMOL/L (ref 137–145)
SP GR UR REFRACTOMETRY: 1.01 (ref 1–1.03)
SPERM,SPRMU: ABNORMAL
TRIGL SERPL-MCNC: 87 MG/DL (ref 0–200)
UROBILINOGEN UR QL STRIP.AUTO: NEGATIVE
VLDLC SERPL CALC-MCNC: 17 MG/DL
WBC URNS QL MICRO: 0 #/HPF

## 2020-01-24 RX ORDER — AZITHROMYCIN 250 MG/1
250 TABLET, FILM COATED ORAL SEE ADMIN INSTRUCTIONS
Qty: 6 TAB | Refills: 0 | Status: SHIPPED | OUTPATIENT
Start: 2020-01-24 | End: 2020-01-29

## 2020-01-24 NOTE — PROGRESS NOTES
This is a Subsequent Medicare Annual Wellness Visit providing Personalized Prevention Plan Services (PPPS) (Performed 12 months after initial AWV and PPPS )    I have reviewed the patient's medical history in detail and updated the computerized patient record. He presents today for his Medicare subsequent annual wellness examination and screening questionnaire. He is also in follow-up of his chronic medical problems include hypertension, hyperlipidemia, end-stage renal disease on dialysis, GERD, bilateral noncritical carotid stenosis, chronic hepatitis C, vitamin D deficiency, DJD and other medical problems. In addition to his chronic problems he has an acute problem today also head and nasal congestion been going on for some time. This is probably been present for a week. He denies any fevers or chills. He notes no shortness of breath but does have some cough. He denies any chest pain, palpitations, PND, orthopnea or other cardiorespiratory complaints. There are no GI or  complaints. He notes no headaches, dizziness or neurologic complaints. He has no current arthritic complaints and there are no other complaints on complete review of systems.     History     Past Medical History:   Diagnosis Date    Arthritis     Chronic kidney disease     Dialysis T, Thur, Sat @ [facility]    Gout     Hepatitis C     Hypertension     Liver disease     Hep C    Other and unspecified alcohol dependence, unspecified drinking behavior     Acid reflux    Other ill-defined conditions(799.89)     gout    Other ill-defined conditions(799.89)     blood transfusion hx      Past Surgical History:   Procedure Laterality Date    HX OTHER SURGICAL      liver biopsy    HX VASCULAR ACCESS      [facility]    HX VASCULAR ACCESS  7/15/13    CREATION LEFT UPPER ARM BASILIC VEIN TRANSPOSITION    [facility]ULAR ACCESS      [facility]     Social History     Tobacco Use    Smoking status: Never Smoker    Smokeless tobacco: Never Used   Substance Use Topics    Alcohol use: No    Drug use: Not Currently     Types: Marijuana, Heroin     Comment: smoked pot 4 years ago, herion in 20s     Current Outpatient Medications   Medication Sig Dispense Refill    azithromycin (ZITHROMAX) 250 mg tablet Take 1 Tab by mouth See Admin Instructions for 5 days. Take 2 tablets the first day, then 1 tablet daily for the next four days. 6 Tab 0    LORazepam (ATIVAN) 0.5 mg tablet Take 1 Tab by mouth two (2) times daily as needed for Anxiety. Max Daily Amount: 1 mg. 30 Tab 2    lisinopril (PRINIVIL, ZESTRIL) 20 mg tablet Take 1 Tab by mouth daily. 30 Tab prn    prednisoLONE acetate (PRED FORTE) 1 % ophthalmic suspension Administer 1 Drop to both eyes four (4) times daily.  lidocaine (LIDODERM) 5 % 1 Patch by TransDERmal route every twenty-four (24) hours. Apply patch to the affected area for 12 hours a day and remove for 12 hours a day. 30 Each prn    omeprazole (PRILOSEC) 40 mg capsule TAKE 1 CAPSULE BY MOUTH ONCE DAILY 90 Cap 3    etelcalcetide (PARSABIV) 5 mg/mL soln by IntraVENous route. Indications: 3 x per week      amLODIPine (NORVASC) 10 mg tablet TAKE 1 TABLET BY MOUTH ONCE DAILY  11    sevelamer carbonate (RENVELA) 800 mg Tab tab Take 1,600 mg by mouth three (3) times daily (with meals). Allergies   Allergen Reactions    Codeine Hives     History reviewed. No pertinent family history.     Patient Active Problem List    Diagnosis    Hypertension, renal disease    Mixed hyperlipidemia    [facility] stage [facility]) [facility] [address]    Gastroesophageal reflux disease without esophagitis    Alcohol screening    Joint pain    Asymptomatic microscopic hematuria    Chronic midline thoracic back pain    Anxiety    Midline low back pain without sciatica    Epididymitis    Right acute serous otitis media    Bilateral carotid artery stenosis    Right carotid bruit    Palpitations    Bruit of left carotid artery    Neck pain    Gout of multiple sites    Chronic hepatitis C without hepatic coma ([facility] [address]    Medicare annual wellness visit, subsequent    Vitamin D deficiency       Patient Care Team:  Kristyn Araya MD as PCP - General (Internal Medicine)  Kristyn Araya MD as PCP - [facility] Empaneled Provider  Edwin Redmond MD (Nephrology)    Depression Risk Factor Screening:     3 most recent [facility] Screens 1/24/2020   Little interest or pleasure in doing things Not at all   Feeling down, depressed, irritable, or hopeless Not at all   Total Score PHQ 2 0     Alcohol Risk Factor Screening: You do not drink alcohol or very rarely. Functional Ability and Level of Safety:     Fall Risk     Fall Risk Assessment, last 12 mths 1/24/2020   Able to walk? Yes   Fall in past 12 months? No   Fall with injury? -       Hearing Loss   mild    Activities of Daily Living   Self-care. ADL Assessment 1/24/2020   Feeding yourself No Help Needed   Getting from bed to chair No Help Needed   Getting dressed No Help Needed   Bathing or showering No Help Needed   Walk across the room (includes cane/walker) No Help Needed   Using the telphone No Help Needed   Taking your medications No Help Needed   Preparing meals No Help Needed   Managing money (expenses/bills) No Help Needed   Moderately strenuous housework (laundry) No Help Needed   Shopping for personal items (toiletries/medicines) No Help Needed   Shopping for groceries No Help Needed   Driving No Help Needed   Climbing a flight of stairs No Help Needed   Getting to places beyond walking distances No Help Needed       Abuse Screen   Patient is not abused    Social History     Patient does not qualify to have social determinant information on file (likely too young). Social History Narrative    Not on file       Review of Systems      ROS:    Constitutional: He denies fevers, weight loss, sweats. Eyes: No blurred or double vision.   ENT: No difficulty with swallowing, taste, speech or smell.. Positive for head and nasal congestion but no sore throat  Neck: no stiffness or swelling  Respiratory: No cough wheezing or shortness of breath. Cardiovascular: Denies chest pain, palpitations, unexplained indigestion or syncope. Gastrointestinal:  No changes in bowel movements, no abdominal pain, no bloating. Genitourinary:  He denies frequency, nocturia or stranguria. Extremities: No joint pain, stiffness or swelling. Neurological:  No numbness, tingling, burring paresthesias or loss of motor strength. No syncope, dizziness or frequent headache  Lymphatic: no adenopathy noted  Hematologic: no easy bruising or bleeding gums  Skin:  No recent rashes or mole changes. Psychiatric/Behavioral:  Negative for depression. Physical Examination     Evaluation of Cognitive Function:  Mood/affect:  happy  Appearance: age appropriate  Family member/caregiver input: none    Visit Vitals  /86   Pulse 82   Temp 98.6 °F (37 °C)   Resp 18   Ht 5' 9\" (1.753 m)   Wt 179 lb 14.4 oz (81.6 kg)   SpO2 99%   BMI 26.57 kg/m²     Vitals:    01/24/20 1040 01/24/20 1106   BP: 152/80 136/86   Pulse: 82    Resp: 18    Temp: 98.6 °F (37 °C)    SpO2: 99%    Weight: 179 lb 14.4 oz (81.6 kg)    Height: 5' 9\" (1.753 m)    PainSc:   0 - No pain         PHYSICAL EXAM:    General appearance - alert, well appearing, and in no distress  Mental status - alert, oriented to person, place, and time  HEENT:    Ears - bilateral TM's and external ear canals clear  Eyes - pupillary responses were normal.  Extraocular muscle function intact. Lids and conjunctiva not injected. Fundoscopic exam revealed sharp disc margins. eye movements intact  Pharynx- clear with teeth in good repair. No masses were noted  Neck - supple without thyromegaly bilateral carotid bruits. No JVD noted  Lungs - clear to auscultation and percussion  Cardiac- normal rate, regular rhythm without murmurs. PMI not displaced.   No gallop, rub or click  Abdomen - flat, soft, non-tender without palpable organomegaly or mass. No pulsatile mass was felt, and not bruit was heard. Bowel sounds were active  : Circumcised, Testes descended w/o masses  Rectal: normal sphincter tone, prostate normal, no masses, stool brown and hemacult negative  Extremities -  no clubbing cyanosis or edema  Lymphatics - no palpable lymphadenopathy, no hepatosplenomegaly  Hematologic: no petechiae or purpura  Peripheral vascular -Femoral, Dorsalis pedis and posterior tibial pulses felt without difficulty  Skin - no rash or unusual mole change noted  Neurological - Cranial nerves II-XII grossly intact. Motor strength 5/5. DTR's 2+ and symmetric. Station and gait normal  Back exam - full range of motion, no tenderness, palpable spasm or pain on motion  Musculoskeletal - no joint tenderness, deformity or swelling      Results for orders placed or performed in visit on 01/06/20   HCV RNA BY AMY QL,RFLX TO QT   Result Value Ref Range    HCV RNA by AMY, QL Negative Negative   HEPATIC FUNCTION PANEL (6)   Result Value Ref Range    Albumin 4.3 3.6 - 4.8 g/dL    Bilirubin, total 0.6 0.0 - 1.2 mg/dL    Bilirubin, direct 0.12 0.00 - 0.40 mg/dL    Alk. phosphatase 129 (H) 39 - 117 IU/L    AST (SGOT) 21 0 - 40 IU/L    ALT (SGPT) 10 0 - 44 IU/L       Advice/Referrals/Counseling   Education and counseling provided:  Are appropriate based on today's review and evaluation  End-of-Life planning (with patient's consent)  Pneumococcal Vaccine  Influenza Vaccine  Colorectal cancer screening tests      Assessment/Plan     ASSESSMENT:   1. Hypertension, renal disease    2. Mixed hyperlipidemia    3. Gastroesophageal reflux disease without esophagitis    4. ESRD (end stage renal disease) ([facility] [address]    5. Vitamin D deficiency    6. Right carotid bruit    7. Chronic gout of multiple sites, unspecified cause    8. Bruit of left carotid artery    9. Bilateral carotid artery stenosis    10. Anxiety    11.  Alcohol screening 12. Medicare annual wellness visit, subsequent    13. Acute non-recurrent maxillary sinusitis      Impression  1. Hypertension is controlled so continue current therapy reviewed with him. 2.  Hyperlipidemia prior lab reviewed and repeat status pending I will adjust if needed. 3.  GERD that is stable  4. End-stage renal disease repeat status of kidneys pending on dialysis 3 days weekly but still making some urine  5. Vitamin D deficiency repeat status pending  6. Bilateral carotid bruits with noncritical stenosis schedule follow-up Doppler test  7. Gout that is stable  8. Anxiety currently stable  9. Annual alcohol screening is done and he does not drink alcohol at present time. I did caution regarding more than 2 drinks per day in males with increased cardiovascular risk as well as increased risk of liver disease and other GI complications. 10.  Sinusitis we will treat with Zithromax  Medicare subsequent annual wellness examination screening questionnaire is completed today. The results were reviewed with him and his questions were answered. Lifestyle recommendations and modifications discussed and made. I will call with lab results and make further recommendations or adjustments if necessary. Follow-up in 3 months or sooner if there is a problem. PLAN:  .  Orders Placed This Encounter    CBC WITH AUTOMATED DIFF    METABOLIC PANEL, COMPREHENSIVE ([facility])    LIPID PANEL ([facility])    CK ([facility])    T4, FREE ([facility])    TSH 3RD GENERATION ([facility])    VITAMIN D, 25 HYDROXY ([facility])    URINALYSIS W/O MICRO ([facility])    azithromycin (ZITHROMAX) 250 mg tablet         ATTENTION:   This medical record was transcribed using an electronic medical records system. Although proofread, it may and can contain electronic and spelling errors. Other human spelling and other errors may be present.   Corrections may be executed at a later time.  Please feel free to contact us for any clarifications as needed. Follow-up and Dispositions    · Return in about 3 months (around 4/24/2020). Archana Duarte MD    Recommended healthy diet low in carbohydrates, fats, sodium and cholesterol. Recommended regular cardiovascular exercise 3-6 times per week for 30-60 minutes daily. Current Outpatient Medications   Medication Sig Dispense Refill    azithromycin (ZITHROMAX) 250 mg tablet Take 1 Tab by mouth See Admin Instructions for 5 days. Take 2 tablets the first day, then 1 tablet daily for the next four days. 6 Tab 0    LORazepam (ATIVAN) 0.5 mg tablet Take 1 Tab by mouth two (2) times daily as needed for Anxiety. Max Daily Amount: 1 mg. 30 Tab 2    lisinopril (PRINIVIL, ZESTRIL) 20 mg tablet Take 1 Tab by mouth daily. 30 Tab prn    prednisoLONE acetate (PRED FORTE) 1 % ophthalmic suspension Administer 1 Drop to both eyes four (4) times daily.  lidocaine (LIDODERM) 5 % 1 Patch by TransDERmal route every twenty-four (24) hours. Apply patch to the affected area for 12 hours a day and remove for 12 hours a day. 30 Each prn    omeprazole (PRILOSEC) 40 mg capsule TAKE 1 CAPSULE BY MOUTH ONCE DAILY 90 Cap 3    etelcalcetide (PARSABIV) 5 mg/mL soln by IntraVENous route. Indications: 3 x per week      amLODIPine (NORVASC) 10 mg tablet TAKE 1 TABLET BY MOUTH ONCE DAILY  11    sevelamer carbonate (RENVELA) 800 mg Tab tab Take 1,600 mg by mouth three (3) times daily (with meals). No results found for any visits on 01/24/20. Verbal and written instructions (see AVS) provided. Patient expresses understanding of diagnosis and treatment plan.     Archana Duarte MD

## 2020-01-24 NOTE — PATIENT INSTRUCTIONS

## 2020-01-24 NOTE — PROGRESS NOTES
Sandro Matos  Identified pt with two pt identifiers(name and ). Chief Complaint   Patient presents with   [facility] Annual Wellness Visit    Cold       1. Have you been to the ER, urgent care clinic since your last visit? Hospitalized since your last visit? no    2. Have you seen or consulted any other health care providers outside of the [address] [place] [address] since your last visit? Include any pap smears or colon screening. no      Health Maintenance Topics with due status: Overdue       Topic Date Due    DTaP/Tdap/Td series 1961    Shingrix Vaccine Age 50> 2000    GLAUCOMA SCREENING Q2Y 2015    MEDICARE YEARLY EXAM 2019     Health Maintenance Topics with due status: Not Due       Topic Last Completion Date    COLONOSCOPY 2013     Health Maintenance Topics with due status: Completed       Topic Last Completion Date    Pneumococcal 65+ years 2018    Influenza Age 5 to Adult 10/03/2019           Medication reconciliation up to date and corrected with patient at this time. Today's provider has been notified of reason for visit, vitals and flowsheets obtained on patients. Reviewed record in preparation for visit, huddled with provider and have obtained necessary documentation.         Wt Readings from Last 3 Encounters:   20 179 lb 14.4 oz (81.6 kg)   20 182 lb 3.2 oz (82.6 kg)   10/28/19 179 lb 12.8 oz (81.6 kg)     Temp Readings from Last 3 Encounters:   20 98.6 °F (37 °C)   20 97.5 °F (36.4 °C) (Tympanic)   10/28/19 98.7 °F (37.1 °C)     BP Readings from Last 3 Encounters:   20 152/80   20 150/80   10/28/19 134/80     Pulse Readings from Last 3 Encounters:   20 82   20 83   10/28/19 80     Vitals:    20 1040   BP: 152/80   Pulse: 82   Resp: 18   Temp: 98.6 °F (37 °C)   SpO2: 99%   Weight: 179 lb 14.4 oz (81.6 kg)   Height: 5' 9\" (1.753 m)   PainSc:   0 - No pain         Learning Assessment:  :     Learning Assessment 6/26/2019 2/4/2019 12/3/2018 10/17/2018 11/20/2017   PRIMARY LEARNER Patient Patient Patient Patient Patient   HIGHEST LEVEL OF EDUCATION - PRIMARY LEARNER  - - - - SOME COLLEGE   BARRIERS PRIMARY LEARNER NONE NONE NONE NONE NONE   CO-LEARNER CAREGIVER No No No - No   PRIMARY LANGUAGE ENGLISH ENGLISH ENGLISH ENGLISH ENGLISH   LEARNER PREFERENCE PRIMARY LISTENING LISTENING LISTENING LISTENING DEMONSTRATION   ANSWERED BY patient patient  patient patient patient   RELATIONSHIP SELF SELF SELF SELF SELF       Depression Screening:  :     3 most recent PHQ Screens 1/24/2020   Little interest or pleasure in doing things Not at all   Feeling down, depressed, irritable, or hopeless Not at all   Total Score PHQ 2 0       No flowsheet data found. Fall Risk Assessment:  :     Fall Risk Assessment, last 12 mths 1/24/2020   Able to walk? Yes   Fall in past 12 months? No   Fall with injury? -       Abuse Screening:  :     Abuse Screening Questionnaire 1/24/2020 6/26/2019 2/4/2019 12/3/2018 10/17/2018 8/10/2018 11/20/2017   Do you ever feel afraid of your partner? N N N N N N N   Are you in a relationship with someone who physically or mentally threatens you? N N N N N N N   Is it safe for you to go home?  Y Y Y Y Y Y Y       ADL Screening:  :     ADL Assessment 1/24/2020   Feeding yourself No Help Needed   Getting from bed to chair No Help Needed   Getting dressed No Help Needed   Bathing or showering No Help Needed   Walk across the room (includes cane/walker) No Help Needed   Using the telphone No Help Needed   Taking your medications No Help Needed   Preparing meals No Help Needed   Managing money (expenses/bills) No Help Needed   Moderately strenuous housework (laundry) No Help Needed   Shopping for personal items (toiletries/medicines) No Help Needed   Shopping for groceries No Help Needed   Driving No Help Needed   Climbing a flight of stairs No Help Needed   Getting to places beyond walking distances No Help Needed

## 2020-01-25 LAB
BASOPHILS # BLD AUTO: 0.1 X10E3/UL (ref 0–0.2)
BASOPHILS NFR BLD AUTO: 1 %
EOSINOPHIL # BLD AUTO: 0.1 X10E3/UL (ref 0–0.4)
EOSINOPHIL NFR BLD AUTO: 1 %
ERYTHROCYTE [DISTWIDTH] IN BLOOD BY AUTOMATED COUNT: 12.3 % (ref 11.6–15.4)
HCT VFR BLD AUTO: 32.4 % (ref 37.5–51)
HGB BLD-MCNC: 11.4 G/DL (ref 13–17.7)
IMM GRANULOCYTES # BLD AUTO: 0 X10E3/UL (ref 0–0.1)
IMM GRANULOCYTES NFR BLD AUTO: 0 %
LYMPHOCYTES # BLD AUTO: 1.8 X10E3/UL (ref 0.7–3.1)
LYMPHOCYTES NFR BLD AUTO: 21 %
MCH RBC QN AUTO: 34.2 PG (ref 26.6–33)
MCHC RBC AUTO-ENTMCNC: 35.2 G/DL (ref 31.5–35.7)
MCV RBC AUTO: 97 FL (ref 79–97)
MONOCYTES # BLD AUTO: 0.8 X10E3/UL (ref 0.1–0.9)
MONOCYTES NFR BLD AUTO: 10 %
NEUTROPHILS # BLD AUTO: 5.5 X10E3/UL (ref 1.4–7)
NEUTROPHILS NFR BLD AUTO: 67 %
PLATELET # BLD AUTO: 205 X10E3/UL (ref 150–450)
RBC # BLD AUTO: 3.33 X10E6/UL (ref 4.14–5.8)
WBC # BLD AUTO: 8.3 X10E3/UL (ref 3.4–10.8)

## 2020-01-28 LAB
25(OH)D3 SERPL-MCNC: 17 NG/ML (ref 30–96)
T4 FREE SERPL-MCNC: 1.06 NG/DL (ref 0.58–2.3)
TSH SERPL DL<=0.05 MIU/L-ACNC: 1.35 UIU/ML (ref 0.34–5.6)

## 2020-02-10 ENCOUNTER — HOSPITAL ENCOUNTER (OUTPATIENT)
Dept: VASCULAR SURGERY | Age: 70
Discharge: HOME | End: 2020-02-10
Attending: INTERNAL MEDICINE
Payer: MEDICARE

## 2020-02-10 DIAGNOSIS — I65.23 BILATERAL CAROTID ARTERY STENOSIS: ICD-10-CM

## 2020-02-10 PROCEDURE — 93880 EXTRACRANIAL BILAT STUDY: CPT

## 2020-02-11 LAB
LEFT CCA DIST DIAS: 13.4 CM/S
LEFT CCA DIST SYS: 70.5 CM/S
LEFT CCA PROX DIAS: 13.4 CM/S
LEFT CCA PROX SYS: 83.7 CM/S
LEFT ECA DIAS: 0 CM/S
LEFT ECA SYS: 62.9 CM/S
LEFT ICA DIST DIAS: 27.8 CM/S
LEFT ICA DIST SYS: 97.2 CM/S
LEFT ICA MID DIAS: 33.2 CM/S
LEFT ICA MID SYS: 128.2 CM/S
LEFT ICA PROX DIAS: 18.9 CM/S
LEFT ICA PROX SYS: 67.2 CM/S
LEFT ICA/CCA SYS: 1.8
LEFT SUBCLAVIAN DIAS: 51.1 CM/S
LEFT SUBCLAVIAN SYS: 166.2 CM/S
LEFT VERTEBRAL DIAS: 11.5 CM/S
LEFT VERTEBRAL SYS: 48.4 CM/S
RIGHT CCA DIST DIAS: 18.1 CM/S
RIGHT CCA DIST SYS: 75.5 CM/S
RIGHT CCA PROX DIAS: 15.5 CM/S
RIGHT CCA PROX SYS: 70.3 CM/S
RIGHT ECA DIAS: 12.2 CM/S
RIGHT ECA SYS: 80.9 CM/S
RIGHT ICA DIST DIAS: 25.7 CM/S
RIGHT ICA DIST SYS: 109.2 CM/S
RIGHT ICA MID DIAS: 25.7 CM/S
RIGHT ICA MID SYS: 79.7 CM/S
RIGHT ICA PROX DIAS: 15.8 CM/S
RIGHT ICA PROX SYS: 51.5 CM/S
RIGHT ICA/CCA SYS: 1.4
RIGHT SUBCLAVIAN DIAS: 5.6 CM/S
RIGHT SUBCLAVIAN SYS: 42.7 CM/S
RIGHT VERTEBRAL DIAS: 6.5 CM/S
RIGHT VERTEBRAL SYS: 25.6 CM/S

## 2020-02-12 NOTE — PROGRESS NOTES
Carotid Dopplers revealed some tortuosity but no evidence of significant blockage. Patient informed.

## 2020-02-23 PROBLEM — Z00.00 MEDICARE ANNUAL WELLNESS VISIT, SUBSEQUENT: Status: RESOLVED | Noted: 2017-10-25 | Resolved: 2020-02-23

## 2020-03-16 RX ORDER — OMEPRAZOLE 40 MG/1
CAPSULE, DELAYED RELEASE ORAL
Qty: 90 CAP | Refills: 3 | Status: SHIPPED | OUTPATIENT
Start: 2020-03-16 | End: 2021-02-22

## 2020-03-16 NOTE — TELEPHONE ENCOUNTER
RX refill request from the patient/pharmacy. Patient last seen 01- with labs, and next appt. scheduled for 04-  Requested Prescriptions     Pending Prescriptions Disp Refills    omeprazole (PRILOSEC) 40 mg capsule [Pharmacy Med Name: OMEPRAZOLE DR CAPS 40MG] 90 Cap 3     Sig: TAKE 1 CAPSULE DAILY   .

## 2020-05-22 ENCOUNTER — TELEPHONE (OUTPATIENT)
Dept: INTERNAL MEDICINE CLINIC | Age: 70
End: 2020-05-22

## 2020-05-22 NOTE — TELEPHONE ENCOUNTER
Patient calls stating he has had intermittent pain in his right side for a week on and off, becoming more noticeable and more frequent. Patient denies nausea and vomiting or fever, but he is in dialysis 3 days a week. Gave him an appt with Dr. Ed Salcedo for Wednesday the 27th, as patient states he cannot get  in sooner, due to dialysis appts. Advised patient to go to [facility] if sxs get worse, but wanted to send a message in case he needs to be evaluated.

## 2020-05-23 ENCOUNTER — APPOINTMENT (OUTPATIENT)
Dept: CT IMAGING | Age: 70
End: 2020-05-23
Attending: EMERGENCY MEDICINE
Payer: MEDICARE

## 2020-05-23 ENCOUNTER — HOSPITAL ENCOUNTER (EMERGENCY)
Age: 70
Discharge: HOME | End: 2020-05-23
Attending: EMERGENCY MEDICINE
Payer: MEDICARE

## 2020-05-23 VITALS
WEIGHT: 175.93 LBS | BODY MASS INDEX: 26.06 KG/M2 | RESPIRATION RATE: 16 BRPM | TEMPERATURE: 98.7 F | HEIGHT: 69 IN | SYSTOLIC BLOOD PRESSURE: 173 MMHG | HEART RATE: 74 BPM | DIASTOLIC BLOOD PRESSURE: 89 MMHG | OXYGEN SATURATION: 100 %

## 2020-05-23 DIAGNOSIS — M79.18 MUSCULAR ABDOMINAL PAIN IN RIGHT FLANK: Primary | ICD-10-CM

## 2020-05-23 LAB
ALBUMIN SERPL-MCNC: 3.5 G/DL (ref 3.5–5)
ALBUMIN/GLOB SERPL: 0.8 {RATIO} (ref 1.1–2.2)
ALP SERPL-CCNC: 71 U/L (ref 45–117)
ALT SERPL-CCNC: 12 U/L (ref 12–78)
ANION GAP SERPL CALC-SCNC: 6 MMOL/L (ref 5–15)
APPEARANCE UR: CLEAR
AST SERPL-CCNC: 19 U/L (ref 15–37)
BACTERIA URNS QL MICRO: NEGATIVE /HPF
BASOPHILS # BLD: 0 K/UL (ref 0–0.1)
BASOPHILS NFR BLD: 1 % (ref 0–1)
BILIRUB SERPL-MCNC: 0.8 MG/DL (ref 0.2–1)
BILIRUB UR QL: NEGATIVE
BUN SERPL-MCNC: 20 MG/DL (ref 6–20)
BUN/CREAT SERPL: 3 (ref 12–20)
CALCIUM SERPL-MCNC: 8.1 MG/DL (ref 8.5–10.1)
CHLORIDE SERPL-SCNC: 95 MMOL/L (ref 97–108)
CO2 SERPL-SCNC: 33 MMOL/L (ref 21–32)
COLOR UR: ABNORMAL
CREAT SERPL-MCNC: 6.61 MG/DL (ref 0.7–1.3)
DIFFERENTIAL METHOD BLD: ABNORMAL
EOSINOPHIL # BLD: 0.1 K/UL (ref 0–0.4)
EOSINOPHIL NFR BLD: 1 % (ref 0–7)
EPITH CASTS URNS QL MICRO: ABNORMAL /LPF
ERYTHROCYTE [DISTWIDTH] IN BLOOD BY AUTOMATED COUNT: 12.6 % (ref 11.5–14.5)
GLOBULIN SER CALC-MCNC: 4.5 G/DL (ref 2–4)
GLUCOSE SERPL-MCNC: 97 MG/DL (ref 65–100)
GLUCOSE UR STRIP.AUTO-MCNC: NEGATIVE MG/DL
HCT VFR BLD AUTO: 31.6 % (ref 36.6–50.3)
HGB BLD-MCNC: 10.7 G/DL (ref 12.1–17)
HGB UR QL STRIP: ABNORMAL
HYALINE CASTS URNS QL MICRO: ABNORMAL /LPF (ref 0–5)
IMM GRANULOCYTES # BLD AUTO: 0 K/UL (ref 0–0.04)
IMM GRANULOCYTES NFR BLD AUTO: 0 % (ref 0–0.5)
KETONES UR QL STRIP.AUTO: NEGATIVE MG/DL
LEUKOCYTE ESTERASE UR QL STRIP.AUTO: NEGATIVE
LYMPHOCYTES # BLD: 1.5 K/UL (ref 0.8–3.5)
LYMPHOCYTES NFR BLD: 25 % (ref 12–49)
MCH RBC QN AUTO: 32.6 PG (ref 26–34)
MCHC RBC AUTO-ENTMCNC: 33.9 G/DL (ref 30–36.5)
MCV RBC AUTO: 96.3 FL (ref 80–99)
MONOCYTES # BLD: 0.9 K/UL (ref 0–1)
MONOCYTES NFR BLD: 16 % (ref 5–13)
NEUTS SEG # BLD: 3.3 K/UL (ref 1.8–8)
NEUTS SEG NFR BLD: 57 % (ref 32–75)
NITRITE UR QL STRIP.AUTO: NEGATIVE
NRBC # BLD: 0 K/UL (ref 0–0.01)
NRBC BLD-RTO: 0 PER 100 WBC
PH UR STRIP: 8 [PH] (ref 5–8)
PLATELET # BLD AUTO: 164 K/UL (ref 150–400)
PMV BLD AUTO: 10.1 FL (ref 8.9–12.9)
POTASSIUM SERPL-SCNC: 3.9 MMOL/L (ref 3.5–5.1)
PROT SERPL-MCNC: 8 G/DL (ref 6.4–8.2)
PROT UR STRIP-MCNC: 30 MG/DL
RBC # BLD AUTO: 3.28 M/UL (ref 4.1–5.7)
RBC #/AREA URNS HPF: ABNORMAL /HPF (ref 0–5)
SODIUM SERPL-SCNC: 134 MMOL/L (ref 136–145)
SP GR UR REFRACTOMETRY: 1 (ref 1–1.03)
UA: UC IF INDICATED,UAUC: ABNORMAL
UROBILINOGEN UR QL STRIP.AUTO: 0.2 EU/DL (ref 0.2–1)
WBC # BLD AUTO: 5.9 K/UL (ref 4.1–11.1)
WBC URNS QL MICRO: ABNORMAL /HPF (ref 0–4)

## 2020-05-23 PROCEDURE — 74011000250 HC RX REV CODE- 250: Performed by: EMERGENCY MEDICINE

## 2020-05-23 PROCEDURE — 85025 COMPLETE CBC W/AUTO DIFF WBC: CPT

## 2020-05-23 PROCEDURE — 74176 CT ABD & PELVIS W/O CONTRAST: CPT

## 2020-05-23 PROCEDURE — 81001 URINALYSIS AUTO W/SCOPE: CPT

## 2020-05-23 PROCEDURE — 74011250636 HC RX REV CODE- 250/636: Performed by: EMERGENCY MEDICINE

## 2020-05-23 PROCEDURE — 99284 EMERGENCY DEPT VISIT MOD MDM: CPT

## 2020-05-23 PROCEDURE — 74011250637 HC RX REV CODE- 250/637: Performed by: EMERGENCY MEDICINE

## 2020-05-23 PROCEDURE — 36415 COLL VENOUS BLD VENIPUNCTURE: CPT

## 2020-05-23 PROCEDURE — 80053 COMPREHEN METABOLIC PANEL: CPT

## 2020-05-23 RX ORDER — TIZANIDINE HYDROCHLORIDE 4 MG/1
4 CAPSULE, GELATIN COATED ORAL 3 TIMES DAILY
Qty: 20 CAP | Refills: 0 | Status: SHIPPED | OUTPATIENT
Start: 2020-05-23 | End: 2020-06-01 | Stop reason: SDUPTHER

## 2020-05-23 RX ORDER — METHOCARBAMOL 750 MG/1
750 TABLET, FILM COATED ORAL
Status: COMPLETED | OUTPATIENT
Start: 2020-05-23 | End: 2020-05-23

## 2020-05-23 RX ORDER — LIDOCAINE 4 G/100G
1 PATCH TOPICAL EVERY 24 HOURS
Status: DISCONTINUED | OUTPATIENT
Start: 2020-05-23 | End: 2020-05-24 | Stop reason: HOSPADM

## 2020-05-23 RX ORDER — LIDOCAINE 4 G/100G
1 PATCH TOPICAL EVERY 12 HOURS
Qty: 10 PATCH | Refills: 0 | Status: SHIPPED | OUTPATIENT
Start: 2020-05-23

## 2020-05-23 RX ORDER — FENTANYL CITRATE 50 UG/ML
50 INJECTION, SOLUTION INTRAMUSCULAR; INTRAVENOUS
Status: DISCONTINUED | OUTPATIENT
Start: 2020-05-23 | End: 2020-05-24 | Stop reason: HOSPADM

## 2020-05-23 RX ADMIN — METHOCARBAMOL TABLETS 750 MG: 750 TABLET, COATED ORAL at 21:48

## 2020-05-23 NOTE — ED PROVIDER NOTES
EMERGENCY DEPARTMENT HISTORY AND PHYSICAL EXAM      Date: 5/23/2020  Patient Name: Kimmy Mari  Patient Age and Sex: 71 y.o. male     History of Presenting Illness     Chief Complaint   Patient presents with    Flank Pain     Right flank pain that radiates to right side of his abdomen on/off for 2 weeks- pt denies any urinary symptoms. Pt denies any nausea or vomiting. History Provided By: Patient    HPI: Kimmy Mari 35year-old male with a history of end-stage renal disease on dialysis presenting with right flank pain and right side pain. Patient states that for the past 2 weeks has been having intermittent right flank pain radiating to his right side and right upper abdomen. Patient states he also has a history of hepatitis C in the past but took medications and was told he was much better. Denies any change in his urination and he makes very little urine because of his CKD. Denies any nausea, vomiting, diarrhea, constipation, cough, shortness of breath associated with it. Patient states that the pain comes and goes and is worse when he is laying down. Did have 7 out of 10 pain while he was sitting in the waiting room. There are no other complaints, changes, or physical findings at this time. PCP: Taylor Duarte MD    No current facility-administered medications on file prior to encounter. Current Outpatient Medications on File Prior to Encounter   Medication Sig Dispense Refill    omeprazole (PRILOSEC) 40 mg capsule TAKE 1 CAPSULE DAILY 90 Cap 3    LORazepam (ATIVAN) 0.5 mg tablet Take 1 Tab by mouth two (2) times daily as needed for Anxiety. Max Daily Amount: 1 mg. 30 Tab 2    lisinopril (PRINIVIL, ZESTRIL) 20 mg tablet Take 1 Tab by mouth daily. 30 Tab prn    prednisoLONE acetate (PRED FORTE) 1 % ophthalmic suspension Administer 1 Drop to both eyes four (4) times daily.       [DISCONTINUED] lidocaine (LIDODERM) 5 % 1 Patch by TransDERmal route every twenty-four (24) hours. Apply patch to the affected area for 12 hours a day and remove for 12 hours a day. 30 Each prn    etelcalcetide (PARSABIV) 5 mg/mL soln by IntraVENous route. Indications: 3 x per week      amLODIPine (NORVASC) 10 mg tablet TAKE 1 TABLET BY MOUTH ONCE DAILY  11    sevelamer carbonate (RENVELA) 800 mg Tab tab Take 1,600 mg by mouth three (3) times daily (with meals). Past History     Past Medical History:  Past Medical History:   Diagnosis Date    Arthritis     Chronic kidney disease     [facility] T, Thur, Sat @ [facility]    Gout     Hepatitis C     Hypertension     Liver disease     Hep C    Other and unspecified alcohol dependence, unspecified drinking behavior     Acid reflux    Other ill-defined conditions(799.89)     gout    Other ill-defined conditions(799.89)     blood transfusion hx       Past Surgical History:  Past Surgical History:   Procedure Laterality Date    HX OTHER SURGICAL      liver biopsy    [facility]      [place] [facility]    HX VASCULAR ACCESS  7/15/13    CREATION LEFT UPPER ARM BASILIC VEIN TRANSPOSITION    [facility]       Family History:  No family history on file. Social History:  Social History     Tobacco Use    Smoking status: Never Smoker    Smokeless tobacco: Never Used   Substance Use Topics    Alcohol use: No    Drug use: Not Currently     Types: Marijuana, Heroin     Comment: smoked pot 4 years ago, herion in 25s       Allergies: Allergies   Allergen Reactions    Codeine Hives         Review of Systems   Review of Systems   Constitutional: Negative for chills and fever. Respiratory: Negative for cough and shortness of breath. Cardiovascular: Negative for chest pain. Gastrointestinal: Positive for abdominal pain. Negative for constipation, diarrhea, nausea and vomiting. Genitourinary: Positive for flank pain. Negative for dysuria, frequency and hematuria.    Neurological: Negative for weakness and numbness. All other systems reviewed and are negative. Physical Exam   Physical Exam  Vitals signs and nursing note reviewed. Constitutional:       Appearance: He is well-developed. HENT:      Head: Normocephalic and atraumatic. Eyes:      Conjunctiva/sclera: Conjunctivae normal.   Neck:      Musculoskeletal: Normal range of motion and neck supple. Cardiovascular:      Rate and Rhythm: Normal rate and regular rhythm. Pulmonary:      Effort: Pulmonary effort is normal. No respiratory distress. Breath sounds: Normal breath sounds. Abdominal:      General: There is no distension. Palpations: Abdomen is soft. Tenderness: There is no abdominal tenderness. Comments: Patient states that he is having some pain at the moment but no tenderness on the flank, side or right upper quadrant. No rash noted. Musculoskeletal: Normal range of motion. Skin:     General: Skin is warm and dry. Neurological:      General: No focal deficit present. Mental Status: He is alert and oriented to person, place, and time. Mental status is at baseline. Psychiatric:         Mood and Affect: Mood normal.          Diagnostic Study Results     Labs -     Recent Results (from the past 12 hour(s))   CBC WITH AUTOMATED DIFF    Collection Time: 05/23/20  8:51 PM   Result Value Ref Range    WBC 5.9 4.1 - 11.1 K/uL    RBC 3.28 (L) 4.10 - 5.70 M/uL    HGB 10.7 (L) 12.1 - 17.0 g/dL    HCT 31.6 (L) 36.6 - 50.3 %    MCV 96.3 80.0 - 99.0 FL    MCH 32.6 26.0 - 34.0 PG    MCHC 33.9 30.0 - 36.5 g/dL    RDW 12.6 11.5 - 14.5 %    PLATELET 281 368 - 152 K/uL    MPV 10.1 8.9 - 12.9 FL    NRBC 0.0 0  WBC    ABSOLUTE NRBC 0.00 0.00 - 0.01 K/uL    NEUTROPHILS 57 32 - 75 %    LYMPHOCYTES 25 12 - 49 %    MONOCYTES 16 (H) 5 - 13 %    EOSINOPHILS 1 0 - 7 %    BASOPHILS 1 0 - 1 %    IMMATURE GRANULOCYTES 0 0.0 - 0.5 %    ABS. NEUTROPHILS 3.3 1.8 - 8.0 K/UL    ABS. LYMPHOCYTES 1.5 0.8 - 3.5 K/UL    ABS.  MONOCYTES 0.9 0.0 - 1.0 K/UL    ABS. EOSINOPHILS 0.1 0.0 - 0.4 K/UL    ABS. BASOPHILS 0.0 0.0 - 0.1 K/UL    ABS. IMM. GRANS. 0.0 0.00 - 0.04 K/UL    DF AUTOMATED     METABOLIC PANEL, COMPREHENSIVE    Collection Time: 05/23/20  8:51 PM   Result Value Ref Range    Sodium 134 (L) 136 - 145 mmol/L    Potassium 3.9 3.5 - 5.1 mmol/L    Chloride 95 (L) 97 - 108 mmol/L    CO2 33 (H) 21 - 32 mmol/L    Anion gap 6 5 - 15 mmol/L    Glucose 97 65 - 100 mg/dL    BUN 20 6 - 20 MG/DL    Creatinine 6.61 (H) 0.70 - 1.30 MG/DL    BUN/Creatinine ratio 3 (L) 12 - 20      GFR est AA 10 (L) >60 ml/min/1.73m2    GFR est non-AA 8 (L) >60 ml/min/1.73m2    Calcium 8.1 (L) 8.5 - 10.1 MG/DL    Bilirubin, total 0.8 0.2 - 1.0 MG/DL    ALT (SGPT) 12 12 - 78 U/L    AST (SGOT) 19 15 - 37 U/L    Alk. phosphatase 71 45 - 117 U/L    Protein, total 8.0 6.4 - 8.2 g/dL    Albumin 3.5 3.5 - 5.0 g/dL    Globulin 4.5 (H) 2.0 - 4.0 g/dL    A-G Ratio 0.8 (L) 1.1 - 2.2     URINALYSIS W/ REFLEX CULTURE    Collection Time: 05/23/20  9:23 PM   Result Value Ref Range    Color YELLOW/STRAW      Appearance CLEAR CLEAR      Specific gravity 1.005 1.003 - 1.030      pH (UA) 8.0 5.0 - 8.0      Protein 30 (A) NEG mg/dL    Glucose Negative NEG mg/dL    Ketone Negative NEG mg/dL    Bilirubin Negative NEG      Blood TRACE (A) NEG      Urobilinogen 0.2 0.2 - 1.0 EU/dL    Nitrites Negative NEG      Leukocyte Esterase Negative NEG      WBC 0-4 0 - 4 /hpf    RBC 0-5 0 - 5 /hpf    Epithelial cells FEW FEW /lpf    Bacteria Negative NEG /hpf    UA:UC IF INDICATED CULTURE NOT INDICATED BY UA RESULT CNI      Hyaline cast 0-2 0 - 5 /lpf       Radiologic Studies -   CT ABD PELV WO CONT   Final Result   IMPRESSION:      1. No acute abnormality is identified. Kidneys are atrophic. There are bilateral low densities within the kidneys   incompletely characterized but most likely cysts.         CT Results  (Last 48 hours)               05/23/20 2024  CT ABD PELV WO CONT Final result    Impression: IMPRESSION:       1. No acute abnormality is identified. Kidneys are atrophic. There are bilateral low densities within the kidneys   incompletely characterized but most likely cysts. Narrative:  EXAM: CT ABD PELV WO CONT       INDICATION: right flank pain       COMPARISON:       CONTRAST:  None. TECHNIQUE:    Thin axial images were obtained through the abdomen and pelvis. Coronal and   sagittal reformats were generated. Oral contrast was not administered. CT dose   reduction was achieved through use of a standardized protocol tailored for this   examination and automatic exposure control for dose modulation. The absence of intravenous contrast material reduces the sensitivity for   evaluation of the vasculature and solid organs. FINDINGS:    LOWER THORAX: There is minor basilar atelectasis. There is cardiomegaly   LIVER: No mass. BILIARY TREE: Gallbladder is within normal limits. CBD is not dilated. SPLEEN: within normal limits. PANCREAS: No focal abnormality. ADRENALS: Unremarkable. KIDNEYS/URETERS: Kidneys are atrophic. There are multiple low densities in the   kidneys incompletely characterized but may represent cysts. STOMACH: Unremarkable. SMALL BOWEL: No dilatation or wall thickening. COLON: No dilatation or wall thickening. APPENDIX: Unremarkable. PERITONEUM: No ascites or pneumoperitoneum. RETROPERITONEUM: No lymphadenopathy or aortic aneurysm. REPRODUCTIVE ORGANS: Prostate is moderately enlarged   URINARY BLADDER: No mass or calculus. BONES: No destructive bone lesion. ABDOMINAL WALL: No mass or hernia. ADDITIONAL COMMENTS: N/A               CXR Results  (Last 48 hours)    None            Medical Decision Making   I am the first provider for this patient. I reviewed the vital signs, available nursing notes, past medical history, past surgical history, family history and social history.     Vital Signs-Reviewed the patient's vital signs. Patient Vitals for the past 12 hrs:   Temp Pulse Resp BP SpO2   05/23/20 2145  74 16 173/89 100 %   05/23/20 1919 98.7 °F (37.1 °C) 70 18 173/81 100 %       Records Reviewed: Nursing Notes and Old Medical Records    Provider Notes (Medical Decision Making):   Senting with right flank, right side and right upper quadrant pain that has been going on and off for the past 2 weeks. While most likely musculoskeletal given change in position makes it worse, differential includes pyelonephritis, kidney stone, cholelithiasis, liver pathology. Will get labs, urinalysis, CT of the abdomen. ED Course:   Initial assessment performed. The patients presenting problems have been discussed, and they are in agreement with the care plan formulated and outlined with them. I have encouraged them to ask questions as they arise throughout their visit. ED Course as of May 23 2221   Sat May 23, 2020   2127 Stated patient that CT came back negative. We will give him fentanyl to help with the pain as well as Robaxin given that I do have a higher suspicion that this is musculoskeletal.  Urinalysis and CMP in process. White count normal    [JS]      ED Course User Index  [JS] Jonah Jones MD     Critical Care Time:   0    Disposition:  Discharge Note:  The patient has been re-evaluated and is ready for discharge. Reviewed available results with patient. Counseled patient on diagnosis and care plan. Patient has expressed understanding, and all questions have been answered. Patient agrees with plan and agrees to follow up as recommended, or to return to the ED if their symptoms worsen. Discharge instructions have been provided and explained to the patient, along with reasons to return to the ED. PLAN:  Current Discharge Medication List      START taking these medications    Details   tiZANidine (ZANAFLEX) 4 mg capsule Take 1 Cap by mouth three (3) times daily.   Qty: 20 Cap, Refills: 0      lidocaine 4 % patch 1 Patch by TransDERmal route every twelve (12) hours every twelve (12) hours. Qty: 10 Patch, Refills: 0         STOP taking these medications       lidocaine (LIDODERM) 5 % Comments:   Reason for Stoppin.   Follow-up Information     Follow up With Specialties Details Why Contact Info    Edmar Maldonado MD Internal Medicine  As needed [address]  [address]  [phone]          3. Return to ED if worse     Diagnosis     Clinical Impression:   1. Muscular abdominal pain in right flank        Attestations:    Ligia Nettles M.D. Please note that this dictation was completed with [employer] the [employer] voice recognition software. Quite often unanticipated grammatical, syntax, homophones, and other interpretive errors are inadvertently transcribed by the computer software. Please disregard these errors. Please excuse any errors that have escaped final proofreading. Thank you.

## 2020-05-24 NOTE — DISCHARGE INSTRUCTIONS
Patient Education        Flank Pain: Care Instructions  Your Care Instructions  Flank pain is pain on the side of the back just below the rib cage and above the waist. It can be on one or both sides. Flank pain has many possible causes, including a kidney stone, a urinary tract infection, or back strain. Flank pain may get better on its own. But don't ignore new symptoms, such as fever, nausea and vomiting, urination problems, pain that gets worse, and dizziness. These may be signs of a more serious problem. You may have to have tests or other treatment. Follow-up care is a key part of your treatment and safety. Be sure to make and go to all appointments, and call your doctor if you are having problems. It's also a good idea to know your test results and keep a list of the medicines you take. How can you care for yourself at home? · Rest until you feel better. · Take pain medicines exactly as directed. ? If the doctor gave you a prescription medicine for pain, take it as prescribed. ? If you are not taking a prescription pain medicine, ask your doctor if you can take an over-the-counter pain medicine, such as acetaminophen (Tylenol), ibuprofen (Advil, Motrin), or naproxen (Aleve). Read and follow all instructions on the label. · If your doctor prescribed antibiotics, take them as directed. Do not stop taking them just because you feel better. You need to take the full course of antibiotics. · To apply heat, put a warm water bottle, a heating pad set on low, or a warm cloth on the painful area. Do not go to sleep with a heating pad on your skin. · To prevent dehydration, drink plenty of fluids, enough so that your urine is light yellow or clear like water. Choose water and other caffeine-free clear liquids until you feel better. If you have kidney, heart, or liver disease and have to limit fluids, talk with your doctor before you increase the amount of fluids you drink. When should you call for help?   Call your doctor now or seek immediate medical care if:    · Your flank pain gets worse.     · You have new symptoms, such as fever, nausea, or vomiting.     · You have symptoms of a urinary problem. For example:  ? You have blood or pus in your urine. ? You have chills or body aches. ? It hurts to urinate. ? You have groin or belly pain.    Watch closely for changes in your health, and be sure to contact your doctor if you do not get better as expected. Where can you learn more? Go to http://kirk-angelika.info/  Enter S191 in the search box to learn more about \"Flank Pain: Care Instructions. \"  Current as of: June 26, 2019Content Version: 12.4  © 4640-7030 Healthwise, Incorporated. Care instructions adapted under license by [employer] (which disclaims liability or warranty for this information). If you have questions about a medical condition or this instruction, always ask your healthcare professional. [place] [address] any warranty or liability for your use of this information.

## 2020-05-24 NOTE — ED NOTES
This RN reviewed discharge instructions with the patient. The patient verbalized understanding. All questions and concerns were addressed. The patient declined a wheelchair and is discharged ambulatory in the care of family members with instructions and prescriptions in hand. Pt is alert and oriented x 4. Respirations are clear and unlabored.

## 2020-05-26 NOTE — PROGRESS NOTES
Chief Complaint   Patient presents with    Hypertension     3 month follow up    Cholesterol Problem       SUBJECTIVE:    Dione Simon is a 71 y.o. male who returns in follow-up for his medical problems include hypertension, hyperlipidemia, GERD, end-stage renal disease which she is on dialysis, history of gout, history of back pain, anxiety and other medical problems. In addition to his chronic problems he had an acute problem when he went to the emergency room May 23 with right flank pain at that point he was thinking he may have a kidney stone however a CT scan done in the emergency room revealed no evidence of a kidney stone and urinalysis had 0-5 red cells and 0-5 white cells with no evidence of infection. He did have a CBC that was normal except for hemoglobin 10.7 although his most recent baseline was 11.4 so not appreciably different and white count was normal.  BMP was consistent with his end-stage renal disease but no other acute abnormalities. It was felt at that point he had muscle spasm and he was placed on Zanaflex as far as a muscle relaxant and he uses a lidocaine patch although he was switched to Aspercreme at the emergency room he notes the lidocaine works better. He does have some right flank pain that persist today although it is better. He notes no fevers or chills. He notes no nausea vomiting. There is no change with food intake. He denies any other GI complaints or  complaints. He denies any headaches, dizziness or neurologic complaints. He notes no chest pain, shortness of breath, palpitations, PND, orthopnea or cardiorespiratory complaints. Other than the right flank discomfort he has no other musculoskeletal complaints. There are no other complaints on complete review of systems. The entire ER visit record is reviewed while the patient is here in the office today.       Current Outpatient Medications   Medication Sig Dispense Refill    carvediloL (COREG) 12.5 mg tablet Take 12.5 mg by mouth two (2) times daily (with meals).  tiZANidine (ZANAFLEX) 4 mg capsule Take 1 Cap by mouth three (3) times daily. 20 Cap 0    lidocaine 4 % patch 1 Patch by TransDERmal route every twelve (12) hours every twelve (12) hours. 10 Patch 0    omeprazole (PRILOSEC) 40 mg capsule TAKE 1 CAPSULE DAILY 90 Cap 3    LORazepam (ATIVAN) 0.5 mg tablet Take 1 Tab by mouth two (2) times daily as needed for Anxiety. Max Daily Amount: 1 mg. 30 Tab 2    lisinopril (PRINIVIL, ZESTRIL) 20 mg tablet Take 1 Tab by mouth daily. 30 Tab prn    prednisoLONE acetate (PRED FORTE) 1 % ophthalmic suspension Administer 1 Drop to both eyes four (4) times daily.  etelcalcetide (PARSABIV) 5 mg/mL soln by IntraVENous route. Indications: 3 x per week      amLODIPine (NORVASC) 10 mg tablet TAKE 1 TABLET BY MOUTH ONCE DAILY  11    sevelamer carbonate (RENVELA) 800 mg Tab tab Take 1,600 mg by mouth three (3) times daily (with meals).        Past Medical History:   Diagnosis Date    Arthritis     Chronic kidney disease     [facility] T, Thur, Sat [facility]    Gout     Hepatitis C     Hypertension     Liver disease     Hep C    Other and unspecified alcohol dependence, unspecified drinking behavior     Acid reflux    Other ill-defined conditions(799.89)     gout    Other ill-defined conditions(799.89)     blood transfusion hx     Past Surgical History:   Procedure Laterality Date    HX OTHER SURGICAL      liver biopsy    HX VASCULAR ACCESS      [place] [facility]    HX VASCULAR ACCESS  7/15/13    CREATION LEFT UPPER ARM BASILIC VEIN TRANSPOSITION    [facility]      [facility]     Allergies   Allergen Reactions    Codeine Hives       REVIEW OF SYSTEMS:  General: negative for - chills or fever, or weight loss or gain  ENT: negative for - headaches, nasal congestion or tinnitus  Eyes: no blurred or visual changes  Neck: No stiffness or swollen nodes  Respiratory: negative for - cough, hemoptysis, shortness of breath or wheezing  Cardiovascular : negative for - chest pain, edema, palpitations or shortness of breath  Gastrointestinal: negative for - abdominal pain, blood in stools, heartburn or nausea/vomiting  Genito-Urinary: no dysuria, trouble voiding, or hematuria  Musculoskeletal: negative for - gait disturbance, joint pain, joint stiffness or joint swelling. Right flank pain as described above  Neurological: no TIA or stroke symptoms  Hematologic: no bruises, no bleeding  Lymphatic: no swollen glands  Integument: no lumps, mole changes, nail changes or rash  Endocrine:no malaise/lethargy poly uria or polydipsia or unexpected weight changes        Social History     Socioeconomic History    Marital status: SINGLE     Spouse name: Not on file    Number of children: Not on file    Years of education: Not on file    Highest education level: Not on file   Tobacco Use    Smoking status: Never Smoker    Smokeless tobacco: Never Used   Substance and Sexual Activity    Alcohol use: No    Drug use: Not Currently     Types: Marijuana, Heroin     Comment: smoked pot 4 years ago, herion in 25s     History reviewed. No pertinent family history. OBJECTIVE:     Visit Vitals  /74 (BP 1 Location: Left arm, BP Patient Position: Sitting)   Pulse 76   Temp 97.5 °F (36.4 °C) (Oral)   Resp 18   Ht 5' 9\" (1.753 m)   Wt 177 lb 1.6 oz (80.3 kg)   SpO2 99%   BMI 26.15 kg/m²     CONSTITUTIONAL:   well nourished, appears age appropriate  EYES: sclera anicteric, PERRL, EOMI  ENMT:nares clear, moist mucous membranes, pharynx clear  NECK: supple.  Thyroid normal, No JVD or bruits  RESPIRATORY: Chest: clear to ascultation and percussion, normal inspiratory effort  CARDIOVASCULAR: Heart: regular rate and rhythm no murmurs, rubs or gallops, PMI not displaced, No thrills, no peripheral edema  GASTROINTESTINAL: Abdomen: non distended, soft, non tender, bowel sounds normal  HEMATOLOGIC: no purpura, petechiae or bruising  LYMPHATIC: No lymph node enlargemant  MUSCULOSKELETAL: Extremities: no active synovitis, pulse 1+. Back without CVA tenderness with mild discomfort to palpation in the right mid to upper flank region but no muscle spasm appreciated  INTEGUMENT: No unusual rashes or suspicious skin lesions noted. Nails appear normal.  PERIPHERAL VASCULAR: normal pulses femoral, PT and DP  NEUROLOGIC: non-focal exam, A & O X 3  PSYCHIATRIC:, appropriate affect     ASSESSMENT:   1. Hypertension, renal disease    2. Mixed hyperlipidemia    3. Gastroesophageal reflux disease without esophagitis    4. ESRD (end stage renal disease) ([facility] [address]    5. Flank pain      Impression  1. Hypertension that is controlled so continue current therapy reviewed with him. 2.  Hyperlipidemia prior lab reviewed and repeat status pending and I will adjust if needed. 3   GERD that is currently stable  4   End-stage renal disease on dialysis  5. Right flank pain as noted I reviewed the entire ER record from May 23. This does seem more consistent musculoskeletal however if it does not resolve with the lidocaine patch and the Zanaflex did not think further evaluation would include an MRI of the lumbar spine to make sure were not dealing with lumbar disc disease. Follow-up tentatively set up for 3 months with a clear understanding I will see him sooner should the flank pain persist.  I will call with the days lab results. PLAN:  .  Orders Placed This Encounter    METABOLIC PANEL, COMPREHENSIVE ([facility])    LIPID PANEL ([facility])    URINALYSIS W/MICROSCOPIC ([facility])    carvediloL (COREG) 12.5 mg tablet         ATTENTION:   This medical record was transcribed using an electronic medical records system. Although proofread, it may and can contain electronic and spelling errors. Other human spelling and other errors may be present. Corrections may be executed at a later time.   Please feel free to contact us for any clarifications as needed. Follow-up and Dispositions    · Return in about 3 months (around 8/27/2020). No results found for any visits on 05/27/20. Teto Paris MD    The patient verbalized understanding of the problems and plans as explained.

## 2020-05-27 ENCOUNTER — OFFICE VISIT (OUTPATIENT)
Dept: INTERNAL MEDICINE CLINIC | Age: 70
End: 2020-05-27

## 2020-05-27 VITALS
OXYGEN SATURATION: 99 % | DIASTOLIC BLOOD PRESSURE: 74 MMHG | TEMPERATURE: 97.5 F | RESPIRATION RATE: 18 BRPM | BODY MASS INDEX: 26.23 KG/M2 | SYSTOLIC BLOOD PRESSURE: 126 MMHG | HEART RATE: 76 BPM | HEIGHT: 69 IN | WEIGHT: 177.1 LBS

## 2020-05-27 DIAGNOSIS — N18.6 ESRD (END STAGE RENAL DISEASE) (HCC): ICD-10-CM

## 2020-05-27 DIAGNOSIS — E78.2 MIXED HYPERLIPIDEMIA: ICD-10-CM

## 2020-05-27 DIAGNOSIS — K21.9 GASTROESOPHAGEAL REFLUX DISEASE WITHOUT ESOPHAGITIS: ICD-10-CM

## 2020-05-27 DIAGNOSIS — R10.9 FLANK PAIN: ICD-10-CM

## 2020-05-27 DIAGNOSIS — I12.9 HYPERTENSION, RENAL DISEASE: Primary | ICD-10-CM

## 2020-05-27 LAB
A-G RATIO,AGRAT: 1 RATIO
ALBUMIN SERPL-MCNC: 4.4 G/DL (ref 3.9–5.4)
ALP SERPL-CCNC: 81 U/L (ref 38–126)
ALT SERPL-CCNC: 11 U/L (ref 0–50)
ANION GAP SERPL CALC-SCNC: 13 MMOL/L
AST SERPL W P-5'-P-CCNC: 23 U/L (ref 14–36)
BILIRUB SERPL-MCNC: 1.4 MG/DL (ref 0.2–1.3)
BILIRUB UR QL: NEGATIVE
BUN SERPL-MCNC: 33 MG/DL (ref 9–20)
BUN/CREATININE RATIO,BUCR: 4 RATIO
CALCIUM SERPL-MCNC: 8.8 MG/DL (ref 8.4–10.2)
CHLORIDE SERPL-SCNC: 95 MMOL/L (ref 98–107)
CHOL/HDL RATIO,CHHD: 3 RATIO (ref 0–4)
CHOLEST SERPL-MCNC: 162 MG/DL (ref 0–200)
CLARITY: CLEAR
CO2 SERPL-SCNC: 29 MMOL/L (ref 22–32)
COLOR UR: ABNORMAL
CREAT SERPL-MCNC: 8.7 MG/DL (ref 0.8–1.5)
GLOBULIN,GLOB: 4.3
GLUCOSE 24H UR-MRATE: NEGATIVE G/(24.H)
GLUCOSE SERPL-MCNC: 87 MG/DL (ref 75–110)
HDLC SERPL-MCNC: 53 MG/DL (ref 35–130)
HGB UR QL STRIP: NEGATIVE
KETONES UR QL STRIP.AUTO: NEGATIVE
LDL/HDL RATIO,LDHD: 2 RATIO
LDLC SERPL CALC-MCNC: 94 MG/DL (ref 0–130)
LEUKOCYTE ESTERASE: NEGATIVE
NITRITE UR QL STRIP.AUTO: NEGATIVE
PH UR STRIP: 8 [PH] (ref 5–7)
POTASSIUM SERPL-SCNC: 4.8 MMOL/L (ref 3.6–5)
PROT SERPL-MCNC: 8.7 G/DL (ref 6.3–8.2)
PROT UR STRIP-MCNC: ABNORMAL MG/DL
RBC #/AREA URNS HPF: 0 #/HPF
SODIUM SERPL-SCNC: 137 MMOL/L (ref 137–145)
SP GR UR REFRACTOMETRY: 1.01 (ref 1–1.03)
SPERM,SPRMU: ABNORMAL
TRIGL SERPL-MCNC: 77 MG/DL (ref 0–200)
UROBILINOGEN UR QL STRIP.AUTO: NEGATIVE
VLDLC SERPL CALC-MCNC: 15 MG/DL
WBC URNS QL MICRO: 0 #/HPF

## 2020-05-27 RX ORDER — CARVEDILOL 12.5 MG/1
12.5 TABLET ORAL 2 TIMES DAILY WITH MEALS
COMMUNITY
Start: 2020-03-27

## 2020-05-27 NOTE — PATIENT INSTRUCTIONS
Back Pain: Care Instructions Your Care Instructions Back pain has many possible causes. It is often related to problems with muscles and ligaments of the back. It may also be related to problems with the nerves, discs, or bones of the back. Moving, lifting, standing, sitting, or sleeping in an awkward way can strain the back. Sometimes you don't notice the injury until later. Arthritis is another common cause of back pain. Although it may hurt a lot, back pain usually improves on its own within several weeks. Most people recover in 12 weeks or less. Using good home treatment and being careful not to stress your back can help you feel better sooner. Follow-up care is a key part of your treatment and safety. Be sure to make and go to all appointments, and call your doctor if you are having problems. It's also a good idea to know your test results and keep a list of the medicines you take. How can you care for yourself at home? · Sit or lie in positions that are most comfortable and reduce your pain. Try one of these positions when you lie down: ? Lie on your back with your knees bent and supported by large pillows. ? Lie on the floor with your legs on the seat of a sofa or chair. ? Lie on your side with your knees and hips bent and a pillow between your legs. ? Lie on your stomach if it does not make pain worse. · Do not sit up in bed, and avoid soft couches and twisted positions. Bed rest can help relieve pain at first, but it delays healing. Avoid bed rest after the first day of back pain. · Change positions every 30 minutes. If you must sit for long periods of time, take breaks from sitting. Get up and walk around, or lie in a comfortable position. · Try using a heating pad on a low or medium setting for 15 to 20 minutes every 2 or 3 hours. Try a warm shower in place of one session with the heating pad. · You can also try an ice pack for 10 to 15 minutes every 2 to 3 hours. Put a thin cloth between the ice pack and your skin. · Take pain medicines exactly as directed. ? If the doctor gave you a prescription medicine for pain, take it as prescribed. ? If you are not taking a prescription pain medicine, ask your doctor if you can take an over-the-counter medicine. · Take short walks several times a day. You can start with 5 to 10 minutes, 3 or 4 times a day, and work up to longer walks. Walk on level surfaces and avoid hills and stairs until your back is better. · Return to work and other activities as soon as you can. Continued rest without activity is usually not good for your back. · To prevent future back pain, do exercises to stretch and strengthen your back and stomach. Learn how to use good posture, safe lifting techniques, and proper body mechanics. When should you call for help? Call your doctor now or seek immediate medical care if: 
  · You have new or worsening numbness in your legs.  
  · You have new or worsening weakness in your legs. (This could make it hard to stand up.)  
  · You lose control of your bladder or bowels.  
 Watch closely for changes in your health, and be sure to contact your doctor if: 
  · You have a fever, lose weight, or don't feel well.  
  · You do not get better as expected. Where can you learn more? Go to http://kirk-angelika.info/ Enter G851 in the search box to learn more about \"Back Pain: Care Instructions. \" Current as of: June 26, 2019Content Version: 12.4 © 8259-2813 Healthwise, Incorporated. Care instructions adapted under license by [employer] (which disclaims liability or warranty for this information). If you have questions about a medical condition or this instruction, always ask your healthcare professional. [address] any warranty or liability for your use of this information.

## 2020-05-27 NOTE — PROGRESS NOTES
Chief Complaint   Patient presents with    Hypertension     3 month follow up    Cholesterol Problem     Visit Vitals  /74 (BP 1 Location: Left arm, BP Patient Position: Sitting)   Pulse 76   Temp 97.5 °F (36.4 °C) (Oral)   Resp 18   Ht 5' 9\" (1.753 m)   Wt 177 lb 1.6 oz (80.3 kg)   SpO2 99%   BMI 26.15 kg/m²     1. Have you been to the ER, urgent care clinic since your last visit? Hospitalized since your last visit? ED [facility] 5/23/20 for flank pain    2. Have you seen or consulted any other health care providers outside of the [address] [place] [address] since your last visit? Include any pap smears or colon screening.  No

## 2020-06-01 ENCOUNTER — TELEPHONE (OUTPATIENT)
Dept: INTERNAL MEDICINE CLINIC | Age: 70
End: 2020-06-01

## 2020-06-01 DIAGNOSIS — M54.9 BACK PAIN, UNSPECIFIED BACK LOCATION, UNSPECIFIED BACK PAIN LATERALITY, UNSPECIFIED CHRONICITY: Primary | ICD-10-CM

## 2020-06-01 RX ORDER — TIZANIDINE HYDROCHLORIDE 4 MG/1
4 CAPSULE, GELATIN COATED ORAL 3 TIMES DAILY
Qty: 20 CAP | Refills: 0 | Status: SHIPPED | OUTPATIENT
Start: 2020-06-01 | End: 2020-06-08 | Stop reason: SDUPTHER

## 2020-06-01 NOTE — TELEPHONE ENCOUNTER
RX refill request from the patient/pharmacy. Patient last seen 05- with labs, and next appt. scheduled for 08-  Requested Prescriptions     Pending Prescriptions Disp Refills    tiZANidine (ZANAFLEX) 4 mg capsule 20 Cap 0     Sig: Take 1 Cap by mouth three (3) times daily. Sarbjit Mccarthy

## 2020-06-01 NOTE — TELEPHONE ENCOUNTER
Patient called to report that he continues with pain in his side radiating to his back. States the Zanaflex given to him at the ER is helping but making his drowsy. States Dr. Earnest Lyons mentioned having a MRI of the Lumbar spine to further evaluate. Discussed with Dr. Earnest Lyons and wants to have the patient scheduled for the MRI of the L spine without contrast and okay given to refill the Zanaflex.

## 2020-06-08 RX ORDER — TIZANIDINE HYDROCHLORIDE 4 MG/1
4 CAPSULE, GELATIN COATED ORAL 3 TIMES DAILY
Qty: 20 CAP | Refills: 0 | Status: SHIPPED | OUTPATIENT
Start: 2020-06-08 | End: 2020-07-01 | Stop reason: ALTCHOICE

## 2020-06-08 NOTE — TELEPHONE ENCOUNTER
RX refill request from the patient/pharmacy. Patient last seen 05- with labs, and next appt. scheduled for 08-  Requested Prescriptions     Pending Prescriptions Disp Refills    tiZANidine (ZANAFLEX) 4 mg capsule 20 Cap 0     Sig: Take 1 Cap by mouth three (3) times daily. Sasha Cardona

## 2020-06-21 ENCOUNTER — HOSPITAL ENCOUNTER (OUTPATIENT)
Dept: MRI IMAGING | Age: 70
Discharge: HOME | End: 2020-06-21
Attending: INTERNAL MEDICINE
Payer: MEDICARE

## 2020-06-21 DIAGNOSIS — M54.9 BACK PAIN, UNSPECIFIED BACK LOCATION, UNSPECIFIED BACK PAIN LATERALITY, UNSPECIFIED CHRONICITY: ICD-10-CM

## 2020-06-21 PROCEDURE — 72148 MRI LUMBAR SPINE W/O DYE: CPT

## 2020-06-23 ENCOUNTER — TELEPHONE (OUTPATIENT)
Dept: INTERNAL MEDICINE CLINIC | Age: 70
End: 2020-06-23

## 2020-06-23 NOTE — TELEPHONE ENCOUNTER
----- Message from Jewel Lefort, MD sent at 6/22/2020 10:46 PM EDT -----  DDD w/o significant stenosis

## 2020-06-23 NOTE — TELEPHONE ENCOUNTER
Informed patient regarding his DDD of his Lumbar spine without stenosis. Has two further questions:  What to do for the pain and what specialists should he see? Has been taking a muscle relaxant but not helping the problem.

## 2020-06-23 NOTE — PROGRESS NOTES
DDD w/o significant stenosis. Discussed with the patient. Wants further advice regarding what to do for the pain and what specialists should he see. Sent a message to Dr. Vivi Laboy regarding.

## 2020-06-24 RX ORDER — MELOXICAM 7.5 MG/1
7.5 TABLET ORAL DAILY
Qty: 30 TAB | Refills: 5 | Status: SHIPPED | OUTPATIENT
Start: 2020-06-24 | End: 2020-12-18

## 2020-06-24 NOTE — PROGRESS NOTES
Dr. Rica Zuluaga recommends Meloxicam 7.5 mg every day and Tylenol Arthritis prn. If no improvement needs an appointment with Dr. Neela Toribio.

## 2020-06-24 NOTE — TELEPHONE ENCOUNTER
Spoke to patient regarding message from Dr. Leoncio Hdez. Rx sent to patient's pharmacy for the Meloxicam 7.5 mg ordered. Discussed Tylenol Arthritis in addition to the anti inflammatory. F/up if no improvement for referral to Dr. Gideon Manley.

## 2020-07-01 ENCOUNTER — OFFICE VISIT (OUTPATIENT)
Dept: INTERNAL MEDICINE CLINIC | Age: 70
End: 2020-07-01

## 2020-07-01 VITALS
DIASTOLIC BLOOD PRESSURE: 82 MMHG | WEIGHT: 176.5 LBS | HEART RATE: 73 BPM | OXYGEN SATURATION: 100 % | BODY MASS INDEX: 26.14 KG/M2 | SYSTOLIC BLOOD PRESSURE: 138 MMHG | RESPIRATION RATE: 18 BRPM | TEMPERATURE: 98.4 F | HEIGHT: 69 IN

## 2020-07-01 DIAGNOSIS — M54.50 MIDLINE LOW BACK PAIN WITHOUT SCIATICA, UNSPECIFIED CHRONICITY: Primary | ICD-10-CM

## 2020-07-01 DIAGNOSIS — R10.11 RIGHT UPPER QUADRANT ABDOMINAL PAIN: ICD-10-CM

## 2020-07-01 NOTE — PATIENT INSTRUCTIONS
Back Pain: Care Instructions Your Care Instructions Back pain has many possible causes. It is often related to problems with muscles and ligaments of the back. It may also be related to problems with the nerves, discs, or bones of the back. Moving, lifting, standing, sitting, or sleeping in an awkward way can strain the back. Sometimes you don't notice the injury until later. Arthritis is another common cause of back pain. Although it may hurt a lot, back pain usually improves on its own within several weeks. Most people recover in 12 weeks or less. Using good home treatment and being careful not to stress your back can help you feel better sooner. Follow-up care is a key part of your treatment and safety. Be sure to make and go to all appointments, and call your doctor if you are having problems. It's also a good idea to know your test results and keep a list of the medicines you take. How can you care for yourself at home? · Sit or lie in positions that are most comfortable and reduce your pain. Try one of these positions when you lie down: ? Lie on your back with your knees bent and supported by large pillows. ? Lie on the floor with your legs on the seat of a sofa or chair. ? Lie on your side with your knees and hips bent and a pillow between your legs. ? Lie on your stomach if it does not make pain worse. · Do not sit up in bed, and avoid soft couches and twisted positions. Bed rest can help relieve pain at first, but it delays healing. Avoid bed rest after the first day of back pain. · Change positions every 30 minutes. If you must sit for long periods of time, take breaks from sitting. Get up and walk around, or lie in a comfortable position. · Try using a heating pad on a low or medium setting for 15 to 20 minutes every 2 or 3 hours. Try a warm shower in place of one session with the heating pad. · You can also try an ice pack for 10 to 15 minutes every 2 to 3 hours. Put a thin cloth between the ice pack and your skin. · Take pain medicines exactly as directed. ? If the doctor gave you a prescription medicine for pain, take it as prescribed. ? If you are not taking a prescription pain medicine, ask your doctor if you can take an over-the-counter medicine. · Take short walks several times a day. You can start with 5 to 10 minutes, 3 or 4 times a day, and work up to longer walks. Walk on level surfaces and avoid hills and stairs until your back is better. · Return to work and other activities as soon as you can. Continued rest without activity is usually not good for your back. · To prevent future back pain, do exercises to stretch and strengthen your back and stomach. Learn how to use good posture, safe lifting techniques, and proper body mechanics. When should you call for help? Call your doctor now or seek immediate medical care if: 
· You have new or worsening numbness in your legs. · You have new or worsening weakness in your legs. (This could make it hard to stand up.) · You lose control of your bladder or bowels. Watch closely for changes in your health, and be sure to contact your doctor if: 
· You have a fever, lose weight, or don't feel well. · You do not get better as expected. Where can you learn more? Go to http://kirk-angelika.info/ Enter V030 in the search box to learn more about \"Back Pain: Care Instructions. \" Current as of: March 2, 2020               Content Version: 12.5 © 8060-6899 Healthwise, Incorporated. Care instructions adapted under license by [employer] (which disclaims liability or warranty for this information). If you have questions about a medical condition or this instruction, always ask your healthcare professional. [place] [address] any warranty or liability for your use of this information.

## 2020-07-01 NOTE — PROGRESS NOTES
Subjective:   Olinda Watkins is a 71 y.o. male      Chief Complaint   Patient presents with    Flank Pain     right flank pain        History of present illness: He presents today still having some right flank pain for which she was actually seen little over a month ago and at that time a CT scan was obtained which did not reveal any acute abnormalities. He describes his pain as an upper abdominal pain that seems to go around to his right flank and he does think food makes it worse. He has been on Prilosec and has not sure that is really helped it. His other complaint seems to be related to some persistent low back pain for which he is also been evaluated with an MRI. That was reviewed today and revealed multilevel degenerative disc disease and facet arthritis. We have tried meloxicam which did not seem to help that. Of note is the meloxicam did not seem to make his abdominal discomfort any worse. He also has been taking Tylenol arthritis. He has previously had back surgery done by Dr. Fern Manley. He does note that the back pain now seems to be radiating little to his right leg which makes it even more concerning to him although he notes no weakness in her right leg. He notes no cardiorespiratory complaints. He notes no bowel or bladder dysfunction.     Patient Active Problem List   Diagnosis Code    Hypertension, renal disease I12.9    Mixed hyperlipidemia E78.2    ESRD (end stage renal disease) ([facility] [address] N18.6    Gastroesophageal reflux disease without esophagitis K21.9    Vitamin D deficiency E55.9    Gout of multiple sites M10.9    Chronic hepatitis C without hepatic coma (HCC) B18.2    Neck pain M54.2    Palpitations R00.2    Bruit of left carotid artery R09.89    Bilateral carotid artery stenosis I65.23    Right carotid bruit R09.89    Right acute serous otitis media H65.01    Epididymitis N45.1    Midline low back pain without sciatica M54.5    Anxiety F41.9    Chronic midline thoracic back pain M54.6, G89.29    Asymptomatic microscopic hematuria R31.21    Joint pain M25.50    Alcohol screening Z13.39    Right upper quadrant abdominal pain R10.11      Past Medical History:   Diagnosis Date    Arthritis     Chronic kidney disease     Dialysis T, Thur, Sat @ [facility]    Gout     Hepatitis C     Hypertension     Liver disease     Hep C    Other and unspecified alcohol dependence, unspecified drinking behavior     Acid reflux    Other ill-defined conditions(799.89)     gout    Other ill-defined conditions(799.89)     blood transfusion hx      Allergies   Allergen Reactions    Codeine Hives      History reviewed. No pertinent family history.    Social History     Socioeconomic History    Marital status: SINGLE     Spouse name: Not on file    Number of children: Not on file    Years of education: Not on file    Highest education level: Not on file   Occupational History    Not on file   Social Needs    Financial resource strain: Not on file    Food insecurity     Worry: Not on file     Inability: Not on file    Transportation needs     Medical: Not on file     Non-medical: Not on file   Tobacco Use    Smoking status: Never Smoker    Smokeless tobacco: Never Used   Substance and Sexual Activity    Alcohol use: No    Drug use: Not Currently     Types: Marijuana, Heroin     Comment: smoked pot 4 years ago, herion in 25s    Sexual activity: Not on file   Lifestyle    Physical activity     Days per week: Not on file     Minutes per session: Not on file    Stress: Not on file   Relationships    Social connections     Talks on phone: Not on file     Gets together: Not on file     Attends Jewish service: Not on file     Active member of club or organization: Not on file     Attends meetings of clubs or organizations: Not on file     Relationship status: Not on file    Intimate partner violence     Fear of current or ex partner: Not on file     Emotionally abused: Not on file     Physically abused: Not on file     Forced sexual activity: Not on file   Other Topics Concern    Not on file   Social History Narrative    Not on file     Prior to Admission medications    Medication Sig Start Date End Date Taking? Authorizing Provider   meloxicam (MOBIC) 7.5 mg tablet Take 1 Tab by mouth daily. 6/24/20  Yes Nuvia Garzon MD   carvediloL (COREG) 12.5 mg tablet Take 12.5 mg by mouth two (2) times daily (with meals). 3/27/20  Yes Provider, Historical   lidocaine 4 % patch 1 Patch by TransDERmal route every twelve (12) hours every twelve (12) hours. 5/23/20  Yes Earle Joyner MD   omeprazole (PRILOSEC) 40 mg capsule TAKE 1 CAPSULE DAILY 3/16/20  Yes Nuvia Garzon MD   LORazepam (ATIVAN) 0.5 mg tablet Take 1 Tab by mouth two (2) times daily as needed for Anxiety. Max Daily Amount: 1 mg. 10/28/19  Yes Nuvia Garzon MD   lisinopril (PRINIVIL, ZESTRIL) 20 mg tablet Take 1 Tab by mouth daily. 10/7/19  Yes Nuvia Garzon MD   etelcalcetide (PARSABIV) 5 mg/mL soln by IntraVENous route. Indications: 3 x per week   Yes Provider, Historical   amLODIPine (NORVASC) 10 mg tablet TAKE 1 TABLET BY MOUTH ONCE DAILY 10/11/17  Yes Provider, Historical   sevelamer carbonate (RENVELA) 800 mg Tab tab Take 1,600 mg by mouth three (3) times daily (with meals). Yes Provider, Historical        Review of Systems              Constitutional:  He denies fever, weight loss, sweats or fatigue. EYES: No blurred or double vision,               ENT: no nasal congestion, no headache or dizziness. No difficulty with               swallowing, taste, speech or smell. Respiratory:  No cough, wheezing or shortness of breath. No sputum production. Cardiac:  Denies chest pain, palpitations, unexplained indigestion, syncope, edema, PND or orthopnea. GI:  No changes in bowel movements, no bloating, anorexia, nausea, vomiting or heartburn.   Positive upper abdominal pain radiating to right flank seems to be worse with food. :  No frequency or dysuria. Denies incontinence or sexual dysfunction. Extremities:  No joint pain, stiffness or swelling  Back:. Positive the low back pain and with some radiation to the right leg  Skin:  No recent rashes or mole changes. Neurological:  No numbness, tingling, burning paresthesias or loss of motor strength. No syncope, dizziness, frequent headaches or memory loss. Hematologic:  No easy bruising  Lymphatic: No lymph node enlargement    Objective:     Vitals:    07/01/20 1608 07/01/20 1630   BP: 158/80 138/82   Pulse: 73    Resp: 18    Temp: 98.4 °F (36.9 °C)    TempSrc: Oral    SpO2: 100%    Weight: 176 lb 8 oz (80.1 kg)    Height: 5' 9\" (1.753 m)    PainSc:   8    PainLoc: Flank        Body mass index is 26.06 kg/m². Physical Examination:              General Appearance:  Well-developed, well-nourished, no acute distress. HEENT:      Ears:  The TMs and ear canals were clear. Eyes:  The pupillary responses were normal.  Extraocular muscle function intact. Lids and conjunctiva not injected. Funduscopic exam revealed sharp disc margins. Nares: Clear w/o edema or erythema  Pharynx:  Clear with teeth in good repair. No masses were noted. Neck:  Supple without thyromegaly or adenopathy. No JVD noted. No carotid                bruits. Lungs:  Clear to auscultation and percussion. Cardiac:  Regular rate and rhythm without murmur. PMI not displaced. No gallop, rub or click. Abdominal: Soft, mild right upper abdominal discomfort with no rebound, no hepata-spleenomegally or masses  Extremities:  No clubbing, cyanosis or edema. Skin:  No rash or unusual mole changes noted. Lymph Nodes:  None felt in the cervical, supraclavicular, axillary or inguinal region. Neurological: . DTRs 2+ and symmetric. Station and gait normal.   Hematologic:   No purpura or petechiae        Assessment/Plan:         1.  Midline low back pain without sciatica, unspecified chronicity    2. Right upper quadrant abdominal pain        Impressions/Plan:  Impression  1. Low back pain with known degenerative disc disease now by MRI which. He has previously been evaluated and treated for problems by Dr. Ayla Merrill so I will send him back for neurosurgical evaluation to Dr. Ayla Merrill. 2.  Right upper abdominal pain CT scan reviewed today and did not reveal any acute abnormalities. Food seems to make this worse I think he needs an EGD and thus I will set him up appointment for evaluation with gastroenterology. Recheck as previously scheduled for other medical problems or sooner should these problems progress before being seen by the specialist which referrals were made today. Orders Placed This Encounter    Roderick Hernandez [address] [facility] Neurosurgery [facility]           No results found for any visits on 07/01/20. Candido Orourke MD    The patient was given after the visit summary the patient verbalized an understanding of the plans and problems as explained.

## 2020-07-01 NOTE — PROGRESS NOTES
Chief Complaint   Patient presents with    Flank Pain     right flank pain     Visit Vitals  /80 (BP 1 Location: Left arm, BP Patient Position: Sitting)   Pulse 73   Temp 98.4 °F (36.9 °C) (Oral)   Resp 18   Ht 5' 9\" (1.753 m)   Wt 176 lb 8 oz (80.1 kg)   SpO2 100%   BMI 26.06 kg/m²     1. Have you been to the ER, urgent care clinic since your last visit? Hospitalized since your last visit? No    2. Have you seen or consulted any other health care providers outside of the [address] [place] [address] since your last visit? Include any pap smears or colon screening.  No

## 2020-07-06 RX ORDER — TIZANIDINE 4 MG/1
4 TABLET ORAL
Qty: 30 TAB | Refills: 0 | Status: SHIPPED | OUTPATIENT
Start: 2020-07-06 | End: 2020-09-17 | Stop reason: SDUPTHER

## 2020-07-06 NOTE — TELEPHONE ENCOUNTER
RX refill request from the patient/pharmacy. Patient last seen 07- with labs, and next appt. scheduled for 08-  Requested Prescriptions     Pending Prescriptions Disp Refills    tiZANidine (ZANAFLEX) 4 mg tablet 30 Tab 0     Sig: Take 1 Tab by mouth three (3) times daily as needed for Muscle Spasm(s).    Yonatan Brock

## 2020-07-24 ENCOUNTER — HOSPITAL ENCOUNTER (OUTPATIENT)
Dept: PREADMISSION TESTING | Age: 70
Discharge: HOME | End: 2020-07-24
Payer: MEDICARE

## 2020-07-24 PROCEDURE — 87635 SARS-COV-2 COVID-19 AMP PRB: CPT

## 2020-07-25 LAB
SARS-COV-2, COV2NT: NOT DETECTED
SOURCE, COVRS: NORMAL
SPECIMEN SOURCE, FCOV2M: NORMAL

## 2020-07-29 ENCOUNTER — ANESTHESIA (OUTPATIENT)
Dept: ENDOSCOPY | Age: 70
End: 2020-07-29
Payer: MEDICARE

## 2020-07-29 ENCOUNTER — HOSPITAL ENCOUNTER (OUTPATIENT)
Age: 70
Setting detail: OUTPATIENT SURGERY
Discharge: HOME | End: 2020-07-29
Attending: INTERNAL MEDICINE | Admitting: INTERNAL MEDICINE
Payer: MEDICARE

## 2020-07-29 ENCOUNTER — ANESTHESIA EVENT (OUTPATIENT)
Dept: ENDOSCOPY | Age: 70
End: 2020-07-29
Payer: MEDICARE

## 2020-07-29 VITALS
BODY MASS INDEX: 26.13 KG/M2 | SYSTOLIC BLOOD PRESSURE: 142 MMHG | HEIGHT: 69 IN | OXYGEN SATURATION: 99 % | RESPIRATION RATE: 16 BRPM | HEART RATE: 72 BPM | DIASTOLIC BLOOD PRESSURE: 74 MMHG | TEMPERATURE: 98.6 F | WEIGHT: 176.44 LBS

## 2020-07-29 PROCEDURE — 74011000250 HC RX REV CODE- 250

## 2020-07-29 PROCEDURE — 77030019988 HC FCPS ENDOSC DISP BSC -B: Performed by: INTERNAL MEDICINE

## 2020-07-29 PROCEDURE — 76060000031 HC ANESTHESIA FIRST 0.5 HR: Performed by: INTERNAL MEDICINE

## 2020-07-29 PROCEDURE — 74011250636 HC RX REV CODE- 250/636: Performed by: INTERNAL MEDICINE

## 2020-07-29 PROCEDURE — 74011250636 HC RX REV CODE- 250/636

## 2020-07-29 PROCEDURE — 76040000019: Performed by: INTERNAL MEDICINE

## 2020-07-29 PROCEDURE — 88305 TISSUE EXAM BY PATHOLOGIST: CPT

## 2020-07-29 PROCEDURE — 80047 BASIC METABLC PNL IONIZED CA: CPT

## 2020-07-29 RX ORDER — SODIUM CHLORIDE 0.9 % (FLUSH) 0.9 %
5-40 SYRINGE (ML) INJECTION AS NEEDED
Status: DISCONTINUED | OUTPATIENT
Start: 2020-07-29 | End: 2020-07-29 | Stop reason: HOSPADM

## 2020-07-29 RX ORDER — NALOXONE HYDROCHLORIDE 0.4 MG/ML
0.4 INJECTION, SOLUTION INTRAMUSCULAR; INTRAVENOUS; SUBCUTANEOUS
Status: DISCONTINUED | OUTPATIENT
Start: 2020-07-29 | End: 2020-07-29 | Stop reason: HOSPADM

## 2020-07-29 RX ORDER — LIDOCAINE HYDROCHLORIDE 20 MG/ML
INJECTION, SOLUTION EPIDURAL; INFILTRATION; INTRACAUDAL; PERINEURAL AS NEEDED
Status: DISCONTINUED | OUTPATIENT
Start: 2020-07-29 | End: 2020-07-29 | Stop reason: HOSPADM

## 2020-07-29 RX ORDER — EPINEPHRINE 0.1 MG/ML
1 INJECTION INTRACARDIAC; INTRAVENOUS
Status: DISCONTINUED | OUTPATIENT
Start: 2020-07-29 | End: 2020-07-29 | Stop reason: HOSPADM

## 2020-07-29 RX ORDER — SODIUM CHLORIDE 9 MG/ML
75 INJECTION, SOLUTION INTRAVENOUS CONTINUOUS
Status: DISCONTINUED | OUTPATIENT
Start: 2020-07-29 | End: 2020-07-29 | Stop reason: HOSPADM

## 2020-07-29 RX ORDER — DEXTROMETHORPHAN/PSEUDOEPHED 2.5-7.5/.8
1.2 DROPS ORAL
Status: DISCONTINUED | OUTPATIENT
Start: 2020-07-29 | End: 2020-07-29 | Stop reason: HOSPADM

## 2020-07-29 RX ORDER — FLUMAZENIL 0.1 MG/ML
0.2 INJECTION INTRAVENOUS
Status: DISCONTINUED | OUTPATIENT
Start: 2020-07-29 | End: 2020-07-29 | Stop reason: HOSPADM

## 2020-07-29 RX ORDER — ATROPINE SULFATE 0.1 MG/ML
0.5 INJECTION INTRAVENOUS
Status: DISCONTINUED | OUTPATIENT
Start: 2020-07-29 | End: 2020-07-29 | Stop reason: HOSPADM

## 2020-07-29 RX ORDER — SODIUM CHLORIDE 0.9 % (FLUSH) 0.9 %
5-40 SYRINGE (ML) INJECTION EVERY 8 HOURS
Status: DISCONTINUED | OUTPATIENT
Start: 2020-07-29 | End: 2020-07-29 | Stop reason: HOSPADM

## 2020-07-29 RX ORDER — PROPOFOL 10 MG/ML
INJECTION, EMULSION INTRAVENOUS AS NEEDED
Status: DISCONTINUED | OUTPATIENT
Start: 2020-07-29 | End: 2020-07-29 | Stop reason: HOSPADM

## 2020-07-29 RX ADMIN — LIDOCAINE HYDROCHLORIDE 100 MG: 20 INJECTION, SOLUTION EPIDURAL; INFILTRATION; INTRACAUDAL; PERINEURAL at 15:40

## 2020-07-29 RX ADMIN — PROPOFOL 150 MG: 10 INJECTION, EMULSION INTRAVENOUS at 15:50

## 2020-07-29 RX ADMIN — SODIUM CHLORIDE 75 ML/HR: 900 INJECTION, SOLUTION INTRAVENOUS at 15:00

## 2020-07-29 NOTE — PERIOP NOTES
Endoscope was pre-cleaned at bedside immediately following procedure by XAVIER Hurd    Anesthesia reports 150mg Propofol, 100mg Lidocaine and 225mL NS given during procedure. Received report from anesthesia staff on vital signs and status of patient.

## 2020-07-29 NOTE — ANESTHESIA PREPROCEDURE EVALUATION
Anesthetic History   No history of anesthetic complications            Review of Systems / Medical History  Patient summary reviewed, nursing notes reviewed and pertinent labs reviewed    Pulmonary  Within defined limits                 Neuro/Psych         Psychiatric history    Comments: Anxiety Cardiovascular    Hypertension: well controlled          PAD    Exercise tolerance: >4 METS  Comments: Bilateral carotid artery stenosis    GI/Hepatic/Renal     GERD: poorly controlled  Hepatitis: type C  Renal disease: ESRD and dialysis  Liver disease    Comments: Hepatitis C Endo/Other        Arthritis     Other Findings   Comments: Gout   Hx Iritis   Hx Asymptomatic microscopic hematuria  Hx Joint pain            Physical Exam    Airway  Mallampati: III  TM Distance: > 6 cm  Neck ROM: normal range of motion   Mouth opening: Normal     Cardiovascular  Regular rate and rhythm,  S1 and S2 normal,  no murmur, click, rub, or gallop             Dental      Comments: Several missing teeth - denies any loose.    Pulmonary  Breath sounds clear to auscultation               Abdominal  GI exam deferred       Other Findings            Anesthetic Plan    ASA: 4  Anesthesia type: total IV anesthesia and general          Induction: Intravenous  Anesthetic plan and risks discussed with: Patient      Propofol MAC

## 2020-07-29 NOTE — DISCHARGE INSTRUCTIONS
Yudith James  [id number]  1950    EGD DISCHARGE INSTRUCTIONS  Discomfort:  Sore throat- throat lozenges or warm salt water gargle  redness at IV site- apply warm compress to area; if redness or soreness persist- contact your physician  Gaseous discomfort- walking, belching will help relieve any discomfort  You may not operate a vehicle for 12 hours  You may not engage in an occupation involving machinery or appliances for rest of today  You may not drink alcoholic beverages for at least 12 hours  Avoid making any critical decisions for at least 24 hour  DIET    You may resume your regular diet - however -  remember your colon is empty and a heavy meal will produce gas. Avoid these foods:  vegetables, fried / greasy foods, carbonated drinks    MEDICATIONS:  Per Medication Reconciliation      ACTIVITY  You may resume your normal daily activities until tomorrow AM;  Spend the remainder of the day resting -  avoid any strenuous activity. CALL M.D. ANY SIGN OF   Increasing pain, nausea, vomiting  Abdominal distension (swelling)  New increased bleeding (oral or rectal)  Fever (chills)  Pain in chest area  Bloody discharge from nose or mouth  Shortness of breath    You may take any Advil, Aspirin, Ibuprofen, Motrin, Aleve, or Goodys for 10 days, ONLY  Tylenol as needed for pain. IMPRESSION:  Impression:    1. Normal proximal, mid, and distal esophagus  2. Mild, diffuse gastropathy in body and antrum. Biopsied  3. Stomach otherwise normal, including retroflexion  4. Normal duodenal bulb and 2nd portion of the duodenum. Biopsied    Recommendations:  1. Follow up pathology  2.  Proceed with HIDA as planned    Follow-up Instructions:   Call Dr. Darylene Guerin for the results of procedure / biopsy in 7-10 days   Telephone # [phone]    Jim Lowe MD

## 2020-07-29 NOTE — PROCEDURES
NAME:  Ulysses Lenis   :   1950   MRN:   [id number]     Date/Time:  2020 3:47 PM    Esophagogastroduodenoscopy (EGD) Procedure Note    Procedure: Esophagogastroduodenoscopy with biopsy    Indication:  Abdominal pain, RUQ  Pre-operative Diagnosis: see indication above  Post-operative Diagnosis: see findings below  :  Devika Peres MD  Referring Provider:   --Salo Scott MD    Exam:  Airway: clear, no airway problems anticipated  Heart: RRR, without gallops or rubs  Lungs: clear bilaterally without wheezes, crackles, or rhonchi  Abdomen: soft, nontender, nondistended, bowel sounds present  Mental Status: awake, alert and oriented to person, place and time     Anethesia/Sedation:  MAC anesthesia Propofol  Procedure Details   After informed consent was obtained for the procedure, with all risks and benefits of procedure explained the patient was taken to the endoscopy suite and placed in the left lateral decubitus position. Following sequential administration of sedation as per above, the [id number] gastroscope was inserted into the mouth and advanced under direct vision to third portion of the duodenum. A careful inspection was made as the gastroscope was withdrawn, including a retroflexed view of the proximal stomach; findings and interventions are described below. Findings:   1. Normal proximal, mid, and distal esophagus  2. Mild, diffuse gastropathy in body and antrum. Biopsied  3. Stomach otherwise normal, including retroflexion  4. Normal duodenal bulb and 2nd portion of the duodenum. Biopsied    Therapies:  1. Biopsies    Specimens: 1. Duodenal 2. Gastric    EBL:  None. Complications:   None; patient tolerated the procedure well. Impression:    1. Normal proximal, mid, and distal esophagus  2. Mild, diffuse gastropathy in body and antrum. Biopsied  3. Stomach otherwise normal, including retroflexion  4.  Normal duodenal bulb and 2nd portion of the duodenum. Biopsied    Recommendations:  1. Follow up pathology  2.  Proceed with HIDA as planned    Discharge disposition:  Home in the company of  when able to ambulate    Ozzie Srinivasan MD

## 2020-07-29 NOTE — ANESTHESIA POSTPROCEDURE EVALUATION
Procedure(s):  ENDOSCOPY (EGD)  ESOPHAGOGASTRODUODENAL (EGD) BIOPSY. total IV anesthesia    Anesthesia Post Evaluation        Patient location during evaluation: PACU  Note status: Adequate. Level of consciousness: responsive to verbal stimuli and sleepy but conscious  Pain management: satisfactory to patient  Airway patency: patent  Anesthetic complications: no  Cardiovascular status: acceptable  Respiratory status: acceptable  Hydration status: acceptable  Comments: +Post-Anesthesia Evaluation and Assessment    Patient: Sunny Duque MRN: [id number]  SSN: [id number]   YOB: 1950  Age: 71 y.o. Sex: male      Cardiovascular Function/Vital Signs    /53   Pulse 72   Temp 36.9 °C (98.5 °F)   Resp 18   Ht 5' 9\" (1.753 m)   Wt 80 kg (176 lb 7 oz)   SpO2 100%   BMI 26.06 kg/m²     Patient is status post Procedure(s):  ENDOSCOPY (EGD)  ESOPHAGOGASTRODUODENAL (EGD) BIOPSY. Nausea/Vomiting: Controlled. Postoperative hydration reviewed and adequate. Pain:  Pain Scale 1: Visual (07/29/20 1553)  Pain Intensity 1: 0 (07/29/20 1553)   Managed. Neurological Status: At baseline. Mental Status and Level of Consciousness: Arousable. Pulmonary Status:   O2 Device: Room air (07/29/20 1553)   Adequate oxygenation and airway patent. Complications related to anesthesia: None    Post-anesthesia assessment completed. No concerns.     Signed By: Jo Merlos MD    7/29/2020  Post anesthesia nausea and vomiting:  controlled      INITIAL Post-op Vital signs:   Vitals Value Taken Time   /53 7/29/2020  3:53 PM   Temp     Pulse 72 7/29/2020  3:53 PM   Resp 18 7/29/2020  3:53 PM   SpO2 100 % 7/29/2020  3:53 PM

## 2020-07-30 LAB
ANION GAP BLD CALC-SCNC: 17 MMOL/L (ref 10–20)
BUN BLD-MCNC: 39 MG/DL (ref 9–20)
CA-I BLD-MCNC: 1.16 MMOL/L (ref 1.12–1.32)
CHLORIDE BLD-SCNC: 101 MMOL/L (ref 98–107)
CO2 BLD-SCNC: 27 MMOL/L (ref 21–32)
CREAT BLD-MCNC: 9.1 MG/DL (ref 0.6–1.3)
GLUCOSE BLD-MCNC: 88 MG/DL (ref 65–100)
HCT VFR BLD CALC: 32 % (ref 36.6–50.3)
POTASSIUM BLD-SCNC: 4.4 MMOL/L (ref 3.5–5.1)
SERVICE CMNT-IMP: ABNORMAL
SODIUM BLD-SCNC: 139 MMOL/L (ref 136–145)

## 2020-08-14 ENCOUNTER — HOSPITAL ENCOUNTER (OUTPATIENT)
Dept: NUCLEAR MEDICINE | Age: 70
Discharge: HOME | End: 2020-08-14
Attending: INTERNAL MEDICINE
Payer: MEDICARE

## 2020-08-14 VITALS — WEIGHT: 169.75 LBS | BODY MASS INDEX: 25.07 KG/M2

## 2020-08-14 DIAGNOSIS — Z12.11 COLON CANCER SCREENING: ICD-10-CM

## 2020-08-14 DIAGNOSIS — R10.11 RUQ ABDOMINAL PAIN: ICD-10-CM

## 2020-08-14 PROCEDURE — 78227 HEPATOBIL SYST IMAGE W/DRUG: CPT

## 2020-08-14 PROCEDURE — 74011250636 HC RX REV CODE- 250/636: Performed by: INTERNAL MEDICINE

## 2020-08-14 RX ADMIN — SINCALIDE 1.54 MCG: 5 INJECTION, POWDER, LYOPHILIZED, FOR SOLUTION INTRAVENOUS at 12:33

## 2020-08-28 ENCOUNTER — HOSPITAL ENCOUNTER (OUTPATIENT)
Dept: CT IMAGING | Age: 70
Discharge: HOME | End: 2020-08-28
Attending: INTERNAL MEDICINE
Payer: MEDICARE

## 2020-08-28 DIAGNOSIS — R10.11 RUQ ABDOMINAL PAIN: ICD-10-CM

## 2020-08-28 PROCEDURE — 74011000255 HC RX REV CODE- 255: Performed by: INTERNAL MEDICINE

## 2020-08-28 PROCEDURE — 74011000636 HC RX REV CODE- 636: Performed by: INTERNAL MEDICINE

## 2020-08-28 PROCEDURE — 74177 CT ABD & PELVIS W/CONTRAST: CPT

## 2020-08-28 RX ORDER — BARIUM SULFATE 20 MG/ML
900 SUSPENSION ORAL
Status: COMPLETED | OUTPATIENT
Start: 2020-08-28 | End: 2020-08-28

## 2020-08-28 RX ORDER — SODIUM CHLORIDE 0.9 % (FLUSH) 0.9 %
10 SYRINGE (ML) INJECTION
Status: COMPLETED | OUTPATIENT
Start: 2020-08-28 | End: 2020-08-28

## 2020-08-28 RX ADMIN — IOPAMIDOL 100 ML: 755 INJECTION, SOLUTION INTRAVENOUS at 08:27

## 2020-08-28 RX ADMIN — BARIUM SULFATE 900 ML: 21 SUSPENSION ORAL at 08:27

## 2020-08-28 RX ADMIN — Medication 10 ML: at 08:27

## 2020-09-10 NOTE — PROGRESS NOTES
Chief Complaint   Patient presents with    Hypertension     3 month follow up    Cholesterol Problem       SUBJECTIVE:    Jeramy Zendejas is a 71 y.o. male who returns in follow-up for his hypertension, hyperlipidemia, GERD, DJD, chronic hepatitis C, end-stage renal disease for which he is on dialysis and other multiple medical problems. He still having some right abdominal/flank pain and has had extensive negative work-up by Dr. Estrada Abrams as well as in June he had an MRI of his lumbar spine done by me which only showed mild degenerative disc disease but no acute findings. He has been noting that the Zanaflex and meloxicam take the edge off but do not completely cleared his symptoms up. He notes no GI or  complaints. He notes no chest pain, shortness of breath, palpitations, PND, orthopnea or or other cardiac respiratory complaints. He has no headaches, dizziness or neurologic complaints. He has no current arthritic complaints and less that right flank abdominal discomfort is related to a arthritic process from his back. He has no other complaints on review of systems. .  Is persistent      Current Outpatient Medications   Medication Sig Dispense Refill    cholecalciferol (Vitamin D3) 25 mcg (1,000 unit) cap Take  by mouth daily.  tiZANidine (ZANAFLEX) 4 mg tablet Take 1 Tab by mouth three (3) times daily as needed for Muscle Spasm(s). 30 Tab 0    meloxicam (MOBIC) 7.5 mg tablet Take 1 Tab by mouth daily. 30 Tab 5    carvediloL (COREG) 12.5 mg tablet Take 25 mg by mouth two (2) times daily (with meals).  lidocaine 4 % patch 1 Patch by TransDERmal route every twelve (12) hours every twelve (12) hours. (Patient taking differently: 1 Patch by TransDERmal route as needed.) 10 Patch 0    omeprazole (PRILOSEC) 40 mg capsule TAKE 1 CAPSULE DAILY 90 Cap 3    LORazepam (ATIVAN) 0.5 mg tablet Take 1 Tab by mouth two (2) times daily as needed for Anxiety.  Max Daily Amount: 1 mg. 30 Tab 2    lisinopril (PRINIVIL, ZESTRIL) 20 mg tablet Take 1 Tab by mouth daily. (Patient taking differently: Take 40 mg by mouth daily.) 30 Tab prn    etelcalcetide (PARSABIV) 5 mg/mL soln by IntraVENous route. Indications: 3 x per week      amLODIPine (NORVASC) 10 mg tablet TAKE 1 TABLET BY MOUTH ONCE DAILY  11    sevelamer carbonate (RENVELA) 800 mg Tab tab Take 1,600 mg by mouth three (3) times daily (with meals). Past Medical History:   Diagnosis Date    Arthritis     RA    Chronic kidney disease     Dialysis T, Thur, Sat @ [facility]    GERD (gastroesophageal reflux disease)     Gout     Hepatitis C     Hypertension     Iritis     Liver disease     Hep C    Other and unspecified alcohol dependence, unspecified drinking behavior     Acid reflux    Other ill-defined conditions(799.89)     blood transfusion hx     Past Surgical History:   Procedure Laterality Date    HX ENDOSCOPY  07/29/2020    HX OTHER SURGICAL      liver biopsy    HX VASCULAR ACCESS      [facility] / [address]    HX VASCULAR ACCESS  7/15/13    CREATION LEFT UPPER ARM BASILIC VEIN TRANSPOSITION    HX VASCULAR ACCESS      [facility]     Allergies   Allergen Reactions    Codeine Hives       REVIEW OF SYSTEMS:  General: negative for - chills or fever, or weight loss or gain  ENT: negative for - headaches, nasal congestion or tinnitus  Eyes: no blurred or visual changes  Neck: No stiffness or swollen nodes  Respiratory: negative for - cough, hemoptysis, shortness of breath or wheezing  Cardiovascular : negative for - chest pain, edema, palpitations or shortness of breath  Gastrointestinal: negative for - abdominal pain, blood in stools, heartburn or nausea/vomiting.   Persistent abdominal/flank discomfort  Genito-Urinary: no dysuria, trouble voiding, or hematuria  Musculoskeletal: negative for - gait disturbance, joint pain, joint stiffness or joint swelling  Neurological: no TIA or stroke symptoms  Hematologic: no bruises, no bleeding  Lymphatic: no swollen glands  Integument: no lumps, mole changes, nail changes or rash  Endocrine:no malaise/lethargy poly uria or polydipsia or unexpected weight changes        Social History     Socioeconomic History    Marital status: SINGLE     Spouse name: Not on file    Number of children: Not on file    Years of education: Not on file    Highest education level: Not on file   Tobacco Use    Smoking status: Never Smoker    Smokeless tobacco: Never Used   Substance and Sexual Activity    Alcohol use: No    Drug use: Not Currently     Types: Marijuana, Heroin     Comment: smoked pot 4 years ago, herion in 25s     History reviewed. No pertinent family history. OBJECTIVE:     Visit Vitals  /78 (BP 1 Location: Left arm, BP Patient Position: Sitting)   Pulse 65   Temp 98.3 °F (36.8 °C) (Oral)   Resp 17   Ht 5' 9\" (1.753 m)   Wt 175 lb 6.4 oz (79.6 kg)   SpO2 98%   BMI 25.90 kg/m²     CONSTITUTIONAL:   well nourished, appears age appropriate  EYES: sclera anicteric, PERRL, EOMI  ENMT:nares clear, moist mucous membranes, pharynx clear  NECK: supple. Thyroid normal, No JVD or bruits  RESPIRATORY: Chest: clear to ascultation and percussion, normal inspiratory effort  CARDIOVASCULAR: Heart: regular rate and rhythm no murmurs, rubs or gallops, PMI not displaced, No thrills, no peripheral edema  GASTROINTESTINAL: Abdomen: non distended, soft, non tender, bowel sounds normal  HEMATOLOGIC: no purpura, petechiae or bruising  LYMPHATIC: No lymph node enlargemant  MUSCULOSKELETAL: Extremities: no active synovitis, pulse 1+   INTEGUMENT: No unusual rashes or suspicious skin lesions noted. Nails appear normal.  PERIPHERAL VASCULAR: normal pulses femoral, PT and DP  NEUROLOGIC: non-focal exam, A & O X 3  PSYCHIATRIC:, appropriate affect     ASSESSMENT:   1. Hypertension, renal disease    2. Mixed hyperlipidemia    3. Gastroesophageal reflux disease without esophagitis    4.  ESRD (end stage renal disease) ([facility] [address]      Impression  1. Hypertension that is controlled so continue current therapy reviewed with him. 2.  Hyperlipidemia prior lab reviewed and repeat status pending and I will adjust if needed. 3.  GERD that is stable   4. End-stage renal disease that seems to be stable on dialysis  5. The flank discomfort continues with abdominal discomfort in the next evaluation might be considering a neurosurgical evaluation and see maybe if they think a epidural cortisone shot may be of benefit. I will call with lab and make further recommendations or adjustments if necessary. Follow-up scheduled for 3 months or sooner if there is a problem. PLAN:  .  Orders Placed This Encounter    METABOLIC PANEL, COMPREHENSIVE ([facility])    LIPID PANEL ([facility])    CK ([facility])    cholecalciferol (Vitamin D3) 25 mcg (1,000 unit) cap         ATTENTION:   This medical record was transcribed using an electronic medical records system. Although proofread, it may and can contain electronic and spelling errors. Other human spelling and other errors may be present. Corrections may be executed at a later time. Please feel free to contact us for any clarifications as needed. Follow-up and Dispositions    · Return in about 3 months (around 12/11/2020). No results found for any visits on 09/11/20. Eddie Gray MD    The patient verbalized understanding of the problems and plans as explained.

## 2020-09-11 ENCOUNTER — OFFICE VISIT (OUTPATIENT)
Dept: INTERNAL MEDICINE CLINIC | Age: 70
End: 2020-09-11
Payer: MEDICARE

## 2020-09-11 VITALS
HEIGHT: 69 IN | TEMPERATURE: 98.3 F | BODY MASS INDEX: 25.98 KG/M2 | WEIGHT: 175.4 LBS | DIASTOLIC BLOOD PRESSURE: 78 MMHG | HEART RATE: 65 BPM | OXYGEN SATURATION: 98 % | RESPIRATION RATE: 17 BRPM | SYSTOLIC BLOOD PRESSURE: 134 MMHG

## 2020-09-11 DIAGNOSIS — I12.9 HYPERTENSION, RENAL DISEASE: Primary | ICD-10-CM

## 2020-09-11 DIAGNOSIS — N18.6 ESRD (END STAGE RENAL DISEASE) (HCC): ICD-10-CM

## 2020-09-11 DIAGNOSIS — E78.2 MIXED HYPERLIPIDEMIA: ICD-10-CM

## 2020-09-11 DIAGNOSIS — K21.9 GASTROESOPHAGEAL REFLUX DISEASE WITHOUT ESOPHAGITIS: ICD-10-CM

## 2020-09-11 LAB
A-G RATIO,AGRAT: 1.1 RATIO
ALBUMIN SERPL-MCNC: 4.6 G/DL (ref 3.9–5.4)
ALP SERPL-CCNC: 72 U/L (ref 38–126)
ALT SERPL-CCNC: 13 U/L (ref 0–50)
ANION GAP SERPL CALC-SCNC: 12 MMOL/L
AST SERPL W P-5'-P-CCNC: 28 U/L (ref 14–36)
BILIRUB SERPL-MCNC: 0.8 MG/DL (ref 0.2–1.3)
BUN SERPL-MCNC: 27 MG/DL (ref 9–20)
BUN/CREATININE RATIO,BUCR: 3 RATIO
CALCIUM SERPL-MCNC: 8.8 MG/DL (ref 8.4–10.2)
CHLORIDE SERPL-SCNC: 99 MMOL/L (ref 98–107)
CHOL/HDL RATIO,CHHD: 3 RATIO (ref 0–4)
CHOLEST SERPL-MCNC: 192 MG/DL (ref 0–200)
CK SERPL-CCNC: 195 U/L (ref 30–135)
CO2 SERPL-SCNC: 31 MMOL/L (ref 22–32)
CREAT SERPL-MCNC: 8 MG/DL (ref 0.8–1.5)
GLOBULIN,GLOB: 4.1
GLUCOSE SERPL-MCNC: 103 MG/DL (ref 75–110)
HDLC SERPL-MCNC: 73 MG/DL (ref 35–130)
LDL/HDL RATIO,LDHD: 1 RATIO
LDLC SERPL CALC-MCNC: 105 MG/DL (ref 0–130)
POTASSIUM SERPL-SCNC: 4.9 MMOL/L (ref 3.6–5)
PROT SERPL-MCNC: 8.7 G/DL (ref 6.3–8.2)
SODIUM SERPL-SCNC: 142 MMOL/L (ref 137–145)
TRIGL SERPL-MCNC: 70 MG/DL (ref 0–200)
VLDLC SERPL CALC-MCNC: 14 MG/DL

## 2020-09-11 PROCEDURE — 82550 ASSAY OF CK (CPK): CPT | Performed by: INTERNAL MEDICINE

## 2020-09-11 PROCEDURE — 1101F PT FALLS ASSESS-DOCD LE1/YR: CPT | Performed by: INTERNAL MEDICINE

## 2020-09-11 PROCEDURE — 3017F COLORECTAL CA SCREEN DOC REV: CPT | Performed by: INTERNAL MEDICINE

## 2020-09-11 PROCEDURE — G8417 CALC BMI ABV UP PARAM F/U: HCPCS | Performed by: INTERNAL MEDICINE

## 2020-09-11 PROCEDURE — G8536 NO DOC ELDER MAL SCRN: HCPCS | Performed by: INTERNAL MEDICINE

## 2020-09-11 PROCEDURE — 99214 OFFICE O/P EST MOD 30 MIN: CPT | Performed by: INTERNAL MEDICINE

## 2020-09-11 PROCEDURE — G8510 SCR DEP NEG, NO PLAN REQD: HCPCS | Performed by: INTERNAL MEDICINE

## 2020-09-11 PROCEDURE — 80053 COMPREHEN METABOLIC PANEL: CPT | Performed by: INTERNAL MEDICINE

## 2020-09-11 PROCEDURE — 80061 LIPID PANEL: CPT | Performed by: INTERNAL MEDICINE

## 2020-09-11 PROCEDURE — G8427 DOCREV CUR MEDS BY ELIG CLIN: HCPCS | Performed by: INTERNAL MEDICINE

## 2020-09-11 RX ORDER — GLUCOSAMINE SULFATE 1500 MG
POWDER IN PACKET (EA) ORAL DAILY
COMMUNITY

## 2020-09-11 NOTE — PROGRESS NOTES
Chief Complaint   Patient presents with    Hypertension     3 month follow up    Cholesterol Problem     Visit Vitals  /78 (BP 1 Location: Left arm, BP Patient Position: Sitting)   Pulse 65   Temp 98.3 °F (36.8 °C) (Oral)   Resp 17   Ht 5' 9\" (1.753 m)   Wt 175 lb 6.4 oz (79.6 kg)   SpO2 98%   BMI 25.90 kg/m²     1. Have you been to the ER, urgent care clinic since your last visit? Hospitalized since your last visit? No    2. Have you seen or consulted any other health care providers outside of the [address] [place] [address] since your last visit? Include any pap smears or colon screening.  Dr. Ana Baumgarten

## 2020-09-11 NOTE — PATIENT INSTRUCTIONS
Back Pain: Care Instructions Your Care Instructions Back pain has many possible causes. It is often related to problems with muscles and ligaments of the back. It may also be related to problems with the nerves, discs, or bones of the back. Moving, lifting, standing, sitting, or sleeping in an awkward way can strain the back. Sometimes you don't notice the injury until later. Arthritis is another common cause of back pain. Although it may hurt a lot, back pain usually improves on its own within several weeks. Most people recover in 12 weeks or less. Using good home treatment and being careful not to stress your back can help you feel better sooner. Follow-up care is a key part of your treatment and safety. Be sure to make and go to all appointments, and call your doctor if you are having problems. It's also a good idea to know your test results and keep a list of the medicines you take. How can you care for yourself at home? · Sit or lie in positions that are most comfortable and reduce your pain. Try one of these positions when you lie down: ? Lie on your back with your knees bent and supported by large pillows. ? Lie on the floor with your legs on the seat of a sofa or chair. ? Lie on your side with your knees and hips bent and a pillow between your legs. ? Lie on your stomach if it does not make pain worse. · Do not sit up in bed, and avoid soft couches and twisted positions. Bed rest can help relieve pain at first, but it delays healing. Avoid bed rest after the first day of back pain. · Change positions every 30 minutes. If you must sit for long periods of time, take breaks from sitting. Get up and walk around, or lie in a comfortable position. · Try using a heating pad on a low or medium setting for 15 to 20 minutes every 2 or 3 hours. Try a warm shower in place of one session with the heating pad. · You can also try an ice pack for 10 to 15 minutes every 2 to 3 hours. Put a thin cloth between the ice pack and your skin. · Take pain medicines exactly as directed. ? If the doctor gave you a prescription medicine for pain, take it as prescribed. ? If you are not taking a prescription pain medicine, ask your doctor if you can take an over-the-counter medicine. · Take short walks several times a day. You can start with 5 to 10 minutes, 3 or 4 times a day, and work up to longer walks. Walk on level surfaces and avoid hills and stairs until your back is better. · Return to work and other activities as soon as you can. Continued rest without activity is usually not good for your back. · To prevent future back pain, do exercises to stretch and strengthen your back and stomach. Learn how to use good posture, safe lifting techniques, and proper body mechanics. When should you call for help? Call your doctor now or seek immediate medical care if: 
  · You have new or worsening numbness in your legs.  
  · You have new or worsening weakness in your legs. (This could make it hard to stand up.)  
  · You lose control of your bladder or bowels. Watch closely for changes in your health, and be sure to contact your doctor if: 
  · You have a fever, lose weight, or don't feel well.  
  · You do not get better as expected. Where can you learn more? Go to http://kirk-angelika.info/ Enter U430 in the search box to learn more about \"Back Pain: Care Instructions. \" Current as of: March 2, 2020               Content Version: 12.6 © 1640-3312 [employer]. Care instructions adapted under license by [employer] (which disclaims liability or warranty for this information). If you have questions about a medical condition or this instruction, always ask your healthcare professional. [place] [address] any warranty or liability for your use of this information.

## 2020-09-17 RX ORDER — TIZANIDINE 4 MG/1
4 TABLET ORAL
Qty: 30 TAB | Refills: 1 | Status: SHIPPED | OUTPATIENT
Start: 2020-09-17 | End: 2020-11-16 | Stop reason: SDUPTHER

## 2020-11-16 DIAGNOSIS — F41.9 ANXIETY: ICD-10-CM

## 2020-11-16 RX ORDER — TIZANIDINE 4 MG/1
4 TABLET ORAL
Qty: 30 TAB | Refills: 1 | Status: SHIPPED | OUTPATIENT
Start: 2020-11-16 | End: 2022-05-04 | Stop reason: SDUPTHER

## 2020-11-16 RX ORDER — LORAZEPAM 0.5 MG/1
0.5 TABLET ORAL
Qty: 30 TAB | Refills: 2 | Status: SHIPPED | OUTPATIENT
Start: 2020-11-16 | End: 2021-01-06 | Stop reason: SDUPTHER

## 2020-11-16 NOTE — TELEPHONE ENCOUNTER
RX refill request from the patient/pharmacy. Patient last seen 09- with labs, and next appt. scheduled for 12-  Requested Prescriptions     Pending Prescriptions Disp Refills    tiZANidine (ZANAFLEX) 4 mg tablet 30 Tab 1     Sig: Take 1 Tab by mouth three (3) times daily as needed for Muscle Spasm(s).  LORazepam (ATIVAN) 0.5 mg tablet 30 Tab 2     Sig: Take 1 Tab by mouth two (2) times daily as needed for Anxiety. Max Daily Amount: 1 mg. Kate Raymundo

## 2020-11-30 ENCOUNTER — TELEPHONE (OUTPATIENT)
Dept: INTERNAL MEDICINE CLINIC | Age: 70
End: 2020-11-30

## 2020-12-04 NOTE — PROGRESS NOTES
Chief Complaint   Patient presents with    Hypertension     3 month follow up    Cholesterol Problem    Anxiety       SUBJECTIVE:    Prudencio Aguero is a 79 y.o. male who returns today in follow-up of his medical problems to include hypertension, GERD, hyperlipidemia, ASVD, end-stage renal disease on dialysis, vitamin D deficiency, and in addition to his chronic problems he has 2 acute issues going on. One he is having some palpitations where he occasionally notes some discomfort in the chest but no pain. He notes his predominant when he checks his blood pressure with his home monitoring does not he has an irregular heartbeat. At times he says he regularly times is not irregular. His other problem seems to be related to some firmness and discomfort in the lower abdomen. He still passes urine maybe 2-3 times per day. He has noted no blood in the urine. He notes no change in bowel habits. He does take a stool softener. He is up-to-date on colonoscopy with his next one due in 2023. He denies any nausea vomiting. He denies any weight loss or change in appetite. He denies any fevers or chills. There are no night sweats. He denies any other GI or  complaints other than abdominal discomfort. He notes no chest pain, shortness of breath, PND, orthopnea or other cardiorespiratory meds other than the palpitations. He notes no headaches, dizziness or neurologic complaints. He has no current active arthritic complaints and and no other complaints on complete review of systems. Current Outpatient Medications   Medication Sig Dispense Refill    SENNA by Does Not Apply route daily.  tiZANidine (ZANAFLEX) 4 mg tablet Take 1 Tab by mouth three (3) times daily as needed for Muscle Spasm(s). 30 Tab 1    LORazepam (ATIVAN) 0.5 mg tablet Take 1 Tab by mouth two (2) times daily as needed for Anxiety.  Max Daily Amount: 1 mg. 30 Tab 2    cholecalciferol (Vitamin D3) 25 mcg (1,000 unit) cap Take  by mouth daily.  meloxicam (MOBIC) 7.5 mg tablet Take 1 Tab by mouth daily. 30 Tab 5    carvediloL (COREG) 12.5 mg tablet Take 25 mg by mouth two (2) times daily (with meals).  lidocaine 4 % patch 1 Patch by TransDERmal route every twelve (12) hours every twelve (12) hours. (Patient taking differently: 1 Patch by TransDERmal route as needed.) 10 Patch 0    omeprazole (PRILOSEC) 40 mg capsule TAKE 1 CAPSULE DAILY 90 Cap 3    lisinopril (PRINIVIL, ZESTRIL) 20 mg tablet Take 1 Tab by mouth daily. (Patient taking differently: Take 40 mg by mouth daily.) 30 Tab prn    etelcalcetide (PARSABIV) 5 mg/mL soln by IntraVENous route. Indications: 3 x per week      amLODIPine (NORVASC) 10 mg tablet TAKE 1 TABLET BY MOUTH ONCE DAILY  11    sevelamer carbonate (RENVELA) 800 mg Tab tab Take 1,600 mg by mouth three (3) times daily (with meals).        Past Medical History:   Diagnosis Date    Arthritis     RA    Chronic kidney disease     Dialysis T, Thur, Sat @ [facility]    GERD (gastroesophageal reflux disease)     Gout     Hepatitis C     Hypertension     Iritis     Liver disease     Hep C    Other and unspecified alcohol dependence, unspecified drinking behavior     Acid reflux    Other ill-defined conditions(232.67)     blood transfusion hx     Past Surgical History:   Procedure Laterality Date    HX ENDOSCOPY  07/29/2020    HX OTHER SURGICAL      liver biopsy    HX VASCULAR ACCESS      Jacek / [address]    HX VASCULAR ACCESS  7/15/13    CREATION LEFT UPPER ARM BASILIC VEIN TRANSPOSITION    HX VASCULAR ACCESS      [facility]     Allergies   Allergen Reactions    Codeine Hives       REVIEW OF SYSTEMS:  General: negative for - chills or fever, or weight loss or gain  ENT: negative for - headaches, nasal congestion or tinnitus  Eyes: no blurred or visual changes  Neck: No stiffness or swollen nodes  Respiratory: negative for - cough, hemoptysis, shortness of breath or wheezing  Cardiovascular : negative for - chest pain, edema, palpitations or shortness of breath positive for irregular heartbeat  Gastrointestinal: negative for -  blood in stools, heartburn or nausea/vomiting. Positive for suprapubic tightness and pressure with firm abdomen in the pelvic area  Genito-Urinary: no dysuria, trouble voiding, or hematuria  Musculoskeletal: negative for - gait disturbance, joint pain, joint stiffness or joint swelling  Neurological: no TIA or stroke symptoms  Hematologic: no bruises, no bleeding  Lymphatic: no swollen glands  Integument: no lumps, mole changes, nail changes or rash  Endocrine:no malaise/lethargy poly uria or polydipsia or unexpected weight changes        Social History     Socioeconomic History    Marital status: SINGLE     Spouse name: Not on file    Number of children: Not on file    Years of education: Not on file    Highest education level: Not on file   Tobacco Use    Smoking status: Never Smoker    Smokeless tobacco: Never Used   Substance and Sexual Activity    Alcohol use: No    Drug use: Not Currently     Types: Marijuana, Heroin     Comment: smoked pot 4 years ago, herion in 25s     History reviewed. No pertinent family history. OBJECTIVE:     Visit Vitals  /70 (BP 1 Location: Left arm, BP Patient Position: Sitting)   Pulse 71   Temp 98.2 °F (36.8 °C) (Oral)   Resp 17   Ht 5' 9\" (1.753 m)   Wt 181 lb 6.4 oz (82.3 kg)   SpO2 98%   BMI 26.79 kg/m²     CONSTITUTIONAL:   well nourished, appears age appropriate  EYES: sclera anicteric, PERRL, EOMI  ENMT:nares clear, moist mucous membranes, pharynx clear  NECK: supple.  Thyroid normal, No JVD or bruits  RESPIRATORY: Chest: clear to ascultation and percussion, normal inspiratory effort  CARDIOVASCULAR: Heart: regular rate and rhythm no murmurs, rubs or gallops, PMI not displaced, No thrills, no peripheral edema  GASTROINTESTINAL: Abdomen: non distended, soft, non tender suprapubic discomfort although no masses are palpated, bowel sounds normal  HEMATOLOGIC: no purpura, petechiae or bruising  LYMPHATIC: No lymph node enlargemant  MUSCULOSKELETAL: Extremities: no active synovitis, pulse 1+   INTEGUMENT: No unusual rashes or suspicious skin lesions noted. Nails appear normal.  PERIPHERAL VASCULAR: normal pulses femoral, PT and DP  NEUROLOGIC: non-focal exam, A & O X 3  PSYCHIATRIC:, appropriate affect     ASSESSMENT:   1. Hypertension, renal disease    2. Mixed hyperlipidemia    3. ESRD (end stage renal disease) ([facility] [address]    4. Gastroesophageal reflux disease without esophagitis    5. Chronic hepatitis C without hepatic coma ([facility] [address]    6. Chronic gout of multiple sites, unspecified cause    7. Palpitations    8. Lower abdominal pain      Impression  1. Hypertension that is well controlled so continue current therapy reviewed with him. 2.  Hyperlipidemia prior lab reviewed and repeat status pending and I will adjust if needed. 3.  End-stage renal disease on dialysis  4. GERD that is stable  5. Chronic hepatitis C without current symptoms  6. Gout that is stable  7. PalpitationsEKG obtained today normal sinus rhythm within normal limits without ectopy. Cardiac exam is normal.  I will set him up for 24 Holter monitor. 8.  Lower abdominal pain exam was a some mild discomfort but I do not feel any abnormalities. He is passing his urine 2-3 times a day so we will check a urine and check a CBC and I will schedule a CT abdomen pelvis to further evaluate this. Follow-up tentatively set up for 3 months for general medical problems but follow-up after the CT scan for his other medical problems. I will call results of lab as well as the Holter monitor. I will see him sooner should things change or other problems arise.     PLAN:  .  Orders Placed This Encounter    CT ABD W WO CONT    CT PELV WO CONT    METABOLIC PANEL, COMPREHENSIVE ([facility])    LIPID PANEL ([facility])    CK ([facility])    CBC WITH AUTOMATED DIFF ([facility])    URINALYSIS W/MICROSCOPIC ([facility])    AMB POC EKG ROUTINE W/ 12 LEADS, INTER & REP    SENNA         ATTENTION:   This medical record was transcribed using an electronic medical records system. Although proofread, it may and can contain electronic and spelling errors. Other human spelling and other errors may be present. Corrections may be executed at a later time. Please feel free to contact us for any clarifications as needed. Follow-up and Dispositions    · Return After Holter, After CT. No results found for any visits on 12/07/20. Charanjit Vega MD    The patient verbalized understanding of the problems and plans as explained.

## 2020-12-07 ENCOUNTER — OFFICE VISIT (OUTPATIENT)
Dept: INTERNAL MEDICINE CLINIC | Age: 70
End: 2020-12-07
Payer: MEDICARE

## 2020-12-07 VITALS
HEART RATE: 71 BPM | BODY MASS INDEX: 26.87 KG/M2 | RESPIRATION RATE: 17 BRPM | HEIGHT: 69 IN | WEIGHT: 181.4 LBS | TEMPERATURE: 98.2 F | OXYGEN SATURATION: 98 % | SYSTOLIC BLOOD PRESSURE: 138 MMHG | DIASTOLIC BLOOD PRESSURE: 70 MMHG

## 2020-12-07 DIAGNOSIS — M1A.09X0 CHRONIC GOUT OF MULTIPLE SITES, UNSPECIFIED CAUSE: ICD-10-CM

## 2020-12-07 DIAGNOSIS — K21.9 GASTROESOPHAGEAL REFLUX DISEASE WITHOUT ESOPHAGITIS: ICD-10-CM

## 2020-12-07 DIAGNOSIS — I12.9 HYPERTENSION, RENAL DISEASE: Primary | ICD-10-CM

## 2020-12-07 DIAGNOSIS — B18.2 CHRONIC HEPATITIS C WITHOUT HEPATIC COMA (HCC): ICD-10-CM

## 2020-12-07 DIAGNOSIS — E78.2 MIXED HYPERLIPIDEMIA: ICD-10-CM

## 2020-12-07 DIAGNOSIS — N18.6 ESRD (END STAGE RENAL DISEASE) (HCC): ICD-10-CM

## 2020-12-07 DIAGNOSIS — R00.2 PALPITATIONS: ICD-10-CM

## 2020-12-07 DIAGNOSIS — R10.30 LOWER ABDOMINAL PAIN: ICD-10-CM

## 2020-12-07 LAB
A-G RATIO,AGRAT: 1 RATIO
ALBUMIN SERPL-MCNC: 4.3 G/DL (ref 3.9–5.4)
ALP SERPL-CCNC: 78 U/L (ref 38–126)
ALT SERPL-CCNC: 11 U/L (ref 0–50)
ANION GAP SERPL CALC-SCNC: 10 MMOL/L
AST SERPL W P-5'-P-CCNC: 25 U/L (ref 14–36)
BILIRUB SERPL-MCNC: 0.7 MG/DL (ref 0.2–1.3)
BILIRUB UR QL: NEGATIVE
BUN SERPL-MCNC: 46 MG/DL (ref 9–20)
BUN/CREATININE RATIO,BUCR: 4 RATIO
CALCIUM SERPL-MCNC: 9 MG/DL (ref 8.4–10.2)
CHLORIDE SERPL-SCNC: 102 MMOL/L (ref 98–107)
CHOL/HDL RATIO,CHHD: 4 RATIO (ref 0–4)
CHOLEST SERPL-MCNC: 197 MG/DL (ref 0–200)
CK SERPL-CCNC: 183 U/L (ref 30–135)
CLARITY: CLEAR
CO2 SERPL-SCNC: 33 MMOL/L (ref 22–32)
COLOR UR: ABNORMAL
CREAT SERPL-MCNC: 10.9 MG/DL (ref 0.8–1.5)
ERYTHROCYTE [DISTWIDTH] IN BLOOD BY AUTOMATED COUNT: 12.5 %
GLOBULIN,GLOB: 4.1
GLUCOSE 24H UR-MRATE: NEGATIVE G/(24.H)
GLUCOSE SERPL-MCNC: 97 MG/DL (ref 75–110)
HCT VFR BLD AUTO: 35.3 % (ref 37–51)
HDLC SERPL-MCNC: 50 MG/DL (ref 35–130)
HGB BLD-MCNC: 11.3 G/DL (ref 12–18)
HGB UR QL STRIP: ABNORMAL
KETONES UR QL STRIP.AUTO: NEGATIVE
LDL/HDL RATIO,LDHD: 3 RATIO
LDLC SERPL CALC-MCNC: 132 MG/DL (ref 0–130)
LEUKOCYTE ESTERASE: NEGATIVE
LYMPHOCYTES ABSOLUTE: 2.1 K/UL (ref 0.6–4.1)
LYMPHOCYTES NFR BLD: 32.8 % (ref 10–58.5)
MCH RBC QN AUTO: 33.2 PG (ref 26–32)
MCHC RBC AUTO-ENTMCNC: 32 G/DL (ref 30–36)
MCV RBC AUTO: 103.8 FL (ref 80–97)
MONOCYTES ABS-DIF,2141: 0.7 K/UL (ref 0–1.8)
MONOCYTES NFR BLD: 11.1 % (ref 0.1–24)
NEUTROPHILS # BLD: 56.1 % (ref 37–92)
NEUTROPHILS ABS,2156: 3.6 K/UL (ref 2–7.8)
NITRITE UR QL STRIP.AUTO: NEGATIVE
PH UR STRIP: 8 [PH] (ref 5–7)
PLATELET # BLD AUTO: 198 K/UL (ref 140–440)
POTASSIUM SERPL-SCNC: 5.3 MMOL/L (ref 3.6–5)
PROT SERPL-MCNC: 8.4 G/DL (ref 6.3–8.2)
PROT UR STRIP-MCNC: ABNORMAL MG/DL
RBC # BLD AUTO: 3.4 M/UL (ref 4.2–6.3)
RBC #/AREA URNS HPF: ABNORMAL #/HPF
SODIUM SERPL-SCNC: 145 MMOL/L (ref 137–145)
SP GR UR REFRACTOMETRY: 1.01 (ref 1–1.03)
SPERM,SPRMU: ABNORMAL
TRIGL SERPL-MCNC: 73 MG/DL (ref 0–200)
UROBILINOGEN UR QL STRIP.AUTO: NEGATIVE
VLDLC SERPL CALC-MCNC: 15 MG/DL
WBC # BLD AUTO: 6.4 K/UL (ref 4.1–10.9)
WBC URNS QL MICRO: 0 #/HPF

## 2020-12-07 PROCEDURE — 99215 OFFICE O/P EST HI 40 MIN: CPT | Performed by: INTERNAL MEDICINE

## 2020-12-07 PROCEDURE — 80061 LIPID PANEL: CPT | Performed by: INTERNAL MEDICINE

## 2020-12-07 PROCEDURE — 80053 COMPREHEN METABOLIC PANEL: CPT | Performed by: INTERNAL MEDICINE

## 2020-12-07 PROCEDURE — G8536 NO DOC ELDER MAL SCRN: HCPCS | Performed by: INTERNAL MEDICINE

## 2020-12-07 PROCEDURE — G8427 DOCREV CUR MEDS BY ELIG CLIN: HCPCS | Performed by: INTERNAL MEDICINE

## 2020-12-07 PROCEDURE — G8510 SCR DEP NEG, NO PLAN REQD: HCPCS | Performed by: INTERNAL MEDICINE

## 2020-12-07 PROCEDURE — G8417 CALC BMI ABV UP PARAM F/U: HCPCS | Performed by: INTERNAL MEDICINE

## 2020-12-07 PROCEDURE — 85025 COMPLETE CBC W/AUTO DIFF WBC: CPT | Performed by: INTERNAL MEDICINE

## 2020-12-07 PROCEDURE — 1101F PT FALLS ASSESS-DOCD LE1/YR: CPT | Performed by: INTERNAL MEDICINE

## 2020-12-07 PROCEDURE — 93000 ELECTROCARDIOGRAM COMPLETE: CPT | Performed by: INTERNAL MEDICINE

## 2020-12-07 PROCEDURE — 3017F COLORECTAL CA SCREEN DOC REV: CPT | Performed by: INTERNAL MEDICINE

## 2020-12-07 PROCEDURE — 81001 URINALYSIS AUTO W/SCOPE: CPT | Performed by: INTERNAL MEDICINE

## 2020-12-07 PROCEDURE — 82550 ASSAY OF CK (CPK): CPT | Performed by: INTERNAL MEDICINE

## 2020-12-07 NOTE — PATIENT INSTRUCTIONS

## 2020-12-07 NOTE — PROGRESS NOTES
Chief Complaint   Patient presents with    Hypertension     3 month follow up    Cholesterol Problem    Anxiety     Visit Vitals  /70 (BP 1 Location: Left arm, BP Patient Position: Sitting)   Pulse 71   Temp 98.2 °F (36.8 °C) (Oral)   Resp 17   Ht 5' 9\" (1.753 m)   Wt 181 lb 6.4 oz (82.3 kg)   SpO2 98%   BMI 26.79 kg/m²       1. Have you been to the ER, urgent care clinic since your last visit? Hospitalized since your last visit? No    2. Have you seen or consulted any other health care providers outside of the [address] [place] [address] since your last visit? Include any pap smears or colon screening.  No

## 2020-12-08 NOTE — PROGRESS NOTES
DL cholesterol is higher than before so watch diet.  Other numbers are stable.  CT scan pending. Letter generated to patient regarding.

## 2020-12-18 RX ORDER — MELOXICAM 7.5 MG/1
TABLET ORAL
Qty: 30 TAB | Refills: 5 | Status: SHIPPED | OUTPATIENT
Start: 2020-12-18 | End: 2022-02-02 | Stop reason: ALTCHOICE

## 2020-12-18 NOTE — TELEPHONE ENCOUNTER
RX refill request from the patient/pharmacy. Patient last seen 12- with labs, and next appt. scheduled for 12-  Requested Prescriptions     Pending Prescriptions Disp Refills    meloxicam (MOBIC) 7.5 mg tablet [Pharmacy Med Name: MELOXICAM 7.5 MG TABLET] 30 Tab 5     Sig: TAKE 1 TABLET BY MOUTH EVERY DAY   .

## 2021-01-06 DIAGNOSIS — F41.9 ANXIETY: ICD-10-CM

## 2021-01-06 RX ORDER — LORAZEPAM 0.5 MG/1
0.5 TABLET ORAL
Qty: 30 TAB | Refills: 2 | Status: SHIPPED | OUTPATIENT
Start: 2021-01-06 | End: 2022-05-04 | Stop reason: SDUPTHER

## 2021-01-06 NOTE — TELEPHONE ENCOUNTER
RX refill request from the patient/pharmacy. Patient last seen 12- with labs, and next appt. scheduled for 03-  Requested Prescriptions     Pending Prescriptions Disp Refills    LORazepam (ATIVAN) 0.5 mg tablet 30 Tab 2     Sig: Take 1 Tab by mouth two (2) times daily as needed for Anxiety. Max Daily Amount: 1 mg. Luca Conteh

## 2021-01-13 ENCOUNTER — HOSPITAL ENCOUNTER (OUTPATIENT)
Dept: NON INVASIVE DIAGNOSTICS | Age: 71
Discharge: HOME | End: 2021-01-13
Attending: INTERNAL MEDICINE
Payer: MEDICARE

## 2021-01-13 DIAGNOSIS — R00.2 PALPITATIONS: ICD-10-CM

## 2021-01-13 PROCEDURE — 93225 XTRNL ECG REC<48 HRS REC: CPT

## 2021-01-22 NOTE — PROGRESS NOTES
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 He is a dialysis patient ([facility]  Was recently started on Parsabiv 5mg which he gets via IV at dialysis. He is not sure what that is for. 1. Have you been to the ER, urgent care clinic since your last visit? Hospitalized since your last visit? No    2. Have you seen or consulted any other health care providers outside of the [address] [place] [address] since your last visit? Include any pap smears or colon screening.  No [Time Spent: ___ minutes] : I have spent [unfilled] minutes of time on the encounter.

## 2021-02-22 RX ORDER — OMEPRAZOLE 40 MG/1
CAPSULE, DELAYED RELEASE ORAL
Qty: 90 CAP | Refills: 3 | Status: SHIPPED | OUTPATIENT
Start: 2021-02-22

## 2021-02-22 NOTE — TELEPHONE ENCOUNTER
RX refill request from the patient/pharmacy. Patient last seen 12- with labs, and next appt. scheduled for 03-  Last refill 03-  Requested Prescriptions     Pending Prescriptions Disp Refills    omeprazole (PRILOSEC) 40 mg capsule [Pharmacy Med Name: OMEPRAZOLE DR CAPS 40MG] 90 Cap 3     Sig: TAKE 1 CAPSULE DAILY   .

## 2021-03-07 NOTE — PROGRESS NOTES
This is a Subsequent Medicare Annual Wellness Visit providing Personalized Prevention Plan Services (PPPS) (Performed 12 months after initial AWV and PPPS )    I have reviewed the patient's medical history in detail and updated the computerized patient record. He presents today for his Medicare subsequent annual wellness examination and screening questionnaire. He is also in follow-up of his multiple medical problems include hypertension, end-stage renal disease on dialysis, hyperlipidemia, GERD, chronic hepatitis C, vitamin D deficiency, gout and other multiple medical problems. He has recent developed a problem with significant problems with orthostatic hypotension postdialysis which is hindering his dialysis is some degree. He does also have a mild hand tremor but is not to the point where it affects him is more predominantly related to the fact that he has had all the people mention it to him. He denies any chest pain, shortness of breath, palpitations, PND, orthopnea or other cardiorespiratory complaints. He notes no GI or  complaints. He notes no headaches, dizziness or neurologic complaints except for the tremor. He notes no current active arthritic complaints and there are no other complaints on complete review of systems.     History     Past Medical History:   Diagnosis Date    Arthritis     RA    Chronic kidney disease     Dialysis T, Thur, Sat @ [facility]    GERD (gastroesophageal reflux disease)     Gout     Hepatitis C     Hypertension     Iritis     Liver disease     Hep C    Other and unspecified alcohol dependence, unspecified drinking behavior     Acid reflux    Other ill-defined conditions(209.30)     blood transfusion hx      Past Surgical History:   Procedure Laterality Date    HX ENDOSCOPY  07/29/2020    HX OTHER SURGICAL      liver biopsy    HX VASCULAR ACCESS      [facility]    HX VASCULAR ACCESS  7/15/13    CREATION LEFT UPPER ARM BASILIC VEIN TRANSPOSITION  [facility]      [facility]     Social History     Tobacco Use    Smoking status: Never Smoker    Smokeless tobacco: Never Used   Substance Use Topics    Alcohol use: No    Drug use: Not Currently     Types: Marijuana, Heroin     Comment: smoked pot 4 years ago, herion in 20s     Current Outpatient Medications   Medication Sig Dispense Refill    FLUoxetine (PROzac) 20 mg capsule Take 20 mg by mouth daily.  omeprazole (PRILOSEC) 40 mg capsule TAKE 1 CAPSULE DAILY 90 Cap 3    LORazepam (ATIVAN) 0.5 mg tablet Take 1 Tab by mouth two (2) times daily as needed for Anxiety. Max Daily Amount: 1 mg. 30 Tab 2    meloxicam (MOBIC) 7.5 mg tablet TAKE 1 TABLET BY MOUTH EVERY DAY 30 Tab 5    SENNA by Does Not Apply route daily.  tiZANidine (ZANAFLEX) 4 mg tablet Take 1 Tab by mouth three (3) times daily as needed for Muscle Spasm(s). 30 Tab 1    cholecalciferol (Vitamin D3) 25 mcg (1,000 unit) cap Take  by mouth daily.  carvediloL (COREG) 12.5 mg tablet Take 25 mg by mouth two (2) times daily (with meals). Indications: pt is taking 12.5mg twice daily      lidocaine 4 % patch 1 Patch by TransDERmal route every twelve (12) hours every twelve (12) hours. (Patient taking differently: 1 Patch by TransDERmal route as needed.) 10 Patch 0    lisinopril (PRINIVIL, ZESTRIL) 20 mg tablet Take 1 Tab by mouth daily. 30 Tab prn    etelcalcetide (PARSABIV) 5 mg/mL soln by IntraVENous route. Indications: 3 x per week      amLODIPine (NORVASC) 10 mg tablet TAKE 1 TABLET BY MOUTH ONCE DAILY  11    sevelamer carbonate (RENVELA) 800 mg Tab tab Take 1,600 mg by mouth three (3) times daily (with meals). Indications: pt is taking 2 pills with each meal       Allergies   Allergen Reactions    Codeine Hives     History reviewed. No pertinent family history.     Patient Active Problem List    Diagnosis    Hypertension, renal disease    Mixed hyperlipidemia    [facility]) [facility] [address]    Gastroesophageal reflux disease without esophagitis    Lower abdominal pain    Right upper quadrant abdominal pain    Alcohol screening    Joint pain    Asymptomatic microscopic hematuria    Chronic midline thoracic back pain    Anxiety    Midline low back pain without sciatica    Epididymitis    Right acute serous otitis media    Bilateral carotid artery stenosis     Carotid dopplers 1/24 2020 reported as normal      Right carotid bruit    Palpitations    Bruit of left carotid artery    Neck pain    Prostate cancer screening    Gout of multiple sites    Chronic hepatitis C without hepatic coma ([facility] [address]    Medicare annual wellness visit, subsequent    Vitamin D deficiency       Patient Care Team:  Kedar York MD as PCP - General (Internal Medicine)  Kedar York [facility]  Pretty Hartman MD (Nephrology)    Depression Risk Factor Screening:     3 most recent PHQ Screens 3/8/2021   Little interest or pleasure in doing things Not at all   Feeling down, depressed, irritable, or hopeless Several days   Total Score PHQ 2 1     Alcohol Risk Factor Screening: You do not drink alcohol or very rarely. Functional Ability and Level of Safety:     Fall Risk     Fall Risk Assessment, last 12 mths 3/8/2021   Able to walk? Yes   Fall in past 12 months? 0   Do you feel unsteady? 0   Are you worried about falling 0   Fall with injury? -       Hearing Loss   mild    Activities of Daily Living   Self-care.    ADL Assessment 3/8/2021   Feeding yourself No Help Needed   Getting from bed to chair No Help Needed   Getting dressed No Help Needed   Bathing or showering No Help Needed   Walk across the room (includes cane/walker) No Help Needed   Using the telphone No Help Needed   Taking your medications No Help Needed   Preparing meals No Help Needed   Managing money (expenses/bills) No Help Needed   Moderately strenuous housework (laundry) No Help Needed   Shopping for personal items (toiletries/medicines) No Help Needed   Shopping for groceries No Help Needed   Driving No Help Needed   Climbing a flight of stairs No Help Needed   Getting to places beyond walking distances No Help Needed       Abuse Screen   Patient is not abused    Social History     Social History Narrative    Not on file       Review of Systems      ROS:    Constitutional: He denies fevers, weight loss, sweats. Eyes: No blurred or double vision. ENT: No difficulty with swallowing, taste, speech or smell. Neck: no stiffness or swelling  Respiratory: No cough wheezing or shortness of breath. Cardiovascular: Denies chest pain, palpitations, unexplained indigestion or syncope. Gastrointestinal:  No changes in bowel movements, no abdominal pain, no bloating. Genitourinary:  He denies frequency, nocturia or stranguria. Extremities: No joint pain, stiffness or swelling. Neurological:  No numbness, tingling, burring paresthesias or loss of motor strength. No syncope, dizziness or frequent headache  Lymphatic: no adenopathy noted  Hematologic: no easy bruising or bleeding gums  Skin:  No recent rashes or mole changes. Psychiatric/Behavioral:  Negative for depression. Physical Examination     Evaluation of Cognitive Function:  Mood/affect:  happy  Appearance: age appropriate  Family member/caregiver input: none      Vitals:    03/08/21 1103 03/08/21 1146   BP: (!) 160/86 138/78   Pulse: 68    Resp: 16    Temp: 98.6 °F (37 °C)    TempSrc: Temporal    SpO2: 98%    Weight: 173 lb 1.6 oz (78.5 kg)    Height: 5' 9\" (1.753 m)    PainSc:   4    PainLoc: Neck         PHYSICAL EXAM:    General appearance - alert, well appearing, and in no distress  Mental status - alert, oriented to person, place, and time  HEENT:  Ears - bilateral TM's and external ear canals clear  Eyes - pupillary responses were normal.  Extraocular muscle function intact. Lids and conjunctiva not injected.   Fundoscopic exam revealed sharp disc margins. eye movements intact  Pharynx- clear with teeth in good repair. No masses were noted  Neck - supple without thyromegaly or burit. No JVD noted  Lungs - clear to auscultation and percussion  Cardiac- normal rate, regular rhythm without murmurs. PMI not displaced. No gallop, rub or click  Abdomen - flat, soft, non-tender without palpable organomegaly or mass. No pulsatile mass was felt, and not bruit was heard. Bowel sounds were active  : Circumcised, Testes descended w/o masses  Rectal: normal sphincter tone, prostate 1+ enlarged, smooth and nontender without nodules, no masses, stool brown and hemacult negative  Extremities -  no clubbing cyanosis or edema  Lymphatics - no palpable lymphadenopathy, no hepatosplenomegaly  Hematologic: no petechiae or purpura  Peripheral vascular -Femoral, Dorsalis pedis and posterior tibial pulses felt without difficulty  Skin - no rash or unusual mole change noted  Neurological - Cranial nerves II-XII grossly intact. Motor strength 5/5. DTR's 2+ and symmetric. Station and gait normal  Back exam - full range of motion, no tenderness, palpable spasm or pain on motion  Musculoskeletal - no joint tenderness, deformity or swelling        Results for orders placed or performed in visit on 52/55/60   METABOLIC PANEL, COMPREHENSIVE   Result Value Ref Range    Glucose 97 75 - 110 mg/dL    BUN 46.0 (H) 9.0 - 20.0 mg/dL    Creatinine 10.9 (H) 0.8 - 1.5 mg/dL    Sodium 145 137 - 145 mmol/L    Potassium 5.3 (H) 3.6 - 5.0 mmol/L    Chloride 102 98 - 107 mmol/L    CO2 33.0 (H) 22.0 - 32.0 mmol/L    Calcium 9.0 8.4 - 10.2 mg/dl    Protein, total 8.4 (H) 6.3 - 8.2 g/dL    Albumin 4.3 3.9 - 5.4 g/dL    ALT (SGPT) 11 0 - 50 U/L    AST (SGOT) 25.0 14.0 - 36.0 U/L    Alk.  phosphatase 78 38 - 126 U/L    Bilirubin, total 0.7 0.2 - 1.3 mg/dL    BUN/Creatinine ratio 4 Ratio    GFR est AA 6 <60 mL/min/1.73m2    GFR est non-AA 5 <60 mL/min/1.73m2    Globulin 4.10     A-G Ratio 1.0 Ratio    Anion gap 10 mmol/L   LIPID PANEL   Result Value Ref Range    Cholesterol, total 197 0 - 200 mg/dL    Triglyceride 73 0 - 200 mg/dL    HDL Cholesterol 50 35 - 130 mg/dL    VLDL 15 mg/dL    LDL, calculated 132 (H) 0 - 130 mg/dL    CHOL/HDL Ratio 4 0 - 4 Ratio    LDL/HDL Ratio 3 Ratio   CK   Result Value Ref Range    .00 (H) 30.00 - 135.00 U/L   CBC WITH AUTOMATED DIFF   Result Value Ref Range    WBC 6.4 4.1 - 10.9 K/uL    RBC 3.40 (L) 4.20 - 6.30 M/uL    HGB 11.3 (L) 12.0 - 18.0 g/dL    HCT 35.3 (L) 37.0 - 51.0 %    MCH 33.2 (H) 26.0 - 32.0 pg    MCHC 32.0 30.0 - 36.0 g/dL    .8 (H) 80.0 - 97.0 fL    RDW 12.5 %    PLATELET 947.9 935.9 - 440.0 K/uL    Neutrophils abs 3.6 2.0 - 7.8 K/uL    Neutrophils 56.1 37.0 - 92.0 %    Monocytes abs-DIF 0.7 0.0 - 1.8 K/uL    MONOCYTES 11.1 0.1 - 24.0 %    Lymphocytes Absolute 2.1 0.6 - 4.1 K/uL    LYMPHOCYTES 32.8 10.0 - 58.5 %   URINALYSIS W/MICROSCOPIC   Result Value Ref Range    Color Pale Yellow Pale Yellow - Yellow    CLARITY clear Clear    Glucose urine, 24 hr Negative Negative    Ketone Negative Negative    Bilirubin Negative Negative    Specific gravity 1.015 1.000 - 1.030    pH (UA) 8 (A) 5 - 7    Blood 1+ (A) Negative    Protein 2+ (A) Negative    Urobilinogen Negative Negative    Nitrites Negative Negative    Leukocyte Esterase Negative Negative    WBC 0 0 #/HPF    RBC 0-2 (A) 0 #/HPF    Spermatozoa Occasional (A) None Seen       Advice/Referrals/Counseling   Education and counseling provided:  Are appropriate based on today's review and evaluation  End-of-Life planning (with patient's consent)  Pneumococcal Vaccine  Influenza Vaccine  Colorectal cancer screening tests      Assessment/Plan     ASSESSMENT:   1. Hypertension, renal disease    2. Mixed hyperlipidemia    3. Gastroesophageal reflux disease without esophagitis    4. ESRD (end stage renal disease) ([address]    5. Vitamin D deficiency    6. Palpitations    7.  Chronic hepatitis C without hepatic coma ([facility] [address]    8. Right carotid bruit    9. Bruit of left carotid artery    10. Bilateral carotid artery stenosis    11. Anxiety    12. Alcohol screening    13. Prostate cancer screening    14. Medicare annual wellness visit, subsequent      Impression  1. Hypertension that is controlled so continue current therapy reviewed with him it was a bit up and checked by the nurse initially. I am reluctant to make any difference in his blood pressure medicine since he has having some orthostasis postdialysis  2. Hyperlipidemia prior lab reviewed repeat status pending I will adjust if needed. 3.  GERD that is stable  4. End-stage renal disease on 3 times weekly dialysis  5. Vitamin D deficiency repeat status pending  6. Palpitations no recent symptoms  7. Chronic hepatitis C currently stable  8. Bilateral carotid bruits with bilateral carotid stenosis although Dopplers done a year ago did not reveal any significant disease so I do not think we need to evaluate this any further at the present time. 9.  Anxiety chronic but stable  10. Tremor we did discuss adding Mysoline he is already on Coreg which would serve the purpose of a beta-blocker. He is not bothered enough that he wants to proceed with anything at the present time  11. Annual alcohol screening is done he claims not to drink any alcohol at all to present time. 12.  Prostate cancer screening done today with PSA pending  Medicare subsequent annual wellness examination and screening questionnaires completed today. The results were reviewed with him and his questions were answered. Lifestyle recommendations and modifications discussed and made. I will call with lab results and make further recommendations or adjustments if necessary. Follow-up in 3 months or sooner should it be a problem.     PLAN:  .  Orders Placed This Encounter    CBC WITH AUTOMATED DIFF ([facility])    METABOLIC PANEL, COMPREHENSIVE ([facility])    LIPID PANEL ([address] In-House)    CK ([facility])    T4, FREE ([facility])    TSH 3RD GENERATION ([facility])    URINALYSIS W/O MICRO ([facility])    PSA SCREENING (SCREENING) ([facility])    FLUoxetine (PROzac) 20 mg capsule         ATTENTION:   This medical record was transcribed using an electronic medical records system. Although proofread, it may and can contain electronic and spelling errors. Other human spelling and other errors may be present. Corrections may be executed at a later time. Please feel free to contact us for any clarifications as needed. Follow-up and Dispositions    · Return in about 3 months (around 6/8/2021). Milo Rubinstein, MD    Recommended healthy diet low in carbohydrates, fats, sodium and cholesterol. Recommended regular cardiovascular exercise 3-6 times per week for 30-60 minutes daily. Current Outpatient Medications   Medication Sig Dispense Refill    FLUoxetine (PROzac) 20 mg capsule Take 20 mg by mouth daily.  omeprazole (PRILOSEC) 40 mg capsule TAKE 1 CAPSULE DAILY 90 Cap 3    LORazepam (ATIVAN) 0.5 mg tablet Take 1 Tab by mouth two (2) times daily as needed for Anxiety. Max Daily Amount: 1 mg. 30 Tab 2    meloxicam (MOBIC) 7.5 mg tablet TAKE 1 TABLET BY MOUTH EVERY DAY 30 Tab 5    SENNA by Does Not Apply route daily.  tiZANidine (ZANAFLEX) 4 mg tablet Take 1 Tab by mouth three (3) times daily as needed for Muscle Spasm(s). 30 Tab 1    cholecalciferol (Vitamin D3) 25 mcg (1,000 unit) cap Take  by mouth daily.  carvediloL (COREG) 12.5 mg tablet Take 25 mg by mouth two (2) times daily (with meals). Indications: pt is taking 12.5mg twice daily      lidocaine 4 % patch 1 Patch by TransDERmal route every twelve (12) hours every twelve (12) hours. (Patient taking differently: 1 Patch by TransDERmal route as needed.) 10 Patch 0    lisinopril (PRINIVIL, ZESTRIL) 20 mg tablet Take 1 Tab by mouth daily.  30 Tab prn    etelcalcetide (PARSABIV) 5 mg/mL soln by IntraVENous route. Indications: 3 x per week      amLODIPine (NORVASC) 10 mg tablet TAKE 1 TABLET BY MOUTH ONCE DAILY  11    sevelamer carbonate (RENVELA) 800 mg Tab tab Take 1,600 mg by mouth three (3) times daily (with meals). Indications: pt is taking 2 pills with each meal         No results found for any visits on 03/08/21. Verbal and written instructions (see AVS) provided. Patient expresses understanding of diagnosis and treatment plan.     Francisco Javier Castaneda MD

## 2021-03-08 ENCOUNTER — OFFICE VISIT (OUTPATIENT)
Dept: INTERNAL MEDICINE CLINIC | Age: 71
End: 2021-03-08
Payer: MEDICARE

## 2021-03-08 VITALS
RESPIRATION RATE: 16 BRPM | TEMPERATURE: 98.6 F | HEIGHT: 69 IN | BODY MASS INDEX: 25.64 KG/M2 | OXYGEN SATURATION: 98 % | WEIGHT: 173.1 LBS | SYSTOLIC BLOOD PRESSURE: 138 MMHG | DIASTOLIC BLOOD PRESSURE: 78 MMHG | HEART RATE: 68 BPM

## 2021-03-08 DIAGNOSIS — Z12.5 PROSTATE CANCER SCREENING: ICD-10-CM

## 2021-03-08 DIAGNOSIS — R09.89 RIGHT CAROTID BRUIT: ICD-10-CM

## 2021-03-08 DIAGNOSIS — F41.9 ANXIETY: ICD-10-CM

## 2021-03-08 DIAGNOSIS — R00.2 PALPITATIONS: ICD-10-CM

## 2021-03-08 DIAGNOSIS — I65.23 BILATERAL CAROTID ARTERY STENOSIS: ICD-10-CM

## 2021-03-08 DIAGNOSIS — Z00.00 MEDICARE ANNUAL WELLNESS VISIT, SUBSEQUENT: ICD-10-CM

## 2021-03-08 DIAGNOSIS — N18.6 ESRD (END STAGE RENAL DISEASE) (HCC): ICD-10-CM

## 2021-03-08 DIAGNOSIS — I12.9 HYPERTENSION, RENAL DISEASE: Primary | ICD-10-CM

## 2021-03-08 DIAGNOSIS — K21.9 GASTROESOPHAGEAL REFLUX DISEASE WITHOUT ESOPHAGITIS: ICD-10-CM

## 2021-03-08 DIAGNOSIS — R09.89 BRUIT OF LEFT CAROTID ARTERY: ICD-10-CM

## 2021-03-08 DIAGNOSIS — B18.2 CHRONIC HEPATITIS C WITHOUT HEPATIC COMA (HCC): ICD-10-CM

## 2021-03-08 DIAGNOSIS — E78.2 MIXED HYPERLIPIDEMIA: ICD-10-CM

## 2021-03-08 DIAGNOSIS — E55.9 VITAMIN D DEFICIENCY: ICD-10-CM

## 2021-03-08 DIAGNOSIS — Z13.39 ALCOHOL SCREENING: ICD-10-CM

## 2021-03-08 LAB
A-G RATIO,AGRAT: 1.1 RATIO
ALBUMIN SERPL-MCNC: 4.4 G/DL (ref 3.9–5.4)
ALP SERPL-CCNC: 111 U/L (ref 38–126)
ALT SERPL-CCNC: 9 U/L (ref 0–50)
ANION GAP SERPL CALC-SCNC: 16 MMOL/L
AST SERPL W P-5'-P-CCNC: 24 U/L (ref 14–36)
BACTERIA,BACTU: ABNORMAL
BILIRUB SERPL-MCNC: 1 MG/DL (ref 0.2–1.3)
BILIRUB UR QL: NEGATIVE
BUN SERPL-MCNC: 42 MG/DL (ref 9–20)
BUN/CREATININE RATIO,BUCR: 4 RATIO
CALCIUM SERPL-MCNC: 9.6 MG/DL (ref 8.4–10.2)
CHLORIDE SERPL-SCNC: 97 MMOL/L (ref 98–107)
CHOL/HDL RATIO,CHHD: 4 RATIO (ref 0–4)
CHOLEST SERPL-MCNC: 217 MG/DL (ref 0–200)
CK SERPL-CCNC: 104 U/L (ref 30–135)
CLARITY: CLEAR
CO2 SERPL-SCNC: 29 MMOL/L (ref 22–32)
COLOR UR: ABNORMAL
CREAT SERPL-MCNC: 9.6 MG/DL (ref 0.8–1.5)
ERYTHROCYTE [DISTWIDTH] IN BLOOD BY AUTOMATED COUNT: 13.5 %
GLOBULIN,GLOB: 4
GLUCOSE 24H UR-MRATE: NEGATIVE G/(24.H)
GLUCOSE SERPL-MCNC: 98 MG/DL (ref 75–110)
HCT VFR BLD AUTO: 34.5 % (ref 37–51)
HDLC SERPL-MCNC: 62 MG/DL (ref 35–130)
HGB BLD-MCNC: 11.5 G/DL (ref 12–18)
HGB UR QL STRIP: ABNORMAL
KETONES UR QL STRIP.AUTO: NEGATIVE
LDL/HDL RATIO,LDHD: 2 RATIO
LDLC SERPL CALC-MCNC: 134 MG/DL (ref 0–130)
LEUKOCYTE ESTERASE: NEGATIVE
LYMPHOCYTES ABSOLUTE: 1.9 K/UL (ref 0.6–4.1)
LYMPHOCYTES NFR BLD: 28.1 % (ref 10–58.5)
MCH RBC QN AUTO: 34.2 PG (ref 26–32)
MCHC RBC AUTO-ENTMCNC: 33.3 G/DL (ref 30–36)
MCV RBC AUTO: 102.8 FL (ref 80–97)
MONOCYTES ABS-DIF,2141: 0.6 K/UL (ref 0–1.8)
MONOCYTES NFR BLD: 8.9 % (ref 0.1–24)
NEUTROPHILS # BLD: 63 % (ref 37–92)
NEUTROPHILS ABS,2156: 4.3 K/UL (ref 2–7.8)
NITRITE UR QL STRIP.AUTO: NEGATIVE
PH UR STRIP: 8 [PH] (ref 5–7)
PLATELET # BLD AUTO: 196 K/UL (ref 140–440)
POTASSIUM SERPL-SCNC: 4.5 MMOL/L (ref 3.6–5)
PROT SERPL-MCNC: 8.4 G/DL (ref 6.3–8.2)
PROT UR STRIP-MCNC: ABNORMAL MG/DL
PSA, TEST22: 1.2 NG/ML (ref 0–4)
RBC # BLD AUTO: 3.36 M/UL (ref 4.2–6.3)
RBC #/AREA URNS HPF: ABNORMAL #/HPF
SODIUM SERPL-SCNC: 142 MMOL/L (ref 137–145)
SP GR UR REFRACTOMETRY: 1.01 (ref 1–1.03)
SPERM,SPRMU: ABNORMAL
T4 FREE SERPL-MCNC: 0.94 NG/DL (ref 0.58–2.3)
TRIGL SERPL-MCNC: 104 MG/DL (ref 0–200)
TSH SERPL DL<=0.05 MIU/L-ACNC: 0.85 UIU/ML (ref 0.34–5.6)
UROBILINOGEN UR QL STRIP.AUTO: NEGATIVE
VLDLC SERPL CALC-MCNC: 21 MG/DL
WBC # BLD AUTO: 6.8 K/UL (ref 4.1–10.9)
WBC URNS QL MICRO: 0 #/HPF

## 2021-03-08 PROCEDURE — G8427 DOCREV CUR MEDS BY ELIG CLIN: HCPCS | Performed by: INTERNAL MEDICINE

## 2021-03-08 PROCEDURE — 82550 ASSAY OF CK (CPK): CPT | Performed by: INTERNAL MEDICINE

## 2021-03-08 PROCEDURE — 80061 LIPID PANEL: CPT | Performed by: INTERNAL MEDICINE

## 2021-03-08 PROCEDURE — 85025 COMPLETE CBC W/AUTO DIFF WBC: CPT | Performed by: INTERNAL MEDICINE

## 2021-03-08 PROCEDURE — 3017F COLORECTAL CA SCREEN DOC REV: CPT | Performed by: INTERNAL MEDICINE

## 2021-03-08 PROCEDURE — G8510 SCR DEP NEG, NO PLAN REQD: HCPCS | Performed by: INTERNAL MEDICINE

## 2021-03-08 PROCEDURE — G0439 PPPS, SUBSEQ VISIT: HCPCS | Performed by: INTERNAL MEDICINE

## 2021-03-08 PROCEDURE — G0103 PSA SCREENING: HCPCS | Performed by: INTERNAL MEDICINE

## 2021-03-08 PROCEDURE — 84443 ASSAY THYROID STIM HORMONE: CPT | Performed by: INTERNAL MEDICINE

## 2021-03-08 PROCEDURE — 80053 COMPREHEN METABOLIC PANEL: CPT | Performed by: INTERNAL MEDICINE

## 2021-03-08 PROCEDURE — 81003 URINALYSIS AUTO W/O SCOPE: CPT | Performed by: INTERNAL MEDICINE

## 2021-03-08 PROCEDURE — G8536 NO DOC ELDER MAL SCRN: HCPCS | Performed by: INTERNAL MEDICINE

## 2021-03-08 PROCEDURE — 99214 OFFICE O/P EST MOD 30 MIN: CPT | Performed by: INTERNAL MEDICINE

## 2021-03-08 PROCEDURE — 84439 ASSAY OF FREE THYROXINE: CPT | Performed by: INTERNAL MEDICINE

## 2021-03-08 PROCEDURE — 1101F PT FALLS ASSESS-DOCD LE1/YR: CPT | Performed by: INTERNAL MEDICINE

## 2021-03-08 PROCEDURE — G8419 CALC BMI OUT NRM PARAM NOF/U: HCPCS | Performed by: INTERNAL MEDICINE

## 2021-03-08 RX ORDER — FLUOXETINE HYDROCHLORIDE 20 MG/1
20 CAPSULE ORAL DAILY
COMMUNITY
Start: 2021-02-22 | End: 2022-02-02 | Stop reason: ALTCHOICE

## 2021-03-08 NOTE — PATIENT INSTRUCTIONS

## 2021-03-08 NOTE — PROGRESS NOTES
Chief Complaint   Patient presents with   [facility] Annual Wellness Visit     Visit Vitals  BP (!) 160/86 (BP 1 Location: Right arm, BP Patient Position: Sitting, BP Cuff Size: Adult)   Pulse 68   Temp 98.6 °F (37 °C) (Temporal)   Resp 16   Ht 5' 9\" (1.753 m)   Wt 173 lb 1.6 oz (78.5 kg)   SpO2 98%   BMI 25.56 kg/m²       1. Have you been to the ER, urgent care clinic since your last visit? Hospitalized since your last visit? No    2. Have you seen or consulted any other health care providers outside of the [address] [place] [address] since your last visit? Include any pap smears or colon screening. No      Depression Risk Factor Screening:     3 most recent PHQ Screens 3/8/2021   Little interest or pleasure in doing things Not at all   Feeling down, depressed, irritable, or hopeless Several days   Total Score PHQ 2 1       Functional Ability and Level of Safety:     Activities of Daily Living  ADL Assessment 3/8/2021   Feeding yourself No Help Needed   Getting from bed to chair No Help Needed   Getting dressed No Help Needed   Bathing or showering No Help Needed   Walk across the room (includes cane/walker) No Help Needed   Using the telphone No Help Needed   Taking your medications No Help Needed   Preparing meals No Help Needed   Managing money (expenses/bills) No Help Needed   Moderately strenuous housework (laundry) No Help Needed   Shopping for personal items (toiletries/medicines) No Help Needed   Shopping for groceries No Help Needed   Driving No Help Needed   Climbing a flight of stairs No Help Needed   Getting to places beyond walking distances No Help Needed       Fall Risk  Fall Risk Assessment, last 12 mths 3/8/2021   Able to walk? Yes   Fall in past 12 months? 0   Do you feel unsteady? 0   Are you worried about falling 0   Fall with injury? -       Abuse Screen  Abuse Screening Questionnaire 3/8/2021   Do you ever feel afraid of your partner?  N   Are you in a relationship with someone who physically or mentally threatens you? N   Is it safe for you to go home?  Y         Patient Care Team   Patient Care Team:  Clara Powers MD as PCP - General (Internal Medicine)  Clara Powers MD as PCP - [facility] Empaneled Provider  Marisa Corey MD (Nephrology)

## 2021-04-06 PROBLEM — Z00.00 MEDICARE ANNUAL WELLNESS VISIT, SUBSEQUENT: Status: RESOLVED | Noted: 2017-10-25 | Resolved: 2021-04-06

## 2021-04-06 PROBLEM — Z12.5 PROSTATE CANCER SCREENING: Status: RESOLVED | Noted: 2017-10-25 | Resolved: 2021-04-06

## 2022-02-01 NOTE — PROGRESS NOTES
Chief Complaint   Patient presents with    Hypertension     3 month follow up       SUBJECTIVE:    Yoli Goetz is a 70 y.o. male who is overdue for follow-up not having been seen since last March who presents today in follow-up of his medical problems include hypertension, hyperlipidemia, GERD, bilateral noncritical carotid stenosis, chronic hepatitis C, end-stage renal disease on dialysis, vitamin D deficiency, and other medical problems. In addition to his chronic problems he has 2 rather acute problems 1 is some neck pain on the right side does seem to get worse when he turns his neck to the right side he notes he gets some numbness in the right arm associated with that. That has been going on for some time. He also notes some problems with dyspnea on exertion that seems to be coming on on dialysis days. He notes when he walks up his 5 steps when he gets home after dialysis he will get some shortness of breath with exertion. He has no chest pain or chest tightness with this. It does go away rather quickly. It has been however a persistent problem. His other major issue seems to be his blood pressure is now dropping on his dialysis days most times to the point where they actually have to give him some fluids back and holding for a while after dialysis. He said that they're currently working on trying to establish a new dry weight for him. He denies any palpitations, PND, orthopnea, peripheral edema or other cardiac or respiratory complaints except for the dyspnea on exertion. He notes no current GI or  complaints. He notes no headaches or dizziness but does have the drop in his blood pressure after dialysis at which time they have to hold him for some time as noted. He notes no change of his chronic arthritic complaints other than that related to the right side of his neck that hurts with movement. He hasn't had no falls or trauma.   The remainder of complete review of systems is negative. Current Outpatient Medications   Medication Sig Dispense Refill    calcium acetate, phosphate binder, (PHOSLO) 667 mg tab tablet Take  by mouth three (3) times daily (with meals). Indications: pt takes 3 pills 3 times daily      omeprazole (PRILOSEC) 40 mg capsule TAKE 1 CAPSULE DAILY 90 Cap 3    LORazepam (ATIVAN) 0.5 mg tablet Take 1 Tab by mouth two (2) times daily as needed for Anxiety. Max Daily Amount: 1 mg. 30 Tab 2    SENNA by Does Not Apply route daily.  tiZANidine (ZANAFLEX) 4 mg tablet Take 1 Tab by mouth three (3) times daily as needed for Muscle Spasm(s). 30 Tab 1    cholecalciferol (Vitamin D3) 25 mcg (1,000 unit) cap Take  by mouth daily.  carvediloL (COREG) 12.5 mg tablet Take 25 mg by mouth two (2) times daily (with meals).  lidocaine 4 % patch 1 Patch by TransDERmal route every twelve (12) hours every twelve (12) hours. (Patient taking differently: 1 Patch by TransDERmal route as needed.) 10 Patch 0    lisinopril (PRINIVIL, ZESTRIL) 20 mg tablet Take 1 Tab by mouth daily. 30 Tab prn    etelcalcetide (PARSABIV) 5 mg/mL soln by IntraVENous route.  Indications: 3 x per week      amLODIPine (NORVASC) 10 mg tablet TAKE 1 TABLET BY MOUTH ONCE DAILY  11     Past Medical History:   Diagnosis Date    Arthritis     RA    Chronic kidney disease     Dialysis T, Thur, Sat @ [facility]    GERD (gastroesophageal reflux disease)     Gout     Hepatitis C     Hypertension     Iritis     Liver disease     Hep C    Other and unspecified alcohol dependence, unspecified drinking behavior     Acid reflux    Other ill-defined conditions(589.89)     blood transfusion hx     Past Surgical History:   Procedure Laterality Date    HX ENDOSCOPY  07/29/2020    HX OTHER SURGICAL      liver biopsy    HX VASCULAR ACCESS      [place] [facility]    HX VASCULAR ACCESS  7/15/13    CREATION LEFT UPPER ARM BASILIC VEIN TRANSPOSITION    [facility]ASCULAR ACCESS      [facility]     Allergies Allergen Reactions    Codeine Hives       REVIEW OF SYSTEMS:  General: negative for - chills or fever, or weight loss or gain  ENT: negative for - headaches, nasal congestion or tinnitus  Eyes: no blurred or visual changes  Neck: No stiffness or swollen nodes. Right-sided neck pain as described above  Respiratory: negative for - cough, hemoptysis, shortness of breath or wheezing  Cardiovascular : negative for - chest pain, edema, palpitations or shortness of breath. Dyspnea on exertion as described above  Gastrointestinal: negative for - abdominal pain, blood in stools, heartburn or nausea/vomiting  Genito-Urinary: no dysuria, trouble voiding, or hematuria  Musculoskeletal: negative for - gait disturbance, joint pain, joint stiffness or joint swelling  Neurological: no TIA or stroke symptoms  Hematologic: no bruises, no bleeding  Lymphatic: no swollen glands  Integument: no lumps, mole changes, nail changes or rash  Endocrine:no malaise/lethargy poly uria or polydipsia or unexpected weight changes        Social History     Socioeconomic History    Marital status: SINGLE   Tobacco Use    Smoking status: Never Smoker    Smokeless tobacco: Never Used   Vaping Use    Vaping Use: Never used   Substance and Sexual Activity    Alcohol use: No    Drug use: Not Currently     Types: Marijuana, Heroin     Comment: smoked pot 4 years ago, herion in 25s     History reviewed. No pertinent family history. OBJECTIVE:     Visit Vitals  /72 (BP 1 Location: Right arm, BP Patient Position: Sitting, BP Cuff Size: Adult)   Pulse 73   Temp 98.4 °F (36.9 °C) (Oral)   Resp 17   Ht 5' 9\" (1.753 m)   Wt 189 lb 11.2 oz (86 kg)   SpO2 96%   BMI 28.01 kg/m²     CONSTITUTIONAL:   well nourished, appears age appropriate  EYES: sclera anicteric, PERRL, EOMI  ENMT:nares clear, moist mucous membranes, pharynx clear  NECK: supple. Thyroid normal, No JVD or bruits.   Rotary motion of the neck to the right seems to be a little limited with some mild discomfort. RESPIRATORY: Chest: clear to ascultation and percussion, normal inspiratory effort  CARDIOVASCULAR: Heart: regular rate and rhythm no murmurs, rubs or gallops, PMI not displaced, No thrills, no peripheral edema  GASTROINTESTINAL: Abdomen: non distended, soft, non tender, bowel sounds normal  HEMATOLOGIC: no purpura, petechiae or bruising  LYMPHATIC: No lymph node enlargemant  MUSCULOSKELETAL: Extremities: no active synovitis, pulse 1+   INTEGUMENT: No unusual rashes or suspicious skin lesions noted. Nails appear normal.  PERIPHERAL VASCULAR: normal pulses femoral, PT and DP  NEUROLOGIC: non-focal exam, A & O X 3  PSYCHIATRIC:, appropriate affect     ASSESSMENT:   1. Hypertension, renal disease    2. Mixed hyperlipidemia    3. Gastroesophageal reflux disease without esophagitis    4. ESRD (end stage renal disease) ([facility] [address]    5. Anxiety    6. Chronic hepatitis C without hepatic coma (HCC)    7. MOYER (dyspnea on exertion)    8. Neck pain    9. Chest pain, unspecified type      Impression  1. Hypertension that is controlled here at office and not orthostatic. With the orthostasis after dialysis it sounds like either he needs to have less fluid pulled or consider ProAmatine. 2.  Hyperlipidemia prior lab reviewed repeat status. And I'll adjust if needed. 3.  GERD that is stable  4. End-stage renal disease on dialysis   5. Anxiety chronic but stable  6. Chronic hepatitis C  7. Dyspnea on exertion. EKG obtained today no acute process. Chest x-ray obtained today is clear. I'll set him up for a nuclear stress test.  8.  Neck pain right side with range of motion. Cervical spine x-ray today marked arthritic changes. I am scheduling MRI to further evaluate this. Recheck after the above. I will call with lab results.     PLAN:  .  Orders Placed This Encounter    XR SPINE CERV PA LAT ODONT 3 V MAX    XR CHEST PA LAT    METABOLIC PANEL, COMPREHENSIVE    LIPID PANEL    CK    AMB POC EKG ROUTINE W/ 12 LEADS, INTER & REP    calcium acetate, phosphate binder, (PHOSLO) 667 mg tab tablet         ATTENTION:   This medical record was transcribed using an electronic medical records system. Although proofread, it may and can contain electronic and spelling errors. Other human spelling and other errors may be present. Corrections may be executed at a later time. Please feel free to contact us for any clarifications as needed. Follow-up and Dispositions    · Return in about 3 months (around 5/2/2022). No results found for any visits on 02/02/22. Luann Peres MD    The patient verbalized understanding of the problems and plans as explained.

## 2022-02-02 ENCOUNTER — OFFICE VISIT (OUTPATIENT)
Dept: INTERNAL MEDICINE CLINIC | Age: 72
End: 2022-02-02
Payer: MEDICARE

## 2022-02-02 VITALS
BODY MASS INDEX: 28.1 KG/M2 | HEART RATE: 73 BPM | HEIGHT: 69 IN | DIASTOLIC BLOOD PRESSURE: 72 MMHG | SYSTOLIC BLOOD PRESSURE: 136 MMHG | WEIGHT: 189.7 LBS | RESPIRATION RATE: 17 BRPM | TEMPERATURE: 98.4 F | OXYGEN SATURATION: 96 %

## 2022-02-02 DIAGNOSIS — E78.2 MIXED HYPERLIPIDEMIA: ICD-10-CM

## 2022-02-02 DIAGNOSIS — R06.09 DOE (DYSPNEA ON EXERTION): ICD-10-CM

## 2022-02-02 DIAGNOSIS — B18.2 CHRONIC HEPATITIS C WITHOUT HEPATIC COMA (HCC): ICD-10-CM

## 2022-02-02 DIAGNOSIS — K21.9 GASTROESOPHAGEAL REFLUX DISEASE WITHOUT ESOPHAGITIS: ICD-10-CM

## 2022-02-02 DIAGNOSIS — M47.22 OSTEOARTHRITIS OF SPINE WITH RADICULOPATHY, CERVICAL REGION: ICD-10-CM

## 2022-02-02 DIAGNOSIS — I12.9 HYPERTENSION, RENAL DISEASE: Primary | ICD-10-CM

## 2022-02-02 DIAGNOSIS — N18.6 ESRD (END STAGE RENAL DISEASE) (HCC): ICD-10-CM

## 2022-02-02 DIAGNOSIS — R07.9 CHEST PAIN, UNSPECIFIED TYPE: ICD-10-CM

## 2022-02-02 DIAGNOSIS — F41.9 ANXIETY: ICD-10-CM

## 2022-02-02 DIAGNOSIS — M54.2 NECK PAIN: ICD-10-CM

## 2022-02-02 PROCEDURE — 3017F COLORECTAL CA SCREEN DOC REV: CPT | Performed by: INTERNAL MEDICINE

## 2022-02-02 PROCEDURE — G8536 NO DOC ELDER MAL SCRN: HCPCS | Performed by: INTERNAL MEDICINE

## 2022-02-02 PROCEDURE — 99215 OFFICE O/P EST HI 40 MIN: CPT | Performed by: INTERNAL MEDICINE

## 2022-02-02 PROCEDURE — 1101F PT FALLS ASSESS-DOCD LE1/YR: CPT | Performed by: INTERNAL MEDICINE

## 2022-02-02 PROCEDURE — G8427 DOCREV CUR MEDS BY ELIG CLIN: HCPCS | Performed by: INTERNAL MEDICINE

## 2022-02-02 PROCEDURE — 93000 ELECTROCARDIOGRAM COMPLETE: CPT | Performed by: INTERNAL MEDICINE

## 2022-02-02 PROCEDURE — G8419 CALC BMI OUT NRM PARAM NOF/U: HCPCS | Performed by: INTERNAL MEDICINE

## 2022-02-02 PROCEDURE — G8432 DEP SCR NOT DOC, RNG: HCPCS | Performed by: INTERNAL MEDICINE

## 2022-02-02 RX ORDER — CALCIUM ACETATE 667 MG/1
TABLET ORAL
COMMUNITY
Start: 2021-12-10

## 2022-02-02 NOTE — PATIENT INSTRUCTIONS
Neck Pain: Care Instructions  Your Care Instructions     You can have neck pain anywhere from the bottom of your head to the top of your shoulders. It can spread to the upper back or arms. Injuries, painting a ceiling, sleeping with your neck twisted, staying in one position for too long, and many other activities can cause neck pain. Most neck pain gets better with home care. Your doctor may recommend medicine to relieve pain or relax your muscles. He or she may suggest exercise and physical therapy to increase flexibility and relieve stress. You may need to wear a special (cervical) collar to support your neck for a day or two. Follow-up care is a key part of your treatment and safety. Be sure to make and go to all appointments, and call your doctor if you are having problems. It's also a good idea to know your test results and keep a list of the medicines you take. How can you care for yourself at home? · Try using a heating pad on a low or medium setting for 15 to 20 minutes every 2 or 3 hours. Try a warm shower in place of one session with the heating pad. · You can also try an ice pack for 10 to 15 minutes every 2 to 3 hours. Put a thin cloth between the ice and your skin. · Take pain medicines exactly as directed. ? If the doctor gave you a prescription medicine for pain, take it as prescribed. ? If you are not taking a prescription pain medicine, ask your doctor if you can take an over-the-counter medicine. · If your doctor recommends a cervical collar, wear it exactly as directed. When should you call for help? Call your doctor now or seek immediate medical care if:    · You have new or worsening numbness in your arms, buttocks or legs.     · You have new or worsening weakness in your arms or legs. (This could make it hard to stand up.)     · You lose control of your bladder or bowels.    Watch closely for changes in your health, and be sure to contact your doctor if:    · Your neck pain is getting worse.     · You are not getting better after 1 week.     · You do not get better as expected. Where can you learn more? Go to http://www.[employer].com/  Enter V723 in the search box to learn more about \"Neck Pain: Care Instructions. \"  Current as of: July 1, 2021               Content Version: 13.0  © 4538-5096 [email or web address]. Care instructions adapted under license by [employer] (which disclaims liability or warranty for this information). If you have questions about a medical condition or this instruction, always ask your healthcare professional. [place] [address] any warranty or liability for your use of this information.

## 2022-02-02 NOTE — PROGRESS NOTES
Chief Complaint   Patient presents with    Hypertension     3 month follow up     Visit Vitals  /72 (BP 1 Location: Right arm, BP Patient Position: Sitting, BP Cuff Size: Adult)   Pulse 73   Temp 98.4 °F (36.9 °C) (Oral)   Resp 17   Ht 5' 9\" (1.753 m)   Wt 189 lb 11.2 oz (86 kg)   SpO2 96%   BMI 28.01 kg/m²     1. Have you been to the ER, urgent care clinic since your last visit? Hospitalized since your last visit? No    2. Have you seen or consulted any other health care providers outside of the [address] [place] [address] since your last visit? Include any pap smears or colon screening.  No

## 2022-02-03 LAB
ALBUMIN SERPL-MCNC: 3.9 G/DL (ref 3.5–5)
ALBUMIN/GLOB SERPL: 0.9 {RATIO} (ref 1.1–2.2)
ALP SERPL-CCNC: 75 U/L (ref 45–117)
ALT SERPL-CCNC: 18 U/L (ref 12–78)
ANION GAP SERPL CALC-SCNC: 5 MMOL/L (ref 5–15)
AST SERPL-CCNC: 18 U/L (ref 15–37)
BILIRUB SERPL-MCNC: 0.6 MG/DL (ref 0.2–1)
BUN SERPL-MCNC: 42 MG/DL (ref 6–20)
BUN/CREAT SERPL: 5 (ref 12–20)
CALCIUM SERPL-MCNC: 9.2 MG/DL (ref 8.5–10.1)
CHLORIDE SERPL-SCNC: 99 MMOL/L (ref 97–108)
CHOLEST SERPL-MCNC: 187 MG/DL
CK SERPL-CCNC: 149 U/L (ref 39–308)
CO2 SERPL-SCNC: 30 MMOL/L (ref 21–32)
CREAT SERPL-MCNC: 8.85 MG/DL (ref 0.7–1.3)
GLOBULIN SER CALC-MCNC: 4.5 G/DL (ref 2–4)
GLUCOSE SERPL-MCNC: 89 MG/DL (ref 65–100)
HDLC SERPL-MCNC: 52 MG/DL
HDLC SERPL: 3.6 {RATIO} (ref 0–5)
LDLC SERPL CALC-MCNC: 121.2 MG/DL (ref 0–100)
POTASSIUM SERPL-SCNC: 5.1 MMOL/L (ref 3.5–5.1)
PROT SERPL-MCNC: 8.4 G/DL (ref 6.4–8.2)
SODIUM SERPL-SCNC: 134 MMOL/L (ref 136–145)
TRIGL SERPL-MCNC: 69 MG/DL (ref ?–150)
VLDLC SERPL CALC-MCNC: 13.8 MG/DL

## 2022-02-04 ENCOUNTER — TELEPHONE (OUTPATIENT)
Dept: INTERNAL MEDICINE CLINIC | Age: 72
End: 2022-02-04

## 2022-02-04 NOTE — TELEPHONE ENCOUNTER
----- Message from Claudean Patella sent at 2/3/2022 12:36 PM EST -----  Subject: Message to Provider    QUESTIONS  Information for Provider? Pt was seen by Dr. Enma Weaver yesterday. He was   told he was going to do an MRI and nuclear stress test. Pt is a dialysis   pt and has to be careful about Nuclear test. He is asking what the name of   the test is so he can clear it through his kidney doctor. Please call pt   to advise.   ---------------------------------------------------------------------------  --------------  CALL BACK INFO  What is the best way for the office to contact you? OK to leave message on   voicemail  Preferred Call Back Phone Number? [phone]  ---------------------------------------------------------------------------  --------------  SCRIPT ANSWERS  Relationship to Patient?  Self

## 2022-02-07 ENCOUNTER — HOSPITAL ENCOUNTER (OUTPATIENT)
Dept: MRI IMAGING | Age: 72
Discharge: HOME | End: 2022-02-07
Attending: INTERNAL MEDICINE
Payer: MEDICARE

## 2022-02-07 DIAGNOSIS — M47.22 OSTEOARTHRITIS OF SPINE WITH RADICULOPATHY, CERVICAL REGION: ICD-10-CM

## 2022-02-07 PROCEDURE — 72141 MRI NECK SPINE W/O DYE: CPT

## 2022-02-08 DIAGNOSIS — M48.00 MULTILEVEL FORAMINAL STENOSIS: Primary | ICD-10-CM

## 2022-02-08 NOTE — PROGRESS NOTES
Multilevel foraminal compromise most for cervical spine. Appointment to see Dr. Castro March,  Discussed with patient and he wants it scheduled with Dr. Will Bearden who he has seen before. Referral generated.

## 2022-03-18 PROBLEM — F41.9 ANXIETY: Status: ACTIVE | Noted: 2019-07-31

## 2022-03-18 PROBLEM — I12.9 HYPERTENSION, RENAL DISEASE: Status: ACTIVE | Noted: 2017-10-22

## 2022-03-18 PROBLEM — M25.50 JOINT PAIN: Status: ACTIVE | Noted: 2019-08-16

## 2022-03-18 PROBLEM — E78.2 MIXED HYPERLIPIDEMIA: Status: ACTIVE | Noted: 2017-10-22

## 2022-03-18 PROBLEM — I65.23 BILATERAL CAROTID ARTERY STENOSIS: Status: ACTIVE | Noted: 2018-12-21

## 2022-03-19 PROBLEM — M54.6 CHRONIC MIDLINE THORACIC BACK PAIN: Status: ACTIVE | Noted: 2019-08-13

## 2022-03-19 PROBLEM — K21.9 GASTROESOPHAGEAL REFLUX DISEASE WITHOUT ESOPHAGITIS: Status: ACTIVE | Noted: 2017-10-22

## 2022-03-19 PROBLEM — G89.29 CHRONIC MIDLINE THORACIC BACK PAIN: Status: ACTIVE | Noted: 2019-08-13

## 2022-03-19 PROBLEM — M54.2 NECK PAIN: Status: ACTIVE | Noted: 2017-11-20

## 2022-03-19 PROBLEM — R00.2 PALPITATIONS: Status: ACTIVE | Noted: 2018-12-19

## 2022-03-19 PROBLEM — R31.21 ASYMPTOMATIC MICROSCOPIC HEMATURIA: Status: ACTIVE | Noted: 2019-08-15

## 2022-03-19 PROBLEM — H65.01 RIGHT ACUTE SEROUS OTITIS MEDIA: Status: ACTIVE | Noted: 2019-01-21

## 2022-03-19 PROBLEM — R09.89 RIGHT CAROTID BRUIT: Status: ACTIVE | Noted: 2018-12-21

## 2022-03-19 PROBLEM — M10.9 GOUT OF MULTIPLE SITES: Status: ACTIVE | Noted: 2017-10-25

## 2022-03-19 PROBLEM — Z13.39 ALCOHOL SCREENING: Status: ACTIVE | Noted: 2020-01-24

## 2022-03-19 PROBLEM — M54.50 MIDLINE LOW BACK PAIN WITHOUT SCIATICA: Status: ACTIVE | Noted: 2019-05-13

## 2022-03-19 PROBLEM — R10.11 RIGHT UPPER QUADRANT ABDOMINAL PAIN: Status: ACTIVE | Noted: 2020-07-01

## 2022-03-19 PROBLEM — R09.89 BRUIT OF LEFT CAROTID ARTERY: Status: ACTIVE | Noted: 2018-12-19

## 2022-03-19 PROBLEM — B18.2 CHRONIC HEPATITIS C WITHOUT HEPATIC COMA (HCC): Status: ACTIVE | Noted: 2017-10-25

## 2022-03-19 PROBLEM — N45.1 EPIDIDYMITIS: Status: ACTIVE | Noted: 2019-02-25

## 2022-03-19 PROBLEM — R10.30 LOWER ABDOMINAL PAIN: Status: ACTIVE | Noted: 2020-12-07

## 2022-03-19 PROBLEM — R06.09 DOE (DYSPNEA ON EXERTION): Status: ACTIVE | Noted: 2022-02-02

## 2022-03-20 PROBLEM — N18.6 ESRD (END STAGE RENAL DISEASE) (HCC): Status: ACTIVE | Noted: 2017-10-22

## 2022-03-20 PROBLEM — E55.9 VITAMIN D DEFICIENCY: Status: ACTIVE | Noted: 2017-10-22

## 2022-04-13 ENCOUNTER — HOSPITAL ENCOUNTER (OUTPATIENT)
Dept: NUCLEAR MEDICINE | Age: 72
Discharge: HOME | End: 2022-04-13
Attending: INTERNAL MEDICINE
Payer: MEDICARE

## 2022-04-13 ENCOUNTER — HOSPITAL ENCOUNTER (OUTPATIENT)
Dept: NON INVASIVE DIAGNOSTICS | Age: 72
Discharge: HOME | End: 2022-04-13
Attending: INTERNAL MEDICINE
Payer: MEDICARE

## 2022-04-13 DIAGNOSIS — R06.09 DOE (DYSPNEA ON EXERTION): ICD-10-CM

## 2022-04-13 DIAGNOSIS — R07.9 CHEST PAIN, UNSPECIFIED TYPE: ICD-10-CM

## 2022-04-13 LAB
STRESS BASELINE DIAS BP: 77 MMHG
STRESS BASELINE HR: 83 BPM
STRESS BASELINE ST DEPRESSION: 0 MM
STRESS BASELINE SYS BP: 158 MMHG
STRESS ESTIMATED WORKLOAD: 1 METS
STRESS EXERCISE DUR MIN: 1 MIN
STRESS EXERCISE DUR SEC: 4 SEC
STRESS O2 SAT PEAK: 100 %
STRESS O2 SAT REST: 99 %
STRESS PEAK DIAS BP: 77 MMHG
STRESS PEAK SYS BP: 158 MMHG
STRESS PERCENT HR ACHIEVED: 67 %
STRESS POST PEAK HR: 100 BPM
STRESS RATE PRESSURE PRODUCT: NORMAL BPM*MMHG
STRESS TARGET HR: 149 BPM

## 2022-04-13 PROCEDURE — A9500 TC99M SESTAMIBI: HCPCS

## 2022-04-13 PROCEDURE — 74011250636 HC RX REV CODE- 250/636: Performed by: INTERNAL MEDICINE

## 2022-04-13 PROCEDURE — 93017 CV STRESS TEST TRACING ONLY: CPT

## 2022-04-13 RX ORDER — TETRAKIS(2-METHOXYISOBUTYLISOCYANIDE)COPPER(I) TETRAFLUOROBORATE 1 MG/ML
32.4 INJECTION, POWDER, LYOPHILIZED, FOR SOLUTION INTRAVENOUS
Status: COMPLETED | OUTPATIENT
Start: 2022-04-13 | End: 2022-04-13

## 2022-04-13 RX ORDER — TETRAKIS(2-METHOXYISOBUTYLISOCYANIDE)COPPER(I) TETRAFLUOROBORATE 1 MG/ML
10.4 INJECTION, POWDER, LYOPHILIZED, FOR SOLUTION INTRAVENOUS
Status: COMPLETED | OUTPATIENT
Start: 2022-04-13 | End: 2022-04-13

## 2022-04-13 RX ORDER — SODIUM CHLORIDE 0.9 % (FLUSH) 0.9 %
10 SYRINGE (ML) INJECTION AS NEEDED
Status: DISCONTINUED | OUTPATIENT
Start: 2022-04-13 | End: 2022-04-17 | Stop reason: HOSPADM

## 2022-04-13 RX ADMIN — REGADENOSON 0.4 MG: 0.08 INJECTION, SOLUTION INTRAVENOUS at 09:36

## 2022-04-13 RX ADMIN — TETRAKIS(2-METHOXYISOBUTYLISOCYANIDE)COPPER(I) TETRAFLUOROBORATE 32.4 MILLICURIE: 1 INJECTION, POWDER, LYOPHILIZED, FOR SOLUTION INTRAVENOUS at 09:35

## 2022-04-13 RX ADMIN — TETRAKIS(2-METHOXYISOBUTYLISOCYANIDE)COPPER(I) TETRAFLUOROBORATE 10.4 MILLICURIE: 1 INJECTION, POWDER, LYOPHILIZED, FOR SOLUTION INTRAVENOUS at 07:50

## 2022-04-13 NOTE — PROGRESS NOTES
This is normal.  He has no appointment for follow-up with me and should be seen for his normal 3-month checkup around May 2. At which time it he will also be due for his Medicare wellness which she is overdue for since it was due in March.   That appointment needs to be scheduled

## 2022-04-15 NOTE — PROGRESS NOTES
This is normal.  He has no appointment for follow-up with me and should be seen for his normal 3-month checkup around May 2. At which time it he will also be due for his Medicare wellness which she is overdue for since it was due in March. That appointment needs to be scheduled. Discussed test results. Scheduled for f/up 5-4-2022.

## 2022-05-03 PROBLEM — Z12.5 PROSTATE CANCER SCREENING: Status: ACTIVE | Noted: 2017-10-25

## 2022-05-03 PROBLEM — Z00.00 MEDICARE ANNUAL WELLNESS VISIT, SUBSEQUENT: Status: ACTIVE | Noted: 2017-10-25

## 2022-05-03 PROBLEM — R10.11 RIGHT UPPER QUADRANT ABDOMINAL PAIN: Status: RESOLVED | Noted: 2020-07-01 | Resolved: 2022-05-03

## 2022-05-03 PROBLEM — M25.50 JOINT PAIN: Status: RESOLVED | Noted: 2019-08-16 | Resolved: 2022-05-03

## 2022-05-03 NOTE — PROGRESS NOTES
This is a Subsequent Medicare Annual Wellness Visit providing Personalized Prevention Plan Services (PPPS) (Performed 12 months after initial AWV and PPPS )    I have reviewed the patient's medical history in detail and updated the computerized patient record. He returns today for his Medicare subsequent annual wellness examination and screening questionnaire. He is also in follow-up of his multiple medical problems include hypertension, end-stage renal disease on dialysis 3 times weekly, GERD, hyperlipidemia, bilateral noncritical carotid disease, anxiety, chronic low back pain, DJD history of gout, vitamin D deficiency and other multiple medical problems. He is taking his medications trying to follow his diet and try and remain physically active. He currently denies any chest pain, shortness of breath, palpitations, PND, orthopnea or other cardiac or respiratory complaints. He notes no GI or  complaints. He has no headaches, dizziness or neurologic complaints. He has no current active arthritic complaints and and no other complaints on complete review of systems.     History     Past Medical History:   Diagnosis Date    Arthritis     RA    Chronic kidney disease     Dialysis T, Thur, Sat @ [facility]    GERD (gastroesophageal reflux disease)     Gout     Hepatitis C     Hypertension     Ill-defined condition     Gout    Iritis     Liver disease     Hep C    Other and unspecified alcohol dependence, unspecified drinking behavior     Acid reflux    Other ill-defined conditions(799.89)     blood transfusion hx    Psychiatric disorder     Anxiety      Past Surgical History:   Procedure Laterality Date    HX ENDOSCOPY  07/29/2020    HX OTHER SURGICAL      liver biopsy    [facility]      [facility]    HX VASCULAR ACCESS  7/15/13    CREATION LEFT UPPER ARM BASILIC VEIN TRANSPOSITION    [facility]ESS      [facility]     Social History     Tobacco Use    Smoking status: Never Smoker    Smokeless tobacco: Never Used   Vaping Use    Vaping Use: Never used   Substance Use Topics    Alcohol use: No    Drug use: Not Currently     Types: Marijuana, Heroin     Comment: smoked pot  years ago, herion in 20s     Current Outpatient Medications   Medication Sig Dispense Refill    LORazepam (ATIVAN) 0.5 mg tablet Take 1 Tablet by mouth two (2) times daily as needed for Anxiety. Max Daily Amount: 1 mg. 30 Tablet 2    tiZANidine (ZANAFLEX) 4 mg tablet Take 1 Tablet by mouth three (3) times daily as needed for Muscle Spasm(s). 30 Tablet 1    calcium acetate, phosphate binder, (PHOSLO) 667 mg tab tablet Take  by mouth three (3) times daily (with meals). Indications: pt takes 3 pills 3 times daily      omeprazole (PRILOSEC) 40 mg capsule TAKE 1 CAPSULE DAILY 90 Cap 3    SENNA by Does Not Apply route daily.  cholecalciferol (Vitamin D3) 25 mcg (1,000 unit) cap Take  by mouth daily.  carvediloL (COREG) 12.5 mg tablet Take 12.5 mg by mouth two (2) times daily (with meals).  lidocaine 4 % patch 1 Patch by TransDERmal route every twelve (12) hours every twelve (12) hours. (Patient taking differently: 1 Patch by TransDERmal route as needed.) 10 Patch 0    lisinopril (PRINIVIL, ZESTRIL) 20 mg tablet Take 1 Tab by mouth daily. 30 Tab prn    etelcalcetide (PARSABIV) 5 mg/mL soln by IntraVENous route. Indications: 3 x per week      amLODIPine (NORVASC) 10 mg tablet TAKE 1 TABLET BY MOUTH ONCE DAILY  11     Allergies   Allergen Reactions    Codeine Hives     History reviewed. No pertinent family history.     Patient Active Problem List    Diagnosis    Hypertension, renal disease    Mixed hyperlipidemia    [facility]D [facility]end stage [facility] disease) ([facility] [address]    Gastroesophageal reflux disease without esophagitis    MOYER (dyspnea on exertion)    Lower abdominal pain    Alcohol screening    Asymptomatic microscopic hematuria    Chronic midline thoracic back pain    Anxiety    Midline low back pain without sciatica    Epididymitis    Right acute serous otitis media    Bilateral carotid artery stenosis     Carotid dopplers 1/24 2020 reported as normal      Right carotid bruit    Palpitations    Bruit of left carotid artery    Neck pain    Prostate cancer screening    Gout of multiple sites    Chronic hepatitis C without hepatic coma ([facility] [address]    Medicare annual wellness visit, subsequent    Vitamin D deficiency       Patient Care Team:  Remi Goldstein MD as PCP - General (Internal Medicine)  Remi Goldstein MD as PCP - [facility]  Radha De MD (Nephrology)    Depression Risk Factor Screening:     3 most recent PHQ Screens 5/4/2022   Little interest or pleasure in doing things Not at all   Feeling down, depressed, irritable, or hopeless Not at all   Total Score PHQ 2 0     Alcohol Risk Factor Screening: You do not drink alcohol or very rarely. Functional Ability and Level of Safety:     Fall Risk     Fall Risk Assessment, last 12 mths 5/4/2022   Able to walk? Yes   Fall in past 12 months? 0   Do you feel unsteady? 1   Are you worried about falling 0   Is the gait abnormal? 0   Fall with injury? -       Hearing Loss   mild    Activities of Daily Living   Self-care.    ADL Assessment 5/4/2022   Feeding yourself No Help Needed   Getting from bed to chair No Help Needed   Getting dressed No Help Needed   Bathing or showering No Help Needed   Walk across the room (includes cane/walker) No Help Needed   Using the telphone No Help Needed   Taking your medications No Help Needed   Preparing meals No Help Needed   Managing money (expenses/bills) No Help Needed   Moderately strenuous housework (laundry) No Help Needed   Shopping for personal items (toiletries/medicines) No Help Needed   Shopping for groceries No Help Needed   Driving No Help Needed   Climbing a flight of stairs No Help Needed   Getting to places beyond walking distances No Help Needed       Abuse Screen Patient is not abused    Social History     Social History Narrative    Not on file       Review of Systems      ROS:    Constitutional: He denies fevers, weight loss, sweats. Eyes: No blurred or double vision. ENT: No difficulty with swallowing, taste, speech or smell. Neck: no stiffness or swelling  Respiratory: No cough wheezing or shortness of breath. Cardiovascular: Denies chest pain, palpitations, unexplained indigestion or syncope. Gastrointestinal:  No changes in bowel movements, no abdominal pain, no bloating. Genitourinary:  He denies frequency, nocturia or stranguria. Extremities: No joint pain, stiffness or swelling. Neurological:  No numbness, tingling, burring paresthesias or loss of motor strength. No syncope, dizziness or frequent headache  Lymphatic: no adenopathy noted  Hematologic: no easy bruising or bleeding gums  Skin:  No recent rashes or mole changes. Psychiatric/Behavioral:  Negative for depression. Physical Examination     Evaluation of Cognitive Function:  Mood/affect:  happy  Appearance: age appropriate  Family member/caregiver input: None    Vitals:    05/04/22 1101   BP: 138/78   Pulse: 72   Resp: 18   Temp: 98.5 °F (36.9 °C)   TempSrc: Oral   SpO2: 98%   Weight: 187 lb 9.6 oz (85.1 kg)   Height: 5' 9\" (1.753 m)   PainSc:   0 - No pain        PHYSICAL EXAM:    General appearance - alert, well appearing, and in no distress  Mental status - alert, oriented to person, place, and time  HEENT:  Ears - bilateral TM's and external ear canals clear  Eyes - pupillary responses were normal.  Extraocular muscle function intact. Lids and conjunctiva not injected. Fundoscopic exam revealed sharp disc margins. eye movements intact  Pharynx- clear with teeth in good repair. No masses were noted  Neck - supple without thyromegaly or burit. No JVD noted  Lungs - clear to auscultation and percussion  Cardiac- normal rate, regular rhythm without murmurs. PMI not displaced.   No gallop, rub or click  Abdomen - flat, soft, non-tender without palpable organomegaly or mass. No pulsatile mass was felt, and not bruit was heard. Bowel sounds were active  : Circumcised, Testes descended w/o masses  Rectal: Deferred at patient request  Extremities -  no clubbing cyanosis or edema  Lymphatics - no palpable lymphadenopathy, no hepatosplenomegaly  Hematologic: no petechiae or purpura  Peripheral vascular -Femoral, Dorsalis pedis and posterior tibial pulses felt without difficulty  Skin - no rash or unusual mole change noted  Neurological - Cranial nerves II-XII grossly intact. Motor strength 5/5. DTR's 2+ and symmetric. Station and gait normal  Back exam - full range of motion, no tenderness, palpable spasm or pain on motion  Musculoskeletal - no joint tenderness, deformity or swelling        Results for orders placed or performed during the hospital encounter of 04/13/22   NUCLEAR CARDIAC STRESS TEST   Result Value Ref Range    Stress Target  bpm    Exercise Duration Time 1 min    Exercuse Duration Seconds 4 sec    Stress Systolic  mmHg    Stress Diastolic BP 77 mmHg    Stress Peak  BPM    Baseline HR 83 BPM    Stress Estimated Workload 1.0 METS    Stress Rate Pressure Product 15,800 BPM*mmHg    Stress Percent HR Achieved 67 %    Baseline Systolic  mmHg    Baseline Diastolic BP 77 mmHg    Baseline O2 Sat 99 %    Stress O2 Sat 100 %    Baseline ST Depression 0 mm       Advice/Referrals/Counseling   Education and counseling provided:  Are appropriate based on today's review and evaluation  End-of-Life planning (with patient's consent)  Pneumococcal Vaccine  Influenza Vaccine  Colorectal cancer screening tests      Assessment/Plan     ASSESSMENT:   1. Hypertension, renal disease    2. Mixed hyperlipidemia    3. ESRD (end stage renal disease) ([address]    4. Gastroesophageal reflux disease without esophagitis    5. Anxiety    6. Bilateral carotid artery stenosis    7.  Bruit of left carotid artery    8. Chronic hepatitis C without hepatic coma (HCC)    9. Asymptomatic microscopic hematuria    10. Chronic gout of multiple sites, unspecified cause    11. Vitamin D deficiency    12. Prostate cancer screening    13. Alcohol screening    14. Medicare annual wellness visit, subsequent      Impression  1. Hypertension that is controlled so continue current therapy reviewed with him. 2.  Hyperlipidemia prior lab reviewed repeat status pending I will adjust if needed. 3   End-stage renal disease on dialysis and doing well  4. GERD that is stable  5   Anxiety chronic but stable  6. Bilateral carotid artery stenosis noncritical bilateral bruit  7. Chronic hepatitis C not active  8. Microscopic hematuria previously evaluated  9. History of gout not recently having any flares  10. Vitamin D deficiency repeat status pending  11   Prostate cancer screening done today with PSA pending as he wanted to defer the exam  12. Annual alcohol screening is done and he drinks no alcohol at all. We did caution regarding males to drink more than 2 average per day with increased cardiovascular risk and increased risk of liver disease and other GI effects. Medicare subsequent annual wellness examination screening questionnaires completed today. The results were reviewed with him and his questions were answered. Lifestyle recommendations and modifications discussed and made. I will call the lab and make further recommendations or adjustments if necessary. Follow-up in 3 months or sooner if there is a problem.     PLAN:  .  Orders Placed This Encounter    CBC WITH AUTOMATED DIFF    METABOLIC PANEL, COMPREHENSIVE    LIPID PANEL    CK    T4 (THYROXINE)    TSH 3RD GENERATION    URINALYSIS W/ REFLEX CULTURE    VITAMIN D, 25 HYDROXY    PSA SCREENING (SCREENING)    LORazepam (ATIVAN) 0.5 mg tablet    tiZANidine (ZANAFLEX) 4 mg tablet         ATTENTION:   This medical record was transcribed using an electronic medical records system. Although proofread, it may and can contain electronic and spelling errors. Other human spelling and other errors may be present. Corrections may be executed at a later time. Please feel free to contact us for any clarifications as needed. Follow-up and Dispositions    · Return in about 3 months (around 8/4/2022). Follow-up and Disposition History           Igor Reynolds MD    Recommended healthy diet low in carbohydrates, fats, sodium and cholesterol. Recommended regular cardiovascular exercise 3-6 times per week for 30-60 minutes daily. Current Outpatient Medications   Medication Sig Dispense Refill    LORazepam (ATIVAN) 0.5 mg tablet Take 1 Tablet by mouth two (2) times daily as needed for Anxiety. Max Daily Amount: 1 mg. 30 Tablet 2    tiZANidine (ZANAFLEX) 4 mg tablet Take 1 Tablet by mouth three (3) times daily as needed for Muscle Spasm(s). 30 Tablet 1    calcium acetate, phosphate binder, (PHOSLO) 667 mg tab tablet Take  by mouth three (3) times daily (with meals). Indications: pt takes 3 pills 3 times daily      omeprazole (PRILOSEC) 40 mg capsule TAKE 1 CAPSULE DAILY 90 Cap 3    SENNA by Does Not Apply route daily.  cholecalciferol (Vitamin D3) 25 mcg (1,000 unit) cap Take  by mouth daily.  carvediloL (COREG) 12.5 mg tablet Take 12.5 mg by mouth two (2) times daily (with meals).  lidocaine 4 % patch 1 Patch by TransDERmal route every twelve (12) hours every twelve (12) hours. (Patient taking differently: 1 Patch by TransDERmal route as needed.) 10 Patch 0    lisinopril (PRINIVIL, ZESTRIL) 20 mg tablet Take 1 Tab by mouth daily. 30 Tab prn    etelcalcetide (PARSABIV) 5 mg/mL soln by IntraVENous route. Indications: 3 x per week      amLODIPine (NORVASC) 10 mg tablet TAKE 1 TABLET BY MOUTH ONCE DAILY  11       No results found for any visits on 05/04/22. Verbal and written instructions (see AVS) provided.   Patient expresses understanding of diagnosis and treatment plan.     Maria Guadalupe Luque MD

## 2022-05-04 ENCOUNTER — OFFICE VISIT (OUTPATIENT)
Dept: INTERNAL MEDICINE CLINIC | Age: 72
End: 2022-05-04
Payer: MEDICARE

## 2022-05-04 VITALS
SYSTOLIC BLOOD PRESSURE: 138 MMHG | WEIGHT: 187.6 LBS | HEIGHT: 69 IN | BODY MASS INDEX: 27.78 KG/M2 | TEMPERATURE: 98.5 F | OXYGEN SATURATION: 98 % | HEART RATE: 72 BPM | RESPIRATION RATE: 18 BRPM | DIASTOLIC BLOOD PRESSURE: 78 MMHG

## 2022-05-04 DIAGNOSIS — M1A.09X0 CHRONIC GOUT OF MULTIPLE SITES, UNSPECIFIED CAUSE: ICD-10-CM

## 2022-05-04 DIAGNOSIS — I65.23 BILATERAL CAROTID ARTERY STENOSIS: ICD-10-CM

## 2022-05-04 DIAGNOSIS — E78.2 MIXED HYPERLIPIDEMIA: ICD-10-CM

## 2022-05-04 DIAGNOSIS — N18.6 ESRD (END STAGE RENAL DISEASE) (HCC): ICD-10-CM

## 2022-05-04 DIAGNOSIS — B18.2 CHRONIC HEPATITIS C WITHOUT HEPATIC COMA (HCC): ICD-10-CM

## 2022-05-04 DIAGNOSIS — R31.21 ASYMPTOMATIC MICROSCOPIC HEMATURIA: ICD-10-CM

## 2022-05-04 DIAGNOSIS — Z12.5 PROSTATE CANCER SCREENING: ICD-10-CM

## 2022-05-04 DIAGNOSIS — I12.9 HYPERTENSION, RENAL DISEASE: Primary | ICD-10-CM

## 2022-05-04 DIAGNOSIS — E55.9 VITAMIN D DEFICIENCY: ICD-10-CM

## 2022-05-04 DIAGNOSIS — Z00.00 MEDICARE ANNUAL WELLNESS VISIT, SUBSEQUENT: ICD-10-CM

## 2022-05-04 DIAGNOSIS — R09.89 BRUIT OF LEFT CAROTID ARTERY: ICD-10-CM

## 2022-05-04 DIAGNOSIS — F41.9 ANXIETY: ICD-10-CM

## 2022-05-04 DIAGNOSIS — K21.9 GASTROESOPHAGEAL REFLUX DISEASE WITHOUT ESOPHAGITIS: ICD-10-CM

## 2022-05-04 DIAGNOSIS — Z13.39 ALCOHOL SCREENING: ICD-10-CM

## 2022-05-04 PROCEDURE — 1101F PT FALLS ASSESS-DOCD LE1/YR: CPT | Performed by: INTERNAL MEDICINE

## 2022-05-04 PROCEDURE — G8510 SCR DEP NEG, NO PLAN REQD: HCPCS | Performed by: INTERNAL MEDICINE

## 2022-05-04 PROCEDURE — 3017F COLORECTAL CA SCREEN DOC REV: CPT | Performed by: INTERNAL MEDICINE

## 2022-05-04 PROCEDURE — G0439 PPPS, SUBSEQ VISIT: HCPCS | Performed by: INTERNAL MEDICINE

## 2022-05-04 PROCEDURE — 99214 OFFICE O/P EST MOD 30 MIN: CPT | Performed by: INTERNAL MEDICINE

## 2022-05-04 PROCEDURE — G8536 NO DOC ELDER MAL SCRN: HCPCS | Performed by: INTERNAL MEDICINE

## 2022-05-04 PROCEDURE — G8427 DOCREV CUR MEDS BY ELIG CLIN: HCPCS | Performed by: INTERNAL MEDICINE

## 2022-05-04 PROCEDURE — G8419 CALC BMI OUT NRM PARAM NOF/U: HCPCS | Performed by: INTERNAL MEDICINE

## 2022-05-04 RX ORDER — LORAZEPAM 0.5 MG/1
0.5 TABLET ORAL
Qty: 30 TABLET | Refills: 2 | Status: SHIPPED | OUTPATIENT
Start: 2022-05-04

## 2022-05-04 RX ORDER — TIZANIDINE 4 MG/1
4 TABLET ORAL
Qty: 30 TABLET | Refills: 1 | Status: SHIPPED | OUTPATIENT
Start: 2022-05-04

## 2022-05-04 NOTE — PROGRESS NOTES
Chief Complaint   Patient presents with    Annual Wellness Visit       Depression Risk Factor Screening:     3 most recent PHQ Screens 5/4/2022   Little interest or pleasure in doing things Not at all   Feeling down, depressed, irritable, or hopeless Not at all   Total Score PHQ 2 0       Functional Ability and Level of Safety:     Activities of Daily Living  ADL Assessment 5/4/2022   Feeding yourself No Help Needed   Getting from bed to chair No Help Needed   Getting dressed No Help Needed   Bathing or showering No Help Needed   Walk across the room (includes cane/walker) No Help Needed   Using the telphone No Help Needed   Taking your medications No Help Needed   Preparing meals No Help Needed   Managing money (expenses/bills) No Help Needed   Moderately strenuous housework (laundry) No Help Needed   Shopping for personal items (toiletries/medicines) No Help Needed   Shopping for groceries No Help Needed   Driving No Help Needed   Climbing a flight of stairs No Help Needed   Getting to places beyond walking distances No Help Needed       Fall Risk  Fall Risk Assessment, last 12 mths 5/4/2022   Able to walk? Yes   Fall in past 12 months? 0   Do you feel unsteady? 1   Are you worried about falling 0   Is the gait abnormal? 0   Fall with injury? -       Abuse Screen  Abuse Screening Questionnaire 5/4/2022   Do you ever feel afraid of your partner? N   Are you in a relationship with someone who physically or mentally threatens you? N   Is it safe for you to go home?  Y         Patient Care Team   Patient Care Team:  Alessia Escoto MD as PCP - General (Internal Medicine)  Alessia Escoto MD as PCP - [facility] Empaneled Provider  Timmy Goyal MD (Nephrology)

## 2022-05-04 NOTE — PATIENT INSTRUCTIONS
Medicare Wellness Visit, Male    The best way to live healthy is to have a lifestyle where you eat a well-balanced diet, exercise regularly, limit alcohol use, and quit all forms of tobacco/nicotine, if applicable. Regular preventive services are another way to keep healthy. Preventive services (vaccines, screening tests, monitoring & exams) can help personalize your care plan, which helps you manage your own care. Screening tests can find health problems at the earliest stages, when they are easiest to treat. [facility] follows the current, evidence-based guidelines published by the [facility] ([facility]) when recommending preventive services for our patients. Because we follow these guidelines, sometimes recommendations change over time as research supports it. (For example, a prostate screening blood test is no longer routinely recommended for men with no symptoms). Of course, you and your doctor may decide to screen more often for some diseases, based on your risk and co-morbidities (chronic disease you are already diagnosed with). Preventive services for you include:  - Medicare offers their members a free annual wellness visit, which is time for you and your primary care provider to discuss and plan for your preventive service needs. Take advantage of this benefit every year!  -All adults over age 72 should receive the recommended pneumonia vaccines. Current USPSTF guidelines recommend a series of two vaccines for the best pneumonia protection.   -All adults should have a flu vaccine yearly and tetanus vaccine every 10 years.  -All adults age 48 and older should receive the shingles vaccines (series of two vaccines).        -All adults age 38-68 who are overweight should have a diabetes screening test once every three years.   -Other screening tests & preventive services for persons with diabetes include: an eye exam to screen for diabetic retinopathy, a kidney function test, a foot exam, and stricter control over your cholesterol.   -Cardiovascular screening for adults with routine risk involves an electrocardiogram (ECG) at intervals determined by the provider.   -Colorectal cancer screening should be done for adults age 54-65 with no increased risk factors for colorectal cancer. There are a number of acceptable methods of screening for this type of cancer. Each test has its own benefits and drawbacks. Discuss with your provider what is most appropriate for you during your annual wellness visit. The different tests include: colonoscopy (considered the best screening method), a fecal occult blood test, a fecal DNA test, and sigmoidoscopy.  -All adults born [facility] should be screened once for Hepatitis C.  -An Abdominal Aortic Aneurysm (AAA) Screening is recommended for men age 73-68 who has ever smoked in their lifetime.      Here is a list of your current Health Maintenance items (your personalized list of preventive services) with a due date:  Health Maintenance Due   Topic Date Due    DTaP/Tdap/Td  (1 - Tdap) Never done    Shingles Vaccine (1 of 2) Never done    Annual Well Visit  03/09/2022

## 2022-05-05 LAB
25(OH)D3 SERPL-MCNC: 20.5 NG/ML (ref 30–100)
ALBUMIN SERPL-MCNC: 3.6 G/DL (ref 3.5–5)
ALBUMIN/GLOB SERPL: 0.7 {RATIO} (ref 1.1–2.2)
ALP SERPL-CCNC: 76 U/L (ref 45–117)
ALT SERPL-CCNC: 13 U/L (ref 12–78)
ANION GAP SERPL CALC-SCNC: 3 MMOL/L (ref 5–15)
APPEARANCE UR: ABNORMAL
AST SERPL-CCNC: 19 U/L (ref 15–37)
BACTERIA URNS QL MICRO: ABNORMAL /HPF
BASOPHILS # BLD: 0 K/UL (ref 0–0.1)
BASOPHILS NFR BLD: 1 % (ref 0–1)
BILIRUB SERPL-MCNC: 0.7 MG/DL (ref 0.2–1)
BILIRUB UR QL: NEGATIVE
BUN SERPL-MCNC: 36 MG/DL (ref 6–20)
BUN/CREAT SERPL: 4 (ref 12–20)
CALCIUM SERPL-MCNC: 8.4 MG/DL (ref 8.5–10.1)
CHLORIDE SERPL-SCNC: 97 MMOL/L (ref 97–108)
CHOLEST SERPL-MCNC: 172 MG/DL
CK SERPL-CCNC: 146 U/L (ref 39–308)
CO2 SERPL-SCNC: 36 MMOL/L (ref 21–32)
COLOR UR: ABNORMAL
CREAT SERPL-MCNC: 8.85 MG/DL (ref 0.7–1.3)
DIFFERENTIAL METHOD BLD: ABNORMAL
EOSINOPHIL # BLD: 0.1 K/UL (ref 0–0.4)
EOSINOPHIL NFR BLD: 2 % (ref 0–7)
EPITH CASTS URNS QL MICRO: ABNORMAL /LPF
ERYTHROCYTE [DISTWIDTH] IN BLOOD BY AUTOMATED COUNT: 13.6 % (ref 11.5–14.5)
GLOBULIN SER CALC-MCNC: 5.1 G/DL (ref 2–4)
GLUCOSE SERPL-MCNC: 91 MG/DL (ref 65–100)
GLUCOSE UR STRIP.AUTO-MCNC: NEGATIVE MG/DL
HCT VFR BLD AUTO: 36 % (ref 36.6–50.3)
HDLC SERPL-MCNC: 51 MG/DL
HDLC SERPL: 3.4 {RATIO} (ref 0–5)
HGB BLD-MCNC: 11 G/DL (ref 12.1–17)
HGB UR QL STRIP: ABNORMAL
HYALINE CASTS URNS QL MICRO: ABNORMAL /LPF (ref 0–5)
IMM GRANULOCYTES # BLD AUTO: 0 K/UL (ref 0–0.04)
IMM GRANULOCYTES NFR BLD AUTO: 0 % (ref 0–0.5)
KETONES UR QL STRIP.AUTO: NEGATIVE MG/DL
LDLC SERPL CALC-MCNC: 110 MG/DL (ref 0–100)
LEUKOCYTE ESTERASE UR QL STRIP.AUTO: ABNORMAL
LYMPHOCYTES # BLD: 1.7 K/UL (ref 0.8–3.5)
LYMPHOCYTES NFR BLD: 25 % (ref 12–49)
MCH RBC QN AUTO: 32.8 PG (ref 26–34)
MCHC RBC AUTO-ENTMCNC: 30.6 G/DL (ref 30–36.5)
MCV RBC AUTO: 107.5 FL (ref 80–99)
MONOCYTES # BLD: 0.9 K/UL (ref 0–1)
MONOCYTES NFR BLD: 14 % (ref 5–13)
NEUTS SEG # BLD: 3.8 K/UL (ref 1.8–8)
NEUTS SEG NFR BLD: 58 % (ref 32–75)
NITRITE UR QL STRIP.AUTO: NEGATIVE
NRBC # BLD: 0 K/UL (ref 0–0.01)
NRBC BLD-RTO: 0 PER 100 WBC
PH UR STRIP: >8.5 [PH] (ref 5–8)
PLATELET # BLD AUTO: 250 K/UL (ref 150–400)
PMV BLD AUTO: 10.3 FL (ref 8.9–12.9)
POTASSIUM SERPL-SCNC: 4.2 MMOL/L (ref 3.5–5.1)
PROT SERPL-MCNC: 8.7 G/DL (ref 6.4–8.2)
PROT UR STRIP-MCNC: 100 MG/DL
PSA SERPL-MCNC: 2.4 NG/ML (ref 0.01–4)
RBC # BLD AUTO: 3.35 M/UL (ref 4.1–5.7)
RBC #/AREA URNS HPF: ABNORMAL /HPF (ref 0–5)
SODIUM SERPL-SCNC: 136 MMOL/L (ref 136–145)
SP GR UR REFRACTOMETRY: 1.01 (ref 1–1.03)
T4 SERPL-MCNC: 9.1 UG/DL (ref 4.5–12.1)
TRIGL SERPL-MCNC: 55 MG/DL (ref ?–150)
TSH SERPL DL<=0.05 MIU/L-ACNC: 1.09 UIU/ML (ref 0.36–3.74)
UA: UC IF INDICATED,UAUC: ABNORMAL
UROBILINOGEN UR QL STRIP.AUTO: 0.2 EU/DL (ref 0.2–1)
VLDLC SERPL CALC-MCNC: 11 MG/DL
WBC # BLD AUTO: 6.6 K/UL (ref 4.1–11.1)
WBC URNS QL MICRO: >100 /HPF (ref 0–4)

## 2022-05-06 LAB
BACTERIA SPEC CULT: NORMAL
CC UR VC: NORMAL
SERVICE CMNT-IMP: NORMAL

## 2022-05-06 NOTE — PROGRESS NOTES
Vitamin D low so increase Vit D to 2000 U every day, LDL a little elevated so diet   Lab letter sent

## 2022-06-02 PROBLEM — Z00.00 MEDICARE ANNUAL WELLNESS VISIT, SUBSEQUENT: Status: RESOLVED | Noted: 2017-10-25 | Resolved: 2022-06-02

## 2022-06-02 PROBLEM — Z12.5 PROSTATE CANCER SCREENING: Status: RESOLVED | Noted: 2017-10-25 | Resolved: 2022-06-02

## 2022-08-12 ENCOUNTER — OFFICE VISIT (OUTPATIENT)
Dept: INTERNAL MEDICINE CLINIC | Age: 72
End: 2022-08-12
Payer: MEDICARE

## 2022-08-12 VITALS
TEMPERATURE: 98.3 F | SYSTOLIC BLOOD PRESSURE: 138 MMHG | RESPIRATION RATE: 18 BRPM | HEART RATE: 75 BPM | OXYGEN SATURATION: 98 % | BODY MASS INDEX: 27.33 KG/M2 | WEIGHT: 184.5 LBS | DIASTOLIC BLOOD PRESSURE: 76 MMHG | HEIGHT: 69 IN

## 2022-08-12 DIAGNOSIS — K21.9 GASTROESOPHAGEAL REFLUX DISEASE WITHOUT ESOPHAGITIS: ICD-10-CM

## 2022-08-12 DIAGNOSIS — I12.9 HYPERTENSION, RENAL DISEASE: Primary | ICD-10-CM

## 2022-08-12 DIAGNOSIS — N18.6 ESRD (END STAGE RENAL DISEASE) (HCC): ICD-10-CM

## 2022-08-12 DIAGNOSIS — E78.2 MIXED HYPERLIPIDEMIA: ICD-10-CM

## 2022-08-12 LAB
ALBUMIN SERPL-MCNC: 3.6 G/DL (ref 3.5–5)
ALBUMIN/GLOB SERPL: 0.8 {RATIO} (ref 1.1–2.2)
ALP SERPL-CCNC: 89 U/L (ref 45–117)
ALT SERPL-CCNC: 14 U/L (ref 12–78)
ANION GAP SERPL CALC-SCNC: 8 MMOL/L (ref 5–15)
AST SERPL-CCNC: 16 U/L (ref 15–37)
BILIRUB SERPL-MCNC: 0.7 MG/DL (ref 0.2–1)
BUN SERPL-MCNC: 42 MG/DL (ref 6–20)
BUN/CREAT SERPL: 4 (ref 12–20)
CALCIUM SERPL-MCNC: 9.1 MG/DL (ref 8.5–10.1)
CHLORIDE SERPL-SCNC: 98 MMOL/L (ref 97–108)
CHOLEST SERPL-MCNC: 191 MG/DL
CK SERPL-CCNC: 155 U/L (ref 39–308)
CO2 SERPL-SCNC: 30 MMOL/L (ref 21–32)
COMMENT, HOLDF: NORMAL
CREAT SERPL-MCNC: 9.66 MG/DL (ref 0.7–1.3)
GLOBULIN SER CALC-MCNC: 4.8 G/DL (ref 2–4)
GLUCOSE SERPL-MCNC: 95 MG/DL (ref 65–100)
HDLC SERPL-MCNC: 52 MG/DL
HDLC SERPL: 3.7 {RATIO} (ref 0–5)
LDLC SERPL CALC-MCNC: 122.6 MG/DL (ref 0–100)
POTASSIUM SERPL-SCNC: 4.8 MMOL/L (ref 3.5–5.1)
PROT SERPL-MCNC: 8.4 G/DL (ref 6.4–8.2)
SAMPLES BEING HELD,HOLD: NORMAL
SODIUM SERPL-SCNC: 136 MMOL/L (ref 136–145)
TRIGL SERPL-MCNC: 82 MG/DL (ref ?–150)
VLDLC SERPL CALC-MCNC: 16.4 MG/DL

## 2022-08-12 PROCEDURE — G8417 CALC BMI ABV UP PARAM F/U: HCPCS | Performed by: INTERNAL MEDICINE

## 2022-08-12 PROCEDURE — 1123F ACP DISCUSS/DSCN MKR DOCD: CPT | Performed by: INTERNAL MEDICINE

## 2022-08-12 PROCEDURE — 99214 OFFICE O/P EST MOD 30 MIN: CPT | Performed by: INTERNAL MEDICINE

## 2022-08-12 PROCEDURE — G8510 SCR DEP NEG, NO PLAN REQD: HCPCS | Performed by: INTERNAL MEDICINE

## 2022-08-12 PROCEDURE — G8427 DOCREV CUR MEDS BY ELIG CLIN: HCPCS | Performed by: INTERNAL MEDICINE

## 2022-08-12 PROCEDURE — 3017F COLORECTAL CA SCREEN DOC REV: CPT | Performed by: INTERNAL MEDICINE

## 2022-08-12 PROCEDURE — G8536 NO DOC ELDER MAL SCRN: HCPCS | Performed by: INTERNAL MEDICINE

## 2022-08-12 PROCEDURE — 1101F PT FALLS ASSESS-DOCD LE1/YR: CPT | Performed by: INTERNAL MEDICINE

## 2022-08-12 RX ORDER — MELOXICAM 7.5 MG/1
7.5 TABLET ORAL DAILY
COMMUNITY

## 2022-08-12 NOTE — PROGRESS NOTES
Chief Complaint   Patient presents with    GERD     3 month    Hypertension    Cholesterol Problem    End Stage Renal Disease       SUBJECTIVE:    Jennifer Bingham is a 70 y.o. male who returns in follow-up for his medical problems include hypertension, hyperlipidemia, end-stage renal disease on dialysis, GERD, DJD and other multiple medical problems. He is taking his medication trying to follow his diet and remains physically active. He currently denies any chest pain, shortness of breath, palpitations, PND, orthopnea or other cardiac or respiratory complaints. He notes no current GI or  complaints. He notes no headaches, dizziness or neurologic complaints. He has no current active arthritic complaints and no other complaints on complete review of systems. Current Outpatient Medications   Medication Sig Dispense Refill    meloxicam (MOBIC) 7.5 mg tablet Take 7.5 mg by mouth in the morning. Take 1 tablet by mouth  daily  as needed      LORazepam (ATIVAN) 0.5 mg tablet Take 1 Tablet by mouth two (2) times daily as needed for Anxiety. Max Daily Amount: 1 mg. 30 Tablet 2    tiZANidine (ZANAFLEX) 4 mg tablet Take 1 Tablet by mouth three (3) times daily as needed for Muscle Spasm(s). 30 Tablet 1    calcium acetate, phosphate binder, (PHOSLO) 667 mg tab tablet Take  by mouth three (3) times daily (with meals). Indications: pt takes 3 pills 3 times daily      omeprazole (PRILOSEC) 40 mg capsule TAKE 1 CAPSULE DAILY 90 Cap 3    SENNA by Does Not Apply route daily. cholecalciferol (VITAMIN D3) 25 mcg (1,000 unit) cap Take  by mouth daily. carvediloL (COREG) 12.5 mg tablet Take 12.5 mg by mouth two (2) times daily (with meals). lidocaine 4 % patch 1 Patch by TransDERmal route every twelve (12) hours every twelve (12) hours. (Patient taking differently: 1 Patch by TransDERmal route as needed.) 10 Patch 0    lisinopril (PRINIVIL, ZESTRIL) 20 mg tablet Take 1 Tab by mouth daily.  30 Tab prn etelcalcetide 5 mg/mL soln by IntraVENous route.  Indications: 3 x per week      amLODIPine (NORVASC) 10 mg tablet TAKE 1 TABLET BY MOUTH ONCE DAILY  11     Past Medical History:   Diagnosis Date    Arthritis     RA    Chronic kidney disease     Dialysis KATI Thur, Sat @ [facility]    GERD (gastroesophageal reflux disease)     Gout     Hepatitis C     Hypertension     Ill-defined condition     Gout    Iritis     Liver disease     Hep C    Other and unspecified alcohol dependence, unspecified drinking behavior     Acid reflux    Other ill-defined conditions(799.89)     blood transfusion hx    Psychiatric disorder     Anxiety     Past Surgical History:   Procedure Laterality Date    HX ENDOSCOPY  07/29/2020    HX OTHER SURGICAL      liver biopsy    HX VASCULAR ACCESS      [place] / [facility]    HX VASCULAR ACCESS  7/15/13    CREATION LEFT UPPER ARM BASILIC VEIN TRANSPOSITION    HX VASCULAR ACCESS      [facility]     Allergies   Allergen Reactions    Codeine Hives       REVIEW OF SYSTEMS:  General: negative for - chills or fever, or weight loss or gain  ENT: negative for - headaches, nasal congestion or tinnitus  Eyes: no blurred or visual changes  Neck: No stiffness or swollen nodes  Respiratory: negative for - cough, hemoptysis, shortness of breath or wheezing  Cardiovascular : negative for - chest pain, edema, palpitations or shortness of breath  Gastrointestinal: negative for - abdominal pain, blood in stools, heartburn or nausea/vomiting  Genito-Urinary: no dysuria, trouble voiding, or hematuria  Musculoskeletal: negative for - gait disturbance, joint pain, joint stiffness or joint swelling  Neurological: no TIA or stroke symptoms  Hematologic: no bruises, no bleeding  Lymphatic: no swollen glands  Integument: no lumps, mole changes, nail changes or rash  Endocrine:no malaise/lethargy poly uria or polydipsia or unexpected weight changes        Social History     Socioeconomic History    Marital status: SINGLE Tobacco Use    Smoking status: Never    Smokeless tobacco: Never   Vaping Use    Vaping Use: Never used   Substance and Sexual Activity    Alcohol use: No    Drug use: Not Currently     Types: Marijuana, Heroin     Comment: smoked pot  years ago, herion in 25s     History reviewed. No pertinent family history. OBJECTIVE:     Visit Vitals  /76 (BP 1 Location: Left upper arm, BP Patient Position: Sitting, BP Cuff Size: Adult long)   Pulse 75   Temp 98.3 °F (36.8 °C) (Oral)   Resp 18   Ht 5' 9\" (1.753 m)   Wt 184 lb 8 oz (83.7 kg)   SpO2 98%   BMI 27.25 kg/m²     CONSTITUTIONAL:   well nourished, appears age appropriate  EYES: sclera anicteric, PERRL, EOMI  ENMT:nares clear, moist mucous membranes, pharynx clear  NECK: supple. Thyroid normal, No JVD or bruits  RESPIRATORY: Chest: clear to ascultation and percussion, normal inspiratory effort  CARDIOVASCULAR: Heart: regular rate and rhythm no murmurs, rubs or gallops, PMI not displaced, No thrills, no peripheral edema  GASTROINTESTINAL: Abdomen: non distended, soft, non tender, bowel sounds normal  HEMATOLOGIC: no purpura, petechiae or bruising  LYMPHATIC: No lymph node enlargemant  MUSCULOSKELETAL: Extremities: no active synovitis, pulse 1+   INTEGUMENT: No unusual rashes or suspicious skin lesions noted. Nails appear normal.  PERIPHERAL VASCULAR: normal pulses femoral, PT and DP  NEUROLOGIC: non-focal exam, A & O X 3  PSYCHIATRIC:, appropriate affect     ASSESSMENT:   1. Hypertension, renal disease    2. Mixed hyperlipidemia    3. Gastroesophageal reflux disease without esophagitis    4. ESRD (end stage renal disease) ([facility] [address]      Impression  1. Hypertension that is controlled so continue current therapy reviewed with him. 2.  Hyperlipidemia prior lab reviewed repeat status pending  3. GERD stable  4.   End-stage renal disease on dialysis doing well with that he recently had a clot in his shunt which had to be taken care of  Follow-up 3 months or sooner should to be a problem. I will call with lab results in the interim. PLAN:  .  Orders Placed This Encounter    METABOLIC PANEL, COMPREHENSIVE    LIPID PANEL    CK    meloxicam (MOBIC) 7.5 mg tablet         ATTENTION:   This medical record was transcribed using an electronic medical records system. Although proofread, it may and can contain electronic and spelling errors. Other human spelling and other errors may be present. Corrections may be executed at a later time. Please feel free to contact us for any clarifications as needed. Follow-up and Dispositions    Return in about 3 months (around 11/12/2022). No results found for any visits on 08/12/22. Tara Newby MD    The patient verbalized understanding of the problems and plans as explained.

## 2022-08-12 NOTE — PROGRESS NOTES
HIPAA verified by two patient identifiers. Maribell Champion is a 70 y.o. male    Chief Complaint   Patient presents with    GERD     3 month    Hypertension    Cholesterol Problem    End Stage Renal Disease       Visit Vitals  /76 (BP 1 Location: Left upper arm, BP Patient Position: Sitting, BP Cuff Size: Adult long)   Pulse 75   Temp 98.3 °F (36.8 °C) (Oral)   Resp 18   Ht 5' 9\" (1.753 m)   Wt 184 lb 8 oz (83.7 kg)   SpO2 98%   BMI 27.25 kg/m²       Pain Scale: 0 - No pain/10  Pain Location:       Health Maintenance Due   Topic Date Due    Shingrix Vaccine Age 49> (1 of 2) Never done    DTaP/Tdap/Td series (1 - Tdap) Never done    COVID-19 Vaccine (4 - Booster for Moderna series) 02/28/2022         Coordination of Care Questionnaire:  :   1) Have you been to an emergency room, urgent care, or hospitalized since your last visit? If yes, where when, and reason for visit? no       2. Have seen or consulted any other health care provider since your last visit? If yes, where when, and reason for visit? NO      Patient is accompanied by self I have received verbal consent from Maribell Champion to discuss any/all medical information while they are present in the room.

## 2022-11-16 ENCOUNTER — OFFICE VISIT (OUTPATIENT)
Dept: INTERNAL MEDICINE CLINIC | Age: 72
End: 2022-11-16
Payer: MEDICARE

## 2022-11-16 VITALS
SYSTOLIC BLOOD PRESSURE: 138 MMHG | HEART RATE: 80 BPM | WEIGHT: 188.4 LBS | HEIGHT: 69 IN | OXYGEN SATURATION: 98 % | DIASTOLIC BLOOD PRESSURE: 82 MMHG | TEMPERATURE: 98.5 F | RESPIRATION RATE: 18 BRPM | BODY MASS INDEX: 27.91 KG/M2

## 2022-11-16 DIAGNOSIS — I12.9 HYPERTENSION, RENAL DISEASE: Primary | ICD-10-CM

## 2022-11-16 DIAGNOSIS — E78.2 MIXED HYPERLIPIDEMIA: ICD-10-CM

## 2022-11-16 DIAGNOSIS — K21.9 GASTROESOPHAGEAL REFLUX DISEASE WITHOUT ESOPHAGITIS: ICD-10-CM

## 2022-11-16 DIAGNOSIS — F41.9 ANXIETY: ICD-10-CM

## 2022-11-16 DIAGNOSIS — N18.6 ESRD (END STAGE RENAL DISEASE) (HCC): ICD-10-CM

## 2022-11-16 LAB
ALBUMIN SERPL-MCNC: 3.9 G/DL (ref 3.5–5)
ALBUMIN/GLOB SERPL: 0.8 {RATIO} (ref 1.1–2.2)
ALP SERPL-CCNC: 94 U/L (ref 45–117)
ALT SERPL-CCNC: 25 U/L (ref 12–78)
ANION GAP SERPL CALC-SCNC: 8 MMOL/L (ref 5–15)
AST SERPL-CCNC: 20 U/L (ref 15–37)
BILIRUB SERPL-MCNC: 0.5 MG/DL (ref 0.2–1)
BUN SERPL-MCNC: 35 MG/DL (ref 6–20)
BUN/CREAT SERPL: 4 (ref 12–20)
CALCIUM SERPL-MCNC: 8.4 MG/DL (ref 8.5–10.1)
CHLORIDE SERPL-SCNC: 101 MMOL/L (ref 97–108)
CHOLEST SERPL-MCNC: 176 MG/DL
CK SERPL-CCNC: 216 U/L (ref 39–308)
CO2 SERPL-SCNC: 30 MMOL/L (ref 21–32)
CREAT SERPL-MCNC: 8.62 MG/DL (ref 0.7–1.3)
GLOBULIN SER CALC-MCNC: 4.6 G/DL (ref 2–4)
GLUCOSE SERPL-MCNC: 97 MG/DL (ref 65–100)
HDLC SERPL-MCNC: 56 MG/DL
HDLC SERPL: 3.1 {RATIO} (ref 0–5)
LDLC SERPL CALC-MCNC: 107.4 MG/DL (ref 0–100)
POTASSIUM SERPL-SCNC: 4.4 MMOL/L (ref 3.5–5.1)
PROT SERPL-MCNC: 8.5 G/DL (ref 6.4–8.2)
SODIUM SERPL-SCNC: 139 MMOL/L (ref 136–145)
TRIGL SERPL-MCNC: 63 MG/DL (ref ?–150)
VLDLC SERPL CALC-MCNC: 12.6 MG/DL

## 2022-11-16 PROCEDURE — 1101F PT FALLS ASSESS-DOCD LE1/YR: CPT | Performed by: INTERNAL MEDICINE

## 2022-11-16 PROCEDURE — 3017F COLORECTAL CA SCREEN DOC REV: CPT | Performed by: INTERNAL MEDICINE

## 2022-11-16 PROCEDURE — G8427 DOCREV CUR MEDS BY ELIG CLIN: HCPCS | Performed by: INTERNAL MEDICINE

## 2022-11-16 PROCEDURE — 1123F ACP DISCUSS/DSCN MKR DOCD: CPT | Performed by: INTERNAL MEDICINE

## 2022-11-16 PROCEDURE — G8417 CALC BMI ABV UP PARAM F/U: HCPCS | Performed by: INTERNAL MEDICINE

## 2022-11-16 PROCEDURE — G8510 SCR DEP NEG, NO PLAN REQD: HCPCS | Performed by: INTERNAL MEDICINE

## 2022-11-16 PROCEDURE — G8536 NO DOC ELDER MAL SCRN: HCPCS | Performed by: INTERNAL MEDICINE

## 2022-11-16 PROCEDURE — 99214 OFFICE O/P EST MOD 30 MIN: CPT | Performed by: INTERNAL MEDICINE

## 2022-11-16 NOTE — PROGRESS NOTES
HIPAA verified by two patient identifiers. Tess Devlin is a 67 y.o. male    Chief Complaint   Patient presents with    GERD    Hypertension     3 month    Cholesterol Problem       Visit Vitals  BP (!) 140/80 (BP 1 Location: Left upper arm, BP Patient Position: Sitting, BP Cuff Size: Adult long)   Pulse 80   Temp 98.5 °F (36.9 °C) (Oral)   Resp 18   Ht 5' 9\" (1.753 m)   Wt 188 lb 6.4 oz (85.5 kg)   SpO2 98%   BMI 27.82 kg/m²       Pain Scale: 0 - No pain/10  Pain Location:       Health Maintenance Due   Topic Date Due    Hepatitis B Vaccine (1 of 3 - Risk 3-dose series) Never done    Shingrix Vaccine Age 50> (1 of 2) Never done    DTaP/Tdap/Td series (1 - Tdap) Never done    COVID-19 Vaccine (4 - Booster for Moderna series) 12/23/2021    Flu Vaccine (1) 08/01/2022         Coordination of Care Questionnaire:  :   1) Have you been to an emergency room, urgent care, or hospitalized since your last visit? If yes, where when, and reason for visit? no       2. Have seen or consulted any other health care provider since your last visit? If yes, where when, and reason for visit? NO      Patient is accompanied by self I have received verbal consent from Tess Devlin to discuss any/all medical information while they are present in the room.

## 2022-11-16 NOTE — PROGRESS NOTES
Chief Complaint   Patient presents with    GERD    Hypertension     3 month    Cholesterol Problem       SUBJECTIVE:    Efren Slater is a 67 y.o. male who returns in follow-up for his medical problems include hypertension, hyperlipidemia, GERD, chronic hepatitis C, end-stage renal disease on dialysis and other multiple medical problems. He is taking his medication trying to follow his diet remains physically active. Currently he denies any chest pain, shortness of breath or cardiac respiratory complaints. He notes no GI or  complaints. No headaches, dizziness or neurologic complaints. There are no current active arthritic complaints and he has no other complaints on complete review of systems. Current Outpatient Medications   Medication Sig Dispense Refill    meloxicam (MOBIC) 7.5 mg tablet Take 7.5 mg by mouth in the morning. Take 1 tablet by mouth  daily  as needed      LORazepam (ATIVAN) 0.5 mg tablet Take 1 Tablet by mouth two (2) times daily as needed for Anxiety. Max Daily Amount: 1 mg. 30 Tablet 2    tiZANidine (ZANAFLEX) 4 mg tablet Take 1 Tablet by mouth three (3) times daily as needed for Muscle Spasm(s). 30 Tablet 1    calcium acetate, phosphate binder, (PHOSLO) 667 mg tab tablet Take  by mouth three (3) times daily (with meals). Indications: pt takes 3 pills 3 times daily      omeprazole (PRILOSEC) 40 mg capsule TAKE 1 CAPSULE DAILY 90 Cap 3    SENNA by Does Not Apply route daily. cholecalciferol (VITAMIN D3) 25 mcg (1,000 unit) cap Take  by mouth daily. carvediloL (COREG) 12.5 mg tablet Take 12.5 mg by mouth two (2) times daily (with meals). lidocaine 4 % patch 1 Patch by TransDERmal route every twelve (12) hours every twelve (12) hours. (Patient taking differently: 1 Patch by TransDERmal route as needed.) 10 Patch 0    lisinopril (PRINIVIL, ZESTRIL) 20 mg tablet Take 1 Tab by mouth daily. 30 Tab prn    etelcalcetide 5 mg/mL soln by IntraVENous route.  Indications: 3 x per week      amLODIPine (NORVASC) 10 mg tablet TAKE 1 TABLET BY MOUTH ONCE DAILY  11     Past Medical History:   Diagnosis Date    Arthritis     RA    Chronic kidney disease     [facility] Og PARIKH[facility]    GERD (gastroesophageal reflux disease)     Gout     Hepatitis C     Hypertension     Ill-defined condition     Gout    Iritis     Liver disease     Hep C    Other and unspecified alcohol dependence, unspecified drinking behavior     Acid reflux    Other ill-defined conditions(799.89)     blood transfusion hx    Psychiatric disorder     Anxiety     Past Surgical History:   Procedure Laterality Date    HX ENDOSCOPY  07/29/2020    HX OTHER SURGICAL      liver biopsy    HX VASCULAR ACCESS      [place] / [facility]    HX VASCULAR ACCESS  7/15/13    CREATION LEFT UPPER ARM BASILIC VEIN TRANSPOSITION    HX VASCULAR ACCESS      [facility]     Allergies   Allergen Reactions    Codeine Hives       REVIEW OF SYSTEMS:  General: negative for - chills or fever, or weight loss or gain  ENT: negative for - headaches, nasal congestion or tinnitus  Eyes: no blurred or visual changes  Neck: No stiffness or swollen nodes  Respiratory: negative for - cough, hemoptysis, shortness of breath or wheezing  Cardiovascular : negative for - chest pain, edema, palpitations or shortness of breath  Gastrointestinal: negative for - abdominal pain, blood in stools, heartburn or nausea/vomiting  Genito-Urinary: no dysuria, trouble voiding, or hematuria  Musculoskeletal: negative for - gait disturbance, joint pain, joint stiffness or joint swelling  Neurological: no TIA or stroke symptoms  Hematologic: no bruises, no bleeding  Lymphatic: no swollen glands  Integument: no lumps, mole changes, nail changes or rash  Endocrine:no malaise/lethargy poly uria or polydipsia or unexpected weight changes        Social History     Socioeconomic History    Marital status: SINGLE   Tobacco Use    Smoking status: Never    Smokeless tobacco: Never Vaping Use    Vaping Use: Never used   Substance and Sexual Activity    Alcohol use: No    Drug use: Not Currently     Types: Marijuana, Heroin     Comment: smoked pot  years ago, herion in 25s     History reviewed. No pertinent family history. OBJECTIVE:     Visit Vitals  /82   Pulse 80   Temp 98.5 °F (36.9 °C) (Oral)   Resp 18   Ht 5' 9\" (1.753 m)   Wt 188 lb 6.4 oz (85.5 kg)   SpO2 98%   BMI 27.82 kg/m²     CONSTITUTIONAL:   well nourished, appears age appropriate  EYES: sclera anicteric, PERRL, EOMI  ENMT:nares clear, moist mucous membranes, pharynx clear  NECK: supple. Thyroid normal, No JVD or bruits  RESPIRATORY: Chest: clear to ascultation and percussion, normal inspiratory effort  CARDIOVASCULAR: Heart: regular rate and rhythm no murmurs, rubs or gallops, PMI not displaced, No thrills, no peripheral edema  GASTROINTESTINAL: Abdomen: non distended, soft, non tender, bowel sounds normal  HEMATOLOGIC: no purpura, petechiae or bruising  LYMPHATIC: No lymph node enlargemant  MUSCULOSKELETAL: Extremities: no active synovitis, pulse 1+   INTEGUMENT: No unusual rashes or suspicious skin lesions noted. Nails appear normal.  PERIPHERAL VASCULAR: normal pulses femoral, PT and DP  NEUROLOGIC: non-focal exam, A & O X 3  PSYCHIATRIC:, appropriate affect     ASSESSMENT:   1. Hypertension, renal disease    2. Mixed hyperlipidemia    3. Gastroesophageal reflux disease without esophagitis    4. ESRD (end stage renal disease) ([facility] [address]    5. Anxiety      Impression  1. Hypertension is controlled so continue current therapy reviewed with him. 2.  Hyperlipidemia prior lab reviewed repeat status pending   3 GERD stable   4 end-stage renal disease on dialysis  5. Anxiety stable  I will call the lab and follow stable continue same and follow-up with me again 3 months or sooner should to be a problem.     PLAN:  .  Orders Placed This Encounter    METABOLIC PANEL, COMPREHENSIVE    LIPID PANEL    CK         ATTENTION:   This medical record was transcribed using an electronic medical records system. Although proofread, it may and can contain electronic and spelling errors. Other human spelling and other errors may be present. Corrections may be executed at a later time. Please feel free to contact us for any clarifications as needed. Follow-up and Dispositions    Return in about 3 months (around 2/16/2023). No results found for any visits on 11/16/22. Isak Keller MD    The patient verbalized understanding of the problems and plans as explained.

## 2022-12-12 LAB — SARS-COV-2: DETECTED

## 2022-12-14 ENCOUNTER — TELEPHONE (OUTPATIENT)
Dept: INTERNAL MEDICINE CLINIC | Age: 72
End: 2022-12-14

## 2022-12-14 RX ORDER — NIRMATRELVIR AND RITONAVIR 150-100 MG
KIT ORAL
Qty: 1 BOX | Refills: 0 | Status: SHIPPED | OUTPATIENT
Start: 2022-12-14

## 2022-12-14 NOTE — TELEPHONE ENCOUNTER
RX refill request from the patient/pharmacy. Patient last seen 11- with labs, and next appt. scheduled for 02-  Requested Prescriptions     Pending Prescriptions Disp Refills    nirmatrelvir-ritonavir (Paxlovid, EUA,) 150-100 mg 1 Box 0     Sig: Take as directed    .

## 2022-12-14 NOTE — TELEPHONE ENCOUNTER
Patient called regarding testing positive for COVID. Developed fever and cough on Saturday. [facility]. Now c/o achy and feeling very stuffy. Discussed with Dr. Doug Coleman and he did recommend the patient start paxlovid ASAP. Rx sent to patient's pharmacy.

## 2022-12-27 ENCOUNTER — APPOINTMENT (OUTPATIENT)
Dept: GENERAL RADIOLOGY | Age: 72
End: 2022-12-27
Attending: EMERGENCY MEDICINE
Payer: MEDICARE

## 2022-12-27 ENCOUNTER — HOSPITAL ENCOUNTER (EMERGENCY)
Age: 72
Discharge: HOME | End: 2022-12-27
Attending: STUDENT IN AN ORGANIZED HEALTH CARE EDUCATION/TRAINING PROGRAM
Payer: MEDICARE

## 2022-12-27 VITALS
SYSTOLIC BLOOD PRESSURE: 151 MMHG | DIASTOLIC BLOOD PRESSURE: 81 MMHG | OXYGEN SATURATION: 99 % | HEIGHT: 69 IN | RESPIRATION RATE: 16 BRPM | TEMPERATURE: 98.2 F | HEART RATE: 89 BPM | WEIGHT: 183 LBS | BODY MASS INDEX: 27.11 KG/M2

## 2022-12-27 DIAGNOSIS — J20.8 ACUTE BRONCHITIS DUE TO OTHER SPECIFIED ORGANISMS: Primary | ICD-10-CM

## 2022-12-27 LAB
ALBUMIN SERPL-MCNC: 3.3 G/DL (ref 3.5–5)
ALBUMIN/GLOB SERPL: 0.7 {RATIO} (ref 1.1–2.2)
ALP SERPL-CCNC: 127 U/L (ref 45–117)
ALT SERPL-CCNC: 42 U/L (ref 12–78)
ANION GAP SERPL CALC-SCNC: 4 MMOL/L (ref 5–15)
AST SERPL-CCNC: 31 U/L (ref 15–37)
BASOPHILS # BLD: 0.1 K/UL (ref 0–0.1)
BASOPHILS NFR BLD: 1 % (ref 0–1)
BILIRUB SERPL-MCNC: 0.7 MG/DL (ref 0.2–1)
BNP SERPL-MCNC: 1609 PG/ML
BUN SERPL-MCNC: 21 MG/DL (ref 6–20)
BUN/CREAT SERPL: 4 (ref 12–20)
CALCIUM SERPL-MCNC: 8.5 MG/DL (ref 8.5–10.1)
CHLORIDE SERPL-SCNC: 100 MMOL/L (ref 97–108)
CO2 SERPL-SCNC: 30 MMOL/L (ref 21–32)
CREAT SERPL-MCNC: 5.78 MG/DL (ref 0.7–1.3)
DIFFERENTIAL METHOD BLD: ABNORMAL
EOSINOPHIL # BLD: 0.2 K/UL (ref 0–0.4)
EOSINOPHIL NFR BLD: 3 % (ref 0–7)
ERYTHROCYTE [DISTWIDTH] IN BLOOD BY AUTOMATED COUNT: 12.5 % (ref 11.5–14.5)
GLOBULIN SER CALC-MCNC: 4.9 G/DL (ref 2–4)
GLUCOSE SERPL-MCNC: 105 MG/DL (ref 65–100)
HCT VFR BLD AUTO: 29.5 % (ref 36.6–50.3)
HGB BLD-MCNC: 10 G/DL (ref 12.1–17)
IMM GRANULOCYTES # BLD AUTO: 0 K/UL (ref 0–0.04)
IMM GRANULOCYTES NFR BLD AUTO: 0 % (ref 0–0.5)
LYMPHOCYTES # BLD: 1.3 K/UL (ref 0.8–3.5)
LYMPHOCYTES NFR BLD: 19 % (ref 12–49)
MCH RBC QN AUTO: 33.2 PG (ref 26–34)
MCHC RBC AUTO-ENTMCNC: 33.9 G/DL (ref 30–36.5)
MCV RBC AUTO: 98 FL (ref 80–99)
MONOCYTES # BLD: 0.8 K/UL (ref 0–1)
MONOCYTES NFR BLD: 11 % (ref 5–13)
NEUTS SEG # BLD: 4.6 K/UL (ref 1.8–8)
NEUTS SEG NFR BLD: 66 % (ref 32–75)
NRBC # BLD: 0 K/UL (ref 0–0.01)
NRBC BLD-RTO: 0 PER 100 WBC
PLATELET # BLD AUTO: 201 K/UL (ref 150–400)
PMV BLD AUTO: 9.5 FL (ref 8.9–12.9)
POTASSIUM SERPL-SCNC: 3.5 MMOL/L (ref 3.5–5.1)
PROT SERPL-MCNC: 8.2 G/DL (ref 6.4–8.2)
RBC # BLD AUTO: 3.01 M/UL (ref 4.1–5.7)
SODIUM SERPL-SCNC: 134 MMOL/L (ref 136–145)
TROPONIN-HIGH SENSITIVITY: 17 NG/L (ref 0–76)
WBC # BLD AUTO: 6.9 K/UL (ref 4.1–11.1)

## 2022-12-27 PROCEDURE — 99285 EMERGENCY DEPT VISIT HI MDM: CPT

## 2022-12-27 PROCEDURE — 74011250637 HC RX REV CODE- 250/637: Performed by: STUDENT IN AN ORGANIZED HEALTH CARE EDUCATION/TRAINING PROGRAM

## 2022-12-27 PROCEDURE — 83880 ASSAY OF NATRIURETIC PEPTIDE: CPT

## 2022-12-27 PROCEDURE — 36415 COLL VENOUS BLD VENIPUNCTURE: CPT

## 2022-12-27 PROCEDURE — 93005 ELECTROCARDIOGRAM TRACING: CPT

## 2022-12-27 PROCEDURE — 85025 COMPLETE CBC W/AUTO DIFF WBC: CPT

## 2022-12-27 PROCEDURE — 71046 X-RAY EXAM CHEST 2 VIEWS: CPT

## 2022-12-27 PROCEDURE — 84484 ASSAY OF TROPONIN QUANT: CPT

## 2022-12-27 PROCEDURE — 80053 COMPREHEN METABOLIC PANEL: CPT

## 2022-12-27 RX ORDER — GUAIFENESIN 1200 MG/1
1200 TABLET, EXTENDED RELEASE ORAL 2 TIMES DAILY
Qty: 10 TABLET | Refills: 0 | Status: SHIPPED | OUTPATIENT
Start: 2022-12-27 | End: 2023-01-01

## 2022-12-27 RX ORDER — ALBUTEROL SULFATE 90 UG/1
2 AEROSOL, METERED RESPIRATORY (INHALATION) ONCE
Status: COMPLETED | OUTPATIENT
Start: 2022-12-27 | End: 2022-12-27

## 2022-12-27 RX ADMIN — ALBUTEROL SULFATE 2 PUFF: 90 AEROSOL, METERED RESPIRATORY (INHALATION) at 19:03

## 2022-12-27 NOTE — ED PROVIDER NOTES
EMERGENCY DEPARTMENT HISTORY AND PHYSICAL EXAM      Date: 12/27/2022  Patient Name: Frank Torres    History of Presenting Illness     Chief Complaint   Patient presents with    Positive For Covid-19     Patient was diagnosed with covid 17 days ago, given paxlovid. Patient now c/o sob, chest pain, and congestion. Patient comes today straight from dialysis, states it was hard for him to get a good breath during session. History Provided By: Patient    Frank Torres, 67 y.o. male     Patient is a 59-year-old male with history of hypertension,CKD on ESRD Tuesday Thursday Saturday presenting with concern of chest congestion, chest tightness and dyspnea on exertion. Patient states that he had COVID-19 Stated at that time he had cough, congestion, fever, states that he took Paxil but and felt better but then over the past 5 days he has felt worse and has had severe  weeks ago fatigue, congestion, states that he does not produce anything but is concerned he may have ongoing COVID versus a pneumonia. Patient states that he has had no more fevers, denies any nausea vomiting or diarrhea Sofy. Patient denies any leg swelling, states he has been taking over-the-counter Mucinex but has not been producing any Mucinex. Patient states that over this he was in his usual state of health, states that dialysis sessions typically fatigue him but lately he has been feeling very winded afterwards. Denies any other complaints today. 2  There are no other complaints, changes, or physical findings at this time. PCP: Melissa Isaacs MD    No current facility-administered medications on file prior to encounter. Current Outpatient Medications on File Prior to Encounter   Medication Sig Dispense Refill    nirmatrelvir-ritonavir (Paxlovid, EUA,) 150-100 mg Take as directed 1 Box 0    meloxicam (MOBIC) 7.5 mg tablet Take 7.5 mg by mouth in the morning.  Take 1 tablet by mouth  daily  as needed      LORazepam (ATIVAN) 0.5 mg tablet Take 1 Tablet by mouth two (2) times daily as needed for Anxiety. Max Daily Amount: 1 mg. 30 Tablet 2    tiZANidine (ZANAFLEX) 4 mg tablet Take 1 Tablet by mouth three (3) times daily as needed for Muscle Spasm(s). 30 Tablet 1    calcium acetate, phosphate binder, (PHOSLO) 667 mg tab tablet Take  by mouth three (3) times daily (with meals). Indications: pt takes 3 pills 3 times daily      omeprazole (PRILOSEC) 40 mg capsule TAKE 1 CAPSULE DAILY 90 Cap 3    SENNA by Does Not Apply route daily. cholecalciferol (VITAMIN D3) 25 mcg (1,000 unit) cap Take  by mouth daily. carvediloL (COREG) 12.5 mg tablet Take 12.5 mg by mouth two (2) times daily (with meals). lidocaine 4 % patch 1 Patch by TransDERmal route every twelve (12) hours every twelve (12) hours. (Patient taking differently: 1 Patch by TransDERmal route as needed.) 10 Patch 0    lisinopril (PRINIVIL, ZESTRIL) 20 mg tablet Take 1 Tab by mouth daily. 30 Tab prn    etelcalcetide 5 mg/mL soln by IntraVENous route.  Indications: 3 x per week      amLODIPine (NORVASC) 10 mg tablet TAKE 1 TABLET BY MOUTH ONCE DAILY  11       Past History     Past Medical History:  Past Medical History:   Diagnosis Date    Arthritis     RA    Chronic kidney disease     Dialysis T, Thur, Sat @ [facility]    GERD (gastroesophageal reflux disease)     Gout     Hepatitis C     Hypertension     Ill-defined condition     Gout    Iritis     Liver disease     Hep C    Other and unspecified alcohol dependence, unspecified drinking behavior     Acid reflux    Other ill-defined conditions(799.89)     blood transfusion hx    Psychiatric disorder     Anxiety       Past Surgical History:  Past Surgical History:   Procedure Laterality Date    HX ENDOSCOPY  07/29/2020    HX OTHER SURGICAL      liver biopsy    HX VASCULAR ACCESS      [employer] [facility]    HX VASCULAR ACCESS  7/15/13    CREATION LEFT UPPER ARM BASILIC VEIN TRANSPOSITION    HX VASCULAR ACCESS      [facility] Family History:  No family history on file. Social History:  Social History     Tobacco Use    Smoking status: Never    Smokeless tobacco: Never   Vaping Use    Vaping Use: Never used   Substance Use Topics    Alcohol use: No    Drug use: Not Currently     Types: Marijuana, Heroin     Comment: smoked pot  years ago, herion in 25s       Allergies: Allergies   Allergen Reactions    Codeine Hives         Review of Systems   Review of Systems   Constitutional:  Positive for activity change and fatigue. Negative for chills and fever. HENT:  Positive for congestion. Negative for sneezing. Respiratory:  Positive for cough and shortness of breath. Cardiovascular:  Negative for chest pain and leg swelling. Gastrointestinal:  Negative for abdominal pain, constipation, diarrhea, nausea and vomiting. Genitourinary:  Negative for decreased urine volume, dysuria and frequency. Musculoskeletal:  Negative for arthralgias and myalgias. Skin:  Negative for rash. Allergic/Immunologic: Negative for immunocompromised state. Neurological:  Negative for headaches. Hematological:  Does not bruise/bleed easily. Psychiatric/Behavioral:  Negative for confusion. Physical Exam   Physical Exam  Vitals reviewed. Constitutional:       General: He is not in acute distress. Appearance: He is not ill-appearing, toxic-appearing or diaphoretic. HENT:      Head: Normocephalic and atraumatic. Mouth/Throat:      Mouth: Mucous membranes are moist.   Eyes:      Conjunctiva/sclera: Conjunctivae normal.   Cardiovascular:      Rate and Rhythm: Normal rate. Pulmonary:      Effort: Pulmonary effort is normal. No tachypnea, accessory muscle usage or respiratory distress. Breath sounds: No wheezing, rhonchi or rales. Abdominal:      General: Abdomen is flat. Musculoskeletal:      Cervical back: Neck supple. Right lower leg: No edema. Left lower leg: No edema.    Skin:     General: Skin is warm and dry. Neurological:      General: No focal deficit present. Mental Status: He is alert. Psychiatric:         Mood and Affect: Mood normal.   Is a 70-year-old    Diagnostic Study Results     Labs -     Recent Results (from the past 24 hour(s))   CBC WITH AUTOMATED DIFF    Collection Time: 12/27/22  3:30 PM   Result Value Ref Range    WBC 6.9 4.1 - 11.1 K/uL    RBC 3.01 (L) 4.10 - 5.70 M/uL    HGB 10.0 (L) 12.1 - 17.0 g/dL    HCT 29.5 (L) 36.6 - 50.3 %    MCV 98.0 80.0 - 99.0 FL    MCH 33.2 26.0 - 34.0 PG    MCHC 33.9 30.0 - 36.5 g/dL    RDW 12.5 11.5 - 14.5 %    PLATELET 325 499 - 982 K/uL    MPV 9.5 8.9 - 12.9 FL    NRBC 0.0 0  WBC    ABSOLUTE NRBC 0.00 0.00 - 0.01 K/uL    NEUTROPHILS 66 32 - 75 %    LYMPHOCYTES 19 12 - 49 %    MONOCYTES 11 5 - 13 %    EOSINOPHILS 3 0 - 7 %    BASOPHILS 1 0 - 1 %    IMMATURE GRANULOCYTES 0 0.0 - 0.5 %    ABS. NEUTROPHILS 4.6 1.8 - 8.0 K/UL    ABS. LYMPHOCYTES 1.3 0.8 - 3.5 K/UL    ABS. MONOCYTES 0.8 0.0 - 1.0 K/UL    ABS. EOSINOPHILS 0.2 0.0 - 0.4 K/UL    ABS. BASOPHILS 0.1 0.0 - 0.1 K/UL    ABS. IMM. GRANS. 0.0 0.00 - 0.04 K/UL    DF AUTOMATED     METABOLIC PANEL, COMPREHENSIVE    Collection Time: 12/27/22  3:30 PM   Result Value Ref Range    Sodium 134 (L) 136 - 145 mmol/L    Potassium 3.5 3.5 - 5.1 mmol/L    Chloride 100 97 - 108 mmol/L    CO2 30 21 - 32 mmol/L    Anion gap 4 (L) 5 - 15 mmol/L    Glucose 105 (H) 65 - 100 mg/dL    BUN 21 (H) 6 - 20 MG/DL    Creatinine 5.78 (H) 0.70 - 1.30 MG/DL    BUN/Creatinine ratio 4 (L) 12 - 20      eGFR 10 (L) >60 ml/min/1.73m2    Calcium 8.5 8.5 - 10.1 MG/DL    Bilirubin, total 0.7 0.2 - 1.0 MG/DL    ALT (SGPT) 42 12 - 78 U/L    AST (SGOT) 31 15 - 37 U/L    Alk.  phosphatase 127 (H) 45 - 117 U/L    Protein, total 8.2 6.4 - 8.2 g/dL    Albumin 3.3 (L) 3.5 - 5.0 g/dL    Globulin 4.9 (H) 2.0 - 4.0 g/dL    A-G Ratio 0.7 (L) 1.1 - 2.2     TROPONIN-HIGH SENSITIVITY    Collection Time: 12/27/22  3:30 PM   Result Value Ref Range Troponin-High Sensitivity 17 0 - 76 ng/L   NT-PRO BNP    Collection Time: 12/27/22  3:30 PM   Result Value Ref Range    NT pro-BNP 1,609 (H) <125 PG/ML   EKG, 12 LEAD, INITIAL    Collection Time: 12/27/22  3:33 PM   Result Value Ref Range    Ventricular Rate 86 BPM    Atrial Rate 86 BPM    P-R Interval 148 ms    QRS Duration 92 ms    Q-T Interval 374 ms    QTC Calculation (Bezet) 447 ms    Calculated P Axis 52 degrees    Calculated R Axis -46 degrees    Calculated T Axis 14 degrees    Diagnosis       Normal sinus rhythm  Left anterior fascicular block  When compared with ECG of 13-NOV-2017 08:27,  Nonspecific T wave abnormality now evident in Inferior leads         Radiologic Studies -   XR CHEST PA LAT   Final Result   No acute cardiopulmonary disease. CT Results  (Last 48 hours)      None          CXR Results  (Last 48 hours)                 12/27/22 1547  XR CHEST PA LAT Final result    Impression:  No acute cardiopulmonary disease. Narrative: Indication:  Shortness of Breath        Exam: PA and lateral views of the chest.       Direct comparison is made to prior CXR dated 2/2022. Findings: Cardiomediastinal silhouette is within normal limits. Lungs are clear   bilaterally. Pleural spaces are normal. Osseous structures are intact. Medical Decision Making   I am the first provider for this patient. I reviewed the vital signs, available nursing notes, past medical history, past surgical history, family history and social history. Vital Signs-Reviewed the patient's vital signs. Patient Vitals for the past 12 hrs:   Temp Pulse Resp BP SpO2   12/27/22 1516 98.2 °F (36.8 °C) 89 16 (!) 151/81 99 %       Records Reviewed: Nursing records and medical records reviewed    Ddx: 19, bronchitis, pneumonia    Initial assessment performed. The patients presenting problems have been discussed, and they are in agreement with the care plan formulated and outlined with them.   I have encouraged them to ask questions as they arise throughout their visit. MDM  Number of Diagnoses or Management Options  Simple chronic bronchitis ([facility] [address]  Diagnosis management comments: Patient is a well-appearing 26-year-old male with history of ESRD presenting with concern of cough, congestion, shortness of breath, states he went to his dialysis session today, felt very fatigued afterwards prompted his visit. Patient states he was recently treated for COVID-19, states he initially got better than hours. Patient describes dyspnea on exertion and chest tightness associated with congestion, has been taking over-the-counter without any significantly. Patient overall well-appearing on arrival, saturation above 95%, afebrile, lungs are clear bilaterally, no leg swelling, suspicious of possible rebound infection versus ongoing bronchitis from COVID-19 infection, chest x-ray obtained without any signs of pneumonia, lab work unremarkable, will plan on bronchodilator for ongoing cough and congestion for bronchodilation and Mucinex with instructions on taking with full glass of water at home for ongoing congestion. Do not feel that antibiotics are indicated at this time, notify patient of this and instructed him to continue monitor symptoms at home, patient ambulated without difficulty, saturations remained high       Amount and/or Complexity of Data Reviewed  Clinical lab tests: reviewed  Tests in the radiology section of CPT®: reviewed  Tests in the medicine section of CPT®: reviewed  Decide to obtain previous medical records or to obtain history from someone other than the patient: yes                     Procedures    Critical Care: none    Disposition: Dc    DISCHARGE PLAN:  1. Discharge Medication List as of 12/27/2022  7:04 PM        START taking these medications    Details   guaiFENesin (Mucinex) 1,200 mg Ta12 ER tablet Take 1 Tablet by mouth two (2) times a day for 5 days. , Normal, Disp-10 Tablet, R-0 CONTINUE these medications which have NOT CHANGED    Details   nirmatrelvir-ritonavir (Paxlovid, EUA,) 150-100 mg Take as directed, Normal, Disp-1 Box, R-0GFR less than 6      meloxicam (MOBIC) 7.5 mg tablet Take 7.5 mg by mouth in the morning. Take 1 tablet by mouth  daily  as needed, Historical Med      LORazepam (ATIVAN) 0.5 mg tablet Take 1 Tablet by mouth two (2) times daily as needed for Anxiety. Max Daily Amount: 1 mg., Normal, Disp-30 Tablet, R-2      tiZANidine (ZANAFLEX) 4 mg tablet Take 1 Tablet by mouth three (3) times daily as needed for Muscle Spasm(s). , Normal, Disp-30 Tablet, R-1      calcium acetate, phosphate binder, (PHOSLO) 667 mg tab tablet Take  by mouth three (3) times daily (with meals). Indications: pt takes 3 pills 3 times daily, Historical Med      omeprazole (PRILOSEC) 40 mg capsule TAKE 1 CAPSULE DAILY, Normal, Disp-90 Cap, R-3      SENNA by Does Not Apply route daily. , Historical Med      cholecalciferol (VITAMIN D3) 25 mcg (1,000 unit) cap Take  by mouth daily. , Historical Med      carvediloL (COREG) 12.5 mg tablet Take 12.5 mg by mouth two (2) times daily (with meals). , Historical Med      lidocaine 4 % patch 1 Patch by TransDERmal route every twelve (12) hours every twelve (12) hours. , Normal, Disp-10 Patch, R-0      lisinopril (PRINIVIL, ZESTRIL) 20 mg tablet Take 1 Tab by mouth daily. , Normal, Disp-30 Tab, R-prn      etelcalcetide 5 mg/mL soln by IntraVENous route. Indications: 3 x per week, Historical Med      amLODIPine (NORVASC) 10 mg tablet TAKE 1 TABLET BY MOUTH ONCE DAILY, Historical Med, R-11           2.    Follow-up Information       Follow up With Specialties Details Why Contact Info    Kiersten Zhang MD Internal Medicine Physician   [address] [facility] [facility]  [phone]      [facility] EMERGENCY DEPT Emergency Medicine Schedule an appointment as soon as possible for a visit  If symptoms worsen [address] [place] [id number]  [phone]          3. Return to ED if worse     Diagnosis     Clinical Impression:   1. Acute bronchitis due to other specified organisms        Attestations:    Svetlana Gamble MD    Please note that this dictation was completed with [employer], the computer voice recognition software. Quite often unanticipated grammatical, syntax, homophones, and other interpretive errors are inadvertently transcribed by the computer software. Please disregard these errors. Please excuse any errors that have escaped final proofreading. Thank you.

## 2022-12-28 LAB
ATRIAL RATE: 86 BPM
CALCULATED P AXIS, ECG09: 52 DEGREES
CALCULATED R AXIS, ECG10: -46 DEGREES
CALCULATED T AXIS, ECG11: 14 DEGREES
DIAGNOSIS, 93000: NORMAL
P-R INTERVAL, ECG05: 148 MS
Q-T INTERVAL, ECG07: 374 MS
QRS DURATION, ECG06: 92 MS
QTC CALCULATION (BEZET), ECG08: 447 MS
VENTRICULAR RATE, ECG03: 86 BPM

## 2022-12-28 NOTE — DISCHARGE INSTRUCTIONS
Taking Mucinex twice daily with a full cup of water, use albuterol as needed for symptoms of shortness of breath, continue monitor symptoms closely and return to ER if you develop any fevers, chills, worsening shortness of breath or chest pain.

## 2023-01-03 PROBLEM — U07.1 COVID-19: Status: ACTIVE | Noted: 2023-01-03

## 2023-01-04 ENCOUNTER — OFFICE VISIT (OUTPATIENT)
Dept: INTERNAL MEDICINE CLINIC | Age: 73
End: 2023-01-04
Payer: MEDICARE

## 2023-01-04 VITALS
RESPIRATION RATE: 16 BRPM | HEART RATE: 83 BPM | HEIGHT: 69 IN | TEMPERATURE: 98.4 F | BODY MASS INDEX: 27.19 KG/M2 | DIASTOLIC BLOOD PRESSURE: 82 MMHG | WEIGHT: 183.6 LBS | SYSTOLIC BLOOD PRESSURE: 134 MMHG | OXYGEN SATURATION: 96 %

## 2023-01-04 DIAGNOSIS — U07.1 COVID-19: ICD-10-CM

## 2023-01-04 DIAGNOSIS — R06.09 DOE (DYSPNEA ON EXERTION): Primary | ICD-10-CM

## 2023-01-04 DIAGNOSIS — J20.9 ACUTE BRONCHITIS, UNSPECIFIED ORGANISM: ICD-10-CM

## 2023-01-04 DIAGNOSIS — N18.6 ESRD (END STAGE RENAL DISEASE) (HCC): ICD-10-CM

## 2023-01-04 DIAGNOSIS — R05.2 SUBACUTE COUGH: ICD-10-CM

## 2023-01-04 DIAGNOSIS — F41.9 ANXIETY: ICD-10-CM

## 2023-01-04 PROCEDURE — G8427 DOCREV CUR MEDS BY ELIG CLIN: HCPCS | Performed by: INTERNAL MEDICINE

## 2023-01-04 PROCEDURE — 3017F COLORECTAL CA SCREEN DOC REV: CPT | Performed by: INTERNAL MEDICINE

## 2023-01-04 PROCEDURE — 99215 OFFICE O/P EST HI 40 MIN: CPT | Performed by: INTERNAL MEDICINE

## 2023-01-04 PROCEDURE — G8417 CALC BMI ABV UP PARAM F/U: HCPCS | Performed by: INTERNAL MEDICINE

## 2023-01-04 PROCEDURE — G8536 NO DOC ELDER MAL SCRN: HCPCS | Performed by: INTERNAL MEDICINE

## 2023-01-04 PROCEDURE — 1101F PT FALLS ASSESS-DOCD LE1/YR: CPT | Performed by: INTERNAL MEDICINE

## 2023-01-04 PROCEDURE — G8510 SCR DEP NEG, NO PLAN REQD: HCPCS | Performed by: INTERNAL MEDICINE

## 2023-01-04 PROCEDURE — 1123F ACP DISCUSS/DSCN MKR DOCD: CPT | Performed by: INTERNAL MEDICINE

## 2023-01-04 RX ORDER — LORAZEPAM 0.5 MG/1
0.5 TABLET ORAL
Qty: 30 TABLET | Refills: 2 | Status: SHIPPED | OUTPATIENT
Start: 2023-01-04

## 2023-01-04 RX ORDER — ALBUTEROL SULFATE 90 UG/1
2 AEROSOL, METERED RESPIRATORY (INHALATION)
COMMUNITY

## 2023-01-04 RX ORDER — AZITHROMYCIN 250 MG/1
250 TABLET, FILM COATED ORAL SEE ADMIN INSTRUCTIONS
Qty: 6 TABLET | Refills: 0 | Status: SHIPPED | OUTPATIENT
Start: 2023-01-04 | End: 2023-01-09

## 2023-01-04 NOTE — PROGRESS NOTES
Chief Complaint   Patient presents with    ED Follow-up     [id number] 12/27/22  DX:Acute Bronchitis  [facility] 12/11/22 DX:Covid       SUBJECTIVE:    Orlin Ochoa is a 67 y.o. male who was diagnosed with COVID on December 11 and around December 14 he was started on Paxlovid obtained at that time. He completed the Paxlovid but continued to be short of breath with a cough and congestion. He thus presented to the emergency room on December 27 with a cough and congestion at which time a chest x-ray was obtained and he was told he had bronchitis. He was placed on an albuterol inhaler but did not get a real good explanation as to how to take it. He has been using it. He still has some cough and congestion. I did review the emergency room note from his visit on December 27 while he was here in office today. I reviewed his chest x-ray done there which was clear. He did have a elevated BNP which is not unexpected with end-stage renal disease. His renal numbers were consistent with end-stage renal disease. Hemoglobin was 10.0 and white blood count was normal.  Troponin level was negative. He notes no chest pain and really has not noted any increased shortness of breath thus he is not sure how to take inhaler because he was told to take it when he was short of breath. He does have a cough with some purulent sputum that is brown in color. There have been no fevers or chills. He notes no GI or  complaints. He notes no lightheadedness, dizziness or other neurologic complaints. He notes no current active arthritic complaints. Current Outpatient Medications   Medication Sig Dispense Refill    albuterol (PROVENTIL HFA, VENTOLIN HFA, PROAIR HFA) 90 mcg/actuation inhaler Take 2 Puffs by inhalation. LORazepam (ATIVAN) 0.5 mg tablet Take 1 Tablet by mouth two (2) times daily as needed for Anxiety.  Max Daily Amount: 1 mg. 30 Tablet 2    azithromycin (ZITHROMAX) 250 mg tablet Take 1 Tablet by mouth See Admin Instructions for 5 days. Take 2 tablets the first day, then 1 tablet daily for the next four days. 6 Tablet 0    tiZANidine (ZANAFLEX) 4 mg tablet Take 1 Tablet by mouth three (3) times daily as needed for Muscle Spasm(s). 30 Tablet 1    calcium acetate, phosphate binder, (PHOSLO) 667 mg tab tablet Take  by mouth three (3) times daily (with meals). Indications: pt takes 3 pills 3 times daily      omeprazole (PRILOSEC) 40 mg capsule TAKE 1 CAPSULE DAILY 90 Cap 3    carvediloL (COREG) 12.5 mg tablet Take 12.5 mg by mouth two (2) times daily (with meals). lidocaine 4 % patch 1 Patch by TransDERmal route every twelve (12) hours every twelve (12) hours. (Patient taking differently: 1 Patch by TransDERmal route as needed.) 10 Patch 0    lisinopril (PRINIVIL, ZESTRIL) 20 mg tablet Take 1 Tab by mouth daily. 30 Tab prn    etelcalcetide 5 mg/mL soln by IntraVENous route.  Indications: 3 x per week      amLODIPine (NORVASC) 10 mg tablet TAKE 1 TABLET BY MOUTH ONCE DAILY  11     Past Medical History:   Diagnosis Date    Arthritis     RA    Chronic kidney disease     Dialysis KATI, Thur, Sat @ [facility]    COVID 12/11/2022    GERD (gastroesophageal reflux disease)     Gout     Hepatitis C     Hypertension     Ill-defined condition     Gout    Iritis     Liver disease     Hep C    Other and unspecified alcohol dependence, unspecified drinking behavior     Acid reflux    Other ill-defined conditions(799.89)     blood transfusion hx    Psychiatric disorder     Anxiety     Past Surgical History:   Procedure Laterality Date    HX ENDOSCOPY  07/29/2020    HX OTHER SURGICAL      liver biopsy    HX VASCULAR ACCESS      [place] [facility] [place] [facility]    HX VASCULAR ACCESS  7/15/13    CREATION LEFT UPPER ARM BASILIC VEIN TRANSPOSITION    HX VASCULAR ACCESS      [facility]     Allergies   Allergen Reactions    Codeine Hives       REVIEW OF SYSTEMS:  General: negative for - chills or fever, or weight loss or gain  ENT: negative for - headaches, nasal congestion or tinnitus  Eyes: no blurred or visual changes  Neck: No stiffness or swollen nodes  Respiratory: negative for -  hemoptysis, shortness of breath or wheezing. Positive for cough and congestion with brown sputum  Cardiovascular : negative for - chest pain, edema, palpitations or shortness of breath  Gastrointestinal: negative for - abdominal pain, blood in stools, heartburn or nausea/vomiting  Genito-Urinary: no dysuria, trouble voiding, or hematuria  Musculoskeletal: negative for - gait disturbance, joint pain, joint stiffness or joint swelling  Neurological: no TIA or stroke symptoms  Hematologic: no bruises, no bleeding  Lymphatic: no swollen glands  Integument: no lumps, mole changes, nail changes or rash  Endocrine:no malaise/lethargy poly uria or polydipsia or unexpected weight changes        Social History     Socioeconomic History    Marital status: SINGLE   Tobacco Use    Smoking status: Never    Smokeless tobacco: Never   Vaping Use    Vaping Use: Never used   Substance and Sexual Activity    Alcohol use: No    Drug use: Not Currently     Types: Marijuana, Heroin     Comment: smoked pot  years ago, herion in 25s     History reviewed. No pertinent family history. OBJECTIVE:     Visit Vitals  /82 (BP 1 Location: Left upper arm, BP Patient Position: Sitting, BP Cuff Size: Adult)   Pulse 83   Temp 98.4 °F (36.9 °C) (Oral)   Resp 16   Ht 5' 9\" (1.753 m)   Wt 183 lb 9.6 oz (83.3 kg)   SpO2 96%   BMI 27.11 kg/m²     CONSTITUTIONAL:   well nourished, appears age appropriate  EYES: sclera anicteric, PERRL, EOMI  ENMT:nares clear, moist mucous membranes, pharynx clear  NECK: supple.  Thyroid normal, No JVD or bruits  RESPIRATORY: Chest: clear to ascultation and percussion except some scattered rhonchi but no wheezes or rales noted, normal inspiratory effort  CARDIOVASCULAR: Heart: regular rate and rhythm no murmurs, rubs or gallops, PMI not displaced, No thrills, no peripheral edema  GASTROINTESTINAL: Abdomen: non distended, soft, non tender, bowel sounds normal  HEMATOLOGIC: no purpura, petechiae or bruising  LYMPHATIC: No lymph node enlargemant  MUSCULOSKELETAL: Extremities: no active synovitis, pulse 1+   INTEGUMENT: No unusual rashes or suspicious skin lesions noted. Nails appear normal.  PERIPHERAL VASCULAR: normal pulses femoral, PT and DP  NEUROLOGIC: non-focal exam, A & O X 3  PSYCHIATRIC:, appropriate affect     ASSESSMENT:   1. MOYER (dyspnea on exertion)    2. COVID-19    3. ESRD (end stage renal disease) ([facility] [address]    4. Subacute cough    5. Anxiety    6. Acute bronchitis, unspecified organism      Impression  1 dyspnea on exertion that seems to have resolved. 2.  Recent COVID-19 diagnosed December 11 with mild post COVID bronchitis going on now. 3.  End-stage renal disease on dialysis  4. Cough so I repeated his chest x-ray today. Chest x-ray today is clear. I reviewed it and compared to a chest x-ray done in the emergency room on December 27 which was also normal and I have reviewed the radiology reading which was also read as normal.   5.  Anxiety has increased since this has been going on I renewed his Ativan  6. Acute bronchitis we will treat this with Zithromax  I will recheck him myself at his prior scheduled appointment or sooner should to be a problem. 40 minutes spent in direct care of this patient today including review of the complete emergency room record while he was here in the office as well as review of labs and x-rays from the emergency room. Greater than 50% of the time in counseling and coordination of care and reassurance of the patient. PLAN:  .  Orders Placed This Encounter    XR CHEST PA LAT    albuterol (PROVENTIL HFA, VENTOLIN HFA, PROAIR HFA) 90 mcg/actuation inhaler    LORazepam (ATIVAN) 0.5 mg tablet    azithromycin (ZITHROMAX) 250 mg tablet         ATTENTION:   This medical record was transcribed using an electronic medical records system. Although proofread, it may and can contain electronic and spelling errors. Other human spelling and other errors may be present. Corrections may be executed at a later time. Please feel free to contact us for any clarifications as needed. Follow-up and Dispositions    Return At Prior scheduled appointment. No results found for any visits on 01/04/23. Hardin Mohs, MD    The patient verbalized understanding of the problems and plans as explained.

## 2023-01-04 NOTE — PROGRESS NOTES
Chief Complaint   Patient presents with    ED Follow-up     ED [facility] 12/27/22  DX:Acute Bronchitis  [facility] 12/11/22 DX:Covid     Visit Vitals  /82 (BP 1 Location: Left upper arm, BP Patient Position: Sitting, BP Cuff Size: Adult)   Pulse 83   Temp 98.4 °F (36.9 °C) (Oral)   Resp 16   Ht 5' 9\" (1.753 m)   Wt 183 lb 9.6 oz (83.3 kg)   SpO2 96%   BMI 27.11 kg/m²     1. Have you been to the ER, urgent care clinic since your last visit? Hospitalized since your last visit?12/11/22 [facility] DX:Covid  12//27/22 ED [facility] DX:Acute Bronchitis    2. Have you seen or consulted any other health care providers outside of the [address] [place] [address] since your last visit? Include any pap smears or colon screening.  No

## 2023-01-19 PROBLEM — R06.02 SOB (SHORTNESS OF BREATH): Status: ACTIVE | Noted: 2022-02-02

## 2023-01-19 PROBLEM — R53.83 FATIGUE: Status: ACTIVE | Noted: 2023-01-19

## 2023-01-19 NOTE — PROGRESS NOTES
Chief Complaint   Patient presents with    Post-COVID Symptoms     SOB and fatigue       SUBJECTIVE:    Domonique Cote is a 67 y.o. male who returns in follow-up post COVID with shortness of breath and fatigue as well as lightheaded sensation and loss of balance. He was diagnosed with COVID December 27. He persists with his symptoms. He does have a cough with a little bit of brown-tan sputum. He denies any chest pain, palpitations, PND, orthopnea but has marked dyspnea on exertion. He has no other cardiac or respiratory complaints. He denies any GI or  complaints. He does continue dialysis 3 times weekly. He does note his oxygen sat was low at his last dialysis but with deep and pursed lip breathing that did improve. He notes no headaches, dizziness or other neurologic complaints except his balance seems to be a little bit off. He denies any current arthritic complaints. The remainder complete review of systems is negative. Current Outpatient Medications   Medication Sig Dispense Refill    sevelamer carbonate (RENVELA) 800 mg tab tab Take 800 mg by mouth three (3) times daily (with meals). Take 3 tabs with every meal      albuterol (PROVENTIL HFA, VENTOLIN HFA, PROAIR HFA) 90 mcg/actuation inhaler Take 2 Puffs by inhalation. LORazepam (ATIVAN) 0.5 mg tablet Take 1 Tablet by mouth two (2) times daily as needed for Anxiety. Max Daily Amount: 1 mg. 30 Tablet 2    tiZANidine (ZANAFLEX) 4 mg tablet Take 1 Tablet by mouth three (3) times daily as needed for Muscle Spasm(s). 30 Tablet 1    carvediloL (COREG) 12.5 mg tablet Take 12.5 mg by mouth two (2) times daily (with meals). lisinopril (PRINIVIL, ZESTRIL) 20 mg tablet Take 1 Tab by mouth daily. 30 Tab prn    etelcalcetide 5 mg/mL soln by IntraVENous route.  Indications: 3 x per week      amLODIPine (NORVASC) 10 mg tablet TAKE 1 TABLET BY MOUTH ONCE DAILY  11    calcium acetate, phosphate binder, (PHOSLO) 667 mg tab tablet Take  by mouth three (3) times daily (with meals). Indications: pt takes 3 pills 3 times daily (Patient not taking: Reported on 1/20/2023)       Past Medical History:   Diagnosis Date    Arthritis     RA    Chronic kidney disease     Dialysis T, Thur, Sat @ [facility]    COVID 12/11/2022    GERD (gastroesophageal reflux disease)     Gout     Hepatitis C     Hypertension     Ill-defined condition     Gout    Iritis     Liver disease     Hep C    Other and unspecified alcohol dependence, unspecified drinking behavior     Acid reflux    Other ill-defined conditions(799.89)     blood transfusion hx    Psychiatric disorder     Anxiety     Past Surgical History:   Procedure Laterality Date    HX ENDOSCOPY  07/29/2020    HX OTHER SURGICAL      liver biopsy    HX VASCULAR ACCESS      [place] [facility]    HX VASCULAR ACCESS  7/15/13    CREATION LEFT UPPER ARM BASILIC VEIN TRANSPOSITION    HX VASCULAR ACCESS      [facility]     Allergies   Allergen Reactions    Codeine Hives       REVIEW OF SYSTEMS:  General: negative for - chills or fever, or weight loss or gain  ENT: negative for - headaches, nasal congestion or tinnitus  Eyes: no blurred or visual changes  Neck: No stiffness or swollen nodes  Respiratory: Positive for cough with shortness of breath and marked dyspnea on exertion and there is some occasional brown-tan sputum production.   Negative for -  hemoptysis or wheezing  Cardiovascular : negative for - chest pain, edema, palpitations or shortness of breath  Gastrointestinal: negative for - abdominal pain, blood in stools, heartburn or nausea/vomiting  Genito-Urinary: no dysuria, trouble voiding, or hematuria  Musculoskeletal: negative for - gait disturbance, joint pain, joint stiffness or joint swelling  Neurological: no TIA or stroke symptoms but feels like his balance is off and energy is markedly decreased since he had COVID  Hematologic: no bruises, no bleeding  Lymphatic: no swollen glands  Integument: no lumps, mole changes, nail changes or rash  Endocrine:no malaise/lethargy poly uria or polydipsia or unexpected weight changes        Social History     Socioeconomic History    Marital status: SINGLE   Tobacco Use    Smoking status: Never    Smokeless tobacco: Never   Vaping Use    Vaping Use: Never used   Substance and Sexual Activity    Alcohol use: No    Drug use: Not Currently     Types: Marijuana, Heroin     Comment: smoked pot  years ago, herion in 25s     History reviewed. No pertinent family history. OBJECTIVE:     Visit Vitals  /62 (BP 1 Location: Right upper arm, BP Patient Position: Sitting, BP Cuff Size: Adult)   Pulse 93   Temp 98.8 °F (37.1 °C) (Oral)   Ht 5' 9\" (1.753 m)   Wt 178 lb (80.7 kg)   SpO2 92%   BMI 26.29 kg/m²     CONSTITUTIONAL:   well nourished, appears age appropriate  EYES: sclera anicteric, PERRL, EOMI  ENMT:nares clear, moist mucous membranes, pharynx clear  NECK: supple. Thyroid normal, No JVD or bruits  RESPIRATORY: Chest: clear to ascultation and percussion except very fine left basilar dry rales, normal inspiratory effort  CARDIOVASCULAR: Heart: regular rate and rhythm no murmurs, rubs or gallops, PMI not displaced, No thrills, no peripheral edema  GASTROINTESTINAL: Abdomen: non distended, soft, non tender, bowel sounds normal  HEMATOLOGIC: no purpura, petechiae or bruising  LYMPHATIC: No lymph node enlargemant  MUSCULOSKELETAL: Extremities: no active synovitis, pulse 1+   INTEGUMENT: No unusual rashes or suspicious skin lesions noted. Nails appear normal.  PERIPHERAL VASCULAR: normal pulses femoral, PT and DP  NEUROLOGIC: non-focal exam, A & O X 3  PSYCHIATRIC:, appropriate affect     ASSESSMENT:   1. SOB (shortness of breath)    2. Fatigue, unspecified type    3. COVID-19    4. Pneumonia of both lungs due to infectious organism, unspecified part of lung    5. Acute hypoxemic respiratory failure (HCC)      Impression  1. Shortness of breath with acute hypoxemic respiratory failure. O2 sat was predominantly in the 80s up as high as 88 until I did get and take some deep breaths with her pursed lip breathing and was able to transiently getting up to 92% sitting. 2.  Bilateral pneumonia on chest x-ray right greater than left with recent COVID infection  3. Fatigue secondary to #1 and 2  4. Recent COVID-19  At this point he needs hospitalization. I have discussed this with the emergency room physician and sent him to the emergency room for admission to the hospital for more aggressive treatment of his acute respiratory failure. High complexity patient with high risk of decompensation high complexity decision making on this office visit today. PLAN:  .  Orders Placed This Encounter    XR CHEST PA LAT    CBC WITH AUTOMATED DIFF    AMB POC EKG ROUTINE W/ 12 LEADS, INTER & REP    sevelamer carbonate (RENVELA) 800 mg tab tab         ATTENTION:   This medical record was transcribed using an electronic medical records system. Although proofread, it may and can contain electronic and spelling errors. Other human spelling and other errors may be present. Corrections may be executed at a later time. Please feel free to contact us for any clarifications as needed. Follow-up and Dispositions    Return in about 2 weeks (around 2/3/2023). No results found for any visits on 01/20/23. Renee Hayden MD    The patient verbalized understanding of the problems and plans as explained.

## 2023-01-20 ENCOUNTER — OFFICE VISIT (OUTPATIENT)
Dept: INTERNAL MEDICINE CLINIC | Age: 73
End: 2023-01-20
Payer: MEDICARE

## 2023-01-20 ENCOUNTER — HOSPITAL ENCOUNTER (INPATIENT)
Age: 73
LOS: 7 days | Discharge: HOME | End: 2023-01-27
Attending: STUDENT IN AN ORGANIZED HEALTH CARE EDUCATION/TRAINING PROGRAM | Admitting: INTERNAL MEDICINE
Payer: MEDICARE

## 2023-01-20 ENCOUNTER — APPOINTMENT (OUTPATIENT)
Dept: GENERAL RADIOLOGY | Age: 73
End: 2023-01-20
Attending: STUDENT IN AN ORGANIZED HEALTH CARE EDUCATION/TRAINING PROGRAM
Payer: MEDICARE

## 2023-01-20 VITALS
BODY MASS INDEX: 26.36 KG/M2 | DIASTOLIC BLOOD PRESSURE: 62 MMHG | OXYGEN SATURATION: 92 % | WEIGHT: 178 LBS | HEART RATE: 93 BPM | HEIGHT: 69 IN | TEMPERATURE: 98.8 F | SYSTOLIC BLOOD PRESSURE: 132 MMHG

## 2023-01-20 DIAGNOSIS — R53.83 FATIGUE, UNSPECIFIED TYPE: ICD-10-CM

## 2023-01-20 DIAGNOSIS — N18.6 ESRD (END STAGE RENAL DISEASE) (HCC): ICD-10-CM

## 2023-01-20 DIAGNOSIS — R06.02 SOB (SHORTNESS OF BREATH): Primary | ICD-10-CM

## 2023-01-20 DIAGNOSIS — I12.9 HYPERTENSION, RENAL DISEASE: ICD-10-CM

## 2023-01-20 DIAGNOSIS — J96.01 ACUTE HYPOXEMIC RESPIRATORY FAILURE (HCC): ICD-10-CM

## 2023-01-20 DIAGNOSIS — U07.1 COVID-19: ICD-10-CM

## 2023-01-20 DIAGNOSIS — J18.9 PNEUMONIA OF BOTH LUNGS DUE TO INFECTIOUS ORGANISM, UNSPECIFIED PART OF LUNG: ICD-10-CM

## 2023-01-20 DIAGNOSIS — E78.2 MIXED HYPERLIPIDEMIA: ICD-10-CM

## 2023-01-20 DIAGNOSIS — J18.9 COMMUNITY ACQUIRED PNEUMONIA, UNSPECIFIED LATERALITY: Primary | ICD-10-CM

## 2023-01-20 DIAGNOSIS — B18.2 CHRONIC HEPATITIS C WITHOUT HEPATIC COMA (HCC): ICD-10-CM

## 2023-01-20 DIAGNOSIS — K21.9 GASTROESOPHAGEAL REFLUX DISEASE WITHOUT ESOPHAGITIS: ICD-10-CM

## 2023-01-20 DIAGNOSIS — J96.01 ACUTE RESPIRATORY FAILURE WITH HYPOXIA (HCC): ICD-10-CM

## 2023-01-20 DIAGNOSIS — F41.9 ANXIETY: ICD-10-CM

## 2023-01-20 LAB
ALBUMIN SERPL-MCNC: 3.1 G/DL (ref 3.5–5)
ALBUMIN/GLOB SERPL: 0.5 (ref 1.1–2.2)
ALP SERPL-CCNC: 143 U/L (ref 45–117)
ALT SERPL-CCNC: 24 U/L (ref 12–78)
ANION GAP SERPL CALC-SCNC: 8 MMOL/L (ref 5–15)
AST SERPL-CCNC: 33 U/L (ref 15–37)
B PERT DNA SPEC QL NAA+PROBE: NOT DETECTED
BASOPHILS # BLD: 0.1 K/UL (ref 0–0.1)
BASOPHILS NFR BLD: 1 % (ref 0–1)
BILIRUB SERPL-MCNC: 0.8 MG/DL (ref 0.2–1)
BNP SERPL-MCNC: 2793 PG/ML
BORDETELLA PARAPERTUSSIS PCR, BORPAR: NOT DETECTED
BUN SERPL-MCNC: 27 MG/DL (ref 6–20)
BUN/CREAT SERPL: 4 (ref 12–20)
C PNEUM DNA SPEC QL NAA+PROBE: NOT DETECTED
CALCIUM SERPL-MCNC: 9.7 MG/DL (ref 8.5–10.1)
CHLORIDE SERPL-SCNC: 96 MMOL/L (ref 97–108)
CO2 SERPL-SCNC: 31 MMOL/L (ref 21–32)
COMMENT, HOLDF: NORMAL
CREAT SERPL-MCNC: 7.17 MG/DL (ref 0.7–1.3)
DIFFERENTIAL METHOD BLD: ABNORMAL
EOSINOPHIL # BLD: 0.3 K/UL (ref 0–0.4)
EOSINOPHIL NFR BLD: 2 % (ref 0–7)
ERYTHROCYTE [DISTWIDTH] IN BLOOD BY AUTOMATED COUNT: 12.9 % (ref 11.5–14.5)
FLUAV SUBTYP SPEC NAA+PROBE: NOT DETECTED
FLUBV RNA SPEC QL NAA+PROBE: NOT DETECTED
GLOBULIN SER CALC-MCNC: 6.6 G/DL (ref 2–4)
GLUCOSE SERPL-MCNC: 99 MG/DL (ref 65–100)
HADV DNA SPEC QL NAA+PROBE: NOT DETECTED
HCOV 229E RNA SPEC QL NAA+PROBE: NOT DETECTED
HCOV HKU1 RNA SPEC QL NAA+PROBE: NOT DETECTED
HCOV NL63 RNA SPEC QL NAA+PROBE: NOT DETECTED
HCOV OC43 RNA SPEC QL NAA+PROBE: NOT DETECTED
HCT VFR BLD AUTO: 28.2 % (ref 36.6–50.3)
HGB BLD-MCNC: 9.2 G/DL (ref 12.1–17)
HMPV RNA SPEC QL NAA+PROBE: NOT DETECTED
HPIV1 RNA SPEC QL NAA+PROBE: NOT DETECTED
HPIV2 RNA SPEC QL NAA+PROBE: NOT DETECTED
HPIV3 RNA SPEC QL NAA+PROBE: NOT DETECTED
HPIV4 RNA SPEC QL NAA+PROBE: NOT DETECTED
IMM GRANULOCYTES # BLD AUTO: 0.1 K/UL (ref 0–0.04)
IMM GRANULOCYTES NFR BLD AUTO: 1 % (ref 0–0.5)
LACTATE SERPL-SCNC: 0.6 MMOL/L (ref 0.4–2)
LYMPHOCYTES # BLD: 1.3 K/UL (ref 0.8–3.5)
LYMPHOCYTES NFR BLD: 10 % (ref 12–49)
M PNEUMO DNA SPEC QL NAA+PROBE: NOT DETECTED
MCH RBC QN AUTO: 32.5 PG (ref 26–34)
MCHC RBC AUTO-ENTMCNC: 32.6 G/DL (ref 30–36.5)
MCV RBC AUTO: 99.6 FL (ref 80–99)
MONOCYTES # BLD: 1.6 K/UL (ref 0–1)
MONOCYTES NFR BLD: 12 % (ref 5–13)
NEUTS SEG # BLD: 9.9 K/UL (ref 1.8–8)
NEUTS SEG NFR BLD: 74 % (ref 32–75)
NRBC # BLD: 0 K/UL (ref 0–0.01)
NRBC BLD-RTO: 0 PER 100 WBC
PLATELET # BLD AUTO: 318 K/UL (ref 150–400)
PMV BLD AUTO: 9.9 FL (ref 8.9–12.9)
POTASSIUM SERPL-SCNC: 4.7 MMOL/L (ref 3.5–5.1)
PROT SERPL-MCNC: 9.7 G/DL (ref 6.4–8.2)
RBC # BLD AUTO: 2.83 M/UL (ref 4.1–5.7)
RSV RNA SPEC QL NAA+PROBE: NOT DETECTED
RV+EV RNA SPEC QL NAA+PROBE: NOT DETECTED
SAMPLES BEING HELD,HOLD: NORMAL
SARS-COV-2 PCR, COVPCR: NOT DETECTED
SODIUM SERPL-SCNC: 135 MMOL/L (ref 136–145)
TROPONIN-HIGH SENSITIVITY: 18 NG/L (ref 0–76)
WBC # BLD AUTO: 13.2 K/UL (ref 4.1–11.1)

## 2023-01-20 PROCEDURE — 83880 ASSAY OF NATRIURETIC PEPTIDE: CPT

## 2023-01-20 PROCEDURE — 84484 ASSAY OF TROPONIN QUANT: CPT

## 2023-01-20 PROCEDURE — 96365 THER/PROPH/DIAG IV INF INIT: CPT

## 2023-01-20 PROCEDURE — 36415 COLL VENOUS BLD VENIPUNCTURE: CPT

## 2023-01-20 PROCEDURE — 74011250636 HC RX REV CODE- 250/636: Performed by: INTERNAL MEDICINE

## 2023-01-20 PROCEDURE — 99285 EMERGENCY DEPT VISIT HI MDM: CPT

## 2023-01-20 PROCEDURE — 74011000258 HC RX REV CODE- 258: Performed by: STUDENT IN AN ORGANIZED HEALTH CARE EDUCATION/TRAINING PROGRAM

## 2023-01-20 PROCEDURE — 83605 ASSAY OF LACTIC ACID: CPT

## 2023-01-20 PROCEDURE — 0202U NFCT DS 22 TRGT SARS-COV-2: CPT

## 2023-01-20 PROCEDURE — 74011000250 HC RX REV CODE- 250: Performed by: INTERNAL MEDICINE

## 2023-01-20 PROCEDURE — 71045 X-RAY EXAM CHEST 1 VIEW: CPT

## 2023-01-20 PROCEDURE — 80053 COMPREHEN METABOLIC PANEL: CPT

## 2023-01-20 PROCEDURE — 74011250636 HC RX REV CODE- 250/636: Performed by: STUDENT IN AN ORGANIZED HEALTH CARE EDUCATION/TRAINING PROGRAM

## 2023-01-20 PROCEDURE — 85025 COMPLETE CBC W/AUTO DIFF WBC: CPT

## 2023-01-20 PROCEDURE — 87040 BLOOD CULTURE FOR BACTERIA: CPT

## 2023-01-20 PROCEDURE — 65270000046 HC RM TELEMETRY

## 2023-01-20 PROCEDURE — 74011250637 HC RX REV CODE- 250/637: Performed by: INTERNAL MEDICINE

## 2023-01-20 RX ORDER — SEVELAMER CARBONATE 800 MG/1
800 TABLET, FILM COATED ORAL
Status: DISCONTINUED | OUTPATIENT
Start: 2023-01-20 | End: 2023-01-27 | Stop reason: HOSPADM

## 2023-01-20 RX ORDER — FUROSEMIDE 10 MG/ML
80 INJECTION INTRAMUSCULAR; INTRAVENOUS ONCE
Status: COMPLETED | OUTPATIENT
Start: 2023-01-20 | End: 2023-01-20

## 2023-01-20 RX ORDER — ACETAMINOPHEN 325 MG/1
650 TABLET ORAL
Status: DISCONTINUED | OUTPATIENT
Start: 2023-01-20 | End: 2023-01-27 | Stop reason: HOSPADM

## 2023-01-20 RX ORDER — CARVEDILOL 12.5 MG/1
12.5 TABLET ORAL 2 TIMES DAILY WITH MEALS
Status: DISCONTINUED | OUTPATIENT
Start: 2023-01-20 | End: 2023-01-27 | Stop reason: HOSPADM

## 2023-01-20 RX ORDER — SODIUM CHLORIDE 0.9 % (FLUSH) 0.9 %
5-40 SYRINGE (ML) INJECTION EVERY 8 HOURS
Status: DISCONTINUED | OUTPATIENT
Start: 2023-01-20 | End: 2023-01-27 | Stop reason: HOSPADM

## 2023-01-20 RX ORDER — HEPARIN SODIUM 5000 [USP'U]/ML
5000 INJECTION, SOLUTION INTRAVENOUS; SUBCUTANEOUS EVERY 8 HOURS
Status: DISCONTINUED | OUTPATIENT
Start: 2023-01-21 | End: 2023-01-27 | Stop reason: HOSPADM

## 2023-01-20 RX ORDER — ACETAMINOPHEN 650 MG/1
650 SUPPOSITORY RECTAL
Status: DISCONTINUED | OUTPATIENT
Start: 2023-01-20 | End: 2023-01-27 | Stop reason: HOSPADM

## 2023-01-20 RX ORDER — SODIUM CHLORIDE 0.9 % (FLUSH) 0.9 %
5-40 SYRINGE (ML) INJECTION AS NEEDED
Status: DISCONTINUED | OUTPATIENT
Start: 2023-01-20 | End: 2023-01-27 | Stop reason: HOSPADM

## 2023-01-20 RX ORDER — ONDANSETRON 4 MG/1
4 TABLET, ORALLY DISINTEGRATING ORAL
Status: DISCONTINUED | OUTPATIENT
Start: 2023-01-20 | End: 2023-01-27 | Stop reason: HOSPADM

## 2023-01-20 RX ORDER — TIZANIDINE 2 MG/1
4 TABLET ORAL
Status: DISCONTINUED | OUTPATIENT
Start: 2023-01-20 | End: 2023-01-27 | Stop reason: HOSPADM

## 2023-01-20 RX ORDER — ALBUTEROL SULFATE 90 UG/1
2 AEROSOL, METERED RESPIRATORY (INHALATION)
Status: DISCONTINUED | OUTPATIENT
Start: 2023-01-20 | End: 2023-01-27 | Stop reason: HOSPADM

## 2023-01-20 RX ORDER — AMLODIPINE BESYLATE 5 MG/1
10 TABLET ORAL DAILY
Status: DISCONTINUED | OUTPATIENT
Start: 2023-01-21 | End: 2023-01-27 | Stop reason: HOSPADM

## 2023-01-20 RX ORDER — SEVELAMER CARBONATE 800 MG/1
800 TABLET, FILM COATED ORAL
Status: ON HOLD | COMMUNITY
Start: 2022-12-24

## 2023-01-20 RX ORDER — POLYETHYLENE GLYCOL 3350 17 G/17G
17 POWDER, FOR SOLUTION ORAL DAILY PRN
Status: DISCONTINUED | OUTPATIENT
Start: 2023-01-20 | End: 2023-01-27 | Stop reason: HOSPADM

## 2023-01-20 RX ORDER — LISINOPRIL 20 MG/1
20 TABLET ORAL DAILY
Status: DISCONTINUED | OUTPATIENT
Start: 2023-01-21 | End: 2023-01-27 | Stop reason: HOSPADM

## 2023-01-20 RX ORDER — ONDANSETRON 2 MG/ML
4 INJECTION INTRAMUSCULAR; INTRAVENOUS
Status: DISCONTINUED | OUTPATIENT
Start: 2023-01-20 | End: 2023-01-27 | Stop reason: HOSPADM

## 2023-01-20 RX ORDER — LORAZEPAM 0.5 MG/1
0.5 TABLET ORAL
Status: DISCONTINUED | OUTPATIENT
Start: 2023-01-20 | End: 2023-01-27 | Stop reason: HOSPADM

## 2023-01-20 RX ADMIN — CEFTRIAXONE 2 G: 2 INJECTION, POWDER, FOR SOLUTION INTRAMUSCULAR; INTRAVENOUS at 13:17

## 2023-01-20 RX ADMIN — SODIUM CHLORIDE, PRESERVATIVE FREE 10 ML: 5 INJECTION INTRAVENOUS at 23:42

## 2023-01-20 RX ADMIN — AZITHROMYCIN MONOHYDRATE 500 MG: 500 INJECTION, POWDER, LYOPHILIZED, FOR SOLUTION INTRAVENOUS at 14:14

## 2023-01-20 RX ADMIN — SODIUM CHLORIDE, PRESERVATIVE FREE 10 ML: 5 INJECTION INTRAVENOUS at 14:13

## 2023-01-20 RX ADMIN — SEVELAMER CARBONATE 800 MG: 800 TABLET, FILM COATED ORAL at 14:48

## 2023-01-20 RX ADMIN — SEVELAMER CARBONATE 800 MG: 800 TABLET, FILM COATED ORAL at 18:06

## 2023-01-20 RX ADMIN — CARVEDILOL 12.5 MG: 12.5 TABLET, FILM COATED ORAL at 23:42

## 2023-01-20 RX ADMIN — FUROSEMIDE 80 MG: 10 INJECTION, SOLUTION INTRAMUSCULAR; INTRAVENOUS at 14:10

## 2023-01-20 NOTE — CONSULTS
[facility] Consult Note        NAME: Gurpreet Pike       :  1950       MRN:  [id number]     Date/Time: 2023    Risk of deterioration: medium       Assessment:    Plan:  ESRD-/   COVID PNA  Resp failure   HD tomorrow-orders entered  [facility] aware  Resp isolation  Has aneurysmal changes to avf-pt states [facility] is aware     Asked to see for ESRD needs. Pt dialyzes at [address] [place] [address] on /. Pt states he is compliant with HD. Subjective:     Chief Complaint:  I'm sob and it's getting worse    Review of Systems: no n/v/cp (+) sob  (-) fever    Objective:     VITALS:   Last 24hrs VS reviewed since prior progress note.  Most recent are:  Visit Vitals  /68   Pulse 91   Temp 98.6 °F (37 °C)   Resp 24   Ht 5' 9\" (1.753 m)   Wt 81 kg (178 lb 9.2 oz)   SpO2 95%   BMI 26.37 kg/m²     SpO2 Readings from Last 6 Encounters:   23 95%   23 92%   23 96%   22 99%   22 98%   22 98%    O2 Flow Rate (L/min): 2 l/min   No intake or output data in the 24 hours ending 23 1432     Telemetry Reviewed       PHYSICAL EXAM:    General   well developed, well nourished, appears stated age, in no acute distress  EENT  Normocephalic, Atraumatic, EOMI, sclera clear, nares clear, pharynx normal  Respiratory   Clear To Auscultation bilaterally   Cardiology  Regular Rate and Rythmn   Extremities  No clubbing, cyanosis, (LUE) AVF with aneurysms           Lab Data Reviewed: (see below)    Medications Reviewed: (see below)    PMH/SH reviewed - no change compared to H&P  ___________________________________________________    ___________________________________________________    Attending Physician: Lorna Burdick MD     ____________________________________________________  MEDICATIONS:  Current Facility-Administered Medications   Medication Dose Route Frequency    azithromycin (ZITHROMAX) 500 mg in 0.9% sodium chloride 250 mL ([id number])  500 mg IntraVENous Q24H    albuterol (PROVENTIL HFA, VENTOLIN HFA, PROAIR HFA) inhaler 2 Puff  2 Puff Inhalation Q6H PRN    [START ON 1/21/2023] amLODIPine (NORVASC) tablet 10 mg  10 mg Oral DAILY    carvediloL (COREG) tablet 12.5 mg  12.5 mg Oral BID WITH MEALS    [START ON 1/21/2023] lisinopriL (PRINIVIL, ZESTRIL) tablet 20 mg  20 mg Oral DAILY    LORazepam (ATIVAN) tablet 0.5 mg  0.5 mg Oral BID PRN    sevelamer carbonate (RENVELA) tab 800 mg  800 mg Oral TID WITH MEALS    tiZANidine (ZANAFLEX) tablet 4 mg  4 mg Oral TID PRN    sodium chloride (NS) flush 5-40 mL  5-40 mL IntraVENous Q8H    sodium chloride (NS) flush 5-40 mL  5-40 mL IntraVENous PRN    acetaminophen (TYLENOL) tablet 650 mg  650 mg Oral Q6H PRN    Or    acetaminophen (TYLENOL) suppository 650 mg  650 mg Rectal Q6H PRN    polyethylene glycol (MIRALAX) packet 17 g  17 g Oral DAILY PRN    ondansetron (ZOFRAN ODT) tablet 4 mg  4 mg Oral Q8H PRN    Or    ondansetron (ZOFRAN) injection 4 mg  4 mg IntraVENous Q6H PRN    [START ON 1/21/2023] heparin (porcine) injection 5,000 Units  5,000 Units SubCUTAneous Q8H    [START ON 1/21/2023] cefTRIAXone (ROCEPHIN) 1 g in 0.9% sodium chloride (MBP/ADV) 50 mL MBP  1 g IntraVENous Q24H     Current Outpatient Medications   Medication Sig    sevelamer carbonate (RENVELA) 800 mg tab tab Take 800 mg by mouth three (3) times daily (with meals). Take 3 tabs with every meal    albuterol (PROVENTIL HFA, VENTOLIN HFA, PROAIR HFA) 90 mcg/actuation inhaler Take 2 Puffs by inhalation. LORazepam (ATIVAN) 0.5 mg tablet Take 1 Tablet by mouth two (2) times daily as needed for Anxiety. Max Daily Amount: 1 mg. tiZANidine (ZANAFLEX) 4 mg tablet Take 1 Tablet by mouth three (3) times daily as needed for Muscle Spasm(s). calcium acetate, phosphate binder, (PHOSLO) 667 mg tab tablet Take  by mouth three (3) times daily (with meals).  Indications: pt takes 3 pills 3 times daily (Patient not taking: Reported on 1/20/2023)    carvediloL (COREG) 12.5 mg tablet Take 12.5 mg by mouth two (2) times daily (with meals). lisinopril (PRINIVIL, ZESTRIL) 20 mg tablet Take 1 Tab by mouth daily. etelcalcetide 5 mg/mL soln by IntraVENous route. Indications: 3 x per week    amLODIPine (NORVASC) 10 mg tablet TAKE 1 TABLET BY MOUTH ONCE DAILY        LABS:  Recent Labs     01/20/23  1042   WBC 13.2*   HGB 9.2*   HCT 28.2*        Recent Labs     01/20/23  1042   *   K 4.7   CL 96*   CO2 31   BUN 27*   CREA 7.17*   GLU 99   CA 9.7     Recent Labs     01/20/23  1042   ALT 24   *   TBILI 0.8   TP 9.7*   ALB 3.1*   GLOB 6.6*     No results for input(s): INR, PTP, APTT, INREXT in the last 72 hours. No results for input(s): FE, TIBC, PSAT, FERR in the last 72 hours. No results for input(s): PH, PCO2, PO2 in the last 72 hours. No results for input(s): CPK, CKNDX, TROIQ in the last 72 hours.     No lab exists for component: CPKMB  Lab Results   Component Value Date/Time    Glucose (POC) 88 07/29/2020 02:51 PM    Glucose (POC) 91 06/04/2014 09:45 AM    Glucose (POC) 86 07/15/2013 01:21 PM

## 2023-01-20 NOTE — PROGRESS NOTES
End of Shift Note    Bedside shift change report given to  (oncoming nurse) by Ollie Gordon RN (offgoing nurse). Report included the following information SBAR and ED Summary    Shift worked:  7a-7p     Shift summary and any significant changes:     Patient admitted for pneumonia. A/O x's 4 dialysis tues, thurs, sat (left fistula). Positive for covid in December. Concerns for physician to address:         Zone phone for oncoming shift:            Activity:  Activity Level: Up ad manuel  Number times ambulated in hallways past shift: 0  Number of times OOB to chair past shift: 1    Cardiac:   Cardiac Monitoring: Yes           Access:  Current line(s): PIV     Genitourinary:   Urinary status:      Respiratory:   O2 Device: Nasal cannula  Chronic home O2 use?: NO  Incentive spirometer at bedside: N/A       GI:     Current diet:  ADULT DIET Regular; Low Potassium (Less than 3000 mg/day)  Passing flatus: YES  Tolerating current diet: YES       Pain Management:   Patient states pain is manageable on current regimen: N/A    Skin:  Eugene Score: 21  Interventions: increase time out of bed    Patient Safety:  Fall Score:  Total Score: 0  Interventions: gripper socks       Length of Stay:  Expected LOS: - - -  Actual LOS: 0      Ollie Gordon RN

## 2023-01-20 NOTE — H&P
Hospitalist Admission Note    NAME: Jeferson Davila   :  1950   MRN:  [id number]     Date/Time:  2023 5:50 PM    Patient PCP: Ritchie Gamboa MD  ______________________________________________________________________  Given the patient's current clinical presentation, I have a high level of concern for decompensation if discharged from the emergency department. Complex decision making was performed, which includes reviewing the patient's available past medical records, laboratory results, and x-ray films. My assessment of this patient's clinical condition and my plan of care is as follows. Assessment / Plan:  Acute hypoxic respiratory failure (using accessory muscles and hypoxia requiring 2 L)   community-acquired pneumonia  Sepsis due to pneumonia (tachycardia and leukocytosis)  -will check lactic acid  -Chest x-ray shows bibasilar infiltrates  -Respiratory panel is pending  -Start ceftriaxone and Zithromax  -Continue oxygen by nasal cannula, currently on 2 L and saturating at about 95%      ESRD on hemodialysis  -Consult renal for hemodialysis needs. Check bmp in am.     Hypertension  Dyslipidemia  GERD  Chronic hepatitis C  -Continue Norvasc, Coreg, lisinopril      Code Status: full   Surrogate Decision Maker:    DVT Prophylaxis: Heparin  GI Prophylaxis: not indicated    Baseline: From home, independent of ADLs      Subjective:   CHIEF COMPLAINT: Shortness of breath    HISTORY OF PRESENT ILLNESS:     Delaney Giordano is a 67 y.o.   male who presents with past medical history of end-stage renal disease on hemodialysis, hypertension, dyslipidemia, chronic hepatitis C is coming the hospital chief complaints of shortness of breath. Patient reports her symptoms started about 3 days ago when he started having shortness of breath even with minimal exertion, associated with some cough and whitish phlegm. Does not report any chest pain.   Also reports having borderline fever at home.  He went to see Dr. Soha Alvarado and was noted to hypoxia with saturations of about 80% on room air and was advised to go to the emergency department for further evaluation. On arrival to ED, noted to be tachycardic, noted to have saturations of about 87% on room air. On labs WBC is 13.2, hemoglobin 9.2 and platelet count is 655. BMP shows a sodium of 135, creatinine of 7.17, LFTs are normal.  proBNP is 2783. Troponin is 18. Chest x-ray shows diffuse bibasilar infiltrates. We were asked to admit for work up and evaluation of the above problems. Past Medical History:   Diagnosis Date    Arthritis     RA    Chronic kidney disease     [facility] T[facility]    COVID 12/11/2022    GERD (gastroesophageal reflux disease)     Gout     Hepatitis C     Hypertension     Ill-defined condition     Gout    Iritis     Liver disease     Hep C    Other and unspecified alcohol dependence, unspecified drinking behavior     Acid reflux    Other ill-defined conditions(799.89)     blood transfusion hx    Psychiatric disorder     Anxiety        Past Surgical History:   Procedure Laterality Date    HX ENDOSCOPY  07/29/2020    HX OTHER SURGICAL      liver biopsy    HX VASCULAR ACCESS      [facility]    HX VASCULAR ACCESS  7/15/13    CREATION LEFT UPPER ARM BASILIC VEIN TRANSPOSITION    [facility]ULAR [facility]ESS      [facility]       Social History     Tobacco Use    Smoking status: Never    Smokeless tobacco: Never   Substance Use Topics    Alcohol use: No        Family history  No CAD  Allergies   Allergen Reactions    Codeine Hives        Prior to Admission medications    Medication Sig Start Date End Date Taking? Authorizing Provider   sevelamer carbonate (RENVELA) 800 mg tab tab Take 800 mg by mouth three (3) times daily (with meals). Take 3 tabs with every meal 12/24/22   Provider, Historical   albuterol (PROVENTIL HFA, VENTOLIN HFA, PROAIR HFA) 90 mcg/actuation inhaler Take 2 Puffs by inhalation. Provider, Historical   LORazepam (ATIVAN) 0.5 mg tablet Take 1 Tablet by mouth two (2) times daily as needed for Anxiety. Max Daily Amount: 1 mg. 1/4/23   Kandi Moss MD   tiZANidine (ZANAFLEX) 4 mg tablet Take 1 Tablet by mouth three (3) times daily as needed for Muscle Spasm(s). 5/4/22   Kandi Moss MD   calcium acetate, phosphate binder, (PHOSLO) 667 mg tab tablet Take  by mouth three (3) times daily (with meals). Indications: pt takes 3 pills 3 times daily  Patient not taking: Reported on 1/20/2023 12/10/21   Provider, Historical   carvediloL (COREG) 12.5 mg tablet Take 12.5 mg by mouth two (2) times daily (with meals). 3/27/20   Provider, Historical   lisinopril (PRINIVIL, ZESTRIL) 20 mg tablet Take 1 Tab by mouth daily. 10/7/19   Kandi Moss MD   etelcalcetide 5 mg/mL soln by IntraVENous route. Indications: 3 x per week    Provider, Historical   amLODIPine (NORVASC) 10 mg tablet TAKE 1 TABLET BY MOUTH ONCE DAILY 10/11/17   Provider, Historical       REVIEW OF SYSTEMS:     I am not able to complete the review of systems because:    The patient is intubated and sedated    The patient has altered mental status due to his acute medical problems    The patient has baseline aphasia from prior stroke(s)    The patient has baseline dementia and is not reliable historian    The patient is in acute medical distress and unable to provide information           Total of 12 systems reviewed as follows:       POSITIVE= underlined text  Negative = text not underlined  General:  fever, chills, sweats, generalized weakness, weight loss/gain,      loss of appetite   Eyes:    blurred vision, eye pain, loss of vision, double vision  ENT:    rhinorrhea, pharyngitis   Respiratory:   cough, sputum production, SOB, MOYER, wheezing, pleuritic pain   Cardiology:   chest pain, palpitations, orthopnea, PND, edema, syncope   Gastrointestinal:  abdominal pain , N/V, diarrhea, dysphagia, constipation, bleeding Genitourinary:  frequency, urgency, dysuria, hematuria, incontinence   Muskuloskeletal :  arthralgia, myalgia, back pain  Hematology:  easy bruising, nose or gum bleeding, lymphadenopathy   Dermatological: rash, ulceration, pruritis, color change / jaundice  Endocrine:   hot flashes or polydipsia   Neurological:  headache, dizziness, confusion, focal weakness, paresthesia,     Speech difficulties, memory loss, gait difficulty  Psychological: Feelings of anxiety, depression, agitation    Objective:   VITALS:    Visit Vitals  /63   Pulse 86   Temp 98.6 °F (37 °C)   Resp 24   Ht 5' 9\" (1.753 m)   Wt 81 kg (178 lb 9.2 oz)   SpO2 98%   BMI 26.37 kg/m²       PHYSICAL EXAM:    General:    no distress, appears stated age. HEENT: Atraumatic, anicteric sclerae, pink conjunctivae     No oral ulcers, mucosa moist  Neck:  Supple, symmetrical,  thyroid: non tender  Lungs:   Bilateral air entry present, crackles present, no wheezing  Chest wall:  No tenderness  No Accessory muscle use. Heart:   Regular  rhythm,  No  murmur   No edema  Abdomen:   Soft, non-tender. Not distended. Bowel sounds normal  Extremities: No cyanosis. No clubbing,      Skin turgor normal, Capillary refill normal, Radial dial pulse 2+  Skin:     Not pale. Not Jaundiced  No rashes   Psych:  Not anxious or agitated. Neurologic: EOMs intact. No facial asymmetry. No aphasia or slurred speech. Symmetrical strength, Sensation grossly intact.  Alert and awake.    _______________________________________________________________________  Care Plan discussed with:    Comments   Patient y    Family      RN y    Care Manager                    Consultant:      _______________________________________________________________________  Expected  Disposition:   Home with Family y   HH/PT/OT/RN    SNF/LTC    MARTIN    ________________________________________________________________________  TOTAL TIME:  61  Minutes    Critical Care Provided     Minutes non procedure based      Comments    y Reviewed previous records   >50% of visit spent in counseling and coordination of care y Discussion with patient and/or family and questions answered       ________________________________________________________________________  Signed: Bess Coyle MD    Procedures: see electronic medical records for all procedures/Xrays and details which were not copied into this note but were reviewed prior to creation of Plan. LAB DATA REVIEWED:    Recent Results (from the past 24 hour(s))   CBC WITH AUTOMATED DIFF    Collection Time: 01/20/23 10:42 AM   Result Value Ref Range    WBC 13.2 (H) 4.1 - 11.1 K/uL    RBC 2.83 (L) 4.10 - 5.70 M/uL    HGB 9.2 (L) 12.1 - 17.0 g/dL    HCT 28.2 (L) 36.6 - 50.3 %    MCV 99.6 (H) 80.0 - 99.0 FL    MCH 32.5 26.0 - 34.0 PG    MCHC 32.6 30.0 - 36.5 g/dL    RDW 12.9 11.5 - 14.5 %    PLATELET 978 522 - 938 K/uL    MPV 9.9 8.9 - 12.9 FL    NRBC 0.0 0  WBC    ABSOLUTE NRBC 0.00 0.00 - 0.01 K/uL    NEUTROPHILS 74 32 - 75 %    LYMPHOCYTES 10 (L) 12 - 49 %    MONOCYTES 12 5 - 13 %    EOSINOPHILS 2 0 - 7 %    BASOPHILS 1 0 - 1 %    IMMATURE GRANULOCYTES 1 (H) 0.0 - 0.5 %    ABS. NEUTROPHILS 9.9 (H) 1.8 - 8.0 K/UL    ABS. LYMPHOCYTES 1.3 0.8 - 3.5 K/UL    ABS. MONOCYTES 1.6 (H) 0.0 - 1.0 K/UL    ABS. EOSINOPHILS 0.3 0.0 - 0.4 K/UL    ABS. BASOPHILS 0.1 0.0 - 0.1 K/UL    ABS. IMM.  GRANS. 0.1 (H) 0.00 - 0.04 K/UL    DF AUTOMATED     METABOLIC PANEL, COMPREHENSIVE    Collection Time: 01/20/23 10:42 AM   Result Value Ref Range    Sodium 135 (L) 136 - 145 mmol/L    Potassium 4.7 3.5 - 5.1 mmol/L    Chloride 96 (L) 97 - 108 mmol/L    CO2 31 21 - 32 mmol/L    Anion gap 8 5 - 15 mmol/L    Glucose 99 65 - 100 mg/dL    BUN 27 (H) 6 - 20 MG/DL    Creatinine 7.17 (H) 0.70 - 1.30 MG/DL    BUN/Creatinine ratio 4 (L) 12 - 20      eGFR 8 (L) >60 ml/min/1.73m2    Calcium 9.7 8.5 - 10.1 MG/DL    Bilirubin, total 0.8 0.2 - 1.0 MG/DL    ALT (SGPT) 24 12 - 78 U/L    AST (SGOT) 33 15 - 37 U/L    Alk. phosphatase 143 (H) 45 - 117 U/L    Protein, total 9.7 (H) 6.4 - 8.2 g/dL    Albumin 3.1 (L) 3.5 - 5.0 g/dL    Globulin 6.6 (H) 2.0 - 4.0 g/dL    A-G Ratio 0.5 (L) 1.1 - 2.2     NT-PRO BNP    Collection Time: 01/20/23 10:42 AM   Result Value Ref Range    NT pro-BNP 2,793 (H) <125 PG/ML   SAMPLES BEING HELD    Collection Time: 01/20/23 10:42 AM   Result Value Ref Range    SAMPLES BEING HELD PST, BLUE     COMMENT        Add-on orders for these samples will be processed based on acceptable specimen integrity and analyte stability, which may vary by analyte.    TROPONIN-HIGH SENSITIVITY    Collection Time: 01/20/23 10:43 AM   Result Value Ref Range    Troponin-High Sensitivity 18 0 - 76 ng/L   LACTIC ACID    Collection Time: 01/20/23  1:02 PM   Result Value Ref Range    Lactic acid 0.6 0.4 - 2.0 MMOL/L

## 2023-01-20 NOTE — ED NOTES
Pt placed on monitor x 3.  RA sat 90%. Pts sats yenni to 97%. Pt placed on 3L nasal cannula.   Pt reports he is not on O2 at home, but he has had to sleep in a sitting position since being dx with covid on 12/11

## 2023-01-20 NOTE — PROGRESS NOTES
Angela Oates is a 67 y.o. male     Chief Complaint   Patient presents with    Post-COVID Symptoms     SOB and fatigue       Visit Vitals  /62 (BP 1 Location: Right upper arm, BP Patient Position: Sitting, BP Cuff Size: Adult)   Pulse 93   Temp 98.8 °F (37.1 °C) (Oral)   Ht 5' 9\" (1.753 m)   Wt 178 lb (80.7 kg)   SpO2 (!) 88%   BMI 26.29 kg/m²       Health Maintenance Due   Topic Date Due    Hepatitis B Vaccine (1 of 3 - Risk 3-dose series) Never done    Shingles Vaccine (1 of 2) Never done    DTaP/Tdap/Td series (1 - Tdap) Never done    COVID-19 Vaccine (4 - Booster for Moderna series) 12/23/2021    Flu Vaccine (1) 08/01/2022         1. \"Have you been to the ER, urgent care clinic since your last visit? Hospitalized since your last visit? \"  12/27/22 [facility]    2. \"Have you seen or consulted any other health care providers outside of the [address] [place] [address] since your last visit? \" No     3. For patients aged 39-70: Has the patient had a colonoscopy / FIT/ Cologuard? Yes - no Care Gap present      If the patient is female:    4. For patients aged 41-77: Has the patient had a mammogram within the past 2 years? NA - based on age or sex      11. For patients aged 21-65: Has the patient had a pap smear?  NA - based on age or sex

## 2023-01-20 NOTE — ED NOTES
PT. States that he tested positive for covid December 13. States he started to feel short of breath about 2 weeks ago. States that he was at PCP today and was instructed to come to ED d/t xray showing fluid in lungs bilaterally. Pt. Denies any other complaints at this time. Pt. VS are stable at this time.

## 2023-01-20 NOTE — ED PROVIDER NOTES
[facility] EMERGENCY DEPT  EMERGENCY DEPARTMENT ENCOUNTER       Pt Name: Carey Deutsch  MRN: [id number]  [place] 1950  Date of evaluation: 1/20/2023  Provider: Brennan Clancy DO   PCP: Keagan Mccormack MD  Note Started: 2:31 PM 1/20/23     CHIEF COMPLAINT       Chief Complaint   Patient presents with    Shortness of Breath     Pt has been more short of breath over past few days. Dr. Milly Velasco saw pt today and advise to come to hospital for admission for fluid on his lungs. Pt is dialysis pt and had his dialysis yesterday. HISTORY OF PRESENT ILLNESS: 1 or more elements      History From: Patient  HPI Limitations : None     Carey Deutsch is a 67 y.o. male who presents to the ED with progressively worsening shortness of breath over the past few days. He reports that he was diagnosed with COVID in December and states that his breathing has not been well since. He saw Dr. Milly Velasco today due to shortness of breath and was admitted to be 80% on room air. Patient has a history of ESRD and is compliant with his dialysis, he had dialysis done yesterday. Dr. Milly Velasco referred the patient to the emergency department for admission. Patient denies any chest pain, fever, chills, congestion, nausea, vomiting. Nursing Notes were all reviewed and agreed with or any disagreements were addressed in the HPI. REVIEW OF SYSTEMS      Review of Systems   Respiratory:  Positive for cough and shortness of breath. Cardiovascular:  Negative for chest pain. Gastrointestinal:  Negative for abdominal pain. Positives and Pertinent negatives as per HPI.     PAST HISTORY     Past Medical History:  Past Medical History:   Diagnosis Date    Arthritis     RA    Chronic kidney disease     Dialysis TAlma Sat @ [facility]    COVID 12/11/2022    GERD (gastroesophageal reflux disease)     Gout     Hepatitis C     Hypertension     Ill-defined condition     Gout    Iritis     Liver disease     Hep C    Other and unspecified alcohol dependence, unspecified drinking behavior     Acid reflux    Other ill-defined conditions(799.89)     blood transfusion hx    Psychiatric disorder     Anxiety       Past Surgical History:  Past Surgical History:   Procedure Laterality Date    HX ENDOSCOPY  07/29/2020    HX OTHER SURGICAL      liver biopsy    HX VASCULAR ACCESS      [place] [facility]    HX VASCULAR ACCESS  7/15/13    CREATION LEFT UPPER ARM BASILIC VEIN TRANSPOSITION    HX VASCULAR ACCESS      [facility]       Family History:  History reviewed. No pertinent family history. Social History:  Social History     Tobacco Use    Smoking status: Never    Smokeless tobacco: Never   Vaping Use    Vaping Use: Never used   Substance Use Topics    Alcohol use: No    Drug use: Not Currently     Types: Marijuana, Heroin     Comment: smoked pot  years ago, herion in 25s       Allergies: Allergies   Allergen Reactions    Codeine Hives       CURRENT MEDICATIONS      Previous Medications    ALBUTEROL (PROVENTIL HFA, VENTOLIN HFA, PROAIR HFA) 90 MCG/ACTUATION INHALER    Take 2 Puffs by inhalation. AMLODIPINE (NORVASC) 10 MG TABLET    TAKE 1 TABLET BY MOUTH ONCE DAILY    CALCIUM ACETATE, PHOSPHATE BINDER, (PHOSLO) 667 MG TAB TABLET    Take  by mouth three (3) times daily (with meals). Indications: pt takes 3 pills 3 times daily    CARVEDILOL (COREG) 12.5 MG TABLET    Take 12.5 mg by mouth two (2) times daily (with meals). ETELCALCETIDE 5 MG/ML SOLN    by IntraVENous route. Indications: 3 x per week    LISINOPRIL (PRINIVIL, ZESTRIL) 20 MG TABLET    Take 1 Tab by mouth daily. LORAZEPAM (ATIVAN) 0.5 MG TABLET    Take 1 Tablet by mouth two (2) times daily as needed for Anxiety. Max Daily Amount: 1 mg. SEVELAMER CARBONATE (RENVELA) 800 MG TAB TAB    Take 800 mg by mouth three (3) times daily (with meals).  Take 3 tabs with every meal    TIZANIDINE (ZANAFLEX) 4 MG TABLET    Take 1 Tablet by mouth three (3) times daily as needed for Muscle Spasm(s). SCREENINGS               No data recorded         PHYSICAL EXAM      ED Triage Vitals [01/20/23 1023]   ED Encounter Vitals Group      BP (!) 143/74      Pulse (Heart Rate) 95      Resp Rate 20      Temp 98.6 °F (37 °C)      Temp src       O2 Sat (%) (!) 87 %      Weight 178 lb 9.2 oz      Height 5' 9\"        Physical Exam  Vitals and nursing note reviewed. Constitutional:       Appearance: Normal appearance. HENT:      Head: Normocephalic and atraumatic. Mouth/Throat:      Mouth: Mucous membranes are moist.   Eyes:      Conjunctiva/sclera: Conjunctivae normal.   Cardiovascular:      Rate and Rhythm: Normal rate and regular rhythm. Pulmonary:      Effort: Pulmonary effort is normal.      Breath sounds: Examination of the right-lower field reveals rhonchi. Examination of the left-lower field reveals rhonchi. Rhonchi present. Abdominal:      General: Abdomen is flat. There is no distension. Tenderness: There is no abdominal tenderness. Skin:     General: Skin is warm and dry. Neurological:      Mental Status: He is alert. Mental status is at baseline. DIAGNOSTIC RESULTS   LABS:     Recent Results (from the past 12 hour(s))   CBC WITH AUTOMATED DIFF    Collection Time: 01/20/23 10:42 AM   Result Value Ref Range    WBC 13.2 (H) 4.1 - 11.1 K/uL    RBC 2.83 (L) 4.10 - 5.70 M/uL    HGB 9.2 (L) 12.1 - 17.0 g/dL    HCT 28.2 (L) 36.6 - 50.3 %    MCV 99.6 (H) 80.0 - 99.0 FL    MCH 32.5 26.0 - 34.0 PG    MCHC 32.6 30.0 - 36.5 g/dL    RDW 12.9 11.5 - 14.5 %    PLATELET 978 212 - 586 K/uL    MPV 9.9 8.9 - 12.9 FL    NRBC 0.0 0  WBC    ABSOLUTE NRBC 0.00 0.00 - 0.01 K/uL    NEUTROPHILS 74 32 - 75 %    LYMPHOCYTES 10 (L) 12 - 49 %    MONOCYTES 12 5 - 13 %    EOSINOPHILS 2 0 - 7 %    BASOPHILS 1 0 - 1 %    IMMATURE GRANULOCYTES 1 (H) 0.0 - 0.5 %    ABS. NEUTROPHILS 9.9 (H) 1.8 - 8.0 K/UL    ABS. LYMPHOCYTES 1.3 0.8 - 3.5 K/UL    ABS. MONOCYTES 1.6 (H) 0.0 - 1.0 K/UL    ABS. EOSINOPHILS 0.3 0.0 - 0.4 K/UL    ABS. BASOPHILS 0.1 0.0 - 0.1 K/UL    ABS. IMM. GRANS. 0.1 (H) 0.00 - 0.04 K/UL    DF AUTOMATED     METABOLIC PANEL, COMPREHENSIVE    Collection Time: 01/20/23 10:42 AM   Result Value Ref Range    Sodium 135 (L) 136 - 145 mmol/L    Potassium 4.7 3.5 - 5.1 mmol/L    Chloride 96 (L) 97 - 108 mmol/L    CO2 31 21 - 32 mmol/L    Anion gap 8 5 - 15 mmol/L    Glucose 99 65 - 100 mg/dL    BUN 27 (H) 6 - 20 MG/DL    Creatinine 7.17 (H) 0.70 - 1.30 MG/DL    BUN/Creatinine ratio 4 (L) 12 - 20      eGFR 8 (L) >60 ml/min/1.73m2    Calcium 9.7 8.5 - 10.1 MG/DL    Bilirubin, total 0.8 0.2 - 1.0 MG/DL    ALT (SGPT) 24 12 - 78 U/L    AST (SGOT) 33 15 - 37 U/L    Alk. phosphatase 143 (H) 45 - 117 U/L    Protein, total 9.7 (H) 6.4 - 8.2 g/dL    Albumin 3.1 (L) 3.5 - 5.0 g/dL    Globulin 6.6 (H) 2.0 - 4.0 g/dL    A-G Ratio 0.5 (L) 1.1 - 2.2     NT-PRO BNP    Collection Time: 01/20/23 10:42 AM   Result Value Ref Range    NT pro-BNP 2,793 (H) <125 PG/ML   SAMPLES BEING HELD    Collection Time: 01/20/23 10:42 AM   Result Value Ref Range    SAMPLES BEING HELD PST, BLUE     COMMENT        Add-on orders for these samples will be processed based on acceptable specimen integrity and analyte stability, which may vary by analyte.    TROPONIN-HIGH SENSITIVITY    Collection Time: 01/20/23 10:43 AM   Result Value Ref Range    Troponin-High Sensitivity 18 0 - 76 ng/L   LACTIC ACID    Collection Time: 01/20/23  1:02 PM   Result Value Ref Range    Lactic acid 0.6 0.4 - 2.0 MMOL/L        EKG interpreted by me: Normal sinus rhythm rate: 92 no STEMI     RADIOLOGY:  Non-plain film images such as CT, Ultrasound and MRI are read by the radiologist. Plain radiographic images are visualized and preliminarily interpreted by the ED Provider with the below findings:       Interpretation per the Radiologist below, if available at the time of this note:     XR CHEST PA LAT    Result Date: 1/20/2023  INDICATION:  History of abnormal chest x-ray COMPARISON: January 4 FINDINGS: PA and lateral views of the chest demonstrate increased diffuse bilateral interstitial and alveolar opacities, with a peripheral predominance. There is also suspected bilateral hilar lymphadenopathy. Increased diffuse bilateral interstitial and alveolar opacities, as well as suspected bilateral hilar lymphadenopathy. Favor atypical/viral pneumonia. XR CHEST PORT    Result Date: 1/20/2023  INDICATION:  Shortness of breath COMPARISON: Earlier today FINDINGS: Single AP portable view of the chest obtained at 1059 demonstrates a stable cardiomediastinal silhouette. The lungs are hypoinspiratory. Diffuse bilateral interstitial and alveolar opacities persist. Right hemidiaphragm is elevated. There is no pneumothorax. No significant change. Diffuse bilateral interstitial and alveolar lung disease. Suspect atypical/viral pneumonia.        PROCEDURES   Unless otherwise noted below, none  Procedures     CRITICAL CARE TIME       EMERGENCY DEPARTMENT COURSE and DIFFERENTIAL DIAGNOSIS/MDM   Vitals:    Vitals:    01/20/23 1410 01/20/23 1414 01/20/23 1429 01/20/23 1444   BP: 136/68 131/69 136/65    Pulse: 91 89 92 92   Resp:  24 22 23   Temp:       SpO2:  94% 94% 96%   Weight:       Height:            Patient was given the following medications:  Medications   azithromycin (ZITHROMAX) 500 mg in 0.9% sodium chloride 250 mL ([id number]) (500 mg IntraVENous New Bag 1/20/23 1414)   albuterol (PROVENTIL HFA, VENTOLIN HFA, PROAIR HFA) inhaler 2 Puff (has no administration in time range)   amLODIPine (NORVASC) tablet 10 mg (has no administration in time range)   carvediloL (COREG) tablet 12.5 mg (has no administration in time range)   lisinopriL (PRINIVIL, ZESTRIL) tablet 20 mg (has no administration in time range)   LORazepam (ATIVAN) tablet 0.5 mg (has no administration in time range)   sevelamer carbonate (RENVELA) tab 800 mg (800 mg Oral Given 1/20/23 1448)   tiZANidine (Vidya Mems) tablet 4 mg (has no administration in time range)   sodium chloride (NS) flush 5-40 mL (10 mL IntraVENous Given 1/20/23 1413)   sodium chloride (NS) flush 5-40 mL (has no administration in time range)   acetaminophen (TYLENOL) tablet 650 mg (has no administration in time range)     Or   acetaminophen (TYLENOL) suppository 650 mg (has no administration in time range)   polyethylene glycol (MIRALAX) packet 17 g (has no administration in time range)   ondansetron (ZOFRAN ODT) tablet 4 mg (has no administration in time range)     Or   ondansetron (ZOFRAN) injection 4 mg (has no administration in time range)   heparin (porcine) injection 5,000 Units (has no administration in time range)   cefTRIAXone (ROCEPHIN) 1 g in 0.9% sodium chloride (MBP/ADV) 50 mL MBP (has no administration in time range)   cefTRIAXone (ROCEPHIN) 2 g in 0.9% sodium chloride (MBP/ADV) 50 mL MBP (2 g IntraVENous New Bag 1/20/23 1317)   furosemide (LASIX) injection 80 mg (80 mg IntraVENous Given 1/20/23 1410)       CONSULTS: (Who and What was discussed)  IP CONSULT TO NEPHROLOGY    Chronic Conditions: ESRD, Hep C, HTN    Social Determinants affecting Dx or Tx: None    Records Reviewed (source and summary of external notes): Nursing Notes, Previous Radiology Studies, and Previous Laboratory Studies    CC/HPI Summary, DDx, ED Course, and Reassessment: Patient presenting with c/c of dyspnea. consider acute cardiac etiologies to include ACS, CHF, pericardial effusion/pleural effusion. Consider acute PE although lower suspicion for this given not tachycardic, tachypneic, no lower extremity edema, consider pneumothorax, asthma, allergic etiologies, pneumonia. Not meeting criteria for sepsis via vital signs at this time.      patient with leukocytosis to 13, chest x-ray significant for pneumonia, CMP consistent with ESRD, creatinine of 7, BNP is elevated consistent with history of ESRD, troponin is 18, lactate 0.6, blood cultures and respiratory panel were sent.  Frank Maldonado to Dr. Kaylin Beth, he requested hospital admission to his service via the hospitalist.  I discussed with hospitalist, they will see and evaluate for admission. Disposition Considerations (Tests not done, Shared Decision Making, Pt Expectation of Test or Tx.):     FINAL IMPRESSION     1. Community acquired pneumonia, unspecified laterality    2. Acute respiratory failure with hypoxia [facility])          DISPOSITION/PLAN   Admitted    Admit Note: Pt is being admitted by Dr. Randee Mata. The results of their tests and reason(s) for their admission have been discussed with pt and/or available family. They convey agreement and understanding for the need to be admitted and for the admission diagnosis. (Please note that parts of this dictation were completed with voice recognition software. Quite often unanticipated grammatical, syntax, homophones, and other interpretive errors are inadvertently transcribed by the computer software. Please disregards these errors.  Please excuse any errors that have escaped final proofreading.)    Veronica Wren, DO

## 2023-01-21 LAB
ANION GAP SERPL CALC-SCNC: 8 MMOL/L (ref 5–15)
BASOPHILS # BLD: 0.1 K/UL (ref 0–0.1)
BASOPHILS NFR BLD: 1 % (ref 0–1)
BUN SERPL-MCNC: 39 MG/DL (ref 6–20)
BUN/CREAT SERPL: 4 (ref 12–20)
CALCIUM SERPL-MCNC: 9 MG/DL (ref 8.5–10.1)
CHLORIDE SERPL-SCNC: 100 MMOL/L (ref 97–108)
CO2 SERPL-SCNC: 29 MMOL/L (ref 21–32)
CREAT SERPL-MCNC: 8.89 MG/DL (ref 0.7–1.3)
DIFFERENTIAL METHOD BLD: ABNORMAL
EOSINOPHIL # BLD: 0.2 K/UL (ref 0–0.4)
EOSINOPHIL NFR BLD: 2 % (ref 0–7)
ERYTHROCYTE [DISTWIDTH] IN BLOOD BY AUTOMATED COUNT: 12.6 % (ref 11.5–14.5)
ERYTHROCYTE [DISTWIDTH] IN BLOOD BY AUTOMATED COUNT: 13.1 % (ref 11.5–14.5)
GLUCOSE SERPL-MCNC: 94 MG/DL (ref 65–100)
HCT VFR BLD AUTO: 23.8 % (ref 36.6–50.3)
HCT VFR BLD AUTO: 26.3 % (ref 36.6–50.3)
HGB BLD-MCNC: 7.9 G/DL (ref 12.1–17)
HGB BLD-MCNC: 9 G/DL (ref 12.1–17)
IMM GRANULOCYTES # BLD AUTO: 0.1 K/UL (ref 0–0.04)
IMM GRANULOCYTES NFR BLD AUTO: 1 % (ref 0–0.5)
LYMPHOCYTES # BLD: 1.3 K/UL (ref 0.8–3.5)
LYMPHOCYTES NFR BLD: 11 % (ref 12–49)
MCH RBC QN AUTO: 32.9 PG (ref 26–34)
MCH RBC QN AUTO: 36.3 PG (ref 26–34)
MCHC RBC AUTO-ENTMCNC: 33.2 G/DL (ref 30–36.5)
MCHC RBC AUTO-ENTMCNC: 34.2 G/DL (ref 30–36.5)
MCV RBC AUTO: 106 FL (ref 80–99)
MCV RBC AUTO: 99.2 FL (ref 80–99)
MONOCYTES # BLD: 1.3 K/UL (ref 0–1)
MONOCYTES NFR BLD: 10 % (ref 5–13)
NEUTS SEG # BLD: 9.5 K/UL (ref 1.8–8)
NEUTS SEG NFR BLD: 75 % (ref 32–75)
NRBC # BLD: 0 K/UL (ref 0–0.01)
NRBC # BLD: 0 K/UL (ref 0–0.01)
NRBC BLD-RTO: 0 PER 100 WBC
NRBC BLD-RTO: 0 PER 100 WBC
PLATELET # BLD AUTO: 278 K/UL (ref 150–400)
PLATELET # BLD AUTO: 315 K/UL (ref 150–400)
PMV BLD AUTO: 10.7 FL (ref 8.9–12.9)
PMV BLD AUTO: 9.9 FL (ref 8.9–12.9)
POTASSIUM SERPL-SCNC: 3.6 MMOL/L (ref 3.5–5.1)
RBC # BLD AUTO: 2.4 M/UL (ref 4.1–5.7)
RBC # BLD AUTO: 2.48 M/UL (ref 4.1–5.7)
SODIUM SERPL-SCNC: 137 MMOL/L (ref 136–145)
TROPONIN-HIGH SENSITIVITY: 17 NG/L (ref 0–76)
WBC # BLD AUTO: 12.5 K/UL (ref 4.1–11.1)
WBC # BLD AUTO: 9.9 K/UL (ref 4.1–11.1)

## 2023-01-21 PROCEDURE — 90935 HEMODIALYSIS ONE EVALUATION: CPT

## 2023-01-21 PROCEDURE — 74011250636 HC RX REV CODE- 250/636: Performed by: INTERNAL MEDICINE

## 2023-01-21 PROCEDURE — 80048 BASIC METABOLIC PNL TOTAL CA: CPT

## 2023-01-21 PROCEDURE — 85027 COMPLETE CBC AUTOMATED: CPT

## 2023-01-21 PROCEDURE — 84484 ASSAY OF TROPONIN QUANT: CPT

## 2023-01-21 PROCEDURE — 99232 SBSQ HOSP IP/OBS MODERATE 35: CPT | Performed by: FAMILY MEDICINE

## 2023-01-21 PROCEDURE — 5A1D70Z PERFORMANCE OF URINARY FILTRATION, INTERMITTENT, LESS THAN 6 HOURS PER DAY: ICD-10-PCS | Performed by: INTERNAL MEDICINE

## 2023-01-21 PROCEDURE — 74011000258 HC RX REV CODE- 258: Performed by: INTERNAL MEDICINE

## 2023-01-21 PROCEDURE — 74011250637 HC RX REV CODE- 250/637: Performed by: INTERNAL MEDICINE

## 2023-01-21 PROCEDURE — 36415 COLL VENOUS BLD VENIPUNCTURE: CPT

## 2023-01-21 PROCEDURE — 74011000250 HC RX REV CODE- 250: Performed by: INTERNAL MEDICINE

## 2023-01-21 PROCEDURE — 65270000046 HC RM TELEMETRY

## 2023-01-21 RX ADMIN — AZITHROMYCIN MONOHYDRATE 500 MG: 500 INJECTION, POWDER, LYOPHILIZED, FOR SOLUTION INTRAVENOUS at 14:22

## 2023-01-21 RX ADMIN — CEFTRIAXONE 1 G: 1 INJECTION, POWDER, FOR SOLUTION INTRAMUSCULAR; INTRAVENOUS at 13:43

## 2023-01-21 RX ADMIN — SODIUM CHLORIDE, PRESERVATIVE FREE 10 ML: 5 INJECTION INTRAVENOUS at 13:28

## 2023-01-21 RX ADMIN — SEVELAMER CARBONATE 800 MG: 800 TABLET, FILM COATED ORAL at 13:25

## 2023-01-21 RX ADMIN — CARVEDILOL 12.5 MG: 12.5 TABLET, FILM COATED ORAL at 13:26

## 2023-01-21 RX ADMIN — LISINOPRIL 20 MG: 20 TABLET ORAL at 13:26

## 2023-01-21 RX ADMIN — SEVELAMER CARBONATE 800 MG: 800 TABLET, FILM COATED ORAL at 16:53

## 2023-01-21 RX ADMIN — CARVEDILOL 12.5 MG: 12.5 TABLET, FILM COATED ORAL at 22:38

## 2023-01-21 RX ADMIN — SODIUM CHLORIDE, PRESERVATIVE FREE 10 ML: 5 INJECTION INTRAVENOUS at 22:37

## 2023-01-21 RX ADMIN — HEPARIN SODIUM 5000 UNITS: 5000 INJECTION INTRAVENOUS; SUBCUTANEOUS at 13:27

## 2023-01-21 RX ADMIN — HEPARIN SODIUM 5000 UNITS: 5000 INJECTION INTRAVENOUS; SUBCUTANEOUS at 22:37

## 2023-01-21 RX ADMIN — AMLODIPINE BESYLATE 10 MG: 5 TABLET ORAL at 22:37

## 2023-01-21 RX ADMIN — EPOETIN ALFA-EPBX 10000 UNITS: 10000 INJECTION, SOLUTION INTRAVENOUS; SUBCUTANEOUS at 22:38

## 2023-01-21 NOTE — PROGRESS NOTES
End of Shift Note    Bedside shift change report given to  (oncoming nurse) by Vidal Levy RN (offgoing nurse). Report included the following information SBAR, Procedure Summary, and Intake/Output    Shift worked:  7a-7p     Shift summary and any significant changes:     Dialysis today; 3 liters off. Vitals stable. Concerns for physician to address:       Zone phone for oncoming shift:          Activity:  Activity Level: Up ad manuel  Number times ambulated in hallways past shift: 0  Number of times OOB to chair past shift: 2    Cardiac:   Cardiac Monitoring: Yes           Access:  Current line(s): PIV     Genitourinary:   Urinary status: voiding    Respiratory:   O2 Device: Nasal cannula  Chronic home O2 use?: NO  Incentive spirometer at bedside: NO       GI:     Current diet:  ADULT DIET Regular; Low Potassium (Less than 3000 mg/day)  Passing flatus: YES  Tolerating current diet: YES       Pain Management:   Patient states pain is manageable on current regimen: YES    Skin:  Eugene Score: 21  Interventions: PT/OT consult    Patient Safety:  Fall Score:  Total Score: 0  Interventions: gripper socks       Length of Stay:  Expected LOS: - - -  Actual LOS: 1      Vidal Levy RN

## 2023-01-21 NOTE — PROCEDURES
[facility]                      [phone]  Vitals Pre Post Assessment Pre Post   BP BP: 135/68 (01/21/23 0845) 116/66 LOC AOX4 AOX4   HR Pulse (Heart Rate): 87 (01/21/23 0845) 89 Lungs 02 @ 2 LPM NC 02 @ 2 LPM NC   Resp Resp Rate: 20 (01/21/23 0845) 18 Cardiac NRR NRR   Temp Temp: 97.1 °F (36.2 °C) (01/21/23 0335) 98. 3 Skin Warm/dry Warm/dry   Weight   Edema NO NO   Pain Pain Intensity 1: 0 (01/20/23 2340) 0/10 Tele Status Telemonitor Telemonitor     Orders   Duration: Start: 0845 End: 0455 Total: 3.5 hrs   Dialyzer: Dialyzer/Set Up Inspection: Richa Daisyde (01/21/23 0845)   K Bath: Dialysate K (mEq/L): 3 (01/21/23 0845)   Ca Bath: Dialysate CA (mEq/L): 2.5 (01/21/23 0845)   Na: Dialysate NA (mEq/L): 138 (01/21/23 0845)   Bicarb:  35   Target Fluid Removal: Goal/Amount of Fluid to Remove (mL): 3000 mL (01/21/23 0845)     Access   Type & Location: SHEILA AVF   Comments:   No evidence of warmth, redness, or drainage. +thrill/+bruit.                                          Labs   HBsAg (Antigen) / date: Neg (1/13/23)                          HBsAb (Antibody) / date: Immune (1/13/23)       Source: epic   Obtained/Reviewed  Critical Results Called HGB   Date Value Ref Range Status   01/21/2023 7.9 (L) 12.1 - 17.0 g/dL Final     Potassium   Date Value Ref Range Status   01/21/2023 3.6 3.5 - 5.1 mmol/L Final     Comment:     INVESTIGATED PER DELTA CHECK PROTOCOL     Calcium   Date Value Ref Range Status   01/21/2023 9.0 8.5 - 10.1 MG/DL Final     BUN   Date Value Ref Range Status   01/21/2023 39 (H) 6 - 20 MG/DL Final     Creatinine   Date Value Ref Range Status   01/21/2023 8.89 (H) 0.70 - 1.30 MG/DL Final        Meds Given   Name Dose Route   none                 Adequacy / Fluid    Total Liters Process:                  73.4 Liters   Net Fluid Removed:                  2500 ml      Comments   Time Out Done: 0840   Admitting Diagnosis: Pneumonia   Consent obtained/signed: Informed Consent Verified: Yes (01/21/23 Christo   Machine / RO # Machine Number: [id number] (01/21/23 7108)   Primary Nurse Rpt Pre: Marv Harmon RN   Primary Nurse Rpt Post: Marv Harmon RN   Pt Education: HD procedure   Care Plan: HD as ordered   Pts outpatient clinic: [facility]      Tx Summary   3643: Patient's consent, code status, medications checked. 0840: Safety checks complete, time out performed. assessed, +bruit/thrill, no redness, warmth or drainage noted. Skin prepped per procedure using chloraprep scrub x 30 sec, followed by 30 sec dry time each site. Cannulated using 15g needles each site and secured with tape. +flash/aspiration/flush. 0845: HD initiated. Access visible, lines secure. Medications reviewed. 1215: HD treatment complete. All possible blood returned to the patient. De-cannulated and pressure held until hemostasis was achieved. No bleeding noted. Dressing applied. +bruit/thrill. VSS. SBAR called to primary RN. Comfort and care rendered, needs attended, questions answered. Call bell within reach, bed in lowest position.

## 2023-01-21 NOTE — PROGRESS NOTES
General Daily Progress Note    Admit Date: 1/20/2023  Hospital day 2    Subjective:     Patient has no complaint of shortness f breath at rest. Has had problems with wekaness and dyspnea ever since having covid in early Dec. Saw dr. Rica Zuluaga yesterday, O2 sats were in the 80s, BP was low normal. Referred to ER where WBC was elevated , 13, 200, and CXR showed diffuse bilateral interstitial infiltrates favoring viral pneumonia. Respiraotry panel was negative. NOw on Ceftriaxone and zithromax. Feels better today. Seen during dialysis. Appetite has been poor. Nonsmoker.    Medication side effects: none    Current Facility-Administered Medications   Medication Dose Route Frequency    azithromycin (ZITHROMAX) 500 mg in 0.9% sodium chloride 250 mL ([id number])  500 mg IntraVENous Q24H    albuterol (PROVENTIL HFA, VENTOLIN HFA, PROAIR HFA) inhaler 2 Puff  2 Puff Inhalation Q6H PRN    amLODIPine (NORVASC) tablet 10 mg  10 mg Oral DAILY    carvediloL (COREG) tablet 12.5 mg  12.5 mg Oral BID WITH MEALS    lisinopriL (PRINIVIL, ZESTRIL) tablet 20 mg  20 mg Oral DAILY    LORazepam (ATIVAN) tablet 0.5 mg  0.5 mg Oral BID PRN    sevelamer carbonate (RENVELA) tab 800 mg  800 mg Oral TID WITH MEALS    tiZANidine (ZANAFLEX) tablet 4 mg  4 mg Oral TID PRN    sodium chloride (NS) flush 5-40 mL  5-40 mL IntraVENous Q8H    sodium chloride (NS) flush 5-40 mL  5-40 mL IntraVENous PRN    acetaminophen (TYLENOL) tablet 650 mg  650 mg Oral Q6H PRN    Or    acetaminophen (TYLENOL) suppository 650 mg  650 mg Rectal Q6H PRN    polyethylene glycol (MIRALAX) packet 17 g  17 g Oral DAILY PRN    ondansetron (ZOFRAN ODT) tablet 4 mg  4 mg Oral Q8H PRN    Or    ondansetron (ZOFRAN) injection 4 mg  4 mg IntraVENous Q6H PRN    heparin (porcine) injection 5,000 Units  5,000 Units SubCUTAneous Q8H    cefTRIAXone (ROCEPHIN) 1 g in 0.9% sodium chloride (MBP/ADV) 50 mL MBP  1 g IntraVENous Q24H        Review of Systems  Pertinent items are noted in HPI.    Objective:     Patient Vitals for the past 8 hrs:   BP Pulse Resp   01/21/23 1130 124/71 80 20   01/21/23 1115 124/73 82 18   01/21/23 1100 124/78 89 20   01/21/23 1045 129/72 83 18   01/21/23 1030 125/69 85 18   01/21/23 1015 119/72 78 19   01/21/23 1000 130/71 83 20   01/21/23 0945 120/71 83 18   01/21/23 0930 133/73 83 20   01/21/23 0915 127/66 85 18   01/21/23 0900 135/69 85 18   01/21/23 0845 135/68 87 20     No intake/output data recorded. 01/19 1901 - 01/21 0700  In: -   Out: 200 [Urine:200]    Physical Exam: Visit Vitals  /71   Pulse 80   Temp 97.1 °F (36.2 °C)   Resp 20   Ht 5' 9\" (1.753 m)   Wt 178 lb 9.2 oz (81 kg)   SpO2 96%   BMI 26.37 kg/m²     Lungs: rales R base  Heart: regular rate and rhythm, S1, S2 normal, no murmur, click, rub or gallop  Abdomen: soft, non-tender.  Bowel sounds normal. No masses,  no organomegaly  Extremities: extremities normal, atraumatic, no cyanosis or edema      ECG: normal sinus rhythm     Data Review   Recent Results (from the past 24 hour(s))   CULTURE, BLOOD, PAIRED    Collection Time: 01/20/23  1:02 PM    Specimen: Blood   Result Value Ref Range    Special Requests: NO SPECIAL REQUESTS      Culture result: NO GROWTH AFTER 21 HOURS     LACTIC ACID    Collection Time: 01/20/23  1:02 PM   Result Value Ref Range    Lactic acid 0.6 0.4 - 2.0 MMOL/L   RESPIRATORY VIRUS PANEL W/COVID-19, PCR    Collection Time: 01/20/23  1:03 PM    Specimen: Nasopharyngeal   Result Value Ref Range    Adenovirus Not detected NOTD      Coronavirus 229E Not detected NOTD      Coronavirus HKU1 Not detected NOTD      Coronavirus CVNL63 Not detected NOTD      Coronavirus OC43 Not detected NOTD      SARS-CoV-2, PCR Not detected NOTD      Metapneumovirus Not detected NOTD      Rhinovirus and Enterovirus Not detected NOTD      Influenza A Not detected NOTD      Influenza B Not detected NOTD      Parainfluenza 1 Not detected NOTD      Parainfluenza 2 Not detected NOTD      Parainfluenza 3 Not detected NOTD      Parainfluenza virus 4 Not detected NOTD      RSV by PCR Not detected NOTD      B. parapertussis, PCR Not detected NOTD      Bordetella pertussis - PCR Not detected NOTD      Chlamydophila pneumoniae DNA, QL, PCR Not detected NOTD      Mycoplasma pneumoniae DNA, QL, PCR Not detected NOTD     METABOLIC PANEL, BASIC    Collection Time: 01/21/23  3:25 AM   Result Value Ref Range    Sodium 137 136 - 145 mmol/L    Potassium 3.6 3.5 - 5.1 mmol/L    Chloride 100 97 - 108 mmol/L    CO2 29 21 - 32 mmol/L    Anion gap 8 5 - 15 mmol/L    Glucose 94 65 - 100 mg/dL    BUN 39 (H) 6 - 20 MG/DL    Creatinine 8.89 (H) 0.70 - 1.30 MG/DL    BUN/Creatinine ratio 4 (L) 12 - 20      eGFR 6 (L) >60 ml/min/1.73m2    Calcium 9.0 8.5 - 10.1 MG/DL   CBC W/O DIFF    Collection Time: 01/21/23  3:25 AM   Result Value Ref Range    WBC 9.9 4.1 - 11.1 K/uL    RBC 2.40 (L) 4.10 - 5.70 M/uL    HGB 7.9 (L) 12.1 - 17.0 g/dL    HCT 23.8 (L) 36.6 - 50.3 %    MCV 99.2 (H) 80.0 - 99.0 FL    MCH 32.9 26.0 - 34.0 PG    MCHC 33.2 30.0 - 36.5 g/dL    RDW 12.6 11.5 - 14.5 %    PLATELET 106 743 - 807 K/uL    MPV 9.9 8.9 - 12.9 FL    NRBC 0.0 0  WBC    ABSOLUTE NRBC 0.00 0.00 - 0.01 K/uL   TROPONIN-HIGH SENSITIVITY    Collection Time: 01/21/23  3:25 AM   Result Value Ref Range    Troponin-High Sensitivity 17 0 - 76 ng/L           Assessment:     Active Problems:    CAP (community acquired pneumonia) (1/20/2023)        Plan:     Community acquired pneumonia- continue rocephin and azithromycin . O2 sats 96% on 2 liters. ESRD- on dialysis. Recent (early Dec. ) covid infection. Hypertension- stable  As infiltrates are difffuse and bilateral, will also order echo, altho there is no peripheral edema.

## 2023-01-21 NOTE — PROGRESS NOTES
Problem: Tissue Perfusion - Cardiopulmonary, Altered  Goal: *Optimize tissue perfusion  Outcome: Progressing Towards Goal  Goal: *Absence of hypoxia  Outcome: Progressing Towards Goal     Problem: Patient Education: Go to Patient Education Activity  Goal: Patient/Family Education  Outcome: Progressing Towards Goal     Problem: Pain - Acute  Goal: *Control of acute pain  Outcome: Progressing Towards Goal

## 2023-01-21 NOTE — PROGRESS NOTES
Name: Amanda Newsome   MRN: [id number]  : 1950      Assessment:                                    Plan:  ESRD-T//S ([facility]; L [facility])    HTN    Anemia of ESRD  COVID PNA in [address]. 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 3 Kg UF attempted, but had cramps. Got 2.5 Kg UF  CXR with b/l infiltrates favoring viral PNA  Ct O2  ABx  Start MARJORIE       Subjective:  Oob in chair.  Feels weak/tired    ROS:   No nausea, no vomiting  No chest pain, + shortness of breath    Exam:  Visit Vitals  /66   Pulse 89   Temp 97.1 °F (36.2 °C)   Resp 20   Ht 5' 9\" (1.753 m)   Wt 81 kg (178 lb 9.2 oz)   SpO2 96%   BMI 26.37 kg/m²     Ill appearing in NAD  No icterus  Coarse rales at bases  RRR  No sig edema       Current Facility-Administered Medications   Medication Dose Route Frequency Last Admin    azithromycin (ZITHROMAX) 500 mg in 0.9% sodium chloride 250 mL ([id number])  500 mg IntraVENous Q24H 500 mg at 23 1422    albuterol (PROVENTIL HFA, VENTOLIN HFA, PROAIR HFA) inhaler 2 Puff  2 Puff Inhalation Q6H PRN      amLODIPine (NORVASC) tablet 10 mg  10 mg Oral DAILY      carvediloL (COREG) tablet 12.5 mg  12.5 mg Oral BID WITH MEALS 12.5 mg at 23 1326    lisinopriL (PRINIVIL, ZESTRIL) tablet 20 mg  20 mg Oral DAILY 20 mg at 23 1326    LORazepam (ATIVAN) tablet 0.5 mg  0.5 mg Oral BID PRN      sevelamer carbonate (RENVELA) tab 800 mg  800 mg Oral TID WITH MEALS 800 mg at 23 1653    tiZANidine (ZANAFLEX) tablet 4 mg  4 mg Oral TID PRN      sodium chloride (NS) flush 5-40 mL  5-40 mL IntraVENous Q8H 10 mL at 23 1328    sodium chloride (NS) flush 5-40 mL  5-40 mL IntraVENous PRN      acetaminophen (TYLENOL) tablet 650 mg  650 mg Oral Q6H PRN      Or    acetaminophen (TYLENOL) suppository 650 mg  650 mg Rectal Q6H PRN      polyethylene glycol (MIRALAX) packet 17 g  17 g Oral DAILY PRN      ondansetron (ZOFRAN ODT) tablet 4 mg  4 mg Oral Q8H PRN      Or    ondansetron (ZOFRAN) injection 4 mg  4 mg IntraVENous Q6H PRN      heparin (porcine) injection 5,000 Units  5,000 Units SubCUTAneous Q8H 5,000 Units at 01/21/23 1327    cefTRIAXone (ROCEPHIN) 1 g in 0.9% sodium chloride (MBP/ADV) 50 mL MBP  1 g IntraVENous Q24H 1 g at 01/21/23 1343       Labs/Data:    Lab Results   Component Value Date/Time    WBC 9.9 01/21/2023 03:25 AM    Hemoglobin (POC) 14.6 06/04/2014 09:45 AM    HGB (POC) 11.7 (A) 12/19/2018 10:57 AM    HGB 7.9 (L) 01/21/2023 03:25 AM    Hematocrit (POC) 32 (L) 07/29/2020 02:51 PM    HCT 23.8 (L) 01/21/2023 03:25 AM    PLATELET 250 13/86/2279 03:25 AM    MCV 99.2 (H) 01/21/2023 03:25 AM       Lab Results   Component Value Date/Time    Sodium 137 01/21/2023 03:25 AM    Potassium 3.6 01/21/2023 03:25 AM    Chloride 100 01/21/2023 03:25 AM    CO2 29 01/21/2023 03:25 AM    Anion gap 8 01/21/2023 03:25 AM    Glucose 94 01/21/2023 03:25 AM    BUN 39 (H) 01/21/2023 03:25 AM    Creatinine 8.89 (H) 01/21/2023 03:25 AM    BUN/Creatinine ratio 4 (L) 01/21/2023 03:25 AM    GFR est AA 7 (L) 08/12/2022 09:42 AM    GFR est non-AA 5 (L) 08/12/2022 09:42 AM    eGFR 6 (L) 01/21/2023 03:25 AM    Calcium 9.0 01/21/2023 03:25 AM       Wt Readings from Last 3 Encounters:   01/20/23 81 kg (178 lb 9.2 oz)   01/20/23 80.7 kg (178 lb)   01/04/23 83.3 kg (183 lb 9.6 oz)         Intake/Output Summary (Last 24 hours) at 1/21/2023 1718  Last data filed at 1/21/2023 1215  Gross per 24 hour   Intake --   Output 2700 ml   Net -2700 ml       Patient seen and examined. Chart reviewed. Labs, data and other pertinent notes reviewed in last 24 hrs.     PMH/SH/FH reviewed and unchanged compared to H&P    Discussed with pt      Briseida Jones MD

## 2023-01-22 PROBLEM — R06.02 SOB (SHORTNESS OF BREATH): Status: RESOLVED | Noted: 2022-02-02 | Resolved: 2023-01-22

## 2023-01-22 PROBLEM — R05.2 SUBACUTE COUGH: Status: RESOLVED | Noted: 2023-01-04 | Resolved: 2023-01-22

## 2023-01-22 PROCEDURE — 99233 SBSQ HOSP IP/OBS HIGH 50: CPT | Performed by: FAMILY MEDICINE

## 2023-01-22 PROCEDURE — 74011000258 HC RX REV CODE- 258: Performed by: INTERNAL MEDICINE

## 2023-01-22 PROCEDURE — 74011250637 HC RX REV CODE- 250/637: Performed by: INTERNAL MEDICINE

## 2023-01-22 PROCEDURE — 74011250636 HC RX REV CODE- 250/636: Performed by: INTERNAL MEDICINE

## 2023-01-22 PROCEDURE — 74011000250 HC RX REV CODE- 250: Performed by: INTERNAL MEDICINE

## 2023-01-22 PROCEDURE — 65270000046 HC RM TELEMETRY

## 2023-01-22 RX ADMIN — SEVELAMER CARBONATE 800 MG: 800 TABLET, FILM COATED ORAL at 16:54

## 2023-01-22 RX ADMIN — SODIUM CHLORIDE, PRESERVATIVE FREE 10 ML: 5 INJECTION INTRAVENOUS at 22:26

## 2023-01-22 RX ADMIN — AMLODIPINE BESYLATE 10 MG: 5 TABLET ORAL at 22:26

## 2023-01-22 RX ADMIN — HEPARIN SODIUM 5000 UNITS: 5000 INJECTION INTRAVENOUS; SUBCUTANEOUS at 22:26

## 2023-01-22 RX ADMIN — SEVELAMER CARBONATE 800 MG: 800 TABLET, FILM COATED ORAL at 08:11

## 2023-01-22 RX ADMIN — HEPARIN SODIUM 5000 UNITS: 5000 INJECTION INTRAVENOUS; SUBCUTANEOUS at 14:06

## 2023-01-22 RX ADMIN — SODIUM CHLORIDE, PRESERVATIVE FREE 10 ML: 5 INJECTION INTRAVENOUS at 06:29

## 2023-01-22 RX ADMIN — CEFTRIAXONE 1 G: 1 INJECTION, POWDER, FOR SOLUTION INTRAMUSCULAR; INTRAVENOUS at 08:11

## 2023-01-22 RX ADMIN — CARVEDILOL 12.5 MG: 12.5 TABLET, FILM COATED ORAL at 11:10

## 2023-01-22 RX ADMIN — SODIUM CHLORIDE, PRESERVATIVE FREE 10 ML: 5 INJECTION INTRAVENOUS at 14:13

## 2023-01-22 RX ADMIN — CARVEDILOL 12.5 MG: 12.5 TABLET, FILM COATED ORAL at 22:26

## 2023-01-22 RX ADMIN — HEPARIN SODIUM 5000 UNITS: 5000 INJECTION INTRAVENOUS; SUBCUTANEOUS at 06:28

## 2023-01-22 RX ADMIN — AZITHROMYCIN MONOHYDRATE 500 MG: 500 INJECTION, POWDER, LYOPHILIZED, FOR SOLUTION INTRAVENOUS at 14:06

## 2023-01-22 RX ADMIN — SEVELAMER CARBONATE 800 MG: 800 TABLET, FILM COATED ORAL at 11:10

## 2023-01-22 NOTE — CONSULTS
Vascular Surgery Consult Note  1/22/2023    Subjective:     Irlanda Brown is a 67 y.o.  male history of left brachiocephalic fistula followed by revision with interposition graft (2014, Dr. Sanford Fuentes). Admitted with SOB/Pneumonia. No issues on dialysis. 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 Past Medical History:   Diagnosis Date    Arthritis     RA    Chronic kidney disease     Dialysis T, Chris Stack, Sat @ [facility]    COVID 12/11/2022    GERD (gastroesophageal reflux disease)     Gout     Hepatitis C     Hypertension     Ill-defined condition     Gout    Iritis     Liver disease     Hep C    Other and unspecified alcohol dependence, unspecified drinking behavior     Acid reflux    Other ill-defined conditions(799.89)     blood transfusion hx    Psychiatric disorder     Anxiety      Past Surgical History:   Procedure Laterality Date    HX ENDOSCOPY  07/29/2020    HX OTHER SURGICAL      liver biopsy    HX VASCULAR ACCESS      [facility]    HX VASCULAR ACCESS  7/15/13    CREATION LEFT UPPER ARM BASILIC VEIN TRANSPOSITION    HX VASCULAR ACCESS      [facility]     History reviewed. No pertinent family history. Social History     Tobacco Use    Smoking status: Never    Smokeless tobacco: Never   Substance Use Topics    Alcohol use: No       Prior to Admission medications    Medication Sig Start Date End Date Taking? Authorizing Provider   sevelamer carbonate (RENVELA) 800 mg tab tab Take 800 mg by mouth three (3) times daily (with meals). Take 3 tabs with every meal 12/24/22  Yes Provider, Historical   tiZANidine (ZANAFLEX) 4 mg tablet Take 1 Tablet by mouth three (3) times daily as needed for Muscle Spasm(s). 5/4/22  Yes Veronika Blanchard MD   carvediloL (COREG) 12.5 mg tablet Take 12.5 mg by mouth two (2) times daily (with meals). 3/27/20  Yes Provider, Historical   lisinopril (PRINIVIL, ZESTRIL) 20 mg tablet Take 1 Tab by mouth daily.  10/7/19  Yes Castillo Finley MD   etelcalcetide 5 mg/mL soln by IntraVENous route. Indications: 3 x per week   Yes Provider, Historical   amLODIPine (NORVASC) 10 mg tablet TAKE 1 TABLET BY MOUTH ONCE DAILY 10/11/17  Yes Provider, Historical   albuterol (PROVENTIL HFA, VENTOLIN HFA, PROAIR HFA) 90 mcg/actuation inhaler Take 2 Puffs by inhalation. Provider, Historical   LORazepam (ATIVAN) 0.5 mg tablet Take 1 Tablet by mouth two (2) times daily as needed for Anxiety. Max Daily Amount: 1 mg. 1/4/23   Castillo Finley MD   calcium acetate, phosphate binder, (PHOSLO) 667 mg tab tablet Take  by mouth three (3) times daily (with meals). Indications: pt takes 3 pills 3 times daily  Patient not taking: No sig reported 12/10/21   Provider, Historical     Allergies   Allergen Reactions    Codeine Hives        Review of Systems   All other systems reviewed and are negative. Objective:     Patient Vitals for the past 24 hrs:   BP Temp Pulse Resp SpO2   01/22/23 0745 116/65 98.6 °F (37 °C) 86 14 100 %   01/22/23 0411 110/62 98.4 °F (36.9 °C) 80 16 98 %   01/21/23 2236 122/62 -- 80 -- --   01/21/23 1951 -- -- -- -- 98 %   01/21/23 1925 124/67 98.8 °F (37.1 °C) 83 18 98 %   01/21/23 1215 116/66 -- 89 20 --   01/21/23 1200 127/87 -- 78 18 --   01/21/23 1145 125/87 -- 79 19 --   01/21/23 1130 124/71 -- 80 20 --   01/21/23 1115 124/73 -- 82 18 --   01/21/23 1100 124/78 -- 89 20 --   01/21/23 1045 129/72 -- 83 18 --   01/21/23 1030 125/69 -- 85 18 --   01/21/23 1015 119/72 -- 78 19 --   01/21/23 1000 130/71 -- 83 20 --   01/21/23 0945 120/71 -- 83 18 --   01/21/23 0930 133/73 -- 83 20 --   01/21/23 0915 127/66 -- 85 18 --        Physical Exam  Constitutional:       Appearance: Normal appearance. HENT:      Head: Normocephalic and atraumatic. Mouth/Throat:      Mouth: Mucous membranes are dry. Eyes:      Extraocular Movements: Extraocular movements intact. Pupils: Pupils are equal, round, and reactive to light. Cardiovascular:      Rate and Rhythm: Normal rate. Pulmonary:      Effort: Pulmonary effort is normal.   Abdominal:      Palpations: Abdomen is soft. Musculoskeletal:         General: Normal range of motion. Skin:     General: Skin is warm. Capillary Refill: Capillary refill takes less than 2 seconds. Neurological:      General: No focal deficit present. Mental Status: He is alert. Psychiatric:         Mood and Affect: Mood normal.     Left arm fistula with 2 areas of aneurysmal degeneration. No ulcerations  +thrill/bruit  Pertinent Test Results:   No results found for this or any previous visit (from the past 24 hour(s)).     Assessmen/Plan:   Left arm composite fistula/graft with 2 areas of aneurysmal degeneration  No ulcerations/skin intact  No urgent need for revision  Dr. Caitlin Dean to follow up tomorrow    Signed By: Morenita Navarro MD     January 22, 2023

## 2023-01-22 NOTE — PROGRESS NOTES
End of Shift Note    Bedside shift change report given to  (oncoming nurse) by Domonique Dos Santos RN (offgoing nurse). Report included the following information SBAR and Kardex    Shift worked:  7a-7p     Shift summary and any significant changes:     Lung sounds diminished. Dialysis tues, thurs, Saturday. 3L removed yesterday. Lisinopril held this afternoon per request of patient. Very compliant dialysis patient! Currently on 2L O2 for comfort. Concerns for physician to address:       Zone phone for oncoming shift:          Activity:  Activity Level: Up ad manuel  Number times ambulated in hallways past shift: 0  Number of times OOB to chair past shift: 3    Cardiac:   Cardiac Monitoring: Yes      Cardiac Rhythm: Sinus Rhythm    Access:  Current line(s): PIV     Genitourinary:   Urinary status: voiding    Respiratory:   O2 Device: Nasal cannula  Chronic home O2 use?: NO  Incentive spirometer at bedside: N/A       GI:     Current diet:  ADULT DIET Regular; Low Potassium (Less than 3000 mg/day)  ADULT ORAL NUTRITION SUPPLEMENT Dinner; Renal Supplement  Passing flatus: YES  Tolerating current diet: YES       Pain Management:   Patient states pain is manageable on current regimen: YES    Skin:  Eugene Score: 20  Interventions: increase time out of bed    Patient Safety:  Fall Score:  Total Score: 0  Interventions: gripper socks and pt to call before getting OOB       Length of Stay:  Expected LOS: - - -  Actual LOS: 2      Domonique Dos Santos RN

## 2023-01-22 NOTE — PROGRESS NOTES
General Daily Progress Note    Admit Date: 1/20/2023  Hospital day 3    Subjective:     Patient has no complaint of fever, chills. .. Patient has no complaint of shortness f breath at rest. Has had problems with weakness and dyspnea ever since having covid in early Dec. Saw Dr. Fields Rings 1-20 , O2 sats were in the 80s, BP was low normal. Referred to ER where WBC was elevated , 13, 200, and CXR showed diffuse bilateral interstitial infiltrates favoring viral pneumonia. Respiraotry panel was negative. NOw on Ceftriaxone and zithromax. Feels better today. Hakeem Doherty Appetite has been poor. Nonsmoker, no prior hx lung or heart disease.    Medication side effects: none    Current Facility-Administered Medications   Medication Dose Route Frequency    epoetin jade-epbx (RETACRIT) injection 10,000 Units  10,000 Units SubCUTAneous Q TUE, THU & SAT    azithromycin (ZITHROMAX) 500 mg in 0.9% sodium chloride 250 mL ([id number])  500 mg IntraVENous Q24H    albuterol (PROVENTIL HFA, VENTOLIN HFA, PROAIR HFA) inhaler 2 Puff  2 Puff Inhalation Q6H PRN    amLODIPine (NORVASC) tablet 10 mg  10 mg Oral DAILY    carvediloL (COREG) tablet 12.5 mg  12.5 mg Oral BID WITH MEALS    lisinopriL (PRINIVIL, ZESTRIL) tablet 20 mg  20 mg Oral DAILY    LORazepam (ATIVAN) tablet 0.5 mg  0.5 mg Oral BID PRN    sevelamer carbonate (RENVELA) tab 800 mg  800 mg Oral TID WITH MEALS    tiZANidine (ZANAFLEX) tablet 4 mg  4 mg Oral TID PRN    sodium chloride (NS) flush 5-40 mL  5-40 mL IntraVENous Q8H    sodium chloride (NS) flush 5-40 mL  5-40 mL IntraVENous PRN    acetaminophen (TYLENOL) tablet 650 mg  650 mg Oral Q6H PRN    Or    acetaminophen (TYLENOL) suppository 650 mg  650 mg Rectal Q6H PRN    polyethylene glycol (MIRALAX) packet 17 g  17 g Oral DAILY PRN    ondansetron (ZOFRAN ODT) tablet 4 mg  4 mg Oral Q8H PRN    Or    ondansetron (ZOFRAN) injection 4 mg  4 mg IntraVENous Q6H PRN    heparin (porcine) injection 5,000 Units  5,000 Units SubCUTAneous Q8H cefTRIAXone (ROCEPHIN) 1 g in 0.9% sodium chloride (MBP/ADV) 50 mL MBP  1 g IntraVENous Q24H        Review of Systems  Pertinent items are noted in HPI. Objective:     Patient Vitals for the past 8 hrs:   BP Temp Pulse Resp SpO2   01/22/23 0745 116/65 98.6 °F (37 °C) 86 14 100 %   01/22/23 0411 110/62 98.4 °F (36.9 °C) 80 16 98 %     No intake/output data recorded. 01/20 1901 - 01/22 0700  In: -   Out: 2700 [Urine:200]    Physical Exam: Visit Vitals  /65 (BP 1 Location: Right upper arm, BP Patient Position: Sitting)   Pulse 86   Temp 98.6 °F (37 °C)   Resp 14   Ht 5' 9\" (1.753 m)   Wt 178 lb 9.2 oz (81 kg)   SpO2 100%   BMI 26.37 kg/m²     Lungs: rales R base, L base  Heart: regular rate and rhythm, S1, S2 normal, no murmur, click, rub or gallop  Abdomen: soft, non-tender. Bowel sounds normal. No masses,  no organomegaly  Extremities: extremities normal, atraumatic, no cyanosis or edema      ECG: normal sinus rhythm     Data Review No results found for this or any previous visit (from the past 24 hour(s)). Assessment:     Active Problems:    CAP (community acquired pneumonia) (1/20/2023)        Plan:     Community acquired pneumonia- continue rocephin and azithromycin . O2 sats 98% on 1 liters. Wbc down to 9.9 yesterday. ESRD- on dialysis. Recent (early Dec. ) covid infection. Hypertension- stable  As infiltrates are difffuse and bilateral, will also order echo, altho there is no peripheral edema.  hard to make much of elevated bnp due to renal failure

## 2023-01-22 NOTE — PROGRESS NOTES
Comprehensive Nutrition Assessment    Type and Reason for Visit: Initial, Positive nutrition screen    Nutrition Recommendations/Plan:   Continue current diet  Daily Nepro shakes  Please document % meals and supplements consumed in flowsheet I/O's under intake      Malnutrition Assessment:  Malnutrition Status:  Insufficient data (01/22/23 1520)        Nutrition Assessment:     Chart reviewed for MST. Pt admitted with community acquired PNA, ESRD on HD, HTN, recent COVID (December). Pt receiving nursing care at time of visit. D/t census unable to return today. MST indicated 2-13lb weight loss PTA and decreased appetite. Review of weight hx did not reveal significant weight loss recently. Will add daily oral nutrition supplement and follow up. Wt Readings from Last 5 Encounters:   01/20/23 81 kg (178 lb 9.2 oz)   01/20/23 80.7 kg (178 lb)   01/04/23 83.3 kg (183 lb 9.6 oz)   12/27/22 83 kg (183 lb)   11/16/22 85.5 kg (188 lb 6.4 oz)   ]    Nutrition Related Findings:    No labs today. Meds: azithromycin, ceftriaxone, renvela. BM PTA. Wound Type: None    Current Nutrition Intake & Therapies:  Average Meal Intake: Unable to assess  Average Supplement Intake: None ordered  ADULT DIET Regular; Low Potassium (Less than 3000 mg/day)    Anthropometric Measures:  Height: 5' 9\" (175.3 cm)  Ideal Body Weight (IBW): 160 lbs (73 kg)     Current Body Wt:  81 kg (178 lb 9.2 oz), 111.6 % IBW. Standing scale  Current BMI (kg/m2): 26.4        Weight Adjustment: No adjustment                 BMI Category: Overweight (BMI 25.0-29. 9)    Estimated Daily Nutrient Needs:  Energy Requirements Based On: Formula  Weight Used for Energy Requirements: Current  Energy (kcal/day): 2015 kcals (BMR x 1. 3AF)  Weight Used for Protein Requirements: Current  Protein (g/day): 97g (1.2g/kg)  Method Used for Fluid Requirements: Standard renal  Fluid (ml/day): 1800mL    Nutrition Diagnosis:   No nutrition diagnosis at this time    Nutrition Interventions:   Food and/or Nutrient Delivery: Continue current diet  Nutrition Education/Counseling: No recommendations at this time  Coordination of Nutrition Care: Continue to monitor while inpatient       Goals:     Goals: PO intake 50% or greater, by next RD assessment       Nutrition Monitoring and Evaluation:   Behavioral-Environmental Outcomes: None identified  Food/Nutrient Intake Outcomes: Food and nutrient intake  Physical Signs/Symptoms Outcomes: Biochemical data, GI status, Fluid status or edema, Weight, Nutrition focused physical findings    Discharge Planning:    Continue current diet    Lizeth Rudolph, [address]  Contact: [phone]

## 2023-01-23 ENCOUNTER — APPOINTMENT (OUTPATIENT)
Dept: NON INVASIVE DIAGNOSTICS | Age: 73
End: 2023-01-23
Attending: FAMILY MEDICINE
Payer: MEDICARE

## 2023-01-23 LAB
BASOPHILS # BLD: 0.1 K/UL (ref 0–0.1)
BASOPHILS NFR BLD: 1 % (ref 0–1)
DIFFERENTIAL METHOD BLD: ABNORMAL
ECHO AV AREA PEAK VELOCITY: 2.5 CM2
ECHO AV AREA VTI: 2.5 CM2
ECHO AV AREA/BSA PEAK VELOCITY: 1.3 CM2/M2
ECHO AV AREA/BSA VTI: 1.3 CM2/M2
ECHO AV MEAN GRADIENT: 4 MMHG
ECHO AV MEAN VELOCITY: 0.9 M/S
ECHO AV PEAK GRADIENT: 8 MMHG
ECHO AV PEAK VELOCITY: 1.4 M/S
ECHO AV VELOCITY RATIO: 0.71
ECHO AV VTI: 26.2 CM
ECHO LA DIAMETER INDEX: 1.6 CM/M2
ECHO LA DIAMETER: 3.1 CM
ECHO LA VOL 2C: 43 ML (ref 18–58)
ECHO LA VOL 4C: 36 ML (ref 18–58)
ECHO LA VOLUME AREA LENGTH: 47 ML
ECHO LA VOLUME INDEX A2C: 22 ML/M2 (ref 16–34)
ECHO LA VOLUME INDEX A4C: 19 ML/M2 (ref 16–34)
ECHO LA VOLUME INDEX AREA LENGTH: 24 ML/M2 (ref 16–34)
ECHO LV EDV A2C: 91 ML
ECHO LV EDV A4C: 89 ML
ECHO LV EDV BP: 96 ML (ref 67–155)
ECHO LV EDV INDEX A4C: 46 ML/M2
ECHO LV EDV INDEX BP: 49 ML/M2
ECHO LV EDV NDEX A2C: 47 ML/M2
ECHO LV EJECTION FRACTION A2C: 44 %
ECHO LV EJECTION FRACTION A4C: 68 %
ECHO LV EJECTION FRACTION BIPLANE: 60 % (ref 55–100)
ECHO LV ESV A2C: 51 ML
ECHO LV ESV A4C: 29 ML
ECHO LV ESV BP: 38 ML (ref 22–58)
ECHO LV ESV INDEX A2C: 26 ML/M2
ECHO LV ESV INDEX A4C: 15 ML/M2
ECHO LV ESV INDEX BP: 20 ML/M2
ECHO LV FRACTIONAL SHORTENING: 28 % (ref 28–44)
ECHO LV INTERNAL DIMENSION DIASTOLE INDEX: 2.42 CM/M2
ECHO LV INTERNAL DIMENSION DIASTOLIC: 4.7 CM (ref 4.2–5.9)
ECHO LV INTERNAL DIMENSION SYSTOLIC INDEX: 1.75 CM/M2
ECHO LV INTERNAL DIMENSION SYSTOLIC: 3.4 CM
ECHO LV IVSD: 1.4 CM (ref 0.6–1)
ECHO LV MASS 2D: 237.9 G (ref 88–224)
ECHO LV MASS INDEX 2D: 122.6 G/M2 (ref 49–115)
ECHO LV POSTERIOR WALL DIASTOLIC: 1.2 CM (ref 0.6–1)
ECHO LV RELATIVE WALL THICKNESS RATIO: 0.51
ECHO LVOT AREA: 3.8 CM2
ECHO LVOT AV VTI INDEX: 0.68
ECHO LVOT DIAM: 2.2 CM
ECHO LVOT MEAN GRADIENT: 2 MMHG
ECHO LVOT PEAK GRADIENT: 4 MMHG
ECHO LVOT PEAK VELOCITY: 1 M/S
ECHO LVOT STROKE VOLUME INDEX: 35.1 ML/M2
ECHO LVOT SV: 68 ML
ECHO LVOT VTI: 17.9 CM
ECHO MV A VELOCITY: 0.88 M/S
ECHO MV AREA VTI: 2.3 CM2
ECHO MV E DECELERATION TIME (DT): 217.7 MS
ECHO MV E VELOCITY: 0.72 M/S
ECHO MV E/A RATIO: 0.82
ECHO MV LVOT VTI INDEX: 1.69
ECHO MV MAX VELOCITY: 0.9 M/S
ECHO MV MEAN GRADIENT: 2 MMHG
ECHO MV MEAN VELOCITY: 0.6 M/S
ECHO MV PEAK GRADIENT: 3 MMHG
ECHO MV REGURGITANT PEAK GRADIENT: 64 MMHG
ECHO MV REGURGITANT PEAK VELOCITY: 4 M/S
ECHO MV VTI: 30.2 CM
ECHO PV MAX VELOCITY: 1 M/S
ECHO PV PEAK GRADIENT: 4 MMHG
ECHO PVEIN PEAK D VELOCITY: 0.4 M/S
ECHO PVEIN PEAK S VELOCITY: 0.6 M/S
ECHO PVEIN S/D RATIO: 1.5 NO UNITS
ECHO RV TAPSE: 1.8 CM (ref 1.7–?)
EOSINOPHIL # BLD: 0.4 K/UL (ref 0–0.4)
EOSINOPHIL NFR BLD: 4 % (ref 0–7)
ERYTHROCYTE [DISTWIDTH] IN BLOOD BY AUTOMATED COUNT: 12.7 % (ref 11.5–14.5)
HCT VFR BLD AUTO: 26.1 % (ref 36.6–50.3)
HGB BLD-MCNC: 8.6 G/DL (ref 12.1–17)
IMM GRANULOCYTES # BLD AUTO: 0.1 K/UL (ref 0–0.04)
IMM GRANULOCYTES NFR BLD AUTO: 1 % (ref 0–0.5)
LYMPHOCYTES # BLD: 1.7 K/UL (ref 0.8–3.5)
LYMPHOCYTES NFR BLD: 18 % (ref 12–49)
MCH RBC QN AUTO: 32.3 PG (ref 26–34)
MCHC RBC AUTO-ENTMCNC: 33 G/DL (ref 30–36.5)
MCV RBC AUTO: 98.1 FL (ref 80–99)
MONOCYTES # BLD: 1.2 K/UL (ref 0–1)
MONOCYTES NFR BLD: 13 % (ref 5–13)
NEUTS SEG # BLD: 5.9 K/UL (ref 1.8–8)
NEUTS SEG NFR BLD: 63 % (ref 32–75)
NRBC # BLD: 0 K/UL (ref 0–0.01)
NRBC BLD-RTO: 0 PER 100 WBC
PLATELET # BLD AUTO: 321 K/UL (ref 150–400)
PMV BLD AUTO: 9.9 FL (ref 8.9–12.9)
RBC # BLD AUTO: 2.66 M/UL (ref 4.1–5.7)
WBC # BLD AUTO: 9.3 K/UL (ref 4.1–11.1)

## 2023-01-23 PROCEDURE — 74011250636 HC RX REV CODE- 250/636: Performed by: INTERNAL MEDICINE

## 2023-01-23 PROCEDURE — 74011000250 HC RX REV CODE- 250: Performed by: INTERNAL MEDICINE

## 2023-01-23 PROCEDURE — 74011000258 HC RX REV CODE- 258: Performed by: INTERNAL MEDICINE

## 2023-01-23 PROCEDURE — 74011250637 HC RX REV CODE- 250/637: Performed by: INTERNAL MEDICINE

## 2023-01-23 PROCEDURE — 99233 SBSQ HOSP IP/OBS HIGH 50: CPT | Performed by: INTERNAL MEDICINE

## 2023-01-23 PROCEDURE — 36415 COLL VENOUS BLD VENIPUNCTURE: CPT

## 2023-01-23 PROCEDURE — 77010033678 HC OXYGEN DAILY

## 2023-01-23 PROCEDURE — 65270000046 HC RM TELEMETRY

## 2023-01-23 PROCEDURE — 94640 AIRWAY INHALATION TREATMENT: CPT

## 2023-01-23 PROCEDURE — 85025 COMPLETE CBC W/AUTO DIFF WBC: CPT

## 2023-01-23 PROCEDURE — 93306 TTE W/DOPPLER COMPLETE: CPT

## 2023-01-23 RX ORDER — IPRATROPIUM BROMIDE AND ALBUTEROL SULFATE 2.5; .5 MG/3ML; MG/3ML
3 SOLUTION RESPIRATORY (INHALATION)
Status: DISCONTINUED | OUTPATIENT
Start: 2023-01-23 | End: 2023-01-24

## 2023-01-23 RX ADMIN — SODIUM CHLORIDE, PRESERVATIVE FREE 10 ML: 5 INJECTION INTRAVENOUS at 21:10

## 2023-01-23 RX ADMIN — CARVEDILOL 12.5 MG: 12.5 TABLET, FILM COATED ORAL at 11:00

## 2023-01-23 RX ADMIN — AZITHROMYCIN MONOHYDRATE 500 MG: 500 INJECTION, POWDER, LYOPHILIZED, FOR SOLUTION INTRAVENOUS at 12:17

## 2023-01-23 RX ADMIN — SODIUM CHLORIDE, PRESERVATIVE FREE 10 ML: 5 INJECTION INTRAVENOUS at 06:27

## 2023-01-23 RX ADMIN — CEFTRIAXONE 1 G: 1 INJECTION, POWDER, FOR SOLUTION INTRAMUSCULAR; INTRAVENOUS at 08:32

## 2023-01-23 RX ADMIN — SEVELAMER CARBONATE 800 MG: 800 TABLET, FILM COATED ORAL at 17:22

## 2023-01-23 RX ADMIN — HEPARIN SODIUM 5000 UNITS: 5000 INJECTION INTRAVENOUS; SUBCUTANEOUS at 14:00

## 2023-01-23 RX ADMIN — HEPARIN SODIUM 5000 UNITS: 5000 INJECTION INTRAVENOUS; SUBCUTANEOUS at 06:26

## 2023-01-23 RX ADMIN — METHYLPREDNISOLONE SODIUM SUCCINATE 40 MG: 40 INJECTION, POWDER, FOR SOLUTION INTRAMUSCULAR; INTRAVENOUS at 21:01

## 2023-01-23 RX ADMIN — CARVEDILOL 12.5 MG: 12.5 TABLET, FILM COATED ORAL at 22:26

## 2023-01-23 RX ADMIN — AMLODIPINE BESYLATE 10 MG: 5 TABLET ORAL at 21:05

## 2023-01-23 RX ADMIN — HEPARIN SODIUM 5000 UNITS: 5000 INJECTION INTRAVENOUS; SUBCUTANEOUS at 21:01

## 2023-01-23 RX ADMIN — SEVELAMER CARBONATE 800 MG: 800 TABLET, FILM COATED ORAL at 11:00

## 2023-01-23 RX ADMIN — LISINOPRIL 20 MG: 20 TABLET ORAL at 10:59

## 2023-01-23 RX ADMIN — SEVELAMER CARBONATE 800 MG: 800 TABLET, FILM COATED ORAL at 08:31

## 2023-01-23 RX ADMIN — SODIUM CHLORIDE, PRESERVATIVE FREE 10 ML: 5 INJECTION INTRAVENOUS at 13:38

## 2023-01-23 RX ADMIN — IPRATROPIUM BROMIDE AND ALBUTEROL SULFATE 3 ML: 2.5; .5 SOLUTION RESPIRATORY (INHALATION) at 20:48

## 2023-01-23 RX ADMIN — METHYLPREDNISOLONE SODIUM SUCCINATE 40 MG: 40 INJECTION, POWDER, FOR SOLUTION INTRAMUSCULAR; INTRAVENOUS at 13:36

## 2023-01-23 NOTE — PROGRESS NOTES
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 He has a LUE brachiocephalic fistula requiring revision with interposition graft (). He reports no issue during dialysis with cannulation. He denies bleeding. He is admitted to the hospital for SOB/Pneumonia. This morning, he is resting in bed awake and alert. He is on 2LPM O2 via NC. He reports light-headedness and dyspnea yesterday with attempt to walk to bathroom. He \"could not get enough oxygen. \" Denies falls or LOC. He is on Ceftriazone and zithromax. He had no other complaints, changes, or physical concerns at this time. Objective:     Vital signs  Temp (24hrs), Av.2 °F (36.8 °C), Min:98.1 °F (36.7 °C), Max:98.4 °F (36.9 °C)   No intake/output data recorded.  1901 -  0700  In: -   Out: 150 [Urine:150]    Visit Vitals  /66   Pulse 77   Temp 98.1 °F (36.7 °C)   Resp 18   Ht 5' 9\" (1.753 m)   Wt 78.9 kg (173 lb 15.1 oz)   SpO2 97%   BMI 25.69 kg/m²    O2 Flow Rate (L/min): 2 l/min O2 Device: Nasal cannula     Pain control  Pain Assessment  Pain Scale 1: Numeric (0 - 10)  Pain Intensity 1: 0    PT/OT  Gait                 Physical Exam  Vitals and nursing note reviewed. Constitutional:       General: He is not in acute distress. HENT:      Head: Normocephalic. Eyes:      General: No scleral icterus. Cardiovascular:      Rate and Rhythm: Normal rate. Arteriovenous access: Left arteriovenous access is present. Comments: Two aneurysmal areas. Musculoskeletal:      Cervical back: Normal range of motion. Neurological:      Mental Status: He is alert.           24 hour results:    Recent Results (from the past 24 hour(s))   CBC WITH AUTOMATED DIFF    Collection Time: 01/23/23  2:38 AM   Result Value Ref Range    WBC 9.3 4.1 - 11.1 K/uL    RBC 2.66 (L) 4.10 - 5.70 M/uL    HGB 8.6 (L) 12.1 - 17.0 g/dL    HCT 26.1 (L) 36.6 - 50.3 %    MCV 98.1 80.0 - 99.0 FL    MCH 32.3 26.0 - 34.0 PG    MCHC 33.0 30.0 - 36.5 g/dL    RDW 12.7 11.5 - 14.5 %    PLATELET 804 187 - 697 K/uL    MPV 9.9 8.9 - 12.9 FL    NRBC 0.0 0  WBC    ABSOLUTE NRBC 0.00 0.00 - 0.01 K/uL    NEUTROPHILS 63 32 - 75 %    LYMPHOCYTES 18 12 - 49 %    MONOCYTES 13 5 - 13 %    EOSINOPHILS 4 0 - 7 %    BASOPHILS 1 0 - 1 %    IMMATURE GRANULOCYTES 1 (H) 0.0 - 0.5 %    ABS. NEUTROPHILS 5.9 1.8 - 8.0 K/UL    ABS. LYMPHOCYTES 1.7 0.8 - 3.5 K/UL    ABS. MONOCYTES 1.2 (H) 0.0 - 1.0 K/UL    ABS. EOSINOPHILS 0.4 0.0 - 0.4 K/UL    ABS. BASOPHILS 0.1 0.0 - 0.1 K/UL    ABS. IMM. GRANS. 0.1 (H) 0.00 - 0.04 K/UL    DF AUTOMATED            Assessment/Plan:     Principal Problem:    Acute hypox[facility] ([facility] (1/20/2023)    Active Problems:    Hypertension, renal disease (10/22/2017)      ESRD (end stage renal disease) ([facility] (10/22/2017)      Gastroesophageal reflux disease without esophagitis (10/22/2017)      COVID-19 (1/3/2023)      Overview: Dec 11, 2022 - Tx Paxlovid      CAP (community acquired pneumonia) (1/20/2023)        ESRD  Anemia of CKD  Aneurysm of dialysis access, LUE BC interpositional graft  - known issue, followed by our clinic  - no complications receiving HD  - per patient- his dialysis center was shown where else to stick but they continue to access via anuerysms  - no ulcerations  - Dr. Nida Ramírez to see and determine plan of care.      CAP  SOB, POA  Hx of COVID-19 in December 2022    HTN    Hx of Hep C  GERD  HLD        Pao Taylor, NP

## 2023-01-23 NOTE — PROGRESS NOTES
INTERNAL MEDICINE PROGRESS NOTE    NAME:  Dione Simon   :   1950   MRN:   [id number]     Date/Time:  2023 5:47 AM  Subjective:   History:  Chart reviewed and patient seen and examined and D/W his nurse this AM and all events noted. He is followed by me for HTN, HLD, ESRD on dialysis, Gout, chronic hepatitis C, DJD, Anxiety and other medical problems. He was seen by me at the office on  with SOB post Covid infection in December and found to have bilateral Pneumonia with acute hypoxemic respiratory failure and thus admitted. This AM he still feels short of breath and has some cough although is nonproductive. He has noted no fevers or chills and none of been recorded. There are no other cardiac or respiratory complaints. He notes no GI or  complaints. He notes no neurologic complaints other than the fact he feels weak and lightheaded when he gets up. The remainder complete review of systems is negative.     Medications reviewed:  Current Facility-Administered Medications   Medication Dose Route Frequency    epoetin jade-epbx (RETACRIT) injection 10,000 Units  10,000 Units SubCUTAneous Q TUE, THU & SAT    azithromycin (ZITHROMAX) 500 mg in 0.9% sodium chloride 250 mL ([id number])  500 mg IntraVENous Q24H    albuterol (PROVENTIL HFA, VENTOLIN HFA, PROAIR HFA) inhaler 2 Puff  2 Puff Inhalation Q6H PRN    amLODIPine (NORVASC) tablet 10 mg  10 mg Oral DAILY    carvediloL (COREG) tablet 12.5 mg  12.5 mg Oral BID WITH MEALS    lisinopriL (PRINIVIL, ZESTRIL) tablet 20 mg  20 mg Oral DAILY    LORazepam (ATIVAN) tablet 0.5 mg  0.5 mg Oral BID PRN    sevelamer carbonate (RENVELA) tab 800 mg  800 mg Oral TID WITH MEALS    tiZANidine (ZANAFLEX) tablet 4 mg  4 mg Oral TID PRN    sodium chloride (NS) flush 5-40 mL  5-40 mL IntraVENous Q8H    sodium chloride (NS) flush 5-40 mL  5-40 mL IntraVENous PRN    acetaminophen (TYLENOL) tablet 650 mg  650 mg Oral Q6H PRN    Or    acetaminophen (TYLENOL) suppository 650 mg  650 mg Rectal Q6H PRN    polyethylene glycol (MIRALAX) packet 17 g  17 g Oral DAILY PRN    ondansetron (ZOFRAN ODT) tablet 4 mg  4 mg Oral Q8H PRN    Or    ondansetron (ZOFRAN) injection 4 mg  4 mg IntraVENous Q6H PRN    heparin (porcine) injection 5,000 Units  5,000 Units SubCUTAneous Q8H    cefTRIAXone (ROCEPHIN) 1 g in 0.9% sodium chloride (MBP/ADV) 50 mL MBP  1 g IntraVENous Q24H        Objective:   Vitals:  Visit Vitals  /74   Pulse 80   Temp 97.7 °F (36.5 °C)   Resp 18   Ht 5' 9\" (1.753 m)   Wt 173 lb 15.1 oz (78.9 kg)   SpO2 100%   BMI 25.69 kg/m²    O2 Flow Rate (L/min): 2 l/min O2 Device: Nasal cannula Temp (24hrs), Av.1 °F (36.7 °C), Min:97.7 °F (36.5 °C), Max:98.4 °F (36.9 °C)      Last 24hr Input/Output:    Intake/Output Summary (Last 24 hours) at 2023 1233  Last data filed at 2023 1219  Gross per 24 hour   Intake 480 ml   Output 150 ml   Net 330 ml        PHYSICAL EXAM:  General:     Alert, cooperative, no distress, appears stated age. Head:    Normocephalic, without obvious abnormality, atraumatic. Eyes:    Conjunctivae/corneas clear. PERRLA  Nose:   Nares normal. No drainage or sinus tenderness. Throat:     Lips, mucosa, and tongue normal.  No Thrush  Neck:   Supple, symmetrical,  no adenopathy, thyroid: non tender     no carotid bruit and no JVD. Back:     Symmetric,  No CVA tenderness. Lungs:    Clear to auscultation bilaterally. No Wheezing or Rhonchi. No rales. Heart:    Regular rate and rhythm,  no murmur, rub or gallop. Abdomen:    Soft, non-tender. Not distended. Bowel sounds normal. No masses  Extremities:  Extremities normal, atraumatic, No cyanosis. No edema. No clubbing  Lymph nodes:  Cervical, supraclavicular normal.  Neurologic:  Normal strength, Alert and oriented X 3.    Skin:                No rash      Lab Data Reviewed:    Recent Results (from the past 24 hour(s))   CBC WITH AUTOMATED DIFF    Collection Time: 23  2:38 AM   Result Value Ref Range    WBC 9.3 4.1 - 11.1 K/uL    RBC 2.66 (L) 4.10 - 5.70 M/uL    HGB 8.6 (L) 12.1 - 17.0 g/dL    HCT 26.1 (L) 36.6 - 50.3 %    MCV 98.1 80.0 - 99.0 FL    MCH 32.3 26.0 - 34.0 PG    MCHC 33.0 30.0 - 36.5 g/dL    RDW 12.7 11.5 - 14.5 %    PLATELET 079 520 - 705 K/uL    MPV 9.9 8.9 - 12.9 FL    NRBC 0.0 0  WBC    ABSOLUTE NRBC 0.00 0.00 - 0.01 K/uL    NEUTROPHILS 63 32 - 75 %    LYMPHOCYTES 18 12 - 49 %    MONOCYTES 13 5 - 13 %    EOSINOPHILS 4 0 - 7 %    BASOPHILS 1 0 - 1 %    IMMATURE GRANULOCYTES 1 (H) 0.0 - 0.5 %    ABS. NEUTROPHILS 5.9 1.8 - 8.0 K/UL    ABS. LYMPHOCYTES 1.7 0.8 - 3.5 K/UL    ABS. MONOCYTES 1.2 (H) 0.0 - 1.0 K/UL    ABS. EOSINOPHILS 0.4 0.0 - 0.4 K/UL    ABS. BASOPHILS 0.1 0.0 - 0.1 K/UL    ABS. IMM. GRANS. 0.1 (H) 0.00 - 0.04 K/UL    DF AUTOMATED           Assessment/Plan:     Principal Problem:    Acute hypoxemic respiratory failure ([facility] (1/20/2023)    Active Problems:    Hypertension, renal disease (10/22/2017)      ESRD (end stage renal disease) ([facility] (10/22/2017)      Gastroesophageal reflux disease without esophagitis (10/22/2017)      COVID-19 (1/3/2023)      Overview: Dec 11, 2022 - Tx Paxlovid      CAP (community acquired pneumonia) (1/20/2023)     ___________________________________________________  PLAN:    1. Continue Rocephin and Zithromax for pneumonia  2. Supplemental oxygen to keep sat greater than 92%  3. I will add Solu-Medrol in light of his continued shortness of breath and noting he had a recent COVID infection  4. ProAir inhaler as needed and will order DuoNeb if needed  5. Currently on Norvasc 10 mg daily and Coreg 12.5 twice daily as well as lisinopril 20 daily so we will watch blood pressure closely in light of his complaint of lightheadedness  6. Anemia continue Retacrit   7. ESRD dialysis per renal  8.   Follow-up on Echo which is ordered    50 Minutes spent today in direct care of this high complexity patient with greater than 50% in counseling and coordination of care.     If need to contact me use hospital  [phone], DO NOT USE PERFECT SERVE    ___________________________________________________    Attending Physician: Taylor Moise MD

## 2023-01-23 NOTE — PROGRESS NOTES
Name: Joann Monge   MRN: [id number]  : 1950      Assessment:                                    Plan:  ESRD-T/TH/S ([facility]; L [facility])    HTN    Anemia of ESRD  COVID PNA in [address] Resp panel negative    Resp failure    Has aneurysmal changes to avf-pt states [facility] is aware    No acute need for HD today. Plan HD in AM.    CXR with b/l infiltrates favoring viral PNA  Ct O2  ABx  H/H not at goal.  Continue  MARJORIE       Subjective: \"The dialysis machine they use here is different than the one at my center.     ROS:   No nausea, no vomiting  No chest pain,     Exam:  Visit Vitals  /66   Pulse 77   Temp 98.1 °F (36.7 °C)   Resp 18   Ht 5' 9\" (1.753 m)   Wt 78.9 kg (173 lb 15.1 oz)   SpO2 97%   BMI 25.69 kg/m²     Ill appearing in NAD  No icterus  Coarse rales at bases  RRR  No sig edema       Current Facility-Administered Medications   Medication Dose Route Frequency Last Admin    epoetin jade-epbx (RETACRIT) injection 10,000 Units  10,000 Units SubCUTAneous Q TUE, THU & SAT 10,000 Units at 23 2238    azithromycin (ZITHROMAX) 500 mg in 0.9% sodium chloride 250 mL (Qgee2Soz)  500 mg IntraVENous Q24H 500 mg at 23 1406    albuterol (PROVENTIL HFA, VENTOLIN HFA, PROAIR HFA) inhaler 2 Puff  2 Puff Inhalation Q6H PRN      amLODIPine (NORVASC) tablet 10 mg  10 mg Oral DAILY 10 mg at 23 2226    carvediloL (COREG) tablet 12.5 mg  12.5 mg Oral BID WITH MEALS 12.5 mg at 23 2226    lisinopriL (PRINIVIL, ZESTRIL) tablet 20 mg  20 mg Oral DAILY 20 mg at 23 0832    LORazepam (ATIVAN) tablet 0.5 mg  0.5 mg Oral BID PRN      sevelamer carbonate (RENVELA) tab 800 mg  800 mg Oral TID WITH MEALS 800 mg at 23 0831    tiZANidine (ZANAFLEX) tablet 4 mg  4 mg Oral TID PRN      sodium chloride (NS) flush 5-40 mL  5-40 mL IntraVENous Q8H 10 mL at 23 0266    sodium chloride (NS) flush 5-40 mL  5-40 mL IntraVENous PRN      acetaminophen (TYLENOL) tablet 650 mg  650 mg Oral Q6H PRN      Or    acetaminophen (TYLENOL) suppository 650 mg  650 mg Rectal Q6H PRN      polyethylene glycol (MIRALAX) packet 17 g  17 g Oral DAILY PRN      ondansetron (ZOFRAN ODT) tablet 4 mg  4 mg Oral Q8H PRN      Or    ondansetron (ZOFRAN) injection 4 mg  4 mg IntraVENous Q6H PRN      heparin (porcine) injection 5,000 Units  5,000 Units SubCUTAneous Q8H 5,000 Units at 01/23/23 0626    cefTRIAXone (ROCEPHIN) 1 g in 0.9% sodium chloride (MBP/ADV) 50 mL MBP  1 g IntraVENous Q24H 1 g at 01/23/23 1108       Labs/Data:    Lab Results   Component Value Date/Time    WBC 9.3 01/23/2023 02:38 AM    Hemoglobin (POC) 14.6 06/04/2014 09:45 AM    HGB (POC) 11.7 (A) 12/19/2018 10:57 AM    HGB 8.6 (L) 01/23/2023 02:38 AM    Hematocrit (POC) 32 (L) 07/29/2020 02:51 PM    HCT 26.1 (L) 01/23/2023 02:38 AM    PLATELET 246 35/77/8180 02:38 AM    MCV 98.1 01/23/2023 02:38 AM       Lab Results   Component Value Date/Time    Sodium 137 01/21/2023 03:25 AM    Potassium 3.6 01/21/2023 03:25 AM    Chloride 100 01/21/2023 03:25 AM    CO2 29 01/21/2023 03:25 AM    Anion gap 8 01/21/2023 03:25 AM    Glucose 94 01/21/2023 03:25 AM    BUN 39 (H) 01/21/2023 03:25 AM    Creatinine 8.89 (H) 01/21/2023 03:25 AM    BUN/Creatinine ratio 4 (L) 01/21/2023 03:25 AM    GFR est AA 7 (L) 08/12/2022 09:42 AM    GFR est non-AA 5 (L) 08/12/2022 09:42 AM    eGFR 6 (L) 01/21/2023 03:25 AM    Calcium 9.0 01/21/2023 03:25 AM       Wt Readings from Last 3 Encounters:   01/23/23 78.9 kg (173 lb 15.1 oz)   01/20/23 80.7 kg (178 lb)   01/04/23 83.3 kg (183 lb 9.6 oz)         Intake/Output Summary (Last 24 hours) at 1/23/2023 0957  Last data filed at 1/23/2023 5873  Gross per 24 hour   Intake 240 ml   Output 150 ml   Net 90 ml         Patient seen and examined. Chart reviewed.  Labs, data and other pertinent notes reviewed in last 24 hrs.    PMH/SH/FH reviewed and unchanged compared to H&P    Discussed with pt      Sebastian Armas MD

## 2023-01-24 LAB
ANION GAP SERPL CALC-SCNC: 11 MMOL/L (ref 5–15)
BASOPHILS # BLD: 0 K/UL (ref 0–0.1)
BASOPHILS NFR BLD: 0 % (ref 0–1)
BUN SERPL-MCNC: 67 MG/DL (ref 6–20)
BUN/CREAT SERPL: 6 (ref 12–20)
CALCIUM SERPL-MCNC: 10.2 MG/DL (ref 8.5–10.1)
CHLORIDE SERPL-SCNC: 99 MMOL/L (ref 97–108)
CO2 SERPL-SCNC: 24 MMOL/L (ref 21–32)
CREAT SERPL-MCNC: 11.2 MG/DL (ref 0.7–1.3)
DIFFERENTIAL METHOD BLD: ABNORMAL
EOSINOPHIL # BLD: 0 K/UL (ref 0–0.4)
EOSINOPHIL NFR BLD: 0 % (ref 0–7)
ERYTHROCYTE [DISTWIDTH] IN BLOOD BY AUTOMATED COUNT: 12.7 % (ref 11.5–14.5)
GLUCOSE SERPL-MCNC: 142 MG/DL (ref 65–100)
HCT VFR BLD AUTO: 27.1 % (ref 36.6–50.3)
HGB BLD-MCNC: 9.1 G/DL (ref 12.1–17)
IMM GRANULOCYTES # BLD AUTO: 0 K/UL (ref 0–0.04)
IMM GRANULOCYTES NFR BLD AUTO: 0 % (ref 0–0.5)
LYMPHOCYTES # BLD: 0.6 K/UL (ref 0.8–3.5)
LYMPHOCYTES NFR BLD: 6 % (ref 12–49)
MCH RBC QN AUTO: 32.6 PG (ref 26–34)
MCHC RBC AUTO-ENTMCNC: 33.6 G/DL (ref 30–36.5)
MCV RBC AUTO: 97.1 FL (ref 80–99)
MONOCYTES # BLD: 0.2 K/UL (ref 0–1)
MONOCYTES NFR BLD: 2 % (ref 5–13)
NEUTS SEG # BLD: 10 K/UL (ref 1.8–8)
NEUTS SEG NFR BLD: 92 % (ref 32–75)
NRBC # BLD: 0 K/UL (ref 0–0.01)
NRBC BLD-RTO: 0 PER 100 WBC
PLATELET # BLD AUTO: 354 K/UL (ref 150–400)
PMV BLD AUTO: 9.6 FL (ref 8.9–12.9)
POTASSIUM SERPL-SCNC: 5 MMOL/L (ref 3.5–5.1)
RBC # BLD AUTO: 2.79 M/UL (ref 4.1–5.7)
RBC MORPH BLD: ABNORMAL
SODIUM SERPL-SCNC: 134 MMOL/L (ref 136–145)
WBC # BLD AUTO: 10.8 K/UL (ref 4.1–11.1)

## 2023-01-24 PROCEDURE — 74011000258 HC RX REV CODE- 258: Performed by: INTERNAL MEDICINE

## 2023-01-24 PROCEDURE — 74011250636 HC RX REV CODE- 250/636: Performed by: INTERNAL MEDICINE

## 2023-01-24 PROCEDURE — 85025 COMPLETE CBC W/AUTO DIFF WBC: CPT

## 2023-01-24 PROCEDURE — 94640 AIRWAY INHALATION TREATMENT: CPT

## 2023-01-24 PROCEDURE — 90935 HEMODIALYSIS ONE EVALUATION: CPT

## 2023-01-24 PROCEDURE — 99233 SBSQ HOSP IP/OBS HIGH 50: CPT | Performed by: INTERNAL MEDICINE

## 2023-01-24 PROCEDURE — 74011250637 HC RX REV CODE- 250/637: Performed by: INTERNAL MEDICINE

## 2023-01-24 PROCEDURE — 65270000046 HC RM TELEMETRY

## 2023-01-24 PROCEDURE — 36415 COLL VENOUS BLD VENIPUNCTURE: CPT

## 2023-01-24 PROCEDURE — 77010033678 HC OXYGEN DAILY

## 2023-01-24 PROCEDURE — 80048 BASIC METABOLIC PNL TOTAL CA: CPT

## 2023-01-24 PROCEDURE — 74011000250 HC RX REV CODE- 250: Performed by: INTERNAL MEDICINE

## 2023-01-24 RX ORDER — IPRATROPIUM BROMIDE AND ALBUTEROL SULFATE 2.5; .5 MG/3ML; MG/3ML
3 SOLUTION RESPIRATORY (INHALATION)
Status: DISCONTINUED | OUTPATIENT
Start: 2023-01-24 | End: 2023-01-27 | Stop reason: HOSPADM

## 2023-01-24 RX ADMIN — CARVEDILOL 12.5 MG: 12.5 TABLET, FILM COATED ORAL at 13:41

## 2023-01-24 RX ADMIN — HEPARIN SODIUM 5000 UNITS: 5000 INJECTION INTRAVENOUS; SUBCUTANEOUS at 06:30

## 2023-01-24 RX ADMIN — METHYLPREDNISOLONE SODIUM SUCCINATE 40 MG: 40 INJECTION, POWDER, FOR SOLUTION INTRAMUSCULAR; INTRAVENOUS at 13:40

## 2023-01-24 RX ADMIN — AZITHROMYCIN MONOHYDRATE 500 MG: 500 INJECTION, POWDER, LYOPHILIZED, FOR SOLUTION INTRAVENOUS at 14:57

## 2023-01-24 RX ADMIN — CEFTRIAXONE 1 G: 1 INJECTION, POWDER, FOR SOLUTION INTRAMUSCULAR; INTRAVENOUS at 13:41

## 2023-01-24 RX ADMIN — HEPARIN SODIUM 5000 UNITS: 5000 INJECTION INTRAVENOUS; SUBCUTANEOUS at 22:20

## 2023-01-24 RX ADMIN — IPRATROPIUM BROMIDE AND ALBUTEROL SULFATE 3 ML: 2.5; .5 SOLUTION RESPIRATORY (INHALATION) at 02:20

## 2023-01-24 RX ADMIN — SODIUM CHLORIDE, PRESERVATIVE FREE 10 ML: 5 INJECTION INTRAVENOUS at 06:30

## 2023-01-24 RX ADMIN — SEVELAMER CARBONATE 800 MG: 800 TABLET, FILM COATED ORAL at 13:41

## 2023-01-24 RX ADMIN — HEPARIN SODIUM 5000 UNITS: 5000 INJECTION INTRAVENOUS; SUBCUTANEOUS at 13:40

## 2023-01-24 RX ADMIN — SODIUM CHLORIDE, PRESERVATIVE FREE 10 ML: 5 INJECTION INTRAVENOUS at 13:44

## 2023-01-24 RX ADMIN — AMLODIPINE BESYLATE 10 MG: 5 TABLET ORAL at 13:41

## 2023-01-24 RX ADMIN — METHYLPREDNISOLONE SODIUM SUCCINATE 40 MG: 40 INJECTION, POWDER, FOR SOLUTION INTRAMUSCULAR; INTRAVENOUS at 22:20

## 2023-01-24 RX ADMIN — LISINOPRIL 20 MG: 20 TABLET ORAL at 13:41

## 2023-01-24 RX ADMIN — SEVELAMER CARBONATE 800 MG: 800 TABLET, FILM COATED ORAL at 17:11

## 2023-01-24 RX ADMIN — ACETAMINOPHEN 650 MG: 325 TABLET ORAL at 15:06

## 2023-01-24 RX ADMIN — SEVELAMER CARBONATE 800 MG: 800 TABLET, FILM COATED ORAL at 06:31

## 2023-01-24 RX ADMIN — METHYLPREDNISOLONE SODIUM SUCCINATE 40 MG: 40 INJECTION, POWDER, FOR SOLUTION INTRAMUSCULAR; INTRAVENOUS at 06:30

## 2023-01-24 RX ADMIN — SODIUM CHLORIDE, PRESERVATIVE FREE 10 ML: 5 INJECTION INTRAVENOUS at 22:20

## 2023-01-24 RX ADMIN — EPOETIN ALFA-EPBX 10000 UNITS: 10000 INJECTION, SOLUTION INTRAVENOUS; SUBCUTANEOUS at 22:20

## 2023-01-24 RX ADMIN — CARVEDILOL 12.5 MG: 12.5 TABLET, FILM COATED ORAL at 22:20

## 2023-01-24 RX ADMIN — IPRATROPIUM BROMIDE AND ALBUTEROL SULFATE 3 ML: 2.5; .5 SOLUTION RESPIRATORY (INHALATION) at 07:16

## 2023-01-24 NOTE — PROGRESS NOTES
ADULT PROTOCOL: JET AEROSOL ASSESSMENT    Patient  Kole Rosenberg     67 y.o.   male     1/24/2023  9:59 AM    Breath Sounds Pre Procedure: Right Breath Sounds: Clear                               Left Breath Sounds: Clear    Breath Sounds Post Procedure: Right Breath Sounds: Clear                                 Left Breath Sounds: Clear    Breathing pattern: Pre procedure Breathing Pattern: Regular          Post procedure Breathing Pattern: Regular    Heart Rate: Pre procedure Pulse: 82           Post procedure Pulse: 80    Resp Rate: Pre procedure Respirations: 18           Post procedure Respirations: 18    Cough: Pre procedure                 Post procedure      Oxygen: O2 Device: Nasal cannula   O2 Flow Rate (L/min): 2 l/min         SpO2: Pre procedure SpO2: 98 %   with oxygen              Post procedure SpO2: 100 %  with oxygen    Nebulizer Therapy: Current medications Aerosolized Medications: DuoNeb      Changed: YES    Smoking History:   Social History     Tobacco Use   Smoking Status Never   Smokeless Tobacco Never       Problem List:   Patient Active Problem List   Diagnosis Code    Hypertension, renal disease I12.9    Mixed hyperlipidemia E78.2    ESRD (end stage renal disease) (HCC) N18.6    Gastroesophageal reflux disease without esophagitis K21.9    Vitamin D deficiency E55.9    Gout of multiple sites M10.9    Chronic hepatitis C without hepatic coma (HCC) B18.2    Neck pain M54.2    Palpitations R00.2    Bruit of left carotid artery R09.89    Bilateral carotid artery stenosis I65.23    Right carotid bruit R09.89    Right acute serous otitis media H65.01    Epididymitis N45.1    Midline low back pain without sciatica M54.50    Anxiety F41.9    Chronic midline thoracic back pain M54.6, G89.29    Asymptomatic microscopic hematuria R31.21    Alcohol screening Z13.39    Lower abdominal pain R10.30    COVID-19 U07.1    Fatigue R53.83    Pneumonia of both lungs due to infectious organism J18.9    Ac[facility] hypox[facility] J96.01    CAP (community acquired pneumonia) J18.9       Respiratory Therapist: Mazin Mayberry, RT

## 2023-01-24 NOTE — PROGRESS NOTES
0700 Bedside and Verbal shift change report given to [address] (oncoming nurse) by Luz Cortés RN (offgoing nurse). Report included the following information SBAR, Kardex, ED Summary, Intake/Output, MAR, Recent Results, and Cardiac Rhythm NSR .     0750 patient off the unit to dialysis    1100 Pt in dialysis suite, will reassess at upon return to unit    1341 Patient returned from dialysis, reassessment completed    End of Shift Note    Bedside shift change report given to [address] (oncoming nurse) by Lacy Mario RN (offgoing nurse). Report included the following information SBAR, Kardex, ED Summary, Intake/Output, MAR, Recent Results, and Cardiac Rhythm NSR    Shift worked:  7a to 7p     Shift summary and any significant changes:     See above    Patient had HD today, 3L removed  Weaned patient to room air  Gave PRN tylenol once     Concerns for physician to address:  N/A     Zone phone for oncoming shift:           Activity:  Activity Level: Up with Assistance  Number times ambulated in hallways past shift: 0  Number of times OOB to chair past shift: 0    Cardiac:   Cardiac Monitoring: Yes      Cardiac Rhythm: Sinus Rhythm    Access:  Current line(s): PIV     Genitourinary:   Urinary status: oliguric    Respiratory:   O2 Device: None (Room air)  Chronic home O2 use?: NO  Incentive spirometer at bedside: NO       GI:  Last Bowel Movement Date: 01/22/23  Current diet:  ADULT DIET Regular; Low Potassium (Less than 3000 mg/day)  ADULT ORAL NUTRITION SUPPLEMENT Dinner; Renal Supplement  Passing flatus: YES  Tolerating current diet: YES       Pain Management:   Patient states pain is manageable on current regimen: YES    Skin:  Eugene Score: 20  Interventions: turn team, speciality bed, float heels, increase time out of bed, foam dressing, PT/OT consult, limit briefs, internal/external urinary devices, and nutritional support     Patient Safety:  Fall Score:  Total Score: 0  Interventions: bed/chair alarm, assistive device (walker, cane, etc), gripper socks, pt to call before getting OOB, stay with me (per policy), and gait belt       Length of Stay:  Expected LOS: 4d 0h  Actual LOS: [address] [facility], [place]

## 2023-01-24 NOTE — PROGRESS NOTES
Physician Progress Note      George Montiel  CSN #:                  [id number]  :                       1950  ADMIT DATE:       2023 10:22 AM  DISCH DATE:  RESPONDING  PROVIDER #:        Joe English MD          QUERY TEXT:    Patient admitted with CAP. Noted documentation of sepsis in H&P. In order to support the diagnosis of sepsis, please include additional clinical indicators in your documentation. Or please document if the diagnosis of sepsis has been ruled out after further study. The medical record reflects the following:  Risk Factors: CAP, esrd, resp failure  Clinical Indicators: wbc 12--13, neut 92, lac 0.6, hr 92-97  H&P-Sepsis due to pneumonia (tachycardia and leukocytosis)  Treatment: rocephin and azithromycin  Thanks,  Cecilia Valdes Rn/[facility]   Options provided:  -- Sepsis present as evidenced by, Please document evidence. -- Sepsis was ruled out after study  -- Other - I will add my own diagnosis  -- Disagree - Not applicable / Not valid  -- Disagree - Clinically unable to determine / Unknown  -- Refer to Clinical Documentation Reviewer    PROVIDER RESPONSE TEXT:    Sepsis was ruled out after study.     Query created by: Chin Bullock on 2023 11:34 AM      Electronically signed by:  Joe English MD 2023 1:14 PM

## 2023-01-24 NOTE — PROGRESS NOTES
Transition of Care Plan:    RUR: 19%  Disposition: Home    OP HD with [facility] [place] # [phone] fax# [phone]- goes T,Th and Sat - drives self    If SNF or IPR: Date FOC offered:  Date FOC received:  Date authorization started with reference number:  Date authorization received and expires:  Accepting facility:  Follow up appointments:  DME needed:  Transportation at [address] [facility] will transport self  [place] or means to access home:      patient has access  IM Medicare Letter:will need prior to discharge  Is patient a  and connected with the [place]? If yes, was [employer] transfer form completed and [facility] notified? Caregiver Contact: Liset Mejia sister [phone]  Discharge Caregiver contacted prior to discharge? Per patient  Care Conference needed?:    no    Reason for Admission:   Acute hypoxemic failure                    RUR Score:    19%              PCP: First and Last name:   Ever Vinson MD     Name of Practice:    Are you a current patient: Yes/No: yes   Approximate date of last visit:  on Jan 4th and Jan 20th   Can you participate in a virtual visit if needed: no    Do you (patient/family) have any concerns for transition/discharge?      no              Plan for utilizing home health:   none anticipated    Current Advanced Directive/Advance Care Plan:  Full Code      Healthcare Decision Maker:   Click here to complete [place] including selection of the Healthcare Decision Maker Relationship (ie \"Primary\")          Liset Mejia sister [phone]      Transition of Care Plan:            CM spoke with patient - confirmed demographics on file- lives alone in one story home with 6 steps to enter- independent with ADL's, IADL's and driving - uses no DME - goes on Tues, Thurs and Sat to [facility] [address] - patient uses [facility] on [address] for his meds- has never been to rehab or had home care in the past.  Plans to drive self home upon discharge as his car is parked in the ED parking lot at [address] [place] [address]

## 2023-01-24 NOTE — PROCEDURES
Hemodialysis / [phone]    Vitals Pre Post Assessment Pre Post   BP BP: 131/80 (01/24/23 0900) 119/74 LOC A & o x 4 A & O x 4   HR Pulse (Heart Rate): 80 (01/24/23 0900) 78 Lungs Dim bases Slightly, dim bases   Resp Resp Rate: 18 (01/24/23 0900) 184 Cardiac NSR NSR   Temp Temp: 98.7 °F (37.1 °C) (01/24/23 0900) 98.5 oral Skin Warn dry & intact Warm dry & intact   Weight  N/a N/a Edema None noted None noted   Tele status yes yes Pain Pain Intensity 1: 0 (01/24/23 0303) 0     Orders   Duration: Start: 0900 End: 1230 Total: 3.5   Dialyzer: Dialyzer/Set Up Inspection: Revaclear (01/24/23 0900)   K Bath: Dialysate K (mEq/L): 3 (01/24/23 0900)   Ca Bath: Dialysate CA (mEq/L): 2.5 (01/24/23 0900)   Na: Dialysate NA (mEq/L): 138 (01/24/23 0900)   Bicarb: Dialysate HCO3 (mEq/L): 38 (01/24/23 0900)   Target Fluid Removal: Goal/Amount of Fluid to Remove (mL): 3000 mL (01/24/23 0900)     Access   Type & Location: Left upper arm AVF   Comments:    +thrill/bruit pre and post HD. Cannulated x 2 with 15g needles.  Blood flow 400                                    Labs   HBsAg (Antigen) / date:  Neg 1/12/23                                             HBsAb (Antibody) / date: Immune 1/12/23   Source: epic   Obtained/Reviewed  Critical Results Called HGB   Date Value Ref Range Status   01/24/2023 9.1 (L) 12.1 - 17.0 g/dL Final     Potassium   Date Value Ref Range Status   01/24/2023 5.0 3.5 - 5.1 mmol/L Final     Comment:     INVESTIGATED PER DELTA CHECK PROTOCOL     Calcium   Date Value Ref Range Status   01/24/2023 10.2 (H) 8.5 - 10.1 MG/DL Final     BUN   Date Value Ref Range Status   01/24/2023 67 (H) 6 - 20 MG/DL Final     Comment:     INVESTIGATED PER DELTA CHECK PROTOCOL     Creatinine   Date Value Ref Range Status   01/24/2023 11.20 (H) 0.70 - 1.30 MG/DL Final        Meds Given   Name Dose Route                    Adequacy / Fluid    Total Liters Process: 76.2   Net Fluid Removed: 3000ml      Comments   Time Out Done: (Time) 0630   Admitting Diagnosis: ESRD-   HTN   Anemia of ESRD  COVID PNA in [address] Resp panel negative   Resp failure   Consent obtained/signed: Informed Consent Verified: Yes (01/24/23 0900)   Machine / RO # Machine Number: [id number] (01/24/23 0900)   Primary Nurse Rpt Pre: Bennie OswaldRN   Primary Nurse Rpt Post: Tammy Culp   Pt Education: Access care   Care Plan: Continue HD   Pts outpatient clinic: T/TH/S ([facility]; L AVF)     Tx Summary SHEILA AVF: Pre-assessment: skin CDI. No s/s of infection. + B/T. No issues with cannulation. Running well at . Post assessment: No issues with hemostasis, + B/T remains. Dressing CDI. SBAR received from Primary RN. Pt arrived to HD [address]. Consent signed & on file OR Chronic consent applies. 0900: Pt cannulated with 05Z needles per policy & without issue. Labs drawn per request/ order. VSS. Dialysis Tx initiated. 0930: pt. Stable, lines secure   1000: pt. Stable,lines secure and visible  1030: pt. Resting well and watching tv  1100: pt. Stable, lines secure  1130: pt. Stable,lines secure and visible  1200: pt. Resting well  1230: Tx ended. VSS. All possible blood returned to patient. Hemostasis achieved without issue. Bed locked and in the lowest position, call bell and belongings in reach. SBAR given to Primary, RN. Patient is stable at time of their/ my departure. All Dialysis related medications have been reviewed. Comments:   RN reviewed LPN assessment and completed RN assessment. RN completed patient assessment. RN reviewed technicians vital signs and procedure note. Tx completed.  Reviewed by BRONWYN Garcia

## 2023-01-24 NOTE — PROGRESS NOTES
INTERNAL MEDICINE PROGRESS NOTE    NAME:  Gurpreet Pike   :   1950   MRN:   [id number]     Date/Time:  2023 5:55 AM  Subjective:   History:  Chart reviewed and patient seen and examined and D/W his nurse this AM and all events noted. He is followed by me for HTN, HLD, ESRD on dialysis, Gout, chronic hepatitis C, DJD, Anxiety and other medical problems. He was seen by me at the office on  with SOB post Covid infection in December and found to have bilateral Pneumonia with acute hypoxemic respiratory failure and thus admitted. This AM he still feels much less short of breath since steroids added yesterday. He still has some nonproductive cough. He has noted no fevers or chills and none have been recorded. There are no other cardiac or respiratory complaints. He notes no GI or  complaints. He notes no neurologic complaints other than the fact he feels weak and lightheaded when he gets up although that is improved as well. The remainder complete review of systems is negative.     Medications reviewed:  Current Facility-Administered Medications   Medication Dose Route Frequency    methylPREDNISolone (PF) (SOLU-MEDROL) injection 40 mg  40 mg IntraVENous Q8H    albuterol-ipratropium (DUO-NEB) 2.5 MG-0.5 MG/3 ML  3 mL Nebulization Q6H RT    epoetin jade-epbx (RETACRIT) injection 10,000 Units  10,000 Units SubCUTAneous Q TUE, THU & SAT    azithromycin (ZITHROMAX) 500 mg in 0.9% sodium chloride 250 mL ([id number])  500 mg IntraVENous Q24H    albuterol (PROVENTIL HFA, VENTOLIN HFA, PROAIR HFA) inhaler 2 Puff  2 Puff Inhalation Q6H PRN    amLODIPine (NORVASC) tablet 10 mg  10 mg Oral DAILY    carvediloL (COREG) tablet 12.5 mg  12.5 mg Oral BID WITH MEALS    lisinopriL (PRINIVIL, ZESTRIL) tablet 20 mg  20 mg Oral DAILY    LORazepam (ATIVAN) tablet 0.5 mg  0.5 mg Oral BID PRN    sevelamer carbonate (RENVELA) tab 800 mg  800 mg Oral TID WITH MEALS    tiZANidine (ZANAFLEX) tablet 4 mg  4 mg Oral TID PRN sodium chloride (NS) flush 5-40 mL  5-40 mL IntraVENous Q8H    sodium chloride (NS) flush 5-40 mL  5-40 mL IntraVENous PRN    acetaminophen (TYLENOL) tablet 650 mg  650 mg Oral Q6H PRN    Or    acetaminophen (TYLENOL) suppository 650 mg  650 mg Rectal Q6H PRN    polyethylene glycol (MIRALAX) packet 17 g  17 g Oral DAILY PRN    ondansetron (ZOFRAN ODT) tablet 4 mg  4 mg Oral Q8H PRN    Or    ondansetron (ZOFRAN) injection 4 mg  4 mg IntraVENous Q6H PRN    heparin (porcine) injection 5,000 Units  5,000 Units SubCUTAneous Q8H    cefTRIAXone (ROCEPHIN) 1 g in 0.9% sodium chloride (MBP/ADV) 50 mL MBP  1 g IntraVENous Q24H        Objective:   Vitals:  Visit Vitals  /66 (BP 1 Location: Right upper arm, BP Patient Position: At rest)   Pulse 79   Temp 97.8 °F (36.6 °C)   Resp 18   Ht 5' 9\" (1.753 m)   Wt 173 lb (78.5 kg)   SpO2 100%   BMI 25.55 kg/m²    O2 Flow Rate (L/min): 2 l/min O2 Device: Nasal cannula Temp (24hrs), Av °F (36.7 °C), Min:97.7 °F (36.5 °C), Max:98.2 °F (36.8 °C)      Last 24hr Input/Output:    Intake/Output Summary (Last 24 hours) at 2023 0555  Last data filed at 2023 1219  Gross per 24 hour   Intake 480 ml   Output --   Net 480 ml          PHYSICAL EXAM:  General:     Alert, cooperative, no distress, appears stated age. Head:    Normocephalic, without obvious abnormality, atraumatic. Eyes:    Conjunctivae/corneas clear. PERRLA  Nose:   Nares normal. No drainage or sinus tenderness. Throat:     Lips, mucosa, and tongue normal.  No Thrush  Neck:   Supple, symmetrical,  no adenopathy, thyroid: non tender     no carotid bruit and no JVD. Back:     Symmetric,  No CVA tenderness. Lungs:    Clear to auscultation bilaterally except occasional scattered rhonchi. No Wheezing or Rhonchi. No rales. Heart:    Regular rate and rhythm,  no murmur, rub or gallop. Abdomen:    Soft, non-tender. Not distended.   Bowel sounds normal. No masses  Extremities:  Extremities normal, atraumatic, No cyanosis. No edema. No clubbing  Lymph nodes:  Cervical, supraclavicular normal.  Neurologic:  Normal strength, Alert and oriented X 3.    Skin:                No rash      Lab Data Reviewed:    Recent Results (from the past 24 hour(s))   ECHO ADULT COMPLETE    Collection Time: 01/23/23  2:48 PM   Result Value Ref Range    IVSd 1.4 (A) 0.6 - 1.0 cm    LVIDd 4.7 4.2 - 5.9 cm    LVIDs 3.4 cm    LVOT Diameter 2.2 cm    LVPWd 1.2 (A) 0.6 - 1.0 cm    EF BP 60 55 - 100 %    LV Ejection Fraction A2C 44 %    LV Ejection Fraction A4C 68 %    LV EDV A2C 91 mL    LV EDV A4C 89 mL    LV EDV BP 96 67 - 155 mL    LV ESV A2C 51 mL    LV ESV A4C 29 mL    LV ESV BP 38 22 - 58 mL    LVOT Peak Gradient 4 mmHg    LVOT Mean Gradient 2 mmHg    LVOT SV 68.0 ml    LVOT Peak Velocity 1.0 m/s    LVOT VTI 17.9 cm    LA Diameter 3.1 cm    LA Volume A/L 47 mL    LA Volume 2C 43 18 - 58 mL    LA Volume 4C 36 18 - 58 mL    AV Area by Peak Velocity 2.5 cm2    AV Area by VTI 2.5 cm2    AV Peak Gradient 8 mmHg    AV Mean Gradient 4 mmHg    AV Peak Velocity 1.4 m/s    AV Mean Velocity 0.9 m/s    AV VTI 26.2 cm    MV A Velocity 0.88 m/s    MV E Wave Deceleration Time 217.7 ms    MV E Velocity 0.72 m/s    MV Area by VTI 2.3 cm2    MR Peak Gradient 64 mmHg    MR Peak Velocity 4.0 m/s    MV Peak Gradient 3 mmHg    MV Mean Gradient 2 mmHg    MV Max Velocity 0.9 m/s    MV Mean Velocity 0.6 m/s    MV VTI 30.2 cm    PV Peak Gradient 4 mmHg    PV Max Velocity 1.0 m/s    TAPSE 1.8 1.7 cm    Pulm Vein Peak D Velocity 0.4 m/s    Pulm Vein Peak S Velocity 0.6 m/s    Pulm Vein S/D 1.5 no units    Fractional Shortening 2D 28 28 - 44 %    LV ESV Index BP 20 mL/m2    LV EDV Index BP 49 mL/m2    LV ESV Index A4C 15 mL/m2    LV EDV Index A4C 46 mL/m2    LV ESV Index A2C 26 mL/m2    LV EDV Index A2C 47 mL/m2    LVIDd Index 2.42 cm/m2    LVIDs Index 1.75 cm/m2    LV RWT Ratio 0.51     LV Mass 2D 237.9 (A) 88 - 224 g    LV Mass 2D Index 122.6 (A) 49 - 115 g/m2    MV E/A 0. 82     LA Volume Index A/L 24 16 - 34 mL/m2    LVOT Stroke Volume Index 35.1 mL/m2    LVOT Area 3.8 cm2    LA Volume Index 2C 22 16 - 34 mL/m2    LA Volume Index 4C 19 16 - 34 mL/m2    LA Size Index 1.60 cm/m2    AV Velocity Ratio 0.71     LVOT:AV VTI Index 0.68     RICHA/BSA VTI 1.3 cm2/m2    RICHA/BSA Peak Velocity 1.3 cm2/m2    MV:LVOT VTI Index 1.69    CBC WITH AUTOMATED DIFF    Collection Time: 01/24/23  2:43 AM   Result Value Ref Range    WBC 10.8 4.1 - 11.1 K/uL    RBC 2.79 (L) 4.10 - 5.70 M/uL    HGB 9.1 (L) 12.1 - 17.0 g/dL    HCT 27.1 (L) 36.6 - 50.3 %    MCV 97.1 80.0 - 99.0 FL    MCH 32.6 26.0 - 34.0 PG    MCHC 33.6 30.0 - 36.5 g/dL    RDW 12.7 11.5 - 14.5 %    PLATELET 409 138 - 782 K/uL    MPV 9.6 8.9 - 12.9 FL    NRBC 0.0 0  WBC    ABSOLUTE NRBC 0.00 0.00 - 0.01 K/uL    NEUTROPHILS 92 (H) 32 - 75 %    LYMPHOCYTES 6 (L) 12 - 49 %    MONOCYTES 2 (L) 5 - 13 %    EOSINOPHILS 0 0 - 7 %    BASOPHILS 0 0 - 1 %    IMMATURE GRANULOCYTES 0 0.0 - 0.5 %    ABS. NEUTROPHILS 10.0 (H) 1.8 - 8.0 K/UL    ABS. LYMPHOCYTES 0.6 (L) 0.8 - 3.5 K/UL    ABS. MONOCYTES 0.2 0.0 - 1.0 K/UL    ABS. EOSINOPHILS 0.0 0.0 - 0.4 K/UL    ABS. BASOPHILS 0.0 0.0 - 0.1 K/UL    ABS. IMM.  GRANS. 0.0 0.00 - 0.04 K/UL    DF MANUAL      RBC COMMENTS NORMOCYTIC, NORMOCHROMIC     METABOLIC PANEL, BASIC    Collection Time: 01/24/23  2:43 AM   Result Value Ref Range    Sodium 134 (L) 136 - 145 mmol/L    Potassium 5.0 3.5 - 5.1 mmol/L    Chloride 99 97 - 108 mmol/L    CO2 24 21 - 32 mmol/L    Anion gap 11 5 - 15 mmol/L    Glucose 142 (H) 65 - 100 mg/dL    BUN 67 (H) 6 - 20 MG/DL    Creatinine 11.20 (H) 0.70 - 1.30 MG/DL    BUN/Creatinine ratio 6 (L) 12 - 20      eGFR 4 (L) >60 ml/min/1.73m2    Calcium 10.2 (H) 8.5 - 10.1 MG/DL         Assessment/Plan:     Principal Problem:    Acute hypoxemic respiratory failure (HCC) (1/20/2023)    Active Problems:    Hypertension, renal disease (10/22/2017)      [facility] ([facility] (10/22/2017)      Gastroesophageal reflux disease without esophagitis (10/22/2017)      COVID-19 (1/3/2023)      Overview: Dec 11, 2022 - Tx Paxlovid      CAP (community acquired pneumonia) (1/20/2023)     ___________________________________________________  PLAN:    1. Continue Rocephin and Zithromax for pneumonia  2. Continue supplemental oxygen to keep sat greater than 92%  3. I added Solu-Medrol in light of his continued shortness of breath and noting he had a recent COVID infection  4. ProAir inhaler as needed and supplement with DuoNeb if needed  5. Currently on Norvasc 10 mg daily and Coreg 12.5 twice daily as well as lisinopril 20 daily so we will watch blood pressure closely in light of his complaint of lightheadedness. Except 1 isolated systolic 246 blood pressures have been well  6. Anemia so continue Retacrit   7. ESRD dialysis per renal with dialysis today  8. Follow-up on Echo: Results as follows reviewed:  Mild concentric LVH, low normal EF of 50 to 57%, grade 1 diastolic dysfunction, mild mitral regurgitation    50 Minutes spent today in direct care of this high complexity patient with greater than 50% in counseling and coordination of care.     If need to contact me use hospital  [phone], DO NOT USE PERFECT SERVE    ___________________________________________________    Attending Physician: Dannie Mesa MD

## 2023-01-24 NOTE — PROGRESS NOTES
TRANSFER - IN REPORT:    Verbal report received from Negra Tijerina on Scooby Apodaca  being received from [facility](unit) for ordered procedure      Report consisted of patients Situation, Background, Assessment and   Recommendations(SBAR). Information from the following report(s) Kardex was reviewed with the receiving nurse. Opportunity for questions and clarification was provided. Assessment completed upon patients arrival to unit and care assumed.

## 2023-01-24 NOTE — PROGRESS NOTES
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  He can follow up in our outpt center as usual.

## 2023-01-24 NOTE — PROGRESS NOTES
Name: Consuelo Lam   MRN: [id number]  : 1950      Assessment:                                    Plan:  ESRD-T/TH/S ([facility]; L [facility]F)    HTN    Anemia of ESRD  COVID PNA in [address]. Resp panel negative    Resp failure    Has aneurysmal changes to avf-pt states VSA is aware    Seen on HD at 1005. SBP stable.     CXR with b/l infiltrates favoring viral PNA  Ct O2  ABx  H/H not at goal.  Continue  MARJORIE       Subjective:  No complaints    ROS:   No nausea, no vomiting  No chest pain,     Exam:  Visit Vitals  /67   Pulse 76   Temp 98.7 °F (37.1 °C) (Oral)   Resp 18   Ht 5' 9\" (1.753 m)   Wt 78.5 kg (173 lb)   SpO2 98%   BMI 25.55 kg/m²     Ill appearing in NAD  No icterus  Coarse rales at bases  RRR  No sig edema       Current Facility-Administered Medications   Medication Dose Route Frequency Last Admin    albuterol-ipratropium (DUO-NEB) 2.5 MG-0.5 MG/3 ML  3 mL Nebulization Q4H PRN      methylPREDNISolone (PF) (SOLU-MEDROL) injection 40 mg  40 mg IntraVENous Q8H 40 mg at 23 0630    epoetin jade-epbx (RETACRIT) injection 10,000 Units  10,000 Units SubCUTAneous Q TUE, THU & SAT 10,000 Units at 23 2238    azithromycin (ZITHROMAX) 500 mg in 0.9% sodium chloride 250 mL ([id number])  500 mg IntraVENous Q24H 500 mg at 23 1217    albuterol (PROVENTIL HFA, VENTOLIN HFA, PROAIR HFA) inhaler 2 Puff  2 Puff Inhalation Q6H PRN      amLODIPine (NORVASC) tablet 10 mg  10 mg Oral DAILY 10 mg at 23    carvediloL (COREG) tablet 12.5 mg  12.5 mg Oral BID WITH MEALS 12.5 mg at 23    lisinopriL (PRINIVIL, ZESTRIL) tablet 20 mg  20 mg Oral DAILY 20 mg at 23 1059    LORazepam (ATIVAN) tablet 0.5 mg  0.5 mg Oral BID PRN      sevelamer carbonate (RENVELA) tab 800 mg  800 mg Oral TID WITH MEALS 800 mg at 23 0631    tiZANidine ([place]) tablet 4 mg  4 mg Oral TID PRN      sodium chloride (NS) flush 5-40 mL  5-40 mL IntraVENous Q8H 10 mL at 01/24/23 0630    sodium chloride (NS) flush 5-40 mL  5-40 mL IntraVENous PRN      acetaminophen (TYLENOL) tablet 650 mg  650 mg Oral Q6H PRN      Or    acetaminophen (TYLENOL) suppository 650 mg  650 mg Rectal Q6H PRN      polyethylene glycol (MIRALAX) packet 17 g  17 g Oral DAILY PRN      ondansetron (ZOFRAN ODT) tablet 4 mg  4 mg Oral Q8H PRN      Or    ondansetron (ZOFRAN) injection 4 mg  4 mg IntraVENous Q6H PRN      heparin (porcine) injection 5,000 Units  5,000 Units SubCUTAneous Q8H 5,000 Units at 01/24/23 0630    cefTRIAXone (ROCEPHIN) 1 g in 0.9% sodium chloride (MBP/ADV) 50 mL MBP  1 g IntraVENous Q24H 1 g at 01/23/23 1881       Labs/Data:    Lab Results   Component Value Date/Time    WBC 10.8 01/24/2023 02:43 AM    Hemoglobin (POC) 14.6 06/04/2014 09:45 AM    HGB (POC) 11.7 (A) 12/19/2018 10:57 AM    HGB 9.1 (L) 01/24/2023 02:43 AM    Hematocrit (POC) 32 (L) 07/29/2020 02:51 PM    HCT 27.1 (L) 01/24/2023 02:43 AM    PLATELET 492 92/87/0316 02:43 AM    MCV 97.1 01/24/2023 02:43 AM       Lab Results   Component Value Date/Time    Sodium 134 (L) 01/24/2023 02:43 AM    Potassium 5.0 01/24/2023 02:43 AM    Chloride 99 01/24/2023 02:43 AM    CO2 24 01/24/2023 02:43 AM    Anion gap 11 01/24/2023 02:43 AM    Glucose 142 (H) 01/24/2023 02:43 AM    BUN 67 (H) 01/24/2023 02:43 AM    Creatinine 11.20 (H) 01/24/2023 02:43 AM    BUN/Creatinine ratio 6 (L) 01/24/2023 02:43 AM    GFR est AA 7 (L) 08/12/2022 09:42 AM    GFR est non-AA 5 (L) 08/12/2022 09:42 AM    eGFR 4 (L) 01/24/2023 02:43 AM    Calcium 10.2 (H) 01/24/2023 02:43 AM       Wt Readings from Last 3 Encounters:   01/23/23 78.5 kg (173 lb)   01/20/23 80.7 kg (178 lb)   01/04/23 83.3 kg (183 lb 9.6 oz)         Intake/Output Summary (Last 24 hours) at 1/24/2023 1156  Last data filed at 1/23/2023 1219  Gross per 24 hour   Intake 240 ml   Output --   Net 240 ml Patient seen and examined. Chart reviewed. Labs, data and other pertinent notes reviewed in last 24 hrs.     PMH/SH/FH reviewed and unchanged compared to H&P    Discussed with pt      Dani Duarte MD

## 2023-01-25 LAB
ANION GAP SERPL CALC-SCNC: 11 MMOL/L (ref 5–15)
BASOPHILS # BLD: 0 K/UL (ref 0–0.1)
BASOPHILS NFR BLD: 0 % (ref 0–1)
BUN SERPL-MCNC: 55 MG/DL (ref 6–20)
BUN/CREAT SERPL: 7 (ref 12–20)
CALCIUM SERPL-MCNC: 10 MG/DL (ref 8.5–10.1)
CHLORIDE SERPL-SCNC: 93 MMOL/L (ref 97–108)
CO2 SERPL-SCNC: 31 MMOL/L (ref 21–32)
CREAT SERPL-MCNC: 8.22 MG/DL (ref 0.7–1.3)
DIFFERENTIAL METHOD BLD: ABNORMAL
EOSINOPHIL # BLD: 0 K/UL (ref 0–0.4)
EOSINOPHIL NFR BLD: 0 % (ref 0–7)
ERYTHROCYTE [DISTWIDTH] IN BLOOD BY AUTOMATED COUNT: 13 % (ref 11.5–14.5)
GLUCOSE SERPL-MCNC: 132 MG/DL (ref 65–100)
HCT VFR BLD AUTO: 29.3 % (ref 36.6–50.3)
HGB BLD-MCNC: 9.3 G/DL (ref 12.1–17)
IMM GRANULOCYTES # BLD AUTO: 0.5 K/UL (ref 0–0.04)
IMM GRANULOCYTES NFR BLD AUTO: 2 % (ref 0–0.5)
LYMPHOCYTES # BLD: 0.9 K/UL (ref 0.8–3.5)
LYMPHOCYTES NFR BLD: 4 % (ref 12–49)
MCH RBC QN AUTO: 32.1 PG (ref 26–34)
MCHC RBC AUTO-ENTMCNC: 31.7 G/DL (ref 30–36.5)
MCV RBC AUTO: 101 FL (ref 80–99)
MONOCYTES # BLD: 0.9 K/UL (ref 0–1)
MONOCYTES NFR BLD: 4 % (ref 5–13)
NEUTS SEG # BLD: 21.2 K/UL (ref 1.8–8)
NEUTS SEG NFR BLD: 90 % (ref 32–75)
NRBC # BLD: 0.04 K/UL (ref 0–0.01)
NRBC BLD-RTO: 0.2 PER 100 WBC
PLATELET # BLD AUTO: 396 K/UL (ref 150–400)
PMV BLD AUTO: 9.6 FL (ref 8.9–12.9)
POTASSIUM SERPL-SCNC: 4.7 MMOL/L (ref 3.5–5.1)
RBC # BLD AUTO: 2.9 M/UL (ref 4.1–5.7)
RBC MORPH BLD: ABNORMAL
RBC MORPH BLD: ABNORMAL
SODIUM SERPL-SCNC: 135 MMOL/L (ref 136–145)
WBC # BLD AUTO: 23.5 K/UL (ref 4.1–11.1)

## 2023-01-25 PROCEDURE — 74011000258 HC RX REV CODE- 258: Performed by: INTERNAL MEDICINE

## 2023-01-25 PROCEDURE — 74011250637 HC RX REV CODE- 250/637: Performed by: INTERNAL MEDICINE

## 2023-01-25 PROCEDURE — 99233 SBSQ HOSP IP/OBS HIGH 50: CPT | Performed by: INTERNAL MEDICINE

## 2023-01-25 PROCEDURE — 80048 BASIC METABOLIC PNL TOTAL CA: CPT

## 2023-01-25 PROCEDURE — 65270000046 HC RM TELEMETRY

## 2023-01-25 PROCEDURE — 74011250636 HC RX REV CODE- 250/636: Performed by: INTERNAL MEDICINE

## 2023-01-25 PROCEDURE — 74011000250 HC RX REV CODE- 250: Performed by: INTERNAL MEDICINE

## 2023-01-25 PROCEDURE — 85025 COMPLETE CBC W/AUTO DIFF WBC: CPT

## 2023-01-25 PROCEDURE — 36415 COLL VENOUS BLD VENIPUNCTURE: CPT

## 2023-01-25 RX ADMIN — AMLODIPINE BESYLATE 10 MG: 5 TABLET ORAL at 23:40

## 2023-01-25 RX ADMIN — AZITHROMYCIN MONOHYDRATE 500 MG: 500 INJECTION, POWDER, LYOPHILIZED, FOR SOLUTION INTRAVENOUS at 14:00

## 2023-01-25 RX ADMIN — CEFTRIAXONE 1 G: 1 INJECTION, POWDER, FOR SOLUTION INTRAMUSCULAR; INTRAVENOUS at 10:42

## 2023-01-25 RX ADMIN — CARVEDILOL 12.5 MG: 12.5 TABLET, FILM COATED ORAL at 23:40

## 2023-01-25 RX ADMIN — HEPARIN SODIUM 5000 UNITS: 5000 INJECTION INTRAVENOUS; SUBCUTANEOUS at 05:23

## 2023-01-25 RX ADMIN — SEVELAMER CARBONATE 800 MG: 800 TABLET, FILM COATED ORAL at 16:44

## 2023-01-25 RX ADMIN — METHYLPREDNISOLONE SODIUM SUCCINATE 20 MG: 40 INJECTION, POWDER, FOR SOLUTION INTRAMUSCULAR; INTRAVENOUS at 23:40

## 2023-01-25 RX ADMIN — SEVELAMER CARBONATE 800 MG: 800 TABLET, FILM COATED ORAL at 11:58

## 2023-01-25 RX ADMIN — HEPARIN SODIUM 5000 UNITS: 5000 INJECTION INTRAVENOUS; SUBCUTANEOUS at 23:40

## 2023-01-25 RX ADMIN — SODIUM CHLORIDE, PRESERVATIVE FREE 10 ML: 5 INJECTION INTRAVENOUS at 16:47

## 2023-01-25 RX ADMIN — SODIUM CHLORIDE, PRESERVATIVE FREE 10 ML: 5 INJECTION INTRAVENOUS at 05:23

## 2023-01-25 RX ADMIN — LISINOPRIL 20 MG: 20 TABLET ORAL at 10:42

## 2023-01-25 RX ADMIN — SEVELAMER CARBONATE 800 MG: 800 TABLET, FILM COATED ORAL at 08:15

## 2023-01-25 RX ADMIN — CARVEDILOL 12.5 MG: 12.5 TABLET, FILM COATED ORAL at 10:42

## 2023-01-25 RX ADMIN — HEPARIN SODIUM 5000 UNITS: 5000 INJECTION INTRAVENOUS; SUBCUTANEOUS at 14:00

## 2023-01-25 RX ADMIN — METHYLPREDNISOLONE SODIUM SUCCINATE 20 MG: 40 INJECTION, POWDER, FOR SOLUTION INTRAMUSCULAR; INTRAVENOUS at 14:00

## 2023-01-25 RX ADMIN — METHYLPREDNISOLONE SODIUM SUCCINATE 40 MG: 40 INJECTION, POWDER, FOR SOLUTION INTRAMUSCULAR; INTRAVENOUS at 05:23

## 2023-01-25 NOTE — PROGRESS NOTES
Name: Kedar Faustin   MRN: [id number]  : 1950      Assessment:                                    Plan:  ESRD-T//S ([facility]; L [facility])    HTN    Anemia of ESRD  COVID PNA in [address] Resp panel negative    Resp failure    Has aneurysmal changes to avf-pt states VSA is aware    No acute need for HD today. Plan HD in AM.      CXR with b/l infiltrates favoring viral PNA  Ct O2  ABx  H/H not at goal.  Continue  MARJORIE       Subjective: \"They don't use heparin during my treatment here. \"  No N/V. No bleeding. No dyspnea.     ROS:   No nausea, no vomiting  No chest pain,     Exam:  Visit Vitals  /69 (BP 1 Location: Right upper arm, BP Patient Position: At rest)   Pulse 77   Temp 97.2 °F (36.2 °C)   Resp 16   Ht 5' 9\" (1.753 m)   Wt 78.5 kg (173 lb)   SpO2 100%   BMI 25.55 kg/m²     Ill appearing in NAD  No icterus  Coarse rales at bases  RRR  No sig edema       Current Facility-Administered Medications   Medication Dose Route Frequency Last Admin    methylPREDNISolone (PF) (SOLU-MEDROL) injection 20 mg  20 mg IntraVENous Q8H      albuterol-ipratropium (DUO-NEB) 2.5 MG-0.5 MG/3 ML  3 mL Nebulization Q4H PRN      azithromycin (ZITHROMAX) 500 mg in 0.9% sodium chloride 250 mL ([id number])  500 mg IntraVENous Q24H 500 mg at 23 1457    epoetin jade-epbx (RETACRIT) injection 10,000 Units  10,000 Units SubCUTAneous Q TUE, THU & SAT 10,000 Units at 23 2220    albuterol (PROVENTIL HFA, VENTOLIN HFA, PROAIR HFA) inhaler 2 Puff  2 Puff Inhalation Q6H PRN      amLODIPine (NORVASC) tablet 10 mg  10 mg Oral DAILY 10 mg at 23 1341    carvediloL (COREG) tablet 12.5 mg  12.5 mg Oral BID WITH MEALS 12.5 mg at 23 2220    lisinopriL (PRINIVIL, ZESTRIL) tablet 20 mg  20 mg Oral DAILY 20 mg at 23 1341    LORazepam (ATIVAN) tablet 0.5 mg  0.5 mg Oral BID PRN sevelamer carbonate (RENVELA) tab 800 mg  800 mg Oral TID WITH MEALS 800 mg at 01/25/23 0815    tiZANidine (ZANAFLEX) tablet 4 mg  4 mg Oral TID PRN      sodium chloride (NS) flush 5-40 mL  5-40 mL IntraVENous Q8H 10 mL at 01/25/23 0523    sodium chloride (NS) flush 5-40 mL  5-40 mL IntraVENous PRN      acetaminophen (TYLENOL) tablet 650 mg  650 mg Oral Q6H  mg at 01/24/23 1506    Or    acetaminophen (TYLENOL) suppository 650 mg  650 mg Rectal Q6H PRN      polyethylene glycol (MIRALAX) packet 17 g  17 g Oral DAILY PRN      ondansetron (ZOFRAN ODT) tablet 4 mg  4 mg Oral Q8H PRN      Or    ondansetron (ZOFRAN) injection 4 mg  4 mg IntraVENous Q6H PRN      heparin (porcine) injection 5,000 Units  5,000 Units SubCUTAneous Q8H 5,000 Units at 01/25/23 0523    cefTRIAXone (ROCEPHIN) 1 g in 0.9% sodium chloride (MBP/ADV) 50 mL MBP  1 g IntraVENous Q24H 1 g at 01/24/23 1341       Labs/Data:    Lab Results   Component Value Date/Time    WBC 23.5 (H) 01/25/2023 05:22 AM    Hemoglobin (POC) 14.6 06/04/2014 09:45 AM    HGB (POC) 11.7 (A) 12/19/2018 10:57 AM    HGB 9.3 (L) 01/25/2023 05:22 AM    Hematocrit (POC) 32 (L) 07/29/2020 02:51 PM    HCT 29.3 (L) 01/25/2023 05:22 AM    PLATELET 227 73/23/9378 05:22 AM    .0 (H) 01/25/2023 05:22 AM       Lab Results   Component Value Date/Time    Sodium 135 (L) 01/25/2023 05:22 AM    Potassium 4.7 01/25/2023 05:22 AM    Chloride 93 (L) 01/25/2023 05:22 AM    CO2 31 01/25/2023 05:22 AM    Anion gap 11 01/25/2023 05:22 AM    Glucose 132 (H) 01/25/2023 05:22 AM    BUN 55 (H) 01/25/2023 05:22 AM    Creatinine 8.22 (H) 01/25/2023 05:22 AM    BUN/Creatinine ratio 7 (L) 01/25/2023 05:22 AM    GFR est AA 7 (L) 08/12/2022 09:42 AM    GFR est non-AA 5 (L) 08/12/2022 09:42 AM    eGFR 6 (L) 01/25/2023 05:22 AM    Calcium 10.0 01/25/2023 05:22 AM       Wt Readings from Last 3 Encounters:   01/23/23 78.5 kg (173 lb)   01/20/23 80.7 kg (178 lb)   01/04/23 83.3 kg (183 lb 9.6 oz) Intake/Output Summary (Last 24 hours) at 1/25/2023 8277  Last data filed at 1/24/2023 1230  Gross per 24 hour   Intake --   Output 3000 ml   Net -3000 ml         Patient seen and examined. Chart reviewed. Labs, data and other pertinent notes reviewed in last 24 hrs.     PMH/SH/FH reviewed and unchanged compared to H&P    Discussed with pt      Guerline Osborne MD

## 2023-01-25 NOTE — PROGRESS NOTES
INTERNAL MEDICINE PROGRESS NOTE    NAME:  Epi Castellanos   :   1950   MRN:   [id number]     Date/Time:  2023 5:54 AM  Subjective:   History:  Chart reviewed and patient seen and examined and D/W his nurse this AM and all events noted. He is followed by me for HTN, HLD, ESRD on dialysis, Gout, chronic hepatitis C, DJD, Anxiety and other medical problems. He was seen by me at the office on  with SOB post Covid infection in December and found to have bilateral Pneumonia with acute hypoxemic respiratory failure and thus admitted. This AM he feels much less short of breath since steroids . He still has a little nonproductive cough. He has noted no fevers or chills and none have been recorded. There are no other cardiac or respiratory complaints. He notes no GI or  complaints. He notes no neurologic complaints other than the fact he feels weak and lightheaded when he gets up although that has improved as well. The remainder complete review of systems is negative.     Medications reviewed:  Current Facility-Administered Medications   Medication Dose Route Frequency    methylPREDNISolone (PF) (SOLU-MEDROL) injection 20 mg  20 mg IntraVENous Q8H    albuterol-ipratropium (DUO-NEB) 2.5 MG-0.5 MG/3 ML  3 mL Nebulization Q4H PRN    azithromycin (ZITHROMAX) 500 mg in 0.9% sodium chloride 250 mL ([id number])  500 mg IntraVENous Q24H    epoetin jade-epbx (RETACRIT) injection 10,000 Units  10,000 Units SubCUTAneous Q TUE, THU & SAT    albuterol (PROVENTIL HFA, VENTOLIN HFA, PROAIR HFA) inhaler 2 Puff  2 Puff Inhalation Q6H PRN    amLODIPine (NORVASC) tablet 10 mg  10 mg Oral DAILY    carvediloL (COREG) tablet 12.5 mg  12.5 mg Oral BID WITH MEALS    lisinopriL (PRINIVIL, ZESTRIL) tablet 20 mg  20 mg Oral DAILY    LORazepam (ATIVAN) tablet 0.5 mg  0.5 mg Oral BID PRN    sevelamer carbonate (RENVELA) tab 800 mg  800 mg Oral TID WITH MEALS    tiZANidine (ZANAFLEX) tablet 4 mg  4 mg Oral TID PRN    sodium chloride (NS) flush 5-40 mL  5-40 mL IntraVENous Q8H    sodium chloride (NS) flush 5-40 mL  5-40 mL IntraVENous PRN    acetaminophen (TYLENOL) tablet 650 mg  650 mg Oral Q6H PRN    Or    acetaminophen (TYLENOL) suppository 650 mg  650 mg Rectal Q6H PRN    polyethylene glycol (MIRALAX) packet 17 g  17 g Oral DAILY PRN    ondansetron (ZOFRAN ODT) tablet 4 mg  4 mg Oral Q8H PRN    Or    ondansetron (ZOFRAN) injection 4 mg  4 mg IntraVENous Q6H PRN    heparin (porcine) injection 5,000 Units  5,000 Units SubCUTAneous Q8H    cefTRIAXone (ROCEPHIN) 1 g in 0.9% sodium chloride (MBP/ADV) 50 mL MBP  1 g IntraVENous Q24H        Objective:   Vitals:  Visit Vitals  /65 (BP 1 Location: Right upper arm, BP Patient Position: At rest)   Pulse 77   Temp 97.6 °F (36.4 °C)   Resp 16   Ht 5' 9\" (1.753 m)   Wt 173 lb (78.5 kg)   SpO2 100%   BMI 25.55 kg/m²    O2 Flow Rate (L/min): 2 l/min O2 Device: Nasal cannula Temp (24hrs), Av °F (36.7 °C), Min:97.2 °F (36.2 °C), Max:98.5 °F (36.9 °C)      Last 24hr Input/Output:    Intake/Output Summary (Last 24 hours) at 2023 1154  Last data filed at 2023 1230  Gross per 24 hour   Intake --   Output 3000 ml   Net -3000 ml        PHYSICAL EXAM:  General:     Alert, cooperative, no distress, appears stated age. Head:    Normocephalic, without obvious abnormality, atraumatic. Eyes:    Conjunctivae/corneas clear. PERRLA  Nose:   Nares normal. No drainage or sinus tenderness. Throat:     Lips, mucosa, and tongue normal.  No Thrush  Neck:   Supple, symmetrical,  no adenopathy, thyroid: non tender     no carotid bruit and no JVD. Back:     Symmetric,  No CVA tenderness. Lungs:    Clear to auscultation bilaterally except occasional scattered rhonchi. No Wheezing or Rhonchi. No rales. Heart:    Regular rate and rhythm,  no murmur, rub or gallop. Abdomen:    Soft, non-tender. Not distended.   Bowel sounds normal. No masses  Extremities:  Extremities normal, atraumatic, No cyanosis. No edema. No clubbing  Lymph nodes:  Cervical, supraclavicular normal.  Neurologic:  Normal strength, Alert and oriented X 3. Skin:                No rash      Lab Data Reviewed:    Recent Results (from the past 24 hour(s))   CBC WITH AUTOMATED DIFF    Collection Time: 01/25/23  5:22 AM   Result Value Ref Range    WBC 23.5 (H) 4.1 - 11.1 K/uL    RBC 2.90 (L) 4.10 - 5.70 M/uL    HGB 9.3 (L) 12.1 - 17.0 g/dL    HCT 29.3 (L) 36.6 - 50.3 %    .0 (H) 80.0 - 99.0 FL    MCH 32.1 26.0 - 34.0 PG    MCHC 31.7 30.0 - 36.5 g/dL    RDW 13.0 11.5 - 14.5 %    PLATELET 231 723 - 846 K/uL    MPV 9.6 8.9 - 12.9 FL    NRBC 0.2 (H) 0  WBC    ABSOLUTE NRBC 0.04 (H) 0.00 - 0.01 K/uL    NEUTROPHILS 90 (H) 32 - 75 %    LYMPHOCYTES 4 (L) 12 - 49 %    MONOCYTES 4 (L) 5 - 13 %    EOSINOPHILS 0 0 - 7 %    BASOPHILS 0 0 - 1 %    IMMATURE GRANULOCYTES 2 (H) 0.0 - 0.5 %    ABS. NEUTROPHILS 21.2 (H) 1.8 - 8.0 K/UL    ABS. LYMPHOCYTES 0.9 0.8 - 3.5 K/UL    ABS. MONOCYTES 0.9 0.0 - 1.0 K/UL    ABS. EOSINOPHILS 0.0 0.0 - 0.4 K/UL    ABS. BASOPHILS 0.0 0.0 - 0.1 K/UL    ABS. IMM.  GRANS. 0.5 (H) 0.00 - 0.04 K/UL    DF SMEAR SCANNED      RBC COMMENTS MACROCYTOSIS  1+        RBC COMMENTS POLYCHROMASIA  1+       METABOLIC PANEL, BASIC    Collection Time: 01/25/23  5:22 AM   Result Value Ref Range    Sodium 135 (L) 136 - 145 mmol/L    Potassium 4.7 3.5 - 5.1 mmol/L    Chloride 93 (L) 97 - 108 mmol/L    CO2 31 21 - 32 mmol/L    Anion gap 11 5 - 15 mmol/L    Glucose 132 (H) 65 - 100 mg/dL    BUN 55 (H) 6 - 20 MG/DL    Creatinine 8.22 (H) 0.70 - 1.30 MG/DL    BUN/Creatinine ratio 7 (L) 12 - 20      eGFR 6 (L) >60 ml/min/1.73m2    Calcium 10.0 8.5 - 10.1 MG/DL         Assessment/Plan:     Principal Problem:    Acute hypoxemic respiratory failure (HCC) (1/20/2023)    Active Problems:    Hypertension, renal disease (10/22/2017)      ESRD (end stage renal disease) ([facility] (10/22/2017)      Gastroesophageal reflux disease without esophagitis (10/22/2017)      COVID-19 (1/3/2023)      Overview: Dec 11, 2022 - Tx Paxlovid      CAP (community acquired pneumonia) (1/20/2023)     ___________________________________________________  PLAN:    1. Continue Rocephin and Zithromax for pneumonia  2. Continue supplemental oxygen to keep sat greater than 92%, sat 100% on 2 L at time of my visit  3. I added Solu-Medrol in light of his continued shortness of breath and noting he had a recent COVID infection. I will taper that starting today  4. ProAir inhaler as needed and supplement with DuoNeb if needed  5. Currently on Norvasc 10 mg daily and Coreg 12.5 twice daily as well as lisinopril 20 daily so we will watch blood pressure closely in light of his complaint of lightheadedness. Except 1 isolated systolic low blood pressures have been well  6. Anemia so continue Retacrit   7. ESRD dialysis per renal with dialysis yesterday  8. Follow-up on Echo: Results as follows reviewed:  Mild concentric LVH, low normal EF of 50 to 93%, grade 1 diastolic dysfunction, mild mitral regurgitation    50 Minutes spent today in direct care of this high complexity patient with greater than 50% in counseling and coordination of care.     If need to contact me use hospital  [phone], DO NOT USE PERFECT SERVE    ___________________________________________________    Attending Physician: Yariel Nesbitt MD

## 2023-01-26 ENCOUNTER — APPOINTMENT (OUTPATIENT)
Dept: GENERAL RADIOLOGY | Age: 73
End: 2023-01-26
Attending: INTERNAL MEDICINE
Payer: MEDICARE

## 2023-01-26 LAB
ANION GAP SERPL CALC-SCNC: 10 MMOL/L (ref 5–15)
BACTERIA SPEC CULT: NORMAL
BASOPHILS # BLD: 0 K/UL (ref 0–0.1)
BASOPHILS NFR BLD: 0 % (ref 0–1)
BUN SERPL-MCNC: 85 MG/DL (ref 6–20)
BUN/CREAT SERPL: 8 (ref 12–20)
CALCIUM SERPL-MCNC: 9.8 MG/DL (ref 8.5–10.1)
CHLORIDE SERPL-SCNC: 94 MMOL/L (ref 97–108)
CO2 SERPL-SCNC: 31 MMOL/L (ref 21–32)
CREAT SERPL-MCNC: 10.2 MG/DL (ref 0.7–1.3)
DIFFERENTIAL METHOD BLD: ABNORMAL
EOSINOPHIL # BLD: 0 K/UL (ref 0–0.4)
EOSINOPHIL NFR BLD: 0 % (ref 0–7)
ERYTHROCYTE [DISTWIDTH] IN BLOOD BY AUTOMATED COUNT: 13.1 % (ref 11.5–14.5)
GLUCOSE SERPL-MCNC: 120 MG/DL (ref 65–100)
HCT VFR BLD AUTO: 28.8 % (ref 36.6–50.3)
HGB BLD-MCNC: 9.2 G/DL (ref 12.1–17)
IMM GRANULOCYTES # BLD AUTO: 0 K/UL (ref 0–0.04)
IMM GRANULOCYTES NFR BLD AUTO: 0 % (ref 0–0.5)
LYMPHOCYTES # BLD: 2.5 K/UL (ref 0.8–3.5)
LYMPHOCYTES NFR BLD: 10 % (ref 12–49)
MCH RBC QN AUTO: 32.3 PG (ref 26–34)
MCHC RBC AUTO-ENTMCNC: 31.9 G/DL (ref 30–36.5)
MCV RBC AUTO: 101.1 FL (ref 80–99)
MONOCYTES # BLD: 1.2 K/UL (ref 0–1)
MONOCYTES NFR BLD: 5 % (ref 5–13)
NEUTS SEG # BLD: 20.8 K/UL (ref 1.8–8)
NEUTS SEG NFR BLD: 85 % (ref 32–75)
NRBC # BLD: 0.1 K/UL (ref 0–0.01)
NRBC BLD-RTO: 0.4 PER 100 WBC
PLATELET # BLD AUTO: 389 K/UL (ref 150–400)
PMV BLD AUTO: 9.6 FL (ref 8.9–12.9)
POTASSIUM SERPL-SCNC: 4.5 MMOL/L (ref 3.5–5.1)
RBC # BLD AUTO: 2.85 M/UL (ref 4.1–5.7)
RBC MORPH BLD: ABNORMAL
SERVICE CMNT-IMP: NORMAL
SODIUM SERPL-SCNC: 135 MMOL/L (ref 136–145)
WBC # BLD AUTO: 24.5 K/UL (ref 4.1–11.1)

## 2023-01-26 PROCEDURE — 90935 HEMODIALYSIS ONE EVALUATION: CPT

## 2023-01-26 PROCEDURE — 36415 COLL VENOUS BLD VENIPUNCTURE: CPT

## 2023-01-26 PROCEDURE — 71046 X-RAY EXAM CHEST 2 VIEWS: CPT

## 2023-01-26 PROCEDURE — 74011250637 HC RX REV CODE- 250/637: Performed by: INTERNAL MEDICINE

## 2023-01-26 PROCEDURE — 74011250636 HC RX REV CODE- 250/636: Performed by: INTERNAL MEDICINE

## 2023-01-26 PROCEDURE — 74011000258 HC RX REV CODE- 258: Performed by: INTERNAL MEDICINE

## 2023-01-26 PROCEDURE — 74011000250 HC RX REV CODE- 250: Performed by: INTERNAL MEDICINE

## 2023-01-26 PROCEDURE — 77010033678 HC OXYGEN DAILY

## 2023-01-26 PROCEDURE — 65270000046 HC RM TELEMETRY

## 2023-01-26 PROCEDURE — 80048 BASIC METABOLIC PNL TOTAL CA: CPT

## 2023-01-26 PROCEDURE — 85025 COMPLETE CBC W/AUTO DIFF WBC: CPT

## 2023-01-26 PROCEDURE — 74011636637 HC RX REV CODE- 636/637: Performed by: INTERNAL MEDICINE

## 2023-01-26 PROCEDURE — 99233 SBSQ HOSP IP/OBS HIGH 50: CPT | Performed by: INTERNAL MEDICINE

## 2023-01-26 RX ORDER — PREDNISONE 20 MG/1
20 TABLET ORAL
Status: DISCONTINUED | OUTPATIENT
Start: 2023-01-26 | End: 2023-01-27 | Stop reason: HOSPADM

## 2023-01-26 RX ADMIN — SEVELAMER CARBONATE 800 MG: 800 TABLET, FILM COATED ORAL at 17:50

## 2023-01-26 RX ADMIN — SODIUM CHLORIDE, PRESERVATIVE FREE 10 ML: 5 INJECTION INTRAVENOUS at 14:42

## 2023-01-26 RX ADMIN — HEPARIN SODIUM 5000 UNITS: 5000 INJECTION INTRAVENOUS; SUBCUTANEOUS at 06:31

## 2023-01-26 RX ADMIN — CARVEDILOL 12.5 MG: 12.5 TABLET, FILM COATED ORAL at 22:00

## 2023-01-26 RX ADMIN — SEVELAMER CARBONATE 800 MG: 800 TABLET, FILM COATED ORAL at 12:02

## 2023-01-26 RX ADMIN — PREDNISONE 20 MG: 20 TABLET ORAL at 14:48

## 2023-01-26 RX ADMIN — SEVELAMER CARBONATE 800 MG: 800 TABLET, FILM COATED ORAL at 06:31

## 2023-01-26 RX ADMIN — HEPARIN SODIUM 5000 UNITS: 5000 INJECTION INTRAVENOUS; SUBCUTANEOUS at 14:48

## 2023-01-26 RX ADMIN — HEPARIN SODIUM 5000 UNITS: 5000 INJECTION INTRAVENOUS; SUBCUTANEOUS at 21:58

## 2023-01-26 RX ADMIN — EPOETIN ALFA-EPBX 10000 UNITS: 10000 INJECTION, SOLUTION INTRAVENOUS; SUBCUTANEOUS at 21:58

## 2023-01-26 RX ADMIN — AZITHROMYCIN MONOHYDRATE 500 MG: 500 INJECTION, POWDER, LYOPHILIZED, FOR SOLUTION INTRAVENOUS at 14:47

## 2023-01-26 RX ADMIN — CEFTRIAXONE 1 G: 1 INJECTION, POWDER, FOR SOLUTION INTRAMUSCULAR; INTRAVENOUS at 12:21

## 2023-01-26 RX ADMIN — AMLODIPINE BESYLATE 10 MG: 5 TABLET ORAL at 22:01

## 2023-01-26 RX ADMIN — SODIUM CHLORIDE, PRESERVATIVE FREE 10 ML: 5 INJECTION INTRAVENOUS at 22:01

## 2023-01-26 NOTE — PROCEDURES
Hemodialysis / [phone]    Vitals Pre Post Assessment Pre Post   BP BP: 125/85 (01/26/23 0745) 102/63 LOC A&Ox4 A&Ox4   HR Pulse (Heart Rate): 78 (01/26/23 0745) 69 Lungs Clear Clear   Resp Resp Rate: 16 (01/26/23 0745) 20 Cardiac WNL WNL   Temp Temp: 97.8 °F (36.6 °C) (01/26/23 0745) 97.8 Skin Warm and dry Warm and dry   Weight  N/a N/a Edema trace trace   Tele status tele tele Pain Pain Intensity 1: 0 (01/26/23 0318) 0     Orders   Duration: Start: 4560 End: 9608 Total: 3.5hrs   Dialyzer: Dialyzer/Set Up Inspection: Bernie Baron (01/26/23 0745)   K Bath: Dialysate K (mEq/L): 3 (01/26/23 0745)   Ca Bath: Dialysate CA (mEq/L): 2.5 (01/26/23 0745)   Na: Dialysate NA (mEq/L): 138 (01/26/23 0745)   Bicarb: Dialysate HCO3 (mEq/L): 35 (01/26/23 0745)   Target Fluid Removal: Goal/Amount of Fluid to Remove (mL): 3000 mL (01/26/23 0745)     Access   Type & Location: [facility] AVF: Pre-assessment: skin CDI. No s/s of infection. + B/T. No issues with cannulation. Running well at . Post assessment: No issues with hemostasis, + B/T remains. Dressing CDI.     Comments:                                        Labs   HBsAg (Antigen) / date:   Neg 1 /12/23                                          HBsAb (Antibody) / date: Imm 1/12/23   Source: Clinic   Obtained/Reviewed  Critical Results Called HGB   Date Value Ref Range Status   01/26/2023 9.2 (L) 12.1 - 17.0 g/dL Final     Potassium   Date Value Ref Range Status   01/26/2023 4.5 3.5 - 5.1 mmol/L Final     Calcium   Date Value Ref Range Status   01/26/2023 9.8 8.5 - 10.1 MG/DL Final     BUN   Date Value Ref Range Status   01/26/2023 85 (H) 6 - 20 MG/DL Final     Comment:     INVESTIGATED PER DELTA CHECK PROTOCOL     Creatinine   Date Value Ref Range Status   01/26/2023 10.20 (H) 0.70 - 1.30 MG/DL Final        Meds Given   Name Dose Route   None                 Adequacy / Fluid    Total Liters Process: 78L   Net Fluid Removed: 2335ml      Comments   Time Out Done:   (Time) 1825   Admitting Diagnosis: SOB   Consent obtained/signed: Informed Consent Verified: Yes (01/24/23 0900)   Machine / RO # Machine Number: [id number] (01/26/23 0745)   Primary Nurse Rpt Pre: Ross Scales   Primary Nurse Rpt Post: Neymar Barreto   Pt Education: Access care   Care Plan: To continue HD as ordered   Pts outpatient clinic: [facility]     Tx Summary   Comments:    Pt refused the 3kg and only wanted to take 2.5kg off, Dr. Yaneth Manzo aware  SBAR received from Primary RN. Pt arrived to HD suite A&Ox4. Chronic consent applies. 0215: Pt cannulated with 41L needles per policy & without issue. VSS. Dialysis Tx initiated. 1107: Tx ended. VSS. 2.3kg taken off due to pt request.  All possible blood returned to patient. Hemostasis achieved without issue. Bed locked and in the lowest position, call bell and belongings in reach. SBAR given to Primary, RN. Patient is stable at time of their/ my departure. All Dialysis related medications have been reviewed.

## 2023-01-26 NOTE — PROGRESS NOTES
Name: Becca Carroll   MRN: [id number]  : 1950      Assessment:                                    Plan:  ESRD-T//S ([facility]; L AVF)    HTN    Anemia of ESRD  COVID PNA [address] Resp panel negative    Resp failure    Has aneurysmal changes to avf-pt states [facility]SA is aware    Seen on HD at 0940. BP stable. CXR with b/l infiltrates favoring viral PNA  Ct O2  ABx  H/H not at goal.  Continue  MARJORIE       Subjective:    No complaints.     ROS:   No nausea, no vomiting  No chest pain,     Exam:  Visit Vitals  /65   Pulse 72   Temp 97.8 °F (36.6 °C)   Resp 16   Ht 5' 9\" (1.753 m)   Wt 77.5 kg (170 lb 13.7 oz)   SpO2 100%   BMI 25.23 kg/m²     Ill appearing in NAD  No icterus  Coarse rales at bases  RRR  No sig edema       Current Facility-Administered Medications   Medication Dose Route Frequency Last Admin    methylPREDNISolone (PF) (SOLU-MEDROL) injection 20 mg  20 mg IntraVENous Q8H 20 mg at 23 2340    albuterol-ipratropium (DUO-NEB) 2.5 MG-0.5 MG/3 ML  3 mL Nebulization Q4H PRN      azithromycin (ZITHROMAX) 500 mg in 0.9% sodium chloride 250 mL ([id number])  500 mg IntraVENous Q24H 500 mg at 23 1400    epoetin jade-epbx (RETACRIT) injection 10,000 Units  10,000 Units SubCUTAneous Q TUE, THU & SAT 10,000 Units at 23 2220    albuterol (PROVENTIL HFA, VENTOLIN HFA, PROAIR HFA) inhaler 2 Puff  2 Puff Inhalation Q6H PRN      amLODIPine (NORVASC) tablet 10 mg  10 mg Oral DAILY 10 mg at 23 2340    carvediloL (COREG) tablet 12.5 mg  12.5 mg Oral BID WITH MEALS 12.5 mg at 23 2340    lisinopriL (PRINIVIL, ZESTRIL) tablet 20 mg  20 mg Oral DAILY 20 mg at 23 1042    LORazepam (ATIVAN) tablet 0.5 mg  0.5 mg Oral BID PRN      sevelamer carbonate (RENVELA) tab 800 mg  800 mg Oral TID WITH MEALS 800 mg at 23 0631    tiZANidine (Hayley Moore) tablet 4 mg  4 mg Oral TID PRN      sodium chloride (NS) flush 5-40 mL  5-40 mL IntraVENous Q8H 10 mL at 01/25/23 1647    sodium chloride (NS) flush 5-40 mL  5-40 mL IntraVENous PRN      acetaminophen (TYLENOL) tablet 650 mg  650 mg Oral Q6H  mg at 01/24/23 1506    Or    acetaminophen (TYLENOL) suppository 650 mg  650 mg Rectal Q6H PRN      polyethylene glycol (MIRALAX) packet 17 g  17 g Oral DAILY PRN      ondansetron (ZOFRAN ODT) tablet 4 mg  4 mg Oral Q8H PRN      Or    ondansetron (ZOFRAN) injection 4 mg  4 mg IntraVENous Q6H PRN      heparin (porcine) injection 5,000 Units  5,000 Units SubCUTAneous Q8H 5,000 Units at 01/26/23 0631    cefTRIAXone (ROCEPHIN) 1 g in 0.9% sodium chloride (MBP/ADV) 50 mL MBP  1 g IntraVENous Q24H 1 g at 01/25/23 1042       Labs/Data:    Lab Results   Component Value Date/Time    WBC 24.5 (H) 01/26/2023 03:59 AM    Hemoglobin (POC) 14.6 06/04/2014 09:45 AM    HGB (POC) 11.7 (A) 12/19/2018 10:57 AM    HGB 9.2 (L) 01/26/2023 03:59 AM    Hematocrit (POC) 32 (L) 07/29/2020 02:51 PM    HCT 28.8 (L) 01/26/2023 03:59 AM    PLATELET 855 32/04/2596 03:59 AM    .1 (H) 01/26/2023 03:59 AM       Lab Results   Component Value Date/Time    Sodium 135 (L) 01/26/2023 03:59 AM    Potassium 4.5 01/26/2023 03:59 AM    Chloride 94 (L) 01/26/2023 03:59 AM    CO2 31 01/26/2023 03:59 AM    Anion gap 10 01/26/2023 03:59 AM    Glucose 120 (H) 01/26/2023 03:59 AM    BUN 85 (H) 01/26/2023 03:59 AM    Creatinine 10.20 (H) 01/26/2023 03:59 AM    BUN/Creatinine ratio 8 (L) 01/26/2023 03:59 AM    GFR est AA 7 (L) 08/12/2022 09:42 AM    GFR est non-AA 5 (L) 08/12/2022 09:42 AM    eGFR 5 (L) 01/26/2023 03:59 AM    Calcium 9.8 01/26/2023 03:59 AM       Wt Readings from Last 3 Encounters:   01/26/23 77.5 kg (170 lb 13.7 oz)   01/20/23 80.7 kg (178 lb)   01/04/23 83.3 kg (183 lb 9.6 oz)         Intake/Output Summary (Last 24 hours) at 1/26/2023 1105  Last data filed at 1/25/2023 2325  Gross per 24 hour Intake --   Output 0 ml   Net 0 ml         Patient seen and examined. Chart reviewed. Labs, data and other pertinent notes reviewed in last 24 hrs.     PMH/SH/FH reviewed and unchanged compared to H&P    Discussed with pt      Sebastian Armas MD

## 2023-01-27 VITALS
OXYGEN SATURATION: 98 % | WEIGHT: 170.86 LBS | BODY MASS INDEX: 25.31 KG/M2 | SYSTOLIC BLOOD PRESSURE: 99 MMHG | DIASTOLIC BLOOD PRESSURE: 62 MMHG | TEMPERATURE: 97.7 F | HEART RATE: 78 BPM | RESPIRATION RATE: 19 BRPM | HEIGHT: 69 IN

## 2023-01-27 LAB
BASOPHILS # BLD: 0.2 K/UL (ref 0–0.1)
BASOPHILS NFR BLD: 1 % (ref 0–1)
DIFFERENTIAL METHOD BLD: ABNORMAL
EOSINOPHIL # BLD: 0 K/UL (ref 0–0.4)
EOSINOPHIL NFR BLD: 0 % (ref 0–7)
ERYTHROCYTE [DISTWIDTH] IN BLOOD BY AUTOMATED COUNT: 13.5 % (ref 11.5–14.5)
HCT VFR BLD AUTO: 30.8 % (ref 36.6–50.3)
HGB BLD-MCNC: 9.9 G/DL (ref 12.1–17)
IMM GRANULOCYTES # BLD AUTO: 1.4 K/UL (ref 0–0.04)
IMM GRANULOCYTES NFR BLD AUTO: 6 % (ref 0–0.5)
LYMPHOCYTES # BLD: 1.4 K/UL (ref 0.8–3.5)
LYMPHOCYTES NFR BLD: 6 % (ref 12–49)
MCH RBC QN AUTO: 32.9 PG (ref 26–34)
MCHC RBC AUTO-ENTMCNC: 32.1 G/DL (ref 30–36.5)
MCV RBC AUTO: 102.3 FL (ref 80–99)
MONOCYTES # BLD: 2.3 K/UL (ref 0–1)
MONOCYTES NFR BLD: 10 % (ref 5–13)
NEUTS SEG # BLD: 18 K/UL (ref 1.8–8)
NEUTS SEG NFR BLD: 77 % (ref 32–75)
NRBC # BLD: 0.41 K/UL (ref 0–0.01)
NRBC BLD-RTO: 1.8 PER 100 WBC
PLATELET # BLD AUTO: 359 K/UL (ref 150–400)
PMV BLD AUTO: 9.4 FL (ref 8.9–12.9)
RBC # BLD AUTO: 3.01 M/UL (ref 4.1–5.7)
RBC MORPH BLD: ABNORMAL
WBC # BLD AUTO: 23.3 K/UL (ref 4.1–11.1)

## 2023-01-27 PROCEDURE — 36415 COLL VENOUS BLD VENIPUNCTURE: CPT

## 2023-01-27 PROCEDURE — 74011250636 HC RX REV CODE- 250/636: Performed by: INTERNAL MEDICINE

## 2023-01-27 PROCEDURE — 85025 COMPLETE CBC W/AUTO DIFF WBC: CPT

## 2023-01-27 PROCEDURE — 74011250637 HC RX REV CODE- 250/637: Performed by: INTERNAL MEDICINE

## 2023-01-27 PROCEDURE — 74011636637 HC RX REV CODE- 636/637: Performed by: INTERNAL MEDICINE

## 2023-01-27 PROCEDURE — 74011000250 HC RX REV CODE- 250: Performed by: INTERNAL MEDICINE

## 2023-01-27 PROCEDURE — 99239 HOSP IP/OBS DSCHRG MGMT >30: CPT | Performed by: INTERNAL MEDICINE

## 2023-01-27 RX ORDER — PREDNISONE 10 MG/1
TABLET ORAL
Qty: 9 TABLET | Refills: 0 | Status: SHIPPED | OUTPATIENT
Start: 2023-01-27

## 2023-01-27 RX ADMIN — SEVELAMER CARBONATE 800 MG: 800 TABLET, FILM COATED ORAL at 08:24

## 2023-01-27 RX ADMIN — SEVELAMER CARBONATE 800 MG: 800 TABLET, FILM COATED ORAL at 11:49

## 2023-01-27 RX ADMIN — SODIUM CHLORIDE, PRESERVATIVE FREE 10 ML: 5 INJECTION INTRAVENOUS at 06:06

## 2023-01-27 RX ADMIN — CARVEDILOL 12.5 MG: 12.5 TABLET, FILM COATED ORAL at 11:49

## 2023-01-27 RX ADMIN — PREDNISONE 20 MG: 20 TABLET ORAL at 08:24

## 2023-01-27 RX ADMIN — HEPARIN SODIUM 5000 UNITS: 5000 INJECTION INTRAVENOUS; SUBCUTANEOUS at 06:05

## 2023-01-27 NOTE — DISCHARGE INSTRUCTIONS
Doctor Anika [address]  [phone]      Patient Discharge Instructions    [facility] Roles / [id number] : 1950    Admitted 2023 Discharged: 2023     Principal Problem:    Acute hypoxemic respiratory failure ([facility] (2023)    Active Problems:    Hypertension, renal disease (10/22/2017)      ESRD (end stage renal disease) ([facility] (10/22/2017)      Gastroesophageal reflux disease without esophagitis (10/22/2017)      COVID-19 (1/3/2023)      Overview: Dec 11, 2022 - Tx Paxlovid      CAP (community acquired pneumonia) (2023)          Allergies   Allergen Reactions    Codeine Hives       It is important that you take the medication exactly as they are prescribed. Do not take other medications without consulting your doctor. What to do at Next Level of Care    Disposition: Home    Recommended diet: Usual    Recommended activity: Usual          Information obtained by :  I understand that if any problems occur once I am at home I am to contact my physician. I understand and acknowledge receipt of the instructions indicated above.                                                                                                                                            Physician's or R.N.'s Signature                                                                  Date/Time                                                                                                                                              Patient or Representative Signature                                                          Date/Time

## 2023-01-27 NOTE — PROGRESS NOTES
End of Shift Note    Bedside shift change report given to BRONWYN Castro (oncoming nurse) by Eriberto Jackson RN (offgoing nurse). Report included the following information SBAR, Kardex, and MAR    Shift worked:  nights     Shift summary and any significant changes:     No acute events. CXR negative from 1/26. Pt's VSS. No complaints of pain. Pt to be D/C home today. Concerns for physician to address:  -     Zone phone for oncoming shift:   -       Activity:  Activity Level: Up with Assistance  Number times ambulated in hallways past shift: 0  Number of times OOB to chair past shift: 0    Cardiac:   Cardiac Monitoring: Yes      Cardiac Rhythm: Sinus Rhythm    Access:  Current line(s): PIV and HD access     Genitourinary:   Urinary status: oliguric    Respiratory:   O2 Device: None (Room air)  Chronic home O2 use?: NO  Incentive spirometer at bedside: NO       GI:  Last Bowel Movement Date: 01/22/23  Current diet:  ADULT DIET Regular; Low Potassium (Less than 3000 mg/day)  ADULT ORAL NUTRITION SUPPLEMENT Dinner; Renal Supplement  DIET ONE TIME MESSAGE  Passing flatus: YES  Tolerating current diet: YES       Pain Management:   Patient states pain is manageable on current regimen: YES    Skin:  Eugene Score: 22  Interventions: increase time out of bed    Patient Safety:  Fall Score:  Total Score: 0  Interventions: bed/chair alarm, gripper socks, pt to call before getting OOB, and stay with me (per policy)       Length of Stay:  Expected LOS: 4d 0h  Actual LOS: 6      Eriberto Jackson RN

## 2023-01-27 NOTE — PROGRESS NOTES
Transition of Care Plan:    RUR: 21%  Disposition: Home- patient to drive self    OP HD with [facility] # [phone] fax# [phone] goes T,Th and Sat - drives self   CM faxed last flowsheet to dialysis center and to notify them of discharge today  If SNF or IPR: Date FOC offered:  Date FOC received:  Date authorization started with reference number:  Date authorization received and expires:  Accepting facility:  Follow up appointments:PCP/Specialist  DME needed:none  Transportation at [place] [address] or means to access home:     patient   IM Medicare Letter:1/27/23  Is patient a Leslie and connected with the [employer] [address]? If yes, was [employer] transfer form completed and [facility] notified? Caregiver Contact:Selin Silver sister [phone]  Discharge Caregiver contacted prior to discharge? Per patient  Care Conference needed?:        no      CM spoke with patient - agreeable to discharge to home today and his car is in parking lot to transport himself home. No identified needs at this time.  RODERICK Bui

## 2023-01-27 NOTE — PROGRESS NOTES
Medically stable with good vital signs without hypoxemia and good saturation on room air. Discharge home today. Note dictated.

## 2023-01-27 NOTE — PROGRESS NOTES
Problem: Tissue Perfusion - Cardiopulmonary, Altered  Goal: *Optimize tissue perfusion  Outcome: Progressing Towards Goal     Problem: Pain - Acute  Goal: *Control of acute pain  Outcome: Progressing Towards Goal     Problem: Tissue Perfusion - Cardiopulmonary, Altered  Goal: *Optimize tissue perfusion  Outcome: Progressing Towards Goal

## 2023-01-27 NOTE — PROGRESS NOTES
Spiritual Care Assessment/Progress Note  [facility]      NAME: Kole Rosenberg      MRN: [id number]  AGE: 67 y.o.  SEX: male  Worship Affiliation: Non Adventist   Language: English     1/27/2023     Total Time (in minutes): 43     Spiritual Assessment begun in [facility] CARDIOPULMONARY CARE through conversation with:         [x]Patient        [] Family    [] Friend(s)        Reason for Consult: Initial/Spiritual assessment, patient floor     Spiritual beliefs: (Please include comment if needed)     [x] Identifies with a jonathon tradition:         [x] Supported by a jonathon community:            [] Claims no spiritual orientation:           [] Seeking spiritual identity:                [] Adheres to an individual form of spirituality:           [] Not able to assess:                           Identified resources for coping:      [x] Prayer                               [] Music                  [] Guided Imagery     [x] Family/friends                 [] Pet visits     [] Devotional reading                         [] Unknown     [] Other:                                                Interventions offered during this visit: (See comments for more details)    Patient Interventions: Affirmation of emotions/emotional suffering, Coping skills reviewed/reinforced, Initial/Spiritual assessment, patient floor, Normalization of emotional/spiritual concerns, Prayer (actual), Integration of medical assessment with existing values and beliefs, Affirmation of jonathon, Catharsis/review of pertinent events in supportive environment, Iconic (affirming the presence of God/Higher Power)           Plan of Care:     [] Support spiritual and/or cultural needs    [] Support AMD and/or advance care planning process      [] Support grieving process   [] Coordinate Rites and/or Rituals    [] Coordination with community clergy   [] No spiritual needs identified at this time   [] Detailed Plan of Care below (See Comments) [] Make referral to Music Therapy  [] Make referral to Pet Therapy     [] Make referral to Addiction services  [] Make referral to [facility]  [] Make referral to Spiritual Care Partner  [x] No future visits requested        [] Contact Spiritual Care for further referrals     Comments:  Visit was in 2151 for initial spiritual assessment. Patient received visit kindly and shared about his medical condition and hid long hospitalization. He stated that this is his first ever hospitalization. He had some great but also challenging experiences. Having to depend on staff for his care has not been easy, and multiple interventions and informations becomes overwhelming at times. His [employer] community have been supportive, as well as family. He is glad he gets to go home this afternoon.  affirmed and validated patient's thoughts and feelings, celebrated his progress and offered requested prayer for recovery and strength to continue serving his Hoahaoism and also enjoying the things he loves to do. Patient stated that talking to  was very uplifting, he thanked  for the visit and prayer.        Visited by: Adriana hill : [phone] ([id number])

## 2023-01-27 NOTE — PROGRESS NOTES
I have reviewed discharge instructions with the patient. The patient verbalized understanding. IV site removed pressure dressing applied

## 2023-01-27 NOTE — PROGRESS NOTES
1211 - Attempted to schedule PCP hospital follow up appointment. Unable to reach anyone, unable to leave voicemail. Pending patient discharge. Deretha Barthel, Care Management Assistant    Attempted to schedule PCP hospital follow up appointment. Unable to reach anyone, unable to leave voicemail. Pending patient discharge.  Deretha Barthel, Care Management Assistant

## 2023-01-27 NOTE — PROGRESS NOTES
Name: Jennifer Bingham   MRN: [id number]  : 1950      Assessment:                                    Plan:  ESRD-T//S ([facility]; L AVF)    HTN    Anemia of ESRD  COVID PNA in [address]. Resp panel negative    Resp failure    Has aneurysmal changes to avf-pt states [facility]SA is aware    No acute need for HD today. Plan HD in AM.        CXR with b/l infiltrates favoring viral PNA  Ct O2  ABx  H/H not at goal.  Continue  MARJORIE       Subjective:    No complaints.     ROS:   No nausea, no vomiting  No chest pain,     Exam:  Visit Vitals  BP 99/63   Pulse 81   Temp 97.8 °F (36.6 °C)   Resp 18   Ht 5' 9\" (1.753 m)   Wt 77.5 kg (170 lb 13.7 oz)   SpO2 100%   BMI 25.23 kg/m²     Ill appearing in NAD  No icterus  Coarse rales at bases  RRR  No sig edema       Current Facility-Administered Medications   Medication Dose Route Frequency Last Admin    predniSONE (DELTASONE) tablet 20 mg  20 mg Oral DAILY WITH BREAKFAST 20 mg at 23 0824    albuterol-ipratropium (DUO-NEB) 2.5 MG-0.5 MG/3 ML  3 mL Nebulization Q4H PRN      epoetin jade-epbx (RETACRIT) injection 10,000 Units  10,000 Units SubCUTAneous Q TUE, THU & SAT 10,000 Units at 23 2158    albuterol (PROVENTIL HFA, VENTOLIN HFA, PROAIR HFA) inhaler 2 Puff  2 Puff Inhalation Q6H PRN      amLODIPine (NORVASC) tablet 10 mg  10 mg Oral DAILY 10 mg at 23 220    carvediloL (COREG) tablet 12.5 mg  12.5 mg Oral BID WITH MEALS 12.5 mg at 23 2200    lisinopriL (PRINIVIL, ZESTRIL) tablet 20 mg  20 mg Oral DAILY 20 mg at 23 1042    LORazepam (ATIVAN) tablet 0.5 mg  0.5 mg Oral BID PRN      sevelamer carbonate (RENVELA) tab 800 mg  800 mg Oral TID WITH MEALS 800 mg at 23 0824    tiZANidine (ZANAFLEX) tablet 4 mg  4 mg Oral TID PRN      sodium chloride (NS) flush 5-40 mL  5-40 mL IntraVENous Q8H 10 mL at 23 0606 sodium chloride (NS) flush 5-40 mL  5-40 mL IntraVENous PRN      acetaminophen (TYLENOL) tablet 650 mg  650 mg Oral Q6H  mg at 01/24/23 1506    Or    acetaminophen (TYLENOL) suppository 650 mg  650 mg Rectal Q6H PRN      polyethylene glycol (MIRALAX) packet 17 g  17 g Oral DAILY PRN      ondansetron (ZOFRAN ODT) tablet 4 mg  4 mg Oral Q8H PRN      Or    ondansetron (ZOFRAN) injection 4 mg  4 mg IntraVENous Q6H PRN      heparin (porcine) injection 5,000 Units  5,000 Units SubCUTAneous Q8H 5,000 Units at 01/27/23 0347       Labs/Data:    Lab Results   Component Value Date/Time    WBC 23.3 (H) 01/27/2023 12:16 AM    Hemoglobin (POC) 14.6 06/04/2014 09:45 AM    HGB (POC) 11.7 (A) 12/19/2018 10:57 AM    HGB 9.9 (L) 01/27/2023 12:16 AM    Hematocrit (POC) 32 (L) 07/29/2020 02:51 PM    HCT 30.8 (L) 01/27/2023 12:16 AM    PLATELET 389 57/99/3726 12:16 AM    .3 (H) 01/27/2023 12:16 AM       Lab Results   Component Value Date/Time    Sodium 135 (L) 01/26/2023 03:59 AM    Potassium 4.5 01/26/2023 03:59 AM    Chloride 94 (L) 01/26/2023 03:59 AM    CO2 31 01/26/2023 03:59 AM    Anion gap 10 01/26/2023 03:59 AM    Glucose 120 (H) 01/26/2023 03:59 AM    BUN 85 (H) 01/26/2023 03:59 AM    Creatinine 10.20 (H) 01/26/2023 03:59 AM    BUN/Creatinine ratio 8 (L) 01/26/2023 03:59 AM    GFR est AA 7 (L) 08/12/2022 09:42 AM    GFR est non-AA 5 (L) 08/12/2022 09:42 AM    eGFR 5 (L) 01/26/2023 03:59 AM    Calcium 9.8 01/26/2023 03:59 AM       Wt Readings from Last 3 Encounters:   01/26/23 77.5 kg (170 lb 13.7 oz)   01/20/23 80.7 kg (178 lb)   01/04/23 83.3 kg (183 lb 9.6 oz)         Intake/Output Summary (Last 24 hours) at 1/27/2023 1049  Last data filed at 1/26/2023 1123  Gross per 24 hour   Intake --   Output 2335 ml   Net -2335 ml         Patient seen and examined. Chart reviewed. Labs, data and other pertinent notes reviewed in last 24 hrs.     PMH/SH/FH reviewed and unchanged compared to H&P    Discussed with pt      Jair Medina MD

## 2023-01-28 NOTE — DISCHARGE SUMMARY
[address] SUMMARY    Name:  James Moise  MR#:  [id number]  :  1950  ACCOUNT #:  [de-identified]  ADMIT DATE:  2023  DISCHARGE DATE:  2023    FINAL DIAGNOSES:  1. Acute hypoxemic respiratory failure. 2.  Hypertension. 3.  Bilateral pneumonia. 4.  End-stage renal disease. 5.  Gastroesophageal reflux disease. 6.  Recent COVID-19 infection. CONSULTATIONS:  None. PROCEDURES:  None. For details of admission history, please see admit note. Briefly, the patient is a 70-year-old white male with end-stage renal disease who had recently had COVID infection 2022 at which time he was treated with Paxlovid. He initially seemed to get better then became worse at which time he developed of lot of chest congestion and cough and presented to the office where he was found to have bilateral pneumonia on chest x-ray and hypoxemic respiratory failure. He was sent to the hospital for admission and started on IV antibiotics and during the hospitalization. He was treated with IV antibiotics that consisted of Zithromax and Rocephin and his pneumonia resolved. He did initially still continue to have hypoxemia on admission for several days and a course of steroids was started which was tapered and changed to p.o. prednisone yesterday. At this point his pneumonia has resolved on chest x-ray and he has no shortness breath or clinical signs of pneumonia and it is felt that maximal hospital benefit has been achieved. DISCHARGE MEDICATIONS:  1. Consists of new medication of prednisone 10 b.i.d. for 3 days and then 10 mg daily. 2.  He will discontinue Prilosec. He will continue his other medicines per admission which consists of:  1. Proventil HFA inhaler 2 puffs q.i.d. p.r.n.  2.  Norvasc 10 mg daily. 3.  Coreg 12.5 b.i.d.  4.  Lisinopril 20 mg daily. 5.  Ativan 0.5 mg b.i.d. as needed.   6.  Renvela 800 mg tablet t.i.d.  7.  Zanaflex 4 mg three times daily.    FOLLOWUP:  He will have followup by me again in about 5 days. 40 minutes spent in direct care of this patient today at the time of discharge.       Tristan Roberto MD      [id number]  D:  01/27/2023 12:34  T:  01/27/2023 23:11  JOB #:  [id number]  CC:  MD Hemal Paul

## 2023-01-30 ENCOUNTER — PATIENT OUTREACH (OUTPATIENT)
Dept: CASE MANAGEMENT | Age: 73
End: 2023-01-30

## 2023-01-30 RX ORDER — PHENOL/SODIUM PHENOLATE
20 AEROSOL, SPRAY (ML) MUCOUS MEMBRANE DAILY
COMMUNITY

## 2023-01-30 NOTE — PROGRESS NOTES
Care Transitions Initial Call    Call within 2 business days of discharge: Yes     Patient: Yoli Goetz Patient : 1950 MRN: [id number]    Last Discharge [address]       Date Complaint Diagnosis Description Type Department Provider    23 Shortness of Breath Community acquired pneumonia, unspecified laterality . .. ED to Hosp-Admission (Discharged) (ADMIT) [id number] Raymundo Carver MD; Elizabeth Vasquez... Was this an external facility discharge? No     Challenges to be reviewed by the provider   Additional needs identified to be addressed with provider: yes      WBC at discharge 23.3---patient taking steroids. Patient denies any s/sx infection/fever at time of call    Patient reports that he has been unable to lie flat to sleep since he was diagnosed with [id number]--nq reports he has to sleep sitting up. Patient would like to discuss this at [facility] appt with PCP. No ACP on file       Method of communication with provider : chart routing    Discussed COVID-19 related testing which was available at this time. Test results were negative. Patient informed of results, if available? yes     Advance Care Planning:   Does patient have an Advance Directive: decision makers updated, not on file. Inpatient Readmission Risk score: Unplanned Readmit Risk Score: 20.8    Was this a readmission? no       Patients top risk factors for readmission: medical condition-ESRD patient, hx of covid19 , on steroids, at risk for infection, return of PNA    Interventions to address risk factors: Scheduled appointment with PCP-see goals, Education of patient/family/caregiver/guardian to support self-management-see goals, and Assistance in [place] [id number] (CTN) contacted the patient by telephone to perform post hospital discharge assessment. Verified name and  with patient as identifiers. Provided introduction to self, and explanation of the CTN role.      CTN reviewed discharge instructions, medical action plan and red flags with patient who verbalized understanding. Were discharge instructions available to patient? yes. Reviewed appropriate site of care based on symptoms and resources available to patient including: PCP. Patient given an opportunity to ask questions and does not have any further questions or concerns at this time. The patient agrees to contact the PCP office for questions related to their healthcare. Medication reconciliation was performed with patient, who verbalizes understanding of administration of home medications. Advised obtaining a 90-day supply of all daily and as-needed medications. Referral to Pharm D needed: no     Home Health/Outpatient orders at discharge: none    Durable Medical Equipment ordered at discharge: None    Was patient discharged with a pulse oximeter? no  patient states he has ordered one and delivery should be tomorrow    Discussed follow-up appointments. If no appointment was previously scheduled, appointment scheduling offered: yes. Is follow up appointment scheduled within 7 days of discharge? yes. [facility] follow up appointment(s):   Future Appointments   Date Time Provider Major Barnett   2/1/2023 10:00 AM MD WENDI Ruby BS AMB   2/17/2023  8:50 AM MD WENDI Ruby BS AMB     Non-[facility] follow up appointment(s): na    Plan for follow-up call in 3-5 days based on severity of symptoms and risk factors. Plan for next call: symptom management-PNA  CTN provided contact information for future needs. Goals Addressed                   This Visit's Progress     Prevent complications post hospitalization. 01/30/23   CTN unable to reach patient or emergency contact listed, left messages on all voicemails. My Chart message sent. UPDATE: patient returned CTN call.      PCP--per MD note, pt should follow up in 5 days--patient reports he made appt with PCP for 2/1/23    Patient discharged on prednisone taper. --patient reports he has picked up and is taking as directed. Patient attend [facility] per chart--pt reports he is compliant with attending treatment. Patient denies fever, states he has SOB with activity and gets slightly winded when talking too much. Patient denies cough, reports nasal \"stuffiness\". Patient states he ordered a home pulse ox and it should arrive tomorrow. Patient states he has a home BP cuff and self monitors prior to taking BP meds. WBC 23.3 at discharge--on steroids. Patient denies fever, chills or s/sx infection at time of call. Patient instructed to take steroids with food and glass of water to prevent GI upset. Patient reports that he has had difficulty laying flat since he had Covid19 and has to sleep sitting up. CTN will send PCP note regarding this to discuss at [facility] ([place], [place]). CTN encouraged patient to use IS at home throughout the day and to ambulate several times a day as tolerated.     ---phill

## 2023-01-31 NOTE — PROGRESS NOTES
Transitions Of Care and Hypertension       HPI:  Efren Slater is a 67y.o. year old male who is here for a follow up visit for hospitalization transition of care. He was last seen by me on 1/20/2023 at the office and on 1/27/2023 at time of hospital discharge. Discharged on: 1/27/2023    Diagnosis in hospital:   1. Acute hypoxemic respiratory failure. 2.  Hypertension. 3.  Bilateral pneumonia. 4.  End-stage renal disease. 5.  Gastroesophageal reflux disease. 6.  Recent COVID-19 infection. Complications in hospital: No complications    Medication changes: Start prednisone 10 twice daily for 3 days then 10 daily for 3 additional days and then discontinue. Discontinue Prilosec    Discharge Summary reviewed. Today 2/1/2023      He reports the following: Since hospital discharge she claims to be doing quite well. He does seem a little weak. He notes no shortness of breath, cough, chest congestion or cardiorespiratory complaints. He notes no current GI or  complaints. He is still getting his dialysis 3 times weekly. He denies any headaches, dizziness or neurologic complaints except being a little weak. He denies any current arthritic complaints and there are no other complaints on complete review of systems.           Visit Vitals  /70 (BP 1 Location: Left upper arm, BP Patient Position: Sitting, BP Cuff Size: Adult)   Pulse 76   Temp 98.6 °F (37 °C) (Oral)   Resp 16   Ht 5' 9\" (1.753 m)   Wt 177 lb (80.3 kg)   SpO2 99%   BMI 26.14 kg/m²       Historical Data    Past Medical History:   Diagnosis Date    Arthritis     RA    Chronic kidney disease     Dialysis T, Thur, Sat @ [facility]    COVID 12/11/2022    GERD (gastroesophageal reflux disease)     Gout     Hepatitis C     Hypertension     Ill-defined condition     Gout    Iritis     Liver disease     Hep C    Other and unspecified alcohol dependence, unspecified drinking behavior     Acid reflux    Other ill-defined conditions(799.89)     blood transfusion hx    Psychiatric disorder     Anxiety       Past Surgical History:   Procedure Laterality Date    HX ENDOSCOPY  07/29/2020    HX OTHER SURGICAL      liver biopsy    HX VASCULAR ACCESS      [facility]    HX VASCULAR ACCESS  7/15/13    CREATION LEFT UPPER ARM BASILIC VEIN TRANSPOSITION    HX VASCULAR ACCESS      perma cat       Outpatient Encounter Medications as of 2/1/2023   Medication Sig Dispense Refill    predniSONE (DELTASONE) 10 mg tablet Take 1 twice daily for 3 days and then 1 daily for 3 days and then discontinue 9 Tablet 0    sevelamer carbonate (RENVELA) 800 mg tab tab Take 800 mg by mouth three (3) times daily (with meals). Take 3 tabs with every meal      albuterol (PROVENTIL HFA, VENTOLIN HFA, PROAIR HFA) 90 mcg/actuation inhaler Take 2 Puffs by inhalation. LORazepam (ATIVAN) 0.5 mg tablet Take 1 Tablet by mouth two (2) times daily as needed for Anxiety. Max Daily Amount: 1 mg. 30 Tablet 2    tiZANidine (ZANAFLEX) 4 mg tablet Take 1 Tablet by mouth three (3) times daily as needed for Muscle Spasm(s). 30 Tablet 1    carvediloL (COREG) 12.5 mg tablet Take 12.5 mg by mouth two (2) times daily (with meals). lisinopril (PRINIVIL, ZESTRIL) 20 mg tablet Take 1 Tab by mouth daily. 30 Tab prn    etelcalcetide 5 mg/mL soln by IntraVENous route. Indications: 3 x per week      amLODIPine (NORVASC) 10 mg tablet TAKE 1 TABLET BY MOUTH ONCE DAILY  11    Omeprazole delayed release (PRILOSEC D/R) 20 mg tablet Take 20 mg by mouth daily. (Patient not taking: Reported on 2/1/2023)       No facility-administered encounter medications on file as of 2/1/2023.         Allergies   Allergen Reactions    Codeine Hives        Social History     Socioeconomic History    Marital status: SINGLE     Spouse name: Not on file    Number of children: Not on file    Years of education: Not on file    Highest education level: Not on file   Occupational History    Not on file   Tobacco Use    Smoking status: Never     Passive exposure: Never    Smokeless tobacco: Never   Vaping Use    Vaping Use: Never used   Substance and Sexual Activity    Alcohol use: No    Drug use: Not Currently     Types: Marijuana, Heroin     Comment: smoked pot  years ago, herion in 25s    Sexual activity: Not Currently   Other Topics Concern    Not on file   Social History Narrative    Not on file     Social Determinants of Health     Financial Resource Strain: Not on file   Food Insecurity: Not on file   Transportation Needs: Not on file   Physical Activity: Not on file   Stress: Not on file   Social Connections: Not on file   Intimate Partner Violence: Not on file   Housing Stability: Not on file      REVIEW OF SYSTEMS:  General: negative for - chills or fever  ENT: negative for - headaches, nasal congestion or tinnitus  Eyes: no blurred or visual changes  Neck: No stiffness or swollen nodes  Respiratory: negative for - cough, hemoptysis, shortness of breath or wheezing  Cardiovascular : negative for - chest pain, edema, palpitations or shortness of breath  Gastrointestinal: negative for - abdominal pain, blood in stools, heartburn or nausea/vomiting  Genito-Urinary: no dysuria, trouble voiding, or hematuria  Musculoskeletal: negative for - gait disturbance, joint pain, joint stiffness or joint swelling  Neurological: no TIA or stroke symptoms  Hematologic: no bruises, no bleeding  Lymphatic: no swollen glands  Integument: no lumps, mole changes, nail changes or rash  Endocrine:no malaise/lethargy poly uria or polydipsia or unexpected weight changes      Visit Vitals  /70 (BP 1 Location: Left upper arm, BP Patient Position: Sitting, BP Cuff Size: Adult)   Pulse 76   Temp 98.6 °F (37 °C) (Oral)   Resp 16   Ht 5' 9\" (1.753 m)   Wt 177 lb (80.3 kg)   SpO2 99%   BMI 26.14 kg/m²     CONSTITUTIONAL: well , well nourished, appears age appropriate  HEAD: normocephalic, atraumatic  EYES: sclera anicteric, PERRL, EOMI  ENMT:moist mucous membranes, pharynx clear          Nares: w/o erythema or edema  NECK: supple. Thyroid normal, No JVD or bruits  RESPIRATORY: Chest: clear to ascultation and percussion   CARDIOVASCULAR: Heart: regular rate and rhythm no murmurs, rubs or gallops, PMI not displaced, no thrill  GASTROINTESTINAL: Abdomen: non distended, soft, non-tender, bowel sounds normal  HEMATOLOGIC: no petechiae or purpura  LYMPHATIC: no lymphadenopathy  MUSCULOSKELETAL: Extremities: no edema or active synovitis, pulse 1+   BACK; no point or CVAT  INTEGUMENT: No unusual rashes or suspicious skin lesions noted. Nails appear normal.  NEUROLOGIC: non-focal exam   MENTAL STATUS: alert and oriented, appropriate affect   PSYCHIATRIC: normal affect    ASSESSMENT:   1. Acute hypoxemic respiratory failure (HCC)    2. Pneumonia of both lungs due to infectious organism, unspecified part of lung    3. COVID-19    4. Hypertension, renal disease    5. ESRD (end stage renal disease) ([facility] [address]    6. Gastroesophageal reflux disease without esophagitis    7. Hospital discharge follow-up      Impression  1. Acute hypoxemic respiratory failure on admission that has resolved and today O2 sat is 99% on room air. We did discuss his Proventil inhaler and I told him he only needs to use that if necessary/as needed. 2.  Pneumonia both lungs probably viral etiology but empirically covered in the hospital with Zithromax and Rocephin and that resolved on chest x-ray prior to discharge  3. COVID-19 infection mid December probably led to development of #2 and subsequent #1  4. Hypertension that is currently controlled  5. End-stage renal disease on dialysis  6. GERD that is stable  7. Hospital discharge follow-up completed today. Follow-up with me in 2 weeks or sooner if there is a problem.   Note he is a high complexity patient due to his underlying medical problems including end-stage renal disease on dialysis and thus at high risk of repeat hospitalization so close follow-up is warranted. 50 minutes spent in direct care of this patient today with greater than 50% in counseling coordination of care. PLAN:  . No orders of the defined types were placed in this encounter. ATTENTION:   This medical record was transcribed using an electronic medical records system. Although proofread, it may and can contain electronic and spelling errors. Other human spelling and other errors may be present. Corrections may be executed at a later time. Please feel free to contact us for any clarifications as needed. Follow-up and Dispositions    Return F/U as scheduled. Kathy Silva MD     Orders Placed This Encounter    SD DISCHARGE MEDS RECONCILED W/ CURRENT OUTPATIENT MED LIST          No results found for any visits on 02/01/23. I have reviewed the patient's medical history in detail and updated the computerized patient record. We had a prolonged discussion about these complex clinical issues and went over the various important aspects to consider. All questions were answered. Advised him to call back or return to office if symptoms do not improve, change in nature, or persist.    He was given an after visit summary or informed of [employer] which includes patient instructions, diagnoses, current medications, & vitals. He expressed understanding with the diagnosis and plan.

## 2023-02-01 ENCOUNTER — OFFICE VISIT (OUTPATIENT)
Dept: INTERNAL MEDICINE CLINIC | Age: 73
End: 2023-02-01
Payer: MEDICARE

## 2023-02-01 VITALS
OXYGEN SATURATION: 99 % | HEART RATE: 76 BPM | DIASTOLIC BLOOD PRESSURE: 70 MMHG | HEIGHT: 69 IN | BODY MASS INDEX: 26.22 KG/M2 | TEMPERATURE: 98.6 F | SYSTOLIC BLOOD PRESSURE: 128 MMHG | WEIGHT: 177 LBS | RESPIRATION RATE: 16 BRPM

## 2023-02-01 DIAGNOSIS — J18.9 PNEUMONIA OF BOTH LUNGS DUE TO INFECTIOUS ORGANISM, UNSPECIFIED PART OF LUNG: ICD-10-CM

## 2023-02-01 DIAGNOSIS — J96.01 ACUTE HYPOXEMIC RESPIRATORY FAILURE (HCC): Primary | ICD-10-CM

## 2023-02-01 DIAGNOSIS — I12.9 HYPERTENSION, RENAL DISEASE: ICD-10-CM

## 2023-02-01 DIAGNOSIS — U07.1 COVID-19: ICD-10-CM

## 2023-02-01 DIAGNOSIS — K21.9 GASTROESOPHAGEAL REFLUX DISEASE WITHOUT ESOPHAGITIS: ICD-10-CM

## 2023-02-01 DIAGNOSIS — N18.6 ESRD (END STAGE RENAL DISEASE) (HCC): ICD-10-CM

## 2023-02-01 DIAGNOSIS — Z09 HOSPITAL DISCHARGE FOLLOW-UP: ICD-10-CM

## 2023-02-01 PROCEDURE — 1111F DSCHRG MED/CURRENT MED MERGE: CPT | Performed by: INTERNAL MEDICINE

## 2023-02-01 PROCEDURE — G8427 DOCREV CUR MEDS BY ELIG CLIN: HCPCS | Performed by: INTERNAL MEDICINE

## 2023-02-01 PROCEDURE — 99496 TRANSJ CARE MGMT HIGH F2F 7D: CPT | Performed by: INTERNAL MEDICINE

## 2023-02-01 NOTE — PROGRESS NOTES
Room: e1   Identified pt with two pt identifiers(name and ). Reviewed record in preparation for visit and have obtained necessary documentation. Chief Complaint   Patient presents with    Transitions Of Care    Hypertension        Vitals:    23 1038   BP: 128/70   Pulse: 76   Resp: 16   Temp: 98.6 °F (37 °C)   TempSrc: Oral   SpO2: 99%   Weight: 177 lb (80.3 kg)   Height: 5' 9\" (1.753 m)   PainSc:   0 - No pain       Health Maintenance Due   Topic    Hepatitis B Vaccine (1 of 3 - Risk 3-dose series)    Shingles Vaccine (1 of 2)    DTaP/Tdap/Td series (1 - Tdap)    COVID-19 Vaccine (4 - Booster for Moderna series)    Flu Vaccine (1)       1. \"Have you been to the ER, urgent care clinic since your last visit? Hospitalized since your last visit? \" Yes 23 hospital visit at [address] [facility]     2. \"Have you seen or consulted any other health care providers outside of the [address] [place] [address] since your last visit? \" No     3. For patients over 45: Has the patient had a colonoscopy? Yes - no Care Gap present     If the patient is female:    4. For patients over 36: Has the patient had a mammogram? NA - based on age    11. For patients over 21: Has the patient had a pap smear?  NA - based on age    Current Outpatient Medications   Medication Instructions    albuterol (PROVENTIL HFA, VENTOLIN HFA, PROAIR HFA) 90 mcg/actuation inhaler 2 Puffs, Inhalation    amLODIPine (NORVASC) 10 mg tablet TAKE 1 TABLET BY MOUTH ONCE DAILY    carvediloL (COREG) 12.5 mg, Oral, 2 TIMES DAILY WITH MEALS    etelcalcetide 5 mg/mL soln IntraVENous    lisinopriL (PRINIVIL, ZESTRIL) 20 mg, Oral, DAILY    LORazepam (ATIVAN) 0.5 mg, Oral, 2 TIMES DAILY AS NEEDED    Omeprazole delayed release (PRILOSEC D/R) 20 mg, DAILY    predniSONE (DELTASONE) 10 mg tablet Take 1 twice daily for 3 days and then 1 daily for 3 days and then discontinue    sevelamer carbonate (RENVELA) 800 mg, Oral, 3 TIMES DAILY WITH MEALS, Take 3 tabs with every meal tiZANidine (ZANAFLEX) 4 mg, Oral, 3 TIMES DAILY AS NEEDED       Allergies   Allergen Reactions    Codeine Hives       Immunization History   Administered Date(s) Administered    COVID-19, MODERNA BLUE border, Primary or Immunocompromised, (age 18y+), IM, 100 mcg/0.5mL 03/01/2021, 03/30/2021    COVID-19, MODERNA Booster MARIE border, (age 18y+), IM, 50mcg/0.25mL 10/28/2021    Influenza High Dose Vaccine PF 10/15/2018, 10/03/2019, 09/25/2020, 09/27/2021    Influenza Vaccine 10/12/2015    Pneumococcal Conjugate (PCV-13) 10/12/2015    Pneumococcal Polysaccharide (PPSV-23) 09/13/2013, 12/19/2018       Past Medical History:   Diagnosis Date    Arthritis     RA    Chronic kidney disease     Dialysis T, Thur, Sat @ [facility]    COVID 12/11/2022    GERD (gastroesophageal reflux disease)     Gout     Hepatitis C     Hypertension     Ill-defined condition     Gout    Iritis     Liver disease     Hep C    Other and unspecified alcohol dependence, unspecified drinking behavior     Acid reflux    Other ill-defined conditions(929.89)     blood transfusion hx    Psychiatric disorder     Anxiety

## 2023-02-09 ENCOUNTER — PATIENT OUTREACH (OUTPATIENT)
Dept: CASE MANAGEMENT | Age: 73
End: 2023-02-09

## 2023-02-09 NOTE — PROGRESS NOTES
Care Transitions Follow Up Call    Challenges to be reviewed by the provider   Additional needs identified to be addressed with provider: no  none           Method of communication with provider : none    Care Transition Nurse (CTN) contacted the patient by telephone to follow up after admission on 23. Verified name and  with patient as identifiers. Addressed changes since last contact: none  Follow up appointment completed? yes. Was follow up appointment scheduled within 7 days of discharge? yes. CTN reviewed discharge instructions, medical action plan and red flags with patient and discussed any barriers to care and/or understanding of plan of care after discharge. Discussed appropriate site of care based on symptoms and resources available to patient including: PCP. The patient agrees to contact the PCP office for questions related to their healthcare. Patients top risk factors for readmission: medical condition-ESRD / Hep C, is high risk for infection  Interventions to address risk factors: Education of patient/family/caregiver/guardian to support self-management-encouraged patient to schedule OP PT as soon as possibl to help with strength and endurance    [place] [facility](s):   Future Appointments   Date Time Provider Major Barnett   2023  8:50 AM Cuate Harper MD PCAM BS [facility]     Non-BS follow up appointment(s): na    CTN provided contact information for future needs. Plan for follow-up call in 3-5 days based on severity of symptoms and risk factors. Plan for next call: symptom management-PNA       Goals Addressed                   This Visit's Progress     Prevent complications post hospitalization. 23   CTN unable to reach patient or emergency contact listed, left messages on all voicemails. My Chart message sent. UPDATE: patient returned CTN call.      PCP--per MD note, pt should follow up in 5 days--patient reports he made appt with PCP for 2/1/23    Patient discharged on prednisone taper. --patient reports he has picked up and is taking as directed. Patient attend [facility] Gott[facility] per chart--pt reports he is compliant with attending treatment. Patient denies fever, states he has SOB with activity and gets slightly winded when talking too much. Patient denies cough, reports nasal \"stuffiness\". Patient states he ordered a home pulse ox and it should arrive tomorrow. Patient states he has a home BP cuff and self monitors prior to taking BP meds. WBC 23.3 at discharge--on steroids. Patient denies fever, chills or s/sx infection at time of call. Patient instructed to take steroids with food and glass of water to prevent GI upset. Patient reports that he has had difficulty laying flat since he had Covid19 and has to sleep sitting up. CTN will send PCP note regarding this to discuss at [facility]. CTN encouraged patient to use IS at home throughout the day and to ambulate several times a day as tolerated. ---phill    02/09/23    Spoke with patient , he reports he is tired due to having dialysis earlier today, otherwise he reports no concerns at time of call. Patient denies any worsening of his breathing, no fever or chills. He has attended PCP appt on 2/11/23 and his o2 sat on RA is 100%    Patient has not yet resumes his OP PT at [facility] declines CTN offer to help set up appt at this time. Patient states he will do this soon. Patient requested that he end call due to wanting to take a nap,asked CTN to call back to leave name and number on  so he has it. CTN did as instructed. CTN will follow up next week on non dialysis day. --phill

## 2023-02-16 ENCOUNTER — PATIENT OUTREACH (OUTPATIENT)
Dept: CASE MANAGEMENT | Age: 73
End: 2023-02-16

## 2023-02-16 NOTE — PROGRESS NOTES
Chief Complaint   Patient presents with    Hypertension     3 month follow up    Cholesterol Problem       SUBJECTIVE:    Tammy Rust is a 67 y.o. male who returns in follow-up for his medical problems include hypertension, hyperlipidemia, GERD, DJD, recent hospitalization with bilateral pneumonia from COVID and other multimedical problems. He is still noting he has some purulent sputum at times although is not severe he does noted as color to involve a yellowish appearance. There is some cough but no shortness of breath. He notes no other cardiac or respiratory complaints. He notes no GI or  complaints. He is still getting his dialysis 3 times weekly on Tuesday Thursday and Saturday. He denies any headaches, dizziness or neurologic complaints. There are no current active arthritic complaints and no other complaints on complete review systems. Current Outpatient Medications   Medication Sig Dispense Refill    azithromycin (ZITHROMAX) 250 mg tablet Take 1 Tablet by mouth See Admin Instructions for 5 days. Take 2 tablets the first day, then 1 tablet daily for the next four days. 6 Tablet 0    sevelamer carbonate (RENVELA) 800 mg tab tab Take 800 mg by mouth three (3) times daily (with meals). Take 3 tabs with every meal      albuterol (PROVENTIL HFA, VENTOLIN HFA, PROAIR HFA) 90 mcg/actuation inhaler Take 2 Puffs by inhalation. LORazepam (ATIVAN) 0.5 mg tablet Take 1 Tablet by mouth two (2) times daily as needed for Anxiety. Max Daily Amount: 1 mg. 30 Tablet 2    tiZANidine (ZANAFLEX) 4 mg tablet Take 1 Tablet by mouth three (3) times daily as needed for Muscle Spasm(s). 30 Tablet 1    carvediloL (COREG) 12.5 mg tablet Take 12.5 mg by mouth two (2) times daily (with meals). lisinopril (PRINIVIL, ZESTRIL) 20 mg tablet Take 1 Tab by mouth daily. 30 Tab prn    etelcalcetide 5 mg/mL soln by IntraVENous route.  Indications: 3 x per week      amLODIPine (NORVASC) 10 mg tablet TAKE 1 TABLET BY MOUTH ONCE DAILY  11     Past Medical History:   Diagnosis Date    Arthritis     RA    Chronic kidney disease     Dialysis T, Thur, Sat @ [facility]    COVID 12/11/2022    GERD (gastroesophageal reflux disease)     Gout     Hepatitis C     Hypertension     Ill-defined condition     Gout    Iritis     Liver disease     Hep C    Other and unspecified alcohol dependence, unspecified drinking behavior     Acid reflux    Other ill-defined conditions(799.89)     blood transfusion hx    Psychiatric disorder     Anxiety     Past Surgical History:   Procedure Laterality Date    HX ENDOSCOPY  07/29/2020    HX OTHER SURGICAL      liver biopsy    HX VASCULAR ACCESS      [facility]    HX VASCULAR ACCESS  7/15/13    CREATION LEFT UPPER ARM BASILIC VEIN TRANSPOSITION    HX VASCULAR ACCESS      [facility]     Allergies   Allergen Reactions    Codeine Hives       REVIEW OF SYSTEMS:  General: negative for - chills or fever, or weight loss or gain  ENT: negative for - headaches, nasal congestion or tinnitus  Eyes: no blurred or visual changes  Neck: No stiffness or swollen nodes  Respiratory: negative for -  hemoptysis, shortness of breath or wheezing.   Positive for cough with some yellowish sputum  Cardiovascular : negative for - chest pain, edema, palpitations or shortness of breath  Gastrointestinal: negative for - abdominal pain, blood in stools, heartburn or nausea/vomiting  Genito-Urinary: no dysuria, trouble voiding, or hematuria  Musculoskeletal: negative for - gait disturbance, joint pain, joint stiffness or joint swelling  Neurological: no TIA or stroke symptoms  Hematologic: no bruises, no bleeding  Lymphatic: no swollen glands  Integument: no lumps, mole changes, nail changes or rash  Endocrine:no malaise/lethargy poly uria or polydipsia or unexpected weight changes        Social History     Socioeconomic History    Marital status: SINGLE   Tobacco Use    Smoking status: Never     Passive exposure: Never Smokeless tobacco: Never   Vaping Use    Vaping Use: Never used   Substance and Sexual Activity    Alcohol use: No    Drug use: Not Currently     Types: Marijuana, Heroin     Comment: smoked pot  years ago, herion in 25s    Sexual activity: Not Currently     History reviewed. No pertinent family history. OBJECTIVE:     Visit Vitals  /75 (BP 1 Location: Right arm, BP Patient Position: Sitting, BP Cuff Size: Adult)   Pulse 78   Temp 98.3 °F (36.8 °C) (Oral)   Ht 5' 9\" (1.753 m)   Wt 170 lb (77.1 kg)   SpO2 94%   BMI 25.10 kg/m²     CONSTITUTIONAL:   well nourished, appears age appropriate  EYES: sclera anicteric, PERRL, EOMI  ENMT:nares clear, moist mucous membranes, pharynx clear  NECK: supple. Thyroid normal, No JVD or bruits  RESPIRATORY: Chest: clear to ascultation and percussion except dry bibasilar rales, normal inspiratory effort  CARDIOVASCULAR: Heart: regular rate and rhythm no murmurs, rubs or gallops, PMI not displaced, No thrills, no peripheral edema  GASTROINTESTINAL: Abdomen: non distended, soft, non tender, bowel sounds normal  HEMATOLOGIC: no purpura, petechiae or bruising  LYMPHATIC: No lymph node enlargemant  MUSCULOSKELETAL: Extremities: no active synovitis, pulse 1+   INTEGUMENT: No unusual rashes or suspicious skin lesions noted. Nails appear normal.  PERIPHERAL VASCULAR: normal pulses femoral, PT and DP  NEUROLOGIC: non-focal exam, A & O X 3  PSYCHIATRIC:, appropriate affect     ASSESSMENT:   1. Hypertension, renal disease    2. Mixed hyperlipidemia    3. Gastroesophageal reflux disease without esophagitis    4. Chronic hepatitis C without hepatic coma ([facility] [address]    5. Acute non-recurrent maxillary sinusitis      Impression  1. Hypertension that is controlled to continue current therapy reviewed with him. 2.  Hyperlipidemia prior lab reviewed repeat status pending  3. GERD stable   4. Chronic hep C seems to be stable  5. End-stage renal disease on dialysis  6.   Sinusitis we will treat this with Zithromax  Recheck 3 months or sooner if there is a problem. I will call with lab results in interim. PLAN:  .  Orders Placed This Encounter    LIPID PANEL    METABOLIC PANEL, COMPREHENSIVE    CK    azithromycin (ZITHROMAX) 250 mg tablet         ATTENTION:   This medical record was transcribed using an electronic medical records system. Although proofread, it may and can contain electronic and spelling errors. Other human spelling and other errors may be present. Corrections may be executed at a later time. Please feel free to contact us for any clarifications as needed. Follow-up and Dispositions    Return in about 3 months (around 5/17/2023). No results found for any visits on 02/17/23. Terrie Eisenberg MD    The patient verbalized understanding of the problems and plans as explained.

## 2023-02-16 NOTE — PROGRESS NOTES
Care Transitions Follow Up Call    Patient Current Location: [address] [place] [address] to be reviewed by the provider   Additional needs identified to be addressed with provider: no  none           Method of communication with provider : none    Care Transition Nurse (CTN) contacted the patient by telephone to follow up after admission on 23. Verified name and  with patient as identifiers. Addressed changes since last contact:  follow up PNA  Follow up appointment completed? yes. Was follow up appointment scheduled within 7 days of discharge? yes. Advance Care Planning:   Does patient have an Advance Directive:  currently not on file; patient declined education    CTN reviewed discharge instructions, medical action plan and red flags with patient and discussed any barriers to care and/or understanding of plan of care after discharge. Discussed appropriate site of care based on symptoms and resources available to patient including: PCP and Specialist. The patient agrees to contact the PCP office for questions related to their healthcare. Patients top risk factors for readmission:  none identified at time of call    Interventions to address risk factors:  patient will follow up with PCP tomorrow    [facility] follow up appointment(s):   Future Appointments   Date Time Provider Major Barnett   2023  8:50 AM Francisco Canseco MD PCA BS [facility]     [facility] follow up appointment(s): na    CTN provided contact information for future needs. Plan for follow-up call in 5-7 days based on severity of symptoms and risk factors. Plan for next call: self management-PNA       Goals Addressed                   This Visit's Progress     Prevent complications post hospitalization. 23   CTN unable to reach patient or emergency contact listed, left messages on all voicemails. My Chart message sent. UPDATE: patient returned CTN call.      PCP--per MD note, pt should follow up in 5 days--patient reports he made appt with PCP for 2/1/23    Patient discharged on prednisone taper. --patient reports he has picked up and is taking as directed. Patient attend [facility] Rivas per chart--pt reports he is compliant with attending treatment. Patient denies fever, states he has SOB with activity and gets slightly winded when talking too much. Patient denies cough, reports nasal \"stuffiness\". Patient states he ordered a home pulse ox and it should arrive tomorrow. Patient states he has a home BP cuff and self monitors prior to taking BP meds. WBC 23.3 at discharge--on steroids. Patient denies fever, chills or s/sx infection at time of call. Patient instructed to take steroids with food and glass of water to prevent GI upset. Patient reports that he has had difficulty laying flat since he had Covid19 and has to sleep sitting up. CTN will send PCP note regarding this to discuss at [facility]. CTN encouraged patient to use IS at home throughout the day and to ambulate several times a day as tolerated. ---phill    02/09/23    Spoke with patient , he reports he is tired due to having dialysis earlier today, otherwise he reports no concerns at time of call. Patient denies any worsening of his breathing, no fever or chills. He has attended PCP appt on 2/11/23 and his o2 sat on RA is 100%    Patient has not yet resumes his OP PT at [facility] declines CTN offer to help set up appt at this time. Patient states he will do this soon. Patient requested that he end call due to wanting to take a nap,asked CTN to call back to leave name and number on  so he has it. CTN did as instructed. CTN will follow up next week on non dialysis day. --phill    02/16/23    CTN contacted patient for follow up on symptoms. Patient reports he is not having any problems with his breathing, denies SOB, Fever, cough. He states he has completed steroids. Patient has another follow up with PCP tomorrow. He is aware of this appt.     Patient reports he is going to dialysis and has not missed any treatments. CTN will follow up with patient next week.  Patient asks that CTN call on M/W/F.

## 2023-02-17 ENCOUNTER — OFFICE VISIT (OUTPATIENT)
Dept: INTERNAL MEDICINE CLINIC | Age: 73
End: 2023-02-17
Payer: MEDICARE

## 2023-02-17 VITALS
HEART RATE: 78 BPM | WEIGHT: 170 LBS | HEIGHT: 69 IN | TEMPERATURE: 98.3 F | OXYGEN SATURATION: 94 % | SYSTOLIC BLOOD PRESSURE: 124 MMHG | DIASTOLIC BLOOD PRESSURE: 75 MMHG | BODY MASS INDEX: 25.18 KG/M2

## 2023-02-17 DIAGNOSIS — B18.2 CHRONIC HEPATITIS C WITHOUT HEPATIC COMA (HCC): ICD-10-CM

## 2023-02-17 DIAGNOSIS — J01.00 ACUTE NON-RECURRENT MAXILLARY SINUSITIS: ICD-10-CM

## 2023-02-17 DIAGNOSIS — E78.2 MIXED HYPERLIPIDEMIA: ICD-10-CM

## 2023-02-17 DIAGNOSIS — I12.9 HYPERTENSION, RENAL DISEASE: Primary | ICD-10-CM

## 2023-02-17 DIAGNOSIS — K21.9 GASTROESOPHAGEAL REFLUX DISEASE WITHOUT ESOPHAGITIS: ICD-10-CM

## 2023-02-17 RX ORDER — AZITHROMYCIN 250 MG/1
250 TABLET, FILM COATED ORAL SEE ADMIN INSTRUCTIONS
Qty: 6 TABLET | Refills: 0 | Status: SHIPPED | OUTPATIENT
Start: 2023-02-17 | End: 2023-02-22

## 2023-02-17 NOTE — PROGRESS NOTES
Tammy Rust is a 67 y.o. male     Chief Complaint   Patient presents with    Hypertension     3 month follow up    Cholesterol Problem       Visit Vitals  /75 (BP 1 Location: Right arm, BP Patient Position: Sitting, BP Cuff Size: Adult)   Pulse 78   Temp 98.3 °F (36.8 °C) (Oral)   Ht 5' 9\" (1.753 m)   Wt 170 lb (77.1 kg)   SpO2 94%   BMI 25.10 kg/m²       Health Maintenance Due   Topic Date Due    Hepatitis B Vaccine (1 of 3 - Risk 3-dose series) Never done    Shingles Vaccine (1 of 2) Never done    DTaP/Tdap/Td series (1 - Tdap) Never done    COVID-19 Vaccine (4 - Booster for Moderna series) 12/23/2021    Flu Vaccine (1) 08/01/2022         1. \"Have you been to the ER, urgent care clinic since your last visit? Hospitalized since your last visit? \" No    2. \"Have you seen or consulted any other health care providers outside of the [address] [place] [address] since your last visit? \" No     3. For patients aged 39-70: Has the patient had a colonoscopy / FIT/ Cologuard? Yes - no Care Gap present      If the patient is female:    4. For patients aged 41-77: Has the patient had a mammogram within the past 2 years? NA - based on age or sex      11. For patients aged 21-65: Has the patient had a pap smear?  NA - based on age or sex

## 2023-02-18 LAB
ALBUMIN SERPL-MCNC: 3.5 G/DL (ref 3.5–5)
ALBUMIN/GLOB SERPL: 0.7 (ref 1.1–2.2)
ALP SERPL-CCNC: 110 U/L (ref 45–117)
ALT SERPL-CCNC: 13 U/L (ref 12–78)
ANION GAP SERPL CALC-SCNC: 9 MMOL/L (ref 5–15)
AST SERPL-CCNC: 18 U/L (ref 15–37)
BILIRUB SERPL-MCNC: 0.5 MG/DL (ref 0.2–1)
BUN SERPL-MCNC: 36 MG/DL (ref 6–20)
BUN/CREAT SERPL: 5 (ref 12–20)
CALCIUM SERPL-MCNC: 9.1 MG/DL (ref 8.5–10.1)
CHLORIDE SERPL-SCNC: 100 MMOL/L (ref 97–108)
CHOLEST SERPL-MCNC: 200 MG/DL
CK SERPL-CCNC: 99 U/L (ref 39–308)
CO2 SERPL-SCNC: 28 MMOL/L (ref 21–32)
CREAT SERPL-MCNC: 7.61 MG/DL (ref 0.7–1.3)
GLOBULIN SER CALC-MCNC: 5.1 G/DL (ref 2–4)
GLUCOSE SERPL-MCNC: 88 MG/DL (ref 65–100)
HDLC SERPL-MCNC: 65 MG/DL
HDLC SERPL: 3.1 (ref 0–5)
LDLC SERPL CALC-MCNC: 122.6 MG/DL (ref 0–100)
POTASSIUM SERPL-SCNC: 4.6 MMOL/L (ref 3.5–5.1)
PROT SERPL-MCNC: 8.6 G/DL (ref 6.4–8.2)
SODIUM SERPL-SCNC: 137 MMOL/L (ref 136–145)
TRIGL SERPL-MCNC: 62 MG/DL (ref ?–150)
VLDLC SERPL CALC-MCNC: 12.4 MG/DL

## 2023-02-27 ENCOUNTER — PATIENT OUTREACH (OUTPATIENT)
Dept: CASE MANAGEMENT | Age: 73
End: 2023-02-27

## 2023-02-27 NOTE — PROGRESS NOTES
Patient has graduated from the Transitions of Care Coordination  program on 2/27/2023. Patient/family has the ability to self-manage at this time Care management goals have been completed. Patient was not referred to the [facility] team for further management. Goals Addressed                   This Visit's Progress     Prevent complications post hospitalization. 01/30/23   CTN unable to reach patient or emergency contact listed, left messages on all voicemails. My Chart message sent. UPDATE: patient returned CTN call. PCP--per MD note, pt should follow up in 5 days--patient reports he made appt with PCP for 2/1/23    Patient discharged on prednisone taper. --patient reports he has picked up and is taking as directed. Patient attend [facility] Buckley per chart--pt reports he is compliant with attending treatment. Patient denies fever, states he has SOB with activity and gets slightly winded when talking too much. Patient denies cough, reports nasal \"stuffiness\". Patient states he ordered a home pulse ox and it should arrive tomorrow. Patient states he has a home BP cuff and self monitors prior to taking BP meds. WBC 23.3 at discharge--on steroids. Patient denies fever, chills or s/sx infection at time of call. Patient instructed to take steroids with food and glass of water to prevent GI upset. Patient reports that he has had difficulty laying flat since he had Covid19 and has to sleep sitting up. CTN will send PCP note regarding this to discuss at [facility]. CTN encouraged patient to use IS at home throughout the day and to ambulate several times a day as tolerated. ---phill    02/09/23    Spoke with patient , he reports he is tired due to having dialysis earlier today, otherwise he reports no concerns at time of call. Patient denies any worsening of his breathing, no fever or chills.  He has attended PCP appt on 2/11/23 and his o2 sat on RA is 100%    Patient has not yet resumes his OP PT at [facility] declines CTN offer to help set up appt at this time. Patient states he will do this soon. Patient requested that he end call due to wanting to take a nap,asked CTN to call back to leave name and number on VM so he has it. CTN did as instructed. CTN will follow up next week on non dialysis day. --mkrw    02/16/23    CTN contacted patient for follow up on symptoms. Patient reports he is not having any problems with his breathing, denies SOB, Fever, cough. He states he has completed steroids. Patient has another follow up with PCP tomorrow. He is aware of this appt. Patient reports he is going to dialysis and has not missed any treatments. CTN will follow up with patient next week. Patient asks that CTN call on M/W/F.     02/27/23    CTN unable to contact patient today on phone, LM on  both phones. Chart reviewed, patient attended PCP appt on 2/17/23, had labs rechecked. Patient was prescribed Zithromax for sinusitis. CTN will end NOMAN at this time, no further outreach scheduled--mkrw                    Patient has Care Transition Nurse's contact information for any further questions, concerns, or needs.   Patients upcoming visits:    Future Appointments   Date Time Provider Major Barnett   5/5/2023  9:00 AM Shyam Bang MD PCAM BS [facility]

## 2023-02-27 NOTE — PROGRESS NOTES
02/27/23  UPDATE: patient called CTN stating that he would like information regarding pulmonary specialist in his area. CTN provided patient with  PAR number [phone]. He reports he is not having any acute breathing problems today but states he is unsure if his sinuses or his lungs cause him to be SOB at times. He states he would like a specialist opinion. Patient had no other questions, CTN instructed patient to call again if he needed anything further. --phill

## 2023-03-14 NOTE — TELEPHONE ENCOUNTER
RX refill request from the patient/pharmacy. Patient last seen 05- with labs, and next appt. scheduled for 08-  Requested Prescriptions     Pending Prescriptions Disp Refills    meloxicam (MOBIC) 7.5 mg tablet 30 Tab 5     Sig: Take 1 Tab by mouth daily. Hakeem Doherty verbal cues/nonverbal cues (demo/gestures)/1 person assist

## 2023-05-05 ENCOUNTER — OFFICE VISIT (OUTPATIENT)
Dept: INTERNAL MEDICINE CLINIC | Age: 73
End: 2023-05-05

## 2023-05-05 VITALS
OXYGEN SATURATION: 99 % | DIASTOLIC BLOOD PRESSURE: 82 MMHG | WEIGHT: 177.8 LBS | HEART RATE: 71 BPM | RESPIRATION RATE: 16 BRPM | BODY MASS INDEX: 26.33 KG/M2 | HEIGHT: 69 IN | TEMPERATURE: 97.8 F | SYSTOLIC BLOOD PRESSURE: 138 MMHG

## 2023-05-05 DIAGNOSIS — N18.6 ESRD (END STAGE RENAL DISEASE) (HCC): ICD-10-CM

## 2023-05-05 DIAGNOSIS — Z12.5 PROSTATE CANCER SCREENING: ICD-10-CM

## 2023-05-05 DIAGNOSIS — K21.9 GASTROESOPHAGEAL REFLUX DISEASE WITHOUT ESOPHAGITIS: ICD-10-CM

## 2023-05-05 DIAGNOSIS — Z00.00 MEDICARE ANNUAL WELLNESS VISIT, SUBSEQUENT: ICD-10-CM

## 2023-05-05 DIAGNOSIS — R09.89 BRUIT OF LEFT CAROTID ARTERY: ICD-10-CM

## 2023-05-05 DIAGNOSIS — F41.9 ANXIETY: ICD-10-CM

## 2023-05-05 DIAGNOSIS — I12.9 HYPERTENSION, RENAL DISEASE: Primary | ICD-10-CM

## 2023-05-05 DIAGNOSIS — E55.9 VITAMIN D DEFICIENCY: ICD-10-CM

## 2023-05-05 DIAGNOSIS — E78.2 MIXED HYPERLIPIDEMIA: ICD-10-CM

## 2023-05-05 DIAGNOSIS — Z13.39 ALCOHOL SCREENING: ICD-10-CM

## 2023-05-05 DIAGNOSIS — B18.2 CHRONIC HEPATITIS C WITHOUT HEPATIC COMA (HCC): ICD-10-CM

## 2023-05-05 LAB
25(OH)D3 SERPL-MCNC: 39.3 NG/ML (ref 30–100)
ALBUMIN SERPL-MCNC: 3.6 G/DL (ref 3.5–5)
ALBUMIN/GLOB SERPL: 0.8 (ref 1.1–2.2)
ALP SERPL-CCNC: 67 U/L (ref 45–117)
ALT SERPL-CCNC: 22 U/L (ref 12–78)
ANION GAP SERPL CALC-SCNC: 6 MMOL/L (ref 5–15)
AST SERPL-CCNC: 24 U/L (ref 15–37)
BASOPHILS # BLD: 0 K/UL (ref 0–0.1)
BASOPHILS NFR BLD: 0 % (ref 0–1)
BILIRUB SERPL-MCNC: 0.4 MG/DL (ref 0.2–1)
BUN SERPL-MCNC: 37 MG/DL (ref 6–20)
BUN/CREAT SERPL: 4 (ref 12–20)
CALCIUM SERPL-MCNC: 9 MG/DL (ref 8.5–10.1)
CHLORIDE SERPL-SCNC: 98 MMOL/L (ref 97–108)
CHOLEST SERPL-MCNC: 158 MG/DL
CK SERPL-CCNC: 118 U/L (ref 39–308)
CO2 SERPL-SCNC: 31 MMOL/L (ref 21–32)
CREAT SERPL-MCNC: 8.59 MG/DL (ref 0.7–1.3)
DIFFERENTIAL METHOD BLD: ABNORMAL
EOSINOPHIL # BLD: 0.2 K/UL (ref 0–0.4)
EOSINOPHIL NFR BLD: 3 % (ref 0–7)
ERYTHROCYTE [DISTWIDTH] IN BLOOD BY AUTOMATED COUNT: 15.2 % (ref 11.5–14.5)
GLOBULIN SER CALC-MCNC: 4.7 G/DL (ref 2–4)
GLUCOSE SERPL-MCNC: 89 MG/DL (ref 65–100)
HCT VFR BLD AUTO: 41.3 % (ref 36.6–50.3)
HDLC SERPL-MCNC: 59 MG/DL
HDLC SERPL: 2.7 (ref 0–5)
HGB BLD-MCNC: 12.9 G/DL (ref 12.1–17)
IMM GRANULOCYTES # BLD AUTO: 0 K/UL (ref 0–0.04)
IMM GRANULOCYTES NFR BLD AUTO: 0 % (ref 0–0.5)
LDLC SERPL CALC-MCNC: 88.8 MG/DL (ref 0–100)
LYMPHOCYTES # BLD: 2 K/UL (ref 0.8–3.5)
LYMPHOCYTES NFR BLD: 29 % (ref 12–49)
MCH RBC QN AUTO: 31.6 PG (ref 26–34)
MCHC RBC AUTO-ENTMCNC: 31.2 G/DL (ref 30–36.5)
MCV RBC AUTO: 101.2 FL (ref 80–99)
MONOCYTES # BLD: 0.9 K/UL (ref 0–1)
MONOCYTES NFR BLD: 13 % (ref 5–13)
NEUTS SEG # BLD: 3.9 K/UL (ref 1.8–8)
NEUTS SEG NFR BLD: 55 % (ref 32–75)
NRBC # BLD: 0 K/UL (ref 0–0.01)
NRBC BLD-RTO: 0 PER 100 WBC
PLATELET # BLD AUTO: 171 K/UL (ref 150–400)
PMV BLD AUTO: 10.2 FL (ref 8.9–12.9)
POTASSIUM SERPL-SCNC: 4.9 MMOL/L (ref 3.5–5.1)
PROT SERPL-MCNC: 8.3 G/DL (ref 6.4–8.2)
PSA SERPL-MCNC: 1.2 NG/ML (ref 0.01–4)
RBC # BLD AUTO: 4.08 M/UL (ref 4.1–5.7)
SODIUM SERPL-SCNC: 135 MMOL/L (ref 136–145)
T4 SERPL-MCNC: 9.3 UG/DL (ref 4.5–12.1)
TRIGL SERPL-MCNC: 51 MG/DL (ref ?–150)
TSH SERPL DL<=0.05 MIU/L-ACNC: 1.39 UIU/ML (ref 0.36–3.74)
VLDLC SERPL CALC-MCNC: 10.2 MG/DL
WBC # BLD AUTO: 7 K/UL (ref 4.1–11.1)

## 2023-05-05 RX ORDER — AZELASTINE 1 MG/ML
SPRAY, METERED NASAL
COMMUNITY
Start: 2023-03-27

## 2023-06-03 PROBLEM — Z12.5 PROSTATE CANCER SCREENING: Status: RESOLVED | Noted: 2017-10-25 | Resolved: 2023-06-03

## 2023-06-03 PROBLEM — Z00.00 MEDICARE ANNUAL WELLNESS VISIT, SUBSEQUENT: Status: RESOLVED | Noted: 2017-10-25 | Resolved: 2023-06-03

## 2023-08-07 PROBLEM — H65.01 RIGHT ACUTE SEROUS OTITIS MEDIA: Status: RESOLVED | Noted: 2019-01-21 | Resolved: 2023-08-07

## 2023-08-07 PROBLEM — I12.9 HYPERTENSION, RENAL DISEASE: Status: ACTIVE | Noted: 2017-10-22

## 2023-08-07 PROBLEM — R00.2 PALPITATIONS: Status: ACTIVE | Noted: 2018-12-19

## 2023-08-07 PROBLEM — M54.6 CHRONIC MIDLINE THORACIC BACK PAIN: Status: ACTIVE | Noted: 2019-08-13

## 2023-08-07 PROBLEM — R10.30 LOWER ABDOMINAL PAIN: Status: RESOLVED | Noted: 2020-12-07 | Resolved: 2023-08-07

## 2023-08-07 PROBLEM — N18.6 ESRD (END STAGE RENAL DISEASE) (HCC): Status: ACTIVE | Noted: 2017-10-22

## 2023-08-07 PROBLEM — E55.9 VITAMIN D DEFICIENCY: Status: ACTIVE | Noted: 2017-10-22

## 2023-08-07 PROBLEM — F41.9 ANXIETY: Status: ACTIVE | Noted: 2019-07-31

## 2023-08-07 PROBLEM — M10.9 GOUT OF MULTIPLE SITES: Status: ACTIVE | Noted: 2017-10-25

## 2023-08-07 PROBLEM — B18.2 CHRONIC HEPATITIS C WITHOUT HEPATIC COMA (HCC): Status: ACTIVE | Noted: 2017-10-25

## 2023-08-07 PROBLEM — I65.23 BILATERAL CAROTID ARTERY STENOSIS: Status: RESOLVED | Noted: 2018-12-21 | Resolved: 2023-08-07

## 2023-08-07 PROBLEM — E78.2 MIXED HYPERLIPIDEMIA: Status: ACTIVE | Noted: 2017-10-22

## 2023-08-07 PROBLEM — R09.89 BRUIT OF LEFT CAROTID ARTERY: Status: ACTIVE | Noted: 2018-12-19

## 2023-08-07 PROBLEM — K21.9 GASTROESOPHAGEAL REFLUX DISEASE WITHOUT ESOPHAGITIS: Status: ACTIVE | Noted: 2017-10-22

## 2023-08-07 PROBLEM — M54.50 MIDLINE LOW BACK PAIN WITHOUT SCIATICA: Status: ACTIVE | Noted: 2019-05-13

## 2023-08-07 PROBLEM — G89.29 CHRONIC MIDLINE THORACIC BACK PAIN: Status: ACTIVE | Noted: 2019-08-13

## 2023-08-09 ENCOUNTER — OFFICE VISIT (OUTPATIENT)
Facility: CLINIC | Age: 73
End: 2023-08-09
Payer: MEDICARE

## 2023-08-09 VITALS
RESPIRATION RATE: 18 BRPM | SYSTOLIC BLOOD PRESSURE: 136 MMHG | WEIGHT: 179.3 LBS | BODY MASS INDEX: 26.56 KG/M2 | TEMPERATURE: 97.9 F | HEART RATE: 70 BPM | DIASTOLIC BLOOD PRESSURE: 72 MMHG | HEIGHT: 69 IN | OXYGEN SATURATION: 98 %

## 2023-08-09 DIAGNOSIS — R09.89 BRUIT OF LEFT CAROTID ARTERY: ICD-10-CM

## 2023-08-09 DIAGNOSIS — F41.9 ANXIETY: ICD-10-CM

## 2023-08-09 DIAGNOSIS — N18.6 ESRD (END STAGE RENAL DISEASE) (HCC): ICD-10-CM

## 2023-08-09 DIAGNOSIS — Z12.11 COLON CANCER SCREENING: ICD-10-CM

## 2023-08-09 DIAGNOSIS — E78.2 MIXED HYPERLIPIDEMIA: ICD-10-CM

## 2023-08-09 DIAGNOSIS — K21.9 GASTROESOPHAGEAL REFLUX DISEASE WITHOUT ESOPHAGITIS: ICD-10-CM

## 2023-08-09 DIAGNOSIS — I12.9 HYPERTENSION, RENAL DISEASE: Primary | ICD-10-CM

## 2023-08-09 PROCEDURE — G8419 CALC BMI OUT NRM PARAM NOF/U: HCPCS | Performed by: INTERNAL MEDICINE

## 2023-08-09 PROCEDURE — 4004F PT TOBACCO SCREEN RCVD TLK: CPT | Performed by: INTERNAL MEDICINE

## 2023-08-09 PROCEDURE — 1123F ACP DISCUSS/DSCN MKR DOCD: CPT | Performed by: INTERNAL MEDICINE

## 2023-08-09 PROCEDURE — G8427 DOCREV CUR MEDS BY ELIG CLIN: HCPCS | Performed by: INTERNAL MEDICINE

## 2023-08-09 PROCEDURE — 3017F COLORECTAL CA SCREEN DOC REV: CPT | Performed by: INTERNAL MEDICINE

## 2023-08-09 PROCEDURE — 99214 OFFICE O/P EST MOD 30 MIN: CPT | Performed by: INTERNAL MEDICINE

## 2023-08-09 SDOH — ECONOMIC STABILITY: FOOD INSECURITY: WITHIN THE PAST 12 MONTHS, YOU WORRIED THAT YOUR FOOD WOULD RUN OUT BEFORE YOU GOT MONEY TO BUY MORE.: NEVER TRUE

## 2023-08-09 SDOH — ECONOMIC STABILITY: INCOME INSECURITY: HOW HARD IS IT FOR YOU TO PAY FOR THE VERY BASICS LIKE FOOD, HOUSING, MEDICAL CARE, AND HEATING?: NOT HARD AT ALL

## 2023-08-09 SDOH — ECONOMIC STABILITY: FOOD INSECURITY: WITHIN THE PAST 12 MONTHS, THE FOOD YOU BOUGHT JUST DIDN'T LAST AND YOU DIDN'T HAVE MONEY TO GET MORE.: NEVER TRUE

## 2023-08-09 SDOH — ECONOMIC STABILITY: HOUSING INSECURITY
IN THE LAST 12 MONTHS, WAS THERE A TIME WHEN YOU DID NOT HAVE A STEADY PLACE TO SLEEP OR SLEPT IN A SHELTER (INCLUDING NOW)?: NO

## 2023-08-10 LAB
ALBUMIN SERPL-MCNC: 3.8 G/DL (ref 3.5–5)
ALBUMIN/GLOB SERPL: 0.9 (ref 1.1–2.2)
ALP SERPL-CCNC: 92 U/L (ref 45–117)
ALT SERPL-CCNC: 15 U/L (ref 12–78)
ANION GAP SERPL CALC-SCNC: 5 MMOL/L (ref 5–15)
AST SERPL-CCNC: 18 U/L (ref 15–37)
BILIRUB SERPL-MCNC: 0.7 MG/DL (ref 0.2–1)
BUN SERPL-MCNC: 36 MG/DL (ref 6–20)
BUN/CREAT SERPL: 4 (ref 12–20)
CALCIUM SERPL-MCNC: 8.8 MG/DL (ref 8.5–10.1)
CHLORIDE SERPL-SCNC: 100 MMOL/L (ref 97–108)
CHOLEST SERPL-MCNC: 179 MG/DL
CK SERPL-CCNC: 178 U/L (ref 39–308)
CO2 SERPL-SCNC: 29 MMOL/L (ref 21–32)
CREAT SERPL-MCNC: 8.81 MG/DL (ref 0.7–1.3)
GLOBULIN SER CALC-MCNC: 4.3 G/DL (ref 2–4)
GLUCOSE SERPL-MCNC: 96 MG/DL (ref 65–100)
HDLC SERPL-MCNC: 59 MG/DL
HDLC SERPL: 3 (ref 0–5)
LDLC SERPL CALC-MCNC: 106.8 MG/DL (ref 0–100)
POTASSIUM SERPL-SCNC: 4.8 MMOL/L (ref 3.5–5.1)
PROT SERPL-MCNC: 8.1 G/DL (ref 6.4–8.2)
SODIUM SERPL-SCNC: 134 MMOL/L (ref 136–145)
TRIGL SERPL-MCNC: 66 MG/DL
VLDLC SERPL CALC-MCNC: 13.2 MG/DL

## 2023-08-14 NOTE — PROGRESS NOTES
1. Have you been to the ER, urgent care clinic since your last visit? Hospitalized since your last visit? No    2. Have you seen or consulted any other health care providers outside of the [address] [place] [address] since your last visit? Include any pap smears or colon screening. No   Chief Complaint   Patient presents with    Follow-up     Visit Vitals  BP (!) 171/91 (BP 1 Location: Right arm, BP Patient Position: Sitting)   Pulse 90   Temp 97 °F (36.1 °C) (Tympanic)   Wt 178 lb (80.7 kg)   SpO2 100%   BMI 26.29 kg/m²     3 most recent PHQ Screens 6/26/2019   Little interest or pleasure in doing things Several days   Feeling down, depressed, irritable, or hopeless Several days   Total Score PHQ 2 2     Learning Assessment 6/26/2019   PRIMARY LEARNER Patient   HIGHEST LEVEL OF EDUCATION - PRIMARY LEARNER  -   BARRIERS PRIMARY LEARNER NONE   CO-LEARNER CAREGIVER No   PRIMARY LANGUAGE ENGLISH   LEARNER PREFERENCE PRIMARY LISTENING   ANSWERED BY patient   RELATIONSHIP SELF     Abuse Screening Questionnaire 6/26/2019   Do you ever feel afraid of your partner? N   Are you in a relationship with someone who physically or mentally threatens you? N   Is it safe for you to go home? Y     Fall Risk Assessment, last 12 mths 6/26/2019   Able to walk? Yes   Fall in past 12 months? No   Fall with injury?  No Finasteride Pregnancy And Lactation Text: This medication is absolutely contraindicated during pregnancy. It is unknown if it is excreted in breast milk.

## 2023-09-01 DIAGNOSIS — F41.9 ANXIETY: Primary | ICD-10-CM

## 2023-09-01 RX ORDER — LORAZEPAM 0.5 MG/1
0.5 TABLET ORAL 2 TIMES DAILY PRN
Qty: 30 TABLET | Refills: 0 | Status: SHIPPED | OUTPATIENT
Start: 2023-09-01 | End: 2023-10-01

## 2023-09-01 NOTE — TELEPHONE ENCOUNTER
Requested Prescriptions     Pending Prescriptions Disp Refills    LORazepam (ATIVAN) 0.5 MG tablet 30 tablet 0     Sig: Take 1 tablet by mouth 2 times daily as needed for Anxiety for up to 30 days. Max Daily Amount: 1 mg       RX refill request from the patient/pharmacy. Patient last seen 8/9/23 with labs, and next appt. scheduled for 11/15/23.

## 2023-09-25 ENCOUNTER — OFFICE VISIT (OUTPATIENT)
Facility: CLINIC | Age: 73
End: 2023-09-25
Payer: MEDICARE

## 2023-09-25 VITALS
HEART RATE: 76 BPM | WEIGHT: 169.9 LBS | HEIGHT: 69 IN | SYSTOLIC BLOOD PRESSURE: 157 MMHG | OXYGEN SATURATION: 98 % | TEMPERATURE: 98.1 F | DIASTOLIC BLOOD PRESSURE: 80 MMHG | RESPIRATION RATE: 18 BRPM | BODY MASS INDEX: 25.17 KG/M2

## 2023-09-25 DIAGNOSIS — F41.9 ANXIETY: Primary | ICD-10-CM

## 2023-09-25 PROCEDURE — 1036F TOBACCO NON-USER: CPT | Performed by: INTERNAL MEDICINE

## 2023-09-25 PROCEDURE — 1123F ACP DISCUSS/DSCN MKR DOCD: CPT | Performed by: INTERNAL MEDICINE

## 2023-09-25 PROCEDURE — G8428 CUR MEDS NOT DOCUMENT: HCPCS | Performed by: INTERNAL MEDICINE

## 2023-09-25 PROCEDURE — 3017F COLORECTAL CA SCREEN DOC REV: CPT | Performed by: INTERNAL MEDICINE

## 2023-09-25 PROCEDURE — 99213 OFFICE O/P EST LOW 20 MIN: CPT | Performed by: INTERNAL MEDICINE

## 2023-09-25 PROCEDURE — G8419 CALC BMI OUT NRM PARAM NOF/U: HCPCS | Performed by: INTERNAL MEDICINE

## 2023-09-25 RX ORDER — BUSPIRONE HYDROCHLORIDE 10 MG/1
10 TABLET ORAL 2 TIMES DAILY
Qty: 60 TABLET | Refills: 0 | Status: SHIPPED | OUTPATIENT
Start: 2023-09-25 | End: 2023-10-25

## 2023-09-25 RX ORDER — ESCITALOPRAM OXALATE 10 MG/1
10 TABLET ORAL DAILY
COMMUNITY
Start: 2023-09-07

## 2023-09-25 RX ORDER — BUPROPION HYDROCHLORIDE 100 MG/1
100 TABLET, EXTENDED RELEASE ORAL DAILY
COMMUNITY
Start: 2023-08-17

## 2023-10-13 DIAGNOSIS — F41.9 ANXIETY: Primary | ICD-10-CM

## 2023-10-13 RX ORDER — LORAZEPAM 0.5 MG/1
0.5 TABLET ORAL 2 TIMES DAILY PRN
Qty: 60 TABLET | Refills: 0 | Status: SHIPPED | OUTPATIENT
Start: 2023-10-13 | End: 2023-11-12

## 2023-10-13 NOTE — TELEPHONE ENCOUNTER
RX refill request from the patient/pharmacy. Patient last seen 08- with labs, and next appt. scheduled for 11-  Requested Prescriptions     Pending Prescriptions Disp Refills    LORazepam (ATIVAN) 0.5 MG tablet 60 tablet 0     Sig: Take 1 tablet by mouth 2 times daily as needed for Anxiety for up to 30 days. Max Daily Amount: 1 mg    .

## 2023-10-18 RX ORDER — BUSPIRONE HYDROCHLORIDE 10 MG/1
10 TABLET ORAL 2 TIMES DAILY
Qty: 180 TABLET | Refills: 0 | Status: SHIPPED | OUTPATIENT
Start: 2023-10-18 | End: 2023-10-19 | Stop reason: SDUPTHER

## 2023-10-18 NOTE — TELEPHONE ENCOUNTER
Requested Prescriptions     Pending Prescriptions Disp Refills    busPIRone (BUSPAR) 10 MG tablet 180 tablet 0     Sig: Take 1 tablet by mouth 2 times daily       RX refill request from the patient/pharmacy. Patient last seen 9/25/23 without labs, and next appt. scheduled for 11/15/23.

## 2023-10-19 RX ORDER — BUSPIRONE HYDROCHLORIDE 10 MG/1
10 TABLET ORAL 2 TIMES DAILY
Qty: 180 TABLET | Refills: 0 | Status: SHIPPED | OUTPATIENT
Start: 2023-10-19 | End: 2024-01-18

## 2023-10-19 NOTE — TELEPHONE ENCOUNTER
Requested Prescriptions     Pending Prescriptions Disp Refills    busPIRone (BUSPAR) 10 MG tablet 180 tablet 0     Sig: Take 1 tablet by mouth 2 times daily     Patient did not  medication at the local pharmacy due to expenses, patient would like the medication sent to [facility]. RX refill request from the patient/pharmacy. Patient last seen 9/25/23 without labs, and next appt. scheduled for 11/15/23.

## 2023-11-03 ENCOUNTER — ANESTHESIA EVENT (OUTPATIENT)
Facility: HOSPITAL | Age: 73
End: 2023-11-03
Payer: MEDICARE

## 2023-11-03 ENCOUNTER — HOSPITAL ENCOUNTER (OUTPATIENT)
Facility: HOSPITAL | Age: 73
Setting detail: OUTPATIENT SURGERY
Discharge: HOME | End: 2023-11-03
Attending: SURGERY | Admitting: SURGERY
Payer: MEDICARE

## 2023-11-03 ENCOUNTER — ANESTHESIA (OUTPATIENT)
Facility: HOSPITAL | Age: 73
End: 2023-11-03
Payer: MEDICARE

## 2023-11-03 VITALS
SYSTOLIC BLOOD PRESSURE: 140 MMHG | BODY MASS INDEX: 24.88 KG/M2 | WEIGHT: 168 LBS | HEART RATE: 72 BPM | TEMPERATURE: 97.8 F | HEIGHT: 69 IN | RESPIRATION RATE: 16 BRPM | DIASTOLIC BLOOD PRESSURE: 72 MMHG | OXYGEN SATURATION: 98 %

## 2023-11-03 LAB
ANION GAP BLD CALC-SCNC: 6 MMOL/L (ref 10–20)
CA-I BLD-MCNC: 1.09 MMOL/L (ref 1.12–1.32)
CHLORIDE BLD-SCNC: 101 MMOL/L (ref 98–107)
CO2 BLD-SCNC: 32 MMOL/L (ref 21–32)
CREAT BLD-MCNC: 9.31 MG/DL (ref 0.6–1.3)
GLUCOSE BLD-MCNC: 79 MG/DL (ref 65–100)
POTASSIUM BLD-SCNC: 5.1 MMOL/L (ref 3.5–5.1)
SERVICE CMNT-IMP: ABNORMAL
SODIUM BLD-SCNC: 139 MMOL/L (ref 136–145)

## 2023-11-03 PROCEDURE — 3700000001 HC ADD 15 MINUTES (ANESTHESIA): Performed by: SURGERY

## 2023-11-03 PROCEDURE — 80047 BASIC METABLC PNL IONIZED CA: CPT

## 2023-11-03 PROCEDURE — 2580000003 HC RX 258: Performed by: SURGERY

## 2023-11-03 PROCEDURE — 2709999900 HC NON-CHARGEABLE SUPPLY: Performed by: SURGERY

## 2023-11-03 PROCEDURE — 3600007502: Performed by: SURGERY

## 2023-11-03 PROCEDURE — 3700000000 HC ANESTHESIA ATTENDED CARE: Performed by: SURGERY

## 2023-11-03 PROCEDURE — 3600007512: Performed by: SURGERY

## 2023-11-03 PROCEDURE — 6360000002 HC RX W HCPCS: Performed by: NURSE ANESTHETIST, CERTIFIED REGISTERED

## 2023-11-03 PROCEDURE — 88305 TISSUE EXAM BY PATHOLOGIST: CPT

## 2023-11-03 PROCEDURE — 2580000003 HC RX 258: Performed by: NURSE ANESTHETIST, CERTIFIED REGISTERED

## 2023-11-03 PROCEDURE — 2500000003 HC RX 250 WO HCPCS: Performed by: NURSE ANESTHETIST, CERTIFIED REGISTERED

## 2023-11-03 PROCEDURE — 7100000010 HC PHASE II RECOVERY - FIRST 15 MIN: Performed by: SURGERY

## 2023-11-03 PROCEDURE — 7100000011 HC PHASE II RECOVERY - ADDTL 15 MIN: Performed by: SURGERY

## 2023-11-03 RX ORDER — SODIUM CHLORIDE 0.9 % (FLUSH) 0.9 %
5-40 SYRINGE (ML) INJECTION PRN
Status: DISCONTINUED | OUTPATIENT
Start: 2023-11-03 | End: 2023-11-03 | Stop reason: HOSPADM

## 2023-11-03 RX ORDER — SODIUM CHLORIDE 0.9 % (FLUSH) 0.9 %
5-40 SYRINGE (ML) INJECTION EVERY 12 HOURS SCHEDULED
Status: DISCONTINUED | OUTPATIENT
Start: 2023-11-03 | End: 2023-11-03 | Stop reason: HOSPADM

## 2023-11-03 RX ORDER — AZELASTINE HYDROCHLORIDE 137 UG/1
SPRAY, METERED NASAL
COMMUNITY
Start: 2023-09-17

## 2023-11-03 RX ORDER — 0.9 % SODIUM CHLORIDE 0.9 %
INTRAVENOUS SOLUTION INTRAVENOUS PRN
Status: DISCONTINUED | OUTPATIENT
Start: 2023-11-03 | End: 2023-11-03 | Stop reason: SDUPTHER

## 2023-11-03 RX ORDER — SODIUM CHLORIDE 9 MG/ML
25 INJECTION, SOLUTION INTRAVENOUS PRN
Status: DISCONTINUED | OUTPATIENT
Start: 2023-11-03 | End: 2023-11-03 | Stop reason: HOSPADM

## 2023-11-03 RX ORDER — LIDOCAINE HYDROCHLORIDE 20 MG/ML
INJECTION, SOLUTION EPIDURAL; INFILTRATION; INTRACAUDAL; PERINEURAL PRN
Status: DISCONTINUED | OUTPATIENT
Start: 2023-11-03 | End: 2023-11-03 | Stop reason: SDUPTHER

## 2023-11-03 RX ADMIN — LIDOCAINE HYDROCHLORIDE 50 MG: 20 INJECTION, SOLUTION EPIDURAL; INFILTRATION; INTRACAUDAL; PERINEURAL at 13:59

## 2023-11-03 RX ADMIN — PROPOFOL 50 MG: 10 INJECTION, EMULSION INTRAVENOUS at 13:59

## 2023-11-03 RX ADMIN — PROPOFOL 25 MG: 10 INJECTION, EMULSION INTRAVENOUS at 14:03

## 2023-11-03 RX ADMIN — SODIUM CHLORIDE 25 ML: 9 INJECTION, SOLUTION INTRAVENOUS at 13:50

## 2023-11-03 RX ADMIN — SODIUM CHLORIDE 100 ML: 9 INJECTION, SOLUTION INTRAVENOUS at 14:16

## 2023-11-03 RX ADMIN — PROPOFOL 25 MG: 10 INJECTION, EMULSION INTRAVENOUS at 14:06

## 2023-11-03 RX ADMIN — PROPOFOL 50 MG: 10 INJECTION, EMULSION INTRAVENOUS at 14:10

## 2023-11-03 ASSESSMENT — PAIN - FUNCTIONAL ASSESSMENT: PAIN_FUNCTIONAL_ASSESSMENT: 0-10

## 2023-11-03 NOTE — PROGRESS NOTES
ARRIVAL INFORMATION:  Verified patient name and date of birth, scheduled procedure, and informed consent. : Juan Jose Vitale (friend) contact number: [phone]  Physician and staff cannot share information with the . Belongings with patient include:  Clothing,None    GI FOCUSED ASSESSMENT:  Neuro: Awake, alert, oriented x4  Respiratory: even and unlabored   GI: soft and non-distended  EKG Rhythm: normal sinus rhythm    Education:Reviewed general discharge instructions and  information.

## 2023-11-03 NOTE — ANESTHESIA POSTPROCEDURE EVALUATION
Department of Anesthesiology  Postprocedure Note    Patient: Kristi Burgos  MRN: [id number]  YOB: 1950  Date of evaluation: 11/3/2023      Procedure Summary     Date: 11/03/23 Room / Location: [facility] ENDO 01 / [facility] ENDOSCOPY    Anesthesia Start: 2304 Anesthesia Stop: 0515    Procedure: COLONOSCOPY DIAGNOSTIC (Lower GI Region) Diagnosis:       Screening due      (Screening due [Z13.9])    Surgeons: Clementina Walters MD Responsible Provider: Rhina Russ MD    Anesthesia Type: MAC ASA Status: 4          Anesthesia Type: No value filed.     Addy Phase I: Addy Score: 10    Addy Phase II:        Anesthesia Post Evaluation    Patient location during evaluation: PACU  Patient participation: complete - patient participated  Level of consciousness: sleepy but conscious and responsive to verbal stimuli  Airway patency: patent  Nausea & Vomiting: no vomiting and no nausea  Complications: no  Cardiovascular status: blood pressure returned to baseline and hemodynamically stable  Respiratory status: acceptable  Hydration status: stable

## 2023-11-03 NOTE — PROCEDURES
Colonoscopy Procedure Note    Kristi Burgos  1950  [id number]    Indications:    Screening colonoscopy     Pre-operative Diagnosis: Screening due [Z13.9]    Post-operative Diagnosis: polyp and internal hemorrhoid    : Dominick Suh MD    Referring Provider: Zainab Jo MD    Sedation:  MAC anesthesia Propofol    Procedure Details:    After informed consent was obtained with all risks and benefits of procedure explained and preoperative exam completed, the patient was taken to the endoscopy suite and placed in the left lateral decubitus position. Upon sequential sedation as per above, a digital rectal exam was performed  And was normal.  The Olympus videocolonoscope  was inserted in the rectum and carefully advanced to the cecum, which was identified by the ileocecal valve and appendiceal orifice. The quality of preparation was good. The colonoscope was slowly withdrawn with careful evaluation between folds. Retroflexion in the rectum was performed and was normal..     Findings:   Rectum: Small grade 1 internal hemorrhoid  Sigmoid: no mucosal lesion appreciated  Descending Colon: no mucosal lesion appreciated  Transverse Colon: no mucosal lesion appreciated  Ascending Colon: no mucosal lesion appreciated  Cecum: 3mm sessile polyp. Removed and retrieved with hot biopsy forceps    Therapies:  Polypectomy x1    Specimen:   Polyp x1    Complications: None. EBL:  None    Recommendations: -Repeat colonoscopy in 5 years.       Dominick Suh MD  11/3/2023  2:17 PM

## 2023-11-03 NOTE — DISCHARGE INSTRUCTIONS
Maryuri Ast  [id number]  1950    COLON DISCHARGE INSTRUCTIONS  Discomfort:  Redness at IV site- apply warm compress to area; if redness or soreness persist- contact your physician  There may be a slight amount of blood passed from the rectum  Gaseous discomfort- walking, belching will help relieve any discomfort  You may not operate a vehicle for 12 hours  You may not engage in an occupation involving machinery or appliances for rest of today  You may not drink alcoholic beverages for at least 12 hours  Avoid making any critical decisions for at least 24 hour  DIET:   Regular diet. - however -  remember your colon is empty and a heavy meal will produce gas. Avoid these foods:  vegetables, fried / greasy foods, carbonated drinks for today    MEDICATIONS:  [unfilled]     ACTIVITY:  You may resume your normal daily activities it is recommended that you spend the remainder of the day resting -  avoid any strenuous activity. CALL M.D. ANY SIGN OF:   Increasing pain, nausea, vomiting  Abdominal distension (swelling)  New increased bleeding (oral or rectal)  Fever (chills)  Pain in chest area  Bloody discharge from nose or mouth  Shortness of breath     Follow-up Instructions:   Call Corrinne Bayley, MD if any questions or problems. Telephone # [phone]  Biopsy results will be available in  7 to10 days  Should have a repeat colonoscopy in 5 years. COLONOSCOPY FINDINGS:  Your colonoscopy showed: 1 polyp (removed) and small internal hemorrhoid.     Patient Education on Sedation / Analgesia Administered for Procedure      For 24 hours after general anesthesia or intravenous analgesia / sedation:  Have someone responsible help you with your care  Limit your activities  Do not drive and operate hazardous machinery  Do not make important personal, legal or business decisions  Do not drink alcoholic beverages  If you have not urinated within 8 hours after discharge, please contact your physician  Resume your medications unless otherwise instructed    For 24 hours after general anesthesia or intravenous analgesia / sedation  you may experience:  Drowsiness, dizziness, sleepiness, or confusion  Difficulty remembering or delayed reaction times  Difficulty with your balance, especially while walking, move slowly and carefully, do not make sudden position changes  Difficulty focusing or blurred vision    You may not be aware of slight changes in your behavior and/or your reaction time because of the medication used during and after your procedure.     Report the following to your physician:  Excessive pain, swelling, redness or odor of or around the surgical area  Temperature over 100.5  Nausea and vomiting lasting longer than 4 hours or if unable to take medications  Any signs of decreased circulation or nerve impairment to extremity: change in color, persistent numbness, tingling, coldness or increase pain  Any questions or concerns    IF YOU REPORT TO AN EMERGENCY ROOM, DOCTOR'S OFFICE OR HOSPITAL WITHIN 24 HOURS AFTER YOUR PROCEDURE, BRING THIS SHEET AND YOUR AFTER VISIT SUMMARY WITH YOU AND GIVE IT TO THE PHYSICIAN OR NURSE ATTENDING YOU.

## 2023-11-03 NOTE — H&P
History and Physical    Patient: Lianet Angel MRN: [id number]  SSN: [id number]    YOB: 1950  Age: 68 y.o. Sex: male      Subjective:      Lianet Angel is a 68 y.o. male who presents for colonoscopy. Past Medical History:   Diagnosis Date    Arthritis     RA    Chronic kidney disease     Dialysis SERAVerna Vikik, Sat @ [facility]    COVID 12/11/2022    GERD (gastroesophageal reflux disease)     Gout     Hepatitis C     Hypertension     Ill-defined condition     Gout    Iritis     Liver disease     Hep C    Other and unspecified alcohol dependence, unspecified drinking behavior     Acid reflux    Other ill-defined conditions(799.89)     blood transfusion hx    Psychiatric disorder     Anxiety     Past Surgical History:   Procedure Laterality Date    OTHER SURGICAL HISTORY      liver biopsy    UPPER GASTROINTESTINAL ENDOSCOPY  07/29/2020    VASCULAR SURGERY      [place] [facility]    VASCULAR SURGERY      [facility]    VASCULAR SURGERY  7/15/13    CREATION LEFT UPPER ARM BASILIC VEIN TRANSPOSITION      No family history on file. Social History     Tobacco Use    Smoking status: Never    Smokeless tobacco: Never   Substance Use Topics    Alcohol use: No      Prior to Admission medications    Medication Sig Start Date End Date Taking? Authorizing Provider   busPIRone (BUSPAR) 10 MG tablet Take 1 tablet by mouth 2 times daily 10/19/23 1/18/24  Torie Parekh MD   LORazepam (ATIVAN) 0.5 MG tablet Take 1 tablet by mouth 2 times daily as needed for Anxiety for up to 30 days.  Max Daily Amount: 1 mg 10/13/23 11/12/23  Torie Parekh MD   buPROPion [facility] SR) 100 MG extended release tablet Take 1 tablet by mouth daily 8/17/23   ProviderShanna MD   escitalopram (LEXAPRO) 10 MG tablet Take 1 tablet by mouth daily 9/7/23   ProviderShanna MD   amLODIPine (NORVASC) 10 MG tablet TAKE 1 TABLET BY MOUTH ONCE DAILY 10/11/17   Automatic Reconciliation, Ar   carvedilol (COREG)

## 2023-11-03 NOTE — PROGRESS NOTES
Endoscopy Case End Note:    1425:  Procedure scope was pre-cleaned, per protocol, at bedside by Deandre Granados. 1425:  Report received from anesthesia - [address] [place] [address] Se, CRNA. See anesthesia flowsheet for intra-procedure vital signs and events.

## 2023-11-08 RX ORDER — BUSPIRONE HYDROCHLORIDE 10 MG/1
10 TABLET ORAL 2 TIMES DAILY
Qty: 180 TABLET | Refills: 0 | Status: SHIPPED | OUTPATIENT
Start: 2023-11-08 | End: 2024-02-07

## 2023-11-14 NOTE — PROGRESS NOTES
Chief Complaint   Patient presents with    Hypertension       SUBJECTIVE:    Rachel Obregon is a 68 y.o. male who returns in follow-up for his medical problems include hypertension, hyperlipidemia, end-stage renal disease on dialysis, GERD, DJD, community-acquired pneumonia in the past and other multimedical problems. He is taking his medication trying to follow his diet remains physically active. He does note his blood pressures been running low had dialysis a few times. He does take his medication around 10 in the morning and 10 in the evening. He is thus not yet taken his morning medicines today but he did take it last night at his usual time. He denies any chest pain, shortness of breath cardiorespiratory complaints. There are no GI or  complaints. There are no headaches, dizziness or neurologic complaints. There are no current active arthritic complaints and there are no other complaints on complete review of systems. Current Outpatient Medications   Medication Sig Dispense Refill    lisinopril (PRINIVIL;ZESTRIL) 40 MG tablet Take 1 tablet by mouth daily 90 tablet 3    busPIRone (BUSPAR) 10 MG tablet Take 1 tablet by mouth 2 times daily 180 tablet 0    Azelastine HCl 137 MCG/SPRAY SOLN 1 (ONE) SPRAY TWO TIMES DAILY      amLODIPine (NORVASC) 10 MG tablet TAKE 1 TABLET BY MOUTH ONCE DAILY      carvedilol (COREG) 12.5 MG tablet Take by mouth 2 times daily (with meals)      Etelcalcetide HCl 2.5 MG/0.5ML SOLN Infuse intravenously      sevelamer (RENVELA) 800 MG tablet Take by mouth 3 times daily (with meals)      tiZANidine (ZANAFLEX) 4 MG tablet Take by mouth 3 times daily as needed       No current facility-administered medications for this visit.      Past Medical History:   Diagnosis Date    Arthritis     RA    Chronic kidney disease     Dialysis Nelly ZAMORA Sat @ [facility]    COVID 12/11/2022    GERD (gastroesophageal reflux disease)     Gout     Hepatitis C     Hypertension

## 2023-11-15 ENCOUNTER — OFFICE VISIT (OUTPATIENT)
Facility: CLINIC | Age: 73
End: 2023-11-15
Payer: MEDICARE

## 2023-11-15 VITALS
HEART RATE: 68 BPM | HEIGHT: 69 IN | RESPIRATION RATE: 18 BRPM | TEMPERATURE: 97.7 F | BODY MASS INDEX: 25.73 KG/M2 | OXYGEN SATURATION: 98 % | DIASTOLIC BLOOD PRESSURE: 84 MMHG | WEIGHT: 173.7 LBS | SYSTOLIC BLOOD PRESSURE: 158 MMHG

## 2023-11-15 DIAGNOSIS — F41.9 ANXIETY: ICD-10-CM

## 2023-11-15 DIAGNOSIS — K21.9 GASTROESOPHAGEAL REFLUX DISEASE WITHOUT ESOPHAGITIS: ICD-10-CM

## 2023-11-15 DIAGNOSIS — N18.6 ESRD (END STAGE RENAL DISEASE) (HCC): ICD-10-CM

## 2023-11-15 DIAGNOSIS — I12.9 HYPERTENSION, RENAL DISEASE: Primary | ICD-10-CM

## 2023-11-15 DIAGNOSIS — E78.2 MIXED HYPERLIPIDEMIA: ICD-10-CM

## 2023-11-15 LAB
ALBUMIN SERPL-MCNC: 4 G/DL (ref 3.5–5)
ALBUMIN/GLOB SERPL: 0.9 (ref 1.1–2.2)
ALP SERPL-CCNC: 88 U/L (ref 45–117)
ALT SERPL-CCNC: 11 U/L (ref 12–78)
ANION GAP SERPL CALC-SCNC: 5 MMOL/L (ref 5–15)
AST SERPL-CCNC: 22 U/L (ref 15–37)
BILIRUB SERPL-MCNC: 0.9 MG/DL (ref 0.2–1)
BUN SERPL-MCNC: 40 MG/DL (ref 6–20)
BUN/CREAT SERPL: 5 (ref 12–20)
CALCIUM SERPL-MCNC: 8.9 MG/DL (ref 8.5–10.1)
CHLORIDE SERPL-SCNC: 100 MMOL/L (ref 97–108)
CHOLEST SERPL-MCNC: 206 MG/DL
CK SERPL-CCNC: 141 U/L (ref 39–308)
CO2 SERPL-SCNC: 32 MMOL/L (ref 21–32)
CREAT SERPL-MCNC: 8.1 MG/DL (ref 0.7–1.3)
GLOBULIN SER CALC-MCNC: 4.6 G/DL (ref 2–4)
GLUCOSE SERPL-MCNC: 95 MG/DL (ref 65–100)
HDLC SERPL-MCNC: 75 MG/DL
HDLC SERPL: 2.7 (ref 0–5)
LDLC SERPL CALC-MCNC: 120.8 MG/DL (ref 0–100)
POTASSIUM SERPL-SCNC: 4.3 MMOL/L (ref 3.5–5.1)
PROT SERPL-MCNC: 8.6 G/DL (ref 6.4–8.2)
SODIUM SERPL-SCNC: 137 MMOL/L (ref 136–145)
TRIGL SERPL-MCNC: 51 MG/DL
VLDLC SERPL CALC-MCNC: 10.2 MG/DL

## 2023-11-15 PROCEDURE — 3017F COLORECTAL CA SCREEN DOC REV: CPT | Performed by: INTERNAL MEDICINE

## 2023-11-15 PROCEDURE — G8419 CALC BMI OUT NRM PARAM NOF/U: HCPCS | Performed by: INTERNAL MEDICINE

## 2023-11-15 PROCEDURE — 99214 OFFICE O/P EST MOD 30 MIN: CPT | Performed by: INTERNAL MEDICINE

## 2023-11-15 PROCEDURE — G8484 FLU IMMUNIZE NO ADMIN: HCPCS | Performed by: INTERNAL MEDICINE

## 2023-11-15 PROCEDURE — G8427 DOCREV CUR MEDS BY ELIG CLIN: HCPCS | Performed by: INTERNAL MEDICINE

## 2023-11-15 PROCEDURE — 1036F TOBACCO NON-USER: CPT | Performed by: INTERNAL MEDICINE

## 2023-11-15 PROCEDURE — 1123F ACP DISCUSS/DSCN MKR DOCD: CPT | Performed by: INTERNAL MEDICINE

## 2023-11-15 RX ORDER — LISINOPRIL 40 MG/1
40 TABLET ORAL DAILY
Qty: 90 TABLET | Refills: 3 | OUTPATIENT
Start: 2023-11-15

## 2024-01-02 RX ORDER — BUSPIRONE HYDROCHLORIDE 10 MG/1
10 TABLET ORAL 2 TIMES DAILY
Qty: 180 TABLET | Refills: 3 | Status: SHIPPED | OUTPATIENT
Start: 2024-01-02

## 2024-01-02 NOTE — TELEPHONE ENCOUNTER
RX refill request from the patient/pharmacy. Patient last seen 11- with labs, and next appt. scheduled for 02-  Requested Prescriptions     Pending Prescriptions Disp Refills    busPIRone (BUSPAR) 10 MG tablet [Pharmacy Med Name: BUSPIRONE HCL TABS 10MG] 180 tablet 3     Sig: TAKE 1 TABLET TWICE A DAY    .

## 2024-01-16 DIAGNOSIS — F41.9 ANXIETY: Primary | ICD-10-CM

## 2024-01-16 RX ORDER — LORAZEPAM 0.5 MG/1
0.5 TABLET ORAL 2 TIMES DAILY PRN
Qty: 60 TABLET | Refills: 0 | Status: SHIPPED | OUTPATIENT
Start: 2024-01-16 | End: 2024-02-15

## 2024-01-16 NOTE — TELEPHONE ENCOUNTER
RX refill request from the patient/pharmacy. Patient last seen 11- with labs, and next appt. scheduled for 02-  Requested Prescriptions     Pending Prescriptions Disp Refills    LORazepam (ATIVAN) 0.5 MG tablet 60 tablet 0     Sig: Take 1 tablet by mouth 2 times daily as needed for Anxiety for up to 30 days. Max Daily Amount: 1 mg    .

## 2024-02-15 NOTE — PROGRESS NOTES
Chief Complaint   Patient presents with    3 Month Follow-Up    Headache     Patient states headaches start in the back moving forward. Level 5 pain.       SUBJECTIVE:    Koko Jensen is a 73 y.o. male who returns in follow-up for his medical problems include hypertension, hyperlipidemia, DJD, end-stage renal disease on dialysis, GERD, chronic hepatitis C and he is noting  Some problems with his left ear seems to feel full and he has had a little bit of a headache on the left side.  He notes no chest pain, shortness of breath or cardiorespiratory complaints.  There are no GI or  complaints.  He has no headaches, dizziness or neurologic complaints other than that noted above.  He has no change of his chronic arthritic complaints and there are no other complaints on complete review of systems.      Current Outpatient Medications   Medication Sig Dispense Refill    ciclopirox (PENLAC) 8 % solution Apply topically nightly. 1 each 3    busPIRone (BUSPAR) 10 MG tablet TAKE 1 TABLET TWICE A  tablet 3    lisinopril (PRINIVIL;ZESTRIL) 40 MG tablet Take 1 tablet by mouth daily 90 tablet 3    Azelastine HCl 137 MCG/SPRAY SOLN 1 (ONE) SPRAY TWO TIMES DAILY      amLODIPine (NORVASC) 10 MG tablet TAKE 1 TABLET BY MOUTH ONCE DAILY      carvedilol (COREG) 12.5 MG tablet Take by mouth 2 times daily (with meals)      Etelcalcetide HCl 2.5 MG/0.5ML SOLN Infuse intravenously      sevelamer (RENVELA) 800 MG tablet Take by mouth 3 times daily (with meals)      tiZANidine (ZANAFLEX) 4 MG tablet Take by mouth 3 times daily as needed       No current facility-administered medications for this visit.     Past Medical History:   Diagnosis Date    Arthritis     RA    Chronic kidney disease     Dialysis T, Thur, Sat @ [facility]    COVID 12/11/2022    GERD (gastroesophageal reflux disease)     Gout     Hepatitis C     Hypertension     Ill-defined condition     Gout    Iritis     Liver disease     Hep C    Other and

## 2024-02-16 ENCOUNTER — OFFICE VISIT (OUTPATIENT)
Facility: CLINIC | Age: 74
End: 2024-02-16

## 2024-02-16 VITALS
HEIGHT: 69 IN | OXYGEN SATURATION: 95 % | BODY MASS INDEX: 25.87 KG/M2 | RESPIRATION RATE: 16 BRPM | SYSTOLIC BLOOD PRESSURE: 136 MMHG | HEART RATE: 72 BPM | DIASTOLIC BLOOD PRESSURE: 84 MMHG | TEMPERATURE: 98.1 F | WEIGHT: 174.7 LBS

## 2024-02-16 DIAGNOSIS — E78.2 MIXED HYPERLIPIDEMIA: ICD-10-CM

## 2024-02-16 DIAGNOSIS — B18.2 CHRONIC HEPATITIS C WITHOUT HEPATIC COMA (HCC): ICD-10-CM

## 2024-02-16 DIAGNOSIS — K21.9 GASTROESOPHAGEAL REFLUX DISEASE WITHOUT ESOPHAGITIS: ICD-10-CM

## 2024-02-16 DIAGNOSIS — F41.9 ANXIETY: ICD-10-CM

## 2024-02-16 DIAGNOSIS — B35.1 ONYCHOMYCOSIS: ICD-10-CM

## 2024-02-16 DIAGNOSIS — I12.9 HYPERTENSION, RENAL DISEASE: Primary | ICD-10-CM

## 2024-02-16 DIAGNOSIS — N18.6 ESRD (END STAGE RENAL DISEASE) (HCC): ICD-10-CM

## 2024-02-16 DIAGNOSIS — H61.22 IMPACTED CERUMEN OF LEFT EAR: ICD-10-CM

## 2024-02-16 LAB
ALBUMIN SERPL-MCNC: 4.1 G/DL (ref 3.5–5)
ALBUMIN/GLOB SERPL: 0.9 (ref 1.1–2.2)
ALP SERPL-CCNC: 86 U/L (ref 45–117)
ALT SERPL-CCNC: 12 U/L (ref 12–78)
ANION GAP SERPL CALC-SCNC: 6 MMOL/L (ref 5–15)
AST SERPL-CCNC: 17 U/L (ref 15–37)
BILIRUB SERPL-MCNC: 0.9 MG/DL (ref 0.2–1)
BUN SERPL-MCNC: 29 MG/DL (ref 6–20)
BUN/CREAT SERPL: 4 (ref 12–20)
CALCIUM SERPL-MCNC: 8.7 MG/DL (ref 8.5–10.1)
CHLORIDE SERPL-SCNC: 102 MMOL/L (ref 97–108)
CHOLEST SERPL-MCNC: 194 MG/DL
CK SERPL-CCNC: 190 U/L (ref 39–308)
CO2 SERPL-SCNC: 32 MMOL/L (ref 21–32)
CREAT SERPL-MCNC: 8.28 MG/DL (ref 0.7–1.3)
GLOBULIN SER CALC-MCNC: 4.5 G/DL (ref 2–4)
GLUCOSE SERPL-MCNC: 91 MG/DL (ref 65–100)
HDLC SERPL-MCNC: 72 MG/DL
HDLC SERPL: 2.7 (ref 0–5)
LDLC SERPL CALC-MCNC: 111.8 MG/DL (ref 0–100)
POTASSIUM SERPL-SCNC: 3.9 MMOL/L (ref 3.5–5.1)
PROT SERPL-MCNC: 8.6 G/DL (ref 6.4–8.2)
SODIUM SERPL-SCNC: 140 MMOL/L (ref 136–145)
TRIGL SERPL-MCNC: 51 MG/DL
VLDLC SERPL CALC-MCNC: 10.2 MG/DL

## 2024-02-16 NOTE — PROGRESS NOTES
Rm    Chief Complaint   Patient presents with    3 Month Follow-Up    Headache     Patient states headaches start in the back moving forward. Level 5 pain.        BP (!) 161/85 (Site: Left Upper Arm)   Pulse 72   Temp 98.1 °F (36.7 °C)   Resp 16   Ht 1.753 m (5' 9\")   Wt 79.2 kg (174 lb 11.2 oz)   SpO2 95%   BMI 25.80 kg/m²      1. Have you been to the ER, urgent care clinic since your last visit?  Hospitalized since your last visit? no    2. Have you seen or consulted any other health care providers [facility] since your last visit?  Include any pap smears or colon screening.  no    Health Maintenance Due   Topic Date Due    Hepatitis A vaccine (1 of 2 - Risk 2-dose series) Never done    DTaP/Tdap/Td vaccine (1 - Tdap) Never done    Shingles vaccine (1 of 2) Never done    Hepatitis B vaccine (1 of 3 - Risk 3-dose series) Never done    Respiratory Syncytial Virus (RSV) Pregnant or age 60 yrs+ (1 - 1-dose 60+ series) Never done    Flu vaccine (1) 08/01/2023    COVID-19 Vaccine (4 - 2023-24 season) 09/01/2023             No data to display                 \"Have you been to the ER, urgent care clinic since your last visit?  Hospitalized since your last visit?\"     no    “Have you seen or consulted any other health care providers outside of [facility] since your last visit?”     no

## 2024-05-28 PROBLEM — J96.01 ACUTE HYPOXEMIC RESPIRATORY FAILURE (HCC): Status: RESOLVED | Noted: 2023-01-20 | Resolved: 2024-05-28

## 2024-05-29 ENCOUNTER — OFFICE VISIT (OUTPATIENT)
Facility: CLINIC | Age: 74
End: 2024-05-29
Payer: MEDICARE

## 2024-05-29 VITALS
TEMPERATURE: 97.9 F | SYSTOLIC BLOOD PRESSURE: 136 MMHG | RESPIRATION RATE: 17 BRPM | WEIGHT: 171.4 LBS | OXYGEN SATURATION: 98 % | HEIGHT: 69 IN | DIASTOLIC BLOOD PRESSURE: 82 MMHG | HEART RATE: 71 BPM | BODY MASS INDEX: 25.39 KG/M2

## 2024-05-29 DIAGNOSIS — M54.6 CHRONIC MIDLINE THORACIC BACK PAIN: ICD-10-CM

## 2024-05-29 DIAGNOSIS — M54.50 CHRONIC MIDLINE LOW BACK PAIN WITHOUT SCIATICA: ICD-10-CM

## 2024-05-29 DIAGNOSIS — E78.2 MIXED HYPERLIPIDEMIA: ICD-10-CM

## 2024-05-29 DIAGNOSIS — N18.6 ESRD (END STAGE RENAL DISEASE) (HCC): ICD-10-CM

## 2024-05-29 DIAGNOSIS — I12.9 HYPERTENSION, RENAL DISEASE: Primary | ICD-10-CM

## 2024-05-29 DIAGNOSIS — R00.2 PALPITATIONS: ICD-10-CM

## 2024-05-29 DIAGNOSIS — G89.29 CHRONIC MIDLINE THORACIC BACK PAIN: ICD-10-CM

## 2024-05-29 DIAGNOSIS — F41.9 ANXIETY: ICD-10-CM

## 2024-05-29 DIAGNOSIS — Z12.5 PROSTATE CANCER SCREENING: ICD-10-CM

## 2024-05-29 DIAGNOSIS — K21.9 GASTROESOPHAGEAL REFLUX DISEASE WITHOUT ESOPHAGITIS: ICD-10-CM

## 2024-05-29 DIAGNOSIS — G89.29 CHRONIC MIDLINE LOW BACK PAIN WITHOUT SCIATICA: ICD-10-CM

## 2024-05-29 DIAGNOSIS — Z13.39 ALCOHOL SCREENING: ICD-10-CM

## 2024-05-29 DIAGNOSIS — B18.2 CHRONIC HEPATITIS C WITHOUT HEPATIC COMA (HCC): ICD-10-CM

## 2024-05-29 DIAGNOSIS — M10.09 IDIOPATHIC GOUT OF MULTIPLE SITES, UNSPECIFIED CHRONICITY: ICD-10-CM

## 2024-05-29 DIAGNOSIS — E55.9 VITAMIN D DEFICIENCY: ICD-10-CM

## 2024-05-29 DIAGNOSIS — Z00.00 MEDICARE ANNUAL WELLNESS VISIT, SUBSEQUENT: ICD-10-CM

## 2024-05-29 PROCEDURE — G8419 CALC BMI OUT NRM PARAM NOF/U: HCPCS | Performed by: INTERNAL MEDICINE

## 2024-05-29 PROCEDURE — 1036F TOBACCO NON-USER: CPT | Performed by: INTERNAL MEDICINE

## 2024-05-29 PROCEDURE — 1123F ACP DISCUSS/DSCN MKR DOCD: CPT | Performed by: INTERNAL MEDICINE

## 2024-05-29 PROCEDURE — 3017F COLORECTAL CA SCREEN DOC REV: CPT | Performed by: INTERNAL MEDICINE

## 2024-05-29 PROCEDURE — G0439 PPPS, SUBSEQ VISIT: HCPCS | Performed by: INTERNAL MEDICINE

## 2024-05-29 PROCEDURE — 99214 OFFICE O/P EST MOD 30 MIN: CPT | Performed by: INTERNAL MEDICINE

## 2024-05-29 PROCEDURE — G8427 DOCREV CUR MEDS BY ELIG CLIN: HCPCS | Performed by: INTERNAL MEDICINE

## 2024-05-29 RX ORDER — LORAZEPAM 0.5 MG/1
0.5 TABLET ORAL 2 TIMES DAILY PRN
Qty: 60 TABLET | Refills: 0 | Status: SHIPPED | OUTPATIENT
Start: 2024-05-29 | End: 2024-06-28

## 2024-05-29 ASSESSMENT — PATIENT HEALTH QUESTIONNAIRE - PHQ9
1. LITTLE INTEREST OR PLEASURE IN DOING THINGS: NOT AT ALL
SUM OF ALL RESPONSES TO PHQ9 QUESTIONS 1 & 2: 0
SUM OF ALL RESPONSES TO PHQ QUESTIONS 1-9: 0
2. FEELING DOWN, DEPRESSED OR HOPELESS: NOT AT ALL
SUM OF ALL RESPONSES TO PHQ QUESTIONS 1-9: 0

## 2024-05-29 ASSESSMENT — LIFESTYLE VARIABLES
HOW MANY STANDARD DRINKS CONTAINING ALCOHOL DO YOU HAVE ON A TYPICAL DAY: PATIENT DOES NOT DRINK
HOW OFTEN DO YOU HAVE A DRINK CONTAINING ALCOHOL: NEVER

## 2024-05-29 NOTE — PROGRESS NOTES
Medicare Annual Wellness Visit    Koko Jensen is here for Medicare AWV    Assessment & Plan   Hypertension, renal disease  -     Urinalysis with Microscopic; Future  -     TSH; Future  -     T4, Free; Future  -     Comprehensive Metabolic Panel; Future  -     CBC with Auto Differential; Future  Mixed hyperlipidemia  -     Lipid Panel; Future  -     CK; Future  Gastroesophageal reflux disease without esophagitis  ESRD (end stage renal disease) (HCC)  Chronic midline thoracic back pain  Idiopathic gout of multiple sites, unspecified chronicity  Chronic midline low back pain without sciatica  Palpitations  Chronic hepatitis C without hepatic coma (HCC)  Anxiety  -     LORazepam (ATIVAN) 0.5 MG tablet; Take 1 tablet by mouth 2 times daily as needed for Anxiety for up to 30 days. Max Daily Amount: 1 mg, Disp-60 tablet, R-0Normal  Vitamin D deficiency  -     Vitamin D 25 Hydroxy; Future  Prostate cancer screening  -     PSA Screening; Future  Alcohol screening  -     [place] ANNUAL ALCOHOL SCREEN 15 MIN; Future  Medicare annual wellness visit, subsequent    ASSESSMENT:   1. Hypertension, renal disease    2. Mixed hyperlipidemia    3. Gastroesophageal reflux disease without esophagitis    4. ESRD (end stage renal disease) (HCC)    5. Chronic midline thoracic back pain    6. Idiopathic gout of multiple sites, unspecified chronicity    7. Chronic midline low back pain without sciatica    8. Palpitations    9. Chronic hepatitis C without hepatic coma (HCC)    10. Anxiety    11. Vitamin D deficiency    12. Prostate cancer screening    13. Alcohol screening    14. Medicare annual wellness visit, subsequent      Impression  1.  Hypertension that is controlled so continue current therapy reviewed with him.  2.  Hyperlipidemia prior lab reviewed repeat status pending I will adjust treatment if needed.  3 GERD that is stable  4 end-stage renal disease on dialysis 3 times weekly is tolerating well  5 chronic thoracic back pain

## 2024-05-29 NOTE — PROGRESS NOTES
Koko Jensen is a 73 y.o. male     Chief Complaint   Patient presents with    Medicare AWV       BP (!) 160/80 (Site: Right Upper Arm, Position: Sitting, Cuff Size: Large Adult)   Pulse 71   Temp 97.9 °F (36.6 °C) (Oral)   Resp 17   Ht 1.753 m (5' 9\")   Wt 77.7 kg (171 lb 6.4 oz)   SpO2 98%   BMI 25.31 kg/m²     Health Maintenance Due   Topic Date Due    Hepatitis A vaccine (1 of 2 - Risk 2-dose series) Never done    DTaP/Tdap/Td vaccine (1 - Tdap) Never done    Shingles vaccine (1 of 2) Never done    Hepatitis B vaccine (1 of 3 - Risk 3-dose series) Never done    Respiratory Syncytial Virus (RSV) Pregnant or age 60 yrs+ (1 - 1-dose 60+ series) Never done    COVID-19 Vaccine (4 - 2023-24 season) 09/01/2023    Annual Wellness Visit (Medicare)  05/05/2024         \"Have you been to the ER, urgent care clinic since your last visit?  Hospitalized since your last visit?\"    NO    “Have you seen or consulted any other health care providers outside of [facility] since your last visit?”    NO

## 2024-05-30 LAB
25(OH)D3 SERPL-MCNC: 30.8 NG/ML (ref 30–100)
ALBUMIN SERPL-MCNC: 3.8 G/DL (ref 3.5–5)
ALBUMIN/GLOB SERPL: 0.8 (ref 1.1–2.2)
ALP SERPL-CCNC: 80 U/L (ref 45–117)
ALT SERPL-CCNC: 15 U/L (ref 12–78)
ANION GAP SERPL CALC-SCNC: 5 MMOL/L (ref 5–15)
APPEARANCE UR: CLEAR
AST SERPL-CCNC: 16 U/L (ref 15–37)
BACTERIA URNS QL MICRO: ABNORMAL /HPF
BASOPHILS # BLD: 0.1 K/UL (ref 0–0.1)
BASOPHILS NFR BLD: 1 % (ref 0–1)
BILIRUB SERPL-MCNC: 0.7 MG/DL (ref 0.2–1)
BILIRUB UR QL: NEGATIVE
BUN SERPL-MCNC: 34 MG/DL (ref 6–20)
BUN/CREAT SERPL: 4 (ref 12–20)
CALCIUM SERPL-MCNC: 9.3 MG/DL (ref 8.5–10.1)
CHLORIDE SERPL-SCNC: 104 MMOL/L (ref 97–108)
CHOLEST SERPL-MCNC: 190 MG/DL
CK SERPL-CCNC: 128 U/L (ref 39–308)
CO2 SERPL-SCNC: 31 MMOL/L (ref 21–32)
COLOR UR: ABNORMAL
CREAT SERPL-MCNC: 7.56 MG/DL (ref 0.7–1.3)
DIFFERENTIAL METHOD BLD: ABNORMAL
EOSINOPHIL # BLD: 0.2 K/UL (ref 0–0.4)
EPITH CASTS URNS QL MICRO: ABNORMAL /LPF
ERYTHROCYTE [DISTWIDTH] IN BLOOD BY AUTOMATED COUNT: 13.8 % (ref 11.5–14.5)
GLOBULIN SER CALC-MCNC: 4.6 G/DL (ref 2–4)
GLUCOSE SERPL-MCNC: 90 MG/DL (ref 65–100)
GLUCOSE UR STRIP.AUTO-MCNC: NEGATIVE MG/DL
HCT VFR BLD AUTO: 36 % (ref 36.6–50.3)
HDLC SERPL-MCNC: 68 MG/DL
HDLC SERPL: 2.8 (ref 0–5)
HGB BLD-MCNC: 12.1 G/DL (ref 12.1–17)
HGB UR QL STRIP: NEGATIVE
HYALINE CASTS URNS QL MICRO: ABNORMAL /LPF (ref 0–5)
IMM GRANULOCYTES # BLD AUTO: 0 K/UL (ref 0–0.04)
IMM GRANULOCYTES NFR BLD AUTO: 0 % (ref 0–0.5)
KETONES UR QL STRIP.AUTO: NEGATIVE MG/DL
LDLC SERPL CALC-MCNC: 112.6 MG/DL (ref 0–100)
LEUKOCYTE ESTERASE UR QL STRIP.AUTO: NEGATIVE
LYMPHOCYTES # BLD: 1.5 K/UL (ref 0.8–3.5)
LYMPHOCYTES NFR BLD: 26 % (ref 12–49)
MCH RBC QN AUTO: 34 PG (ref 26–34)
MCHC RBC AUTO-ENTMCNC: 33.6 G/DL (ref 30–36.5)
MCV RBC AUTO: 101.1 FL (ref 80–99)
MONOCYTES # BLD: 0.7 K/UL (ref 0–1)
MONOCYTES NFR BLD: 12 % (ref 5–13)
NEUTS SEG # BLD: 3.4 K/UL (ref 1.8–8)
NEUTS SEG NFR BLD: 58 % (ref 32–75)
NITRITE UR QL STRIP.AUTO: NEGATIVE
NRBC # BLD: 0 K/UL (ref 0–0.01)
NRBC BLD-RTO: 0 PER 100 WBC
PH UR STRIP: 8.5 (ref 5–8)
PLATELET # BLD AUTO: 179 K/UL (ref 150–400)
PMV BLD AUTO: 10.6 FL (ref 8.9–12.9)
POTASSIUM SERPL-SCNC: 4.6 MMOL/L (ref 3.5–5.1)
PROT SERPL-MCNC: 8.4 G/DL (ref 6.4–8.2)
PROT UR STRIP-MCNC: 100 MG/DL
PSA SERPL-MCNC: 1.3 NG/ML (ref 0.01–4)
RBC # BLD AUTO: 3.56 M/UL (ref 4.1–5.7)
RBC #/AREA URNS HPF: ABNORMAL /HPF (ref 0–5)
SODIUM SERPL-SCNC: 140 MMOL/L (ref 136–145)
SP GR UR REFRACTOMETRY: 1.01 (ref 1–1.03)
SPERM URNS QL MICRO: PRESENT
T4 FREE SERPL-MCNC: 1.1 NG/DL (ref 0.8–1.5)
TRIGL SERPL-MCNC: 47 MG/DL
TSH SERPL DL<=0.05 MIU/L-ACNC: 1.89 UIU/ML (ref 0.36–3.74)
UROBILINOGEN UR QL STRIP.AUTO: 0.2 EU/DL (ref 0.2–1)
VLDLC SERPL CALC-MCNC: 9.4 MG/DL
WBC # BLD AUTO: 5.8 K/UL (ref 4.1–11.1)
WBC URNS QL MICRO: ABNORMAL /HPF (ref 0–4)

## 2024-06-27 PROBLEM — Z00.00 MEDICARE ANNUAL WELLNESS VISIT, SUBSEQUENT: Status: RESOLVED | Noted: 2017-10-25 | Resolved: 2024-06-27

## 2024-06-27 PROBLEM — Z12.5 PROSTATE CANCER SCREENING: Status: RESOLVED | Noted: 2017-10-25 | Resolved: 2024-06-27

## 2024-09-03 DIAGNOSIS — F41.9 ANXIETY: Primary | ICD-10-CM

## 2024-09-03 NOTE — TELEPHONE ENCOUNTER
Patient stated that \"Buspirone 10 MG\" was not working and he had an       LORazepam (ATIVAN) 1 MG tablet [[id number]]  ENDED    Order Details  Dose: 0.5 mg Route: Oral Frequency: 2 TIMES DAILY PRN for Anxiety   Dispense Quantity: 60 tablet Refills: 0          Sig: Take 1 tablet by mouth 2 times daily as needed for Anxiety for up to 30 days. Max Daily Amount: 1 mg         Start Date: 05/29/24 End Date: 06/28/24   Written Date: 05/29/24 Expiration Date: 11/25/24       Associated Diagnoses: Anxiety [F41.9]       Pharmacy    [id number] - [place], [place] - [address] - P [phone] - F [phone]  [address] [place] [id number]  Phone: [phone]  Fax: [phone]

## 2024-09-04 RX ORDER — LISINOPRIL 40 MG/1
40 TABLET ORAL DAILY
Qty: 90 TABLET | Refills: 3 | Status: CANCELLED | OUTPATIENT
Start: 2024-09-04

## 2024-09-04 RX ORDER — LORAZEPAM 1 MG/1
1 TABLET ORAL 2 TIMES DAILY PRN
Qty: 60 TABLET | Refills: 0 | Status: SHIPPED | OUTPATIENT
Start: 2024-09-04 | End: 2024-10-04

## 2024-09-04 NOTE — TELEPHONE ENCOUNTER
RX refill request from the patient/pharmacy. Patient last seen 05- with labs, and next appt. scheduled for 09-  Requested Prescriptions     Pending Prescriptions Disp Refills    LORazepam (ATIVAN) 1 MG tablet 60 tablet 0     Sig: Take 1 tablet by mouth 2 times daily as needed for Anxiety for up to 30 days. Max Daily Amount: 2 mg    .

## 2024-09-09 ENCOUNTER — OFFICE VISIT (OUTPATIENT)
Facility: CLINIC | Age: 74
End: 2024-09-09
Payer: MEDICARE

## 2024-09-09 VITALS
TEMPERATURE: 97.7 F | RESPIRATION RATE: 17 BRPM | OXYGEN SATURATION: 98 % | SYSTOLIC BLOOD PRESSURE: 138 MMHG | HEART RATE: 70 BPM | WEIGHT: 163.7 LBS | DIASTOLIC BLOOD PRESSURE: 72 MMHG | HEIGHT: 69 IN | BODY MASS INDEX: 24.24 KG/M2

## 2024-09-09 DIAGNOSIS — I12.9 HYPERTENSION, RENAL DISEASE: Primary | ICD-10-CM

## 2024-09-09 DIAGNOSIS — E78.2 MIXED HYPERLIPIDEMIA: ICD-10-CM

## 2024-09-09 DIAGNOSIS — K21.9 GASTROESOPHAGEAL REFLUX DISEASE WITHOUT ESOPHAGITIS: ICD-10-CM

## 2024-09-09 DIAGNOSIS — N18.6 ESRD (END STAGE RENAL DISEASE) (HCC): ICD-10-CM

## 2024-09-09 LAB
ALBUMIN SERPL-MCNC: 3.9 G/DL (ref 3.5–5)
ALBUMIN/GLOB SERPL: 1 (ref 1.1–2.2)
ALP SERPL-CCNC: 91 U/L (ref 45–117)
ALT SERPL-CCNC: 13 U/L (ref 12–78)
ANION GAP SERPL CALC-SCNC: 5 MMOL/L (ref 2–12)
AST SERPL-CCNC: 12 U/L (ref 15–37)
BILIRUB SERPL-MCNC: 0.6 MG/DL (ref 0.2–1)
BUN SERPL-MCNC: 35 MG/DL (ref 6–20)
BUN/CREAT SERPL: 4 (ref 12–20)
CALCIUM SERPL-MCNC: 8.7 MG/DL (ref 8.5–10.1)
CHLORIDE SERPL-SCNC: 100 MMOL/L (ref 97–108)
CHOLEST SERPL-MCNC: 172 MG/DL
CK SERPL-CCNC: 112 U/L (ref 39–308)
CO2 SERPL-SCNC: 33 MMOL/L (ref 21–32)
CREAT SERPL-MCNC: 9.55 MG/DL (ref 0.7–1.3)
GLOBULIN SER CALC-MCNC: 3.9 G/DL (ref 2–4)
GLUCOSE SERPL-MCNC: 96 MG/DL (ref 65–100)
HDLC SERPL-MCNC: 69 MG/DL
HDLC SERPL: 2.5 (ref 0–5)
LDLC SERPL CALC-MCNC: 91 MG/DL (ref 0–100)
POTASSIUM SERPL-SCNC: 4.6 MMOL/L (ref 3.5–5.1)
PROT SERPL-MCNC: 7.8 G/DL (ref 6.4–8.2)
SODIUM SERPL-SCNC: 138 MMOL/L (ref 136–145)
TRIGL SERPL-MCNC: 60 MG/DL
VLDLC SERPL CALC-MCNC: 12 MG/DL

## 2024-09-09 PROCEDURE — G8420 CALC BMI NORM PARAMETERS: HCPCS | Performed by: INTERNAL MEDICINE

## 2024-09-09 PROCEDURE — 99214 OFFICE O/P EST MOD 30 MIN: CPT | Performed by: INTERNAL MEDICINE

## 2024-09-09 PROCEDURE — 3017F COLORECTAL CA SCREEN DOC REV: CPT | Performed by: INTERNAL MEDICINE

## 2024-09-09 PROCEDURE — G8427 DOCREV CUR MEDS BY ELIG CLIN: HCPCS | Performed by: INTERNAL MEDICINE

## 2024-09-09 PROCEDURE — 1036F TOBACCO NON-USER: CPT | Performed by: INTERNAL MEDICINE

## 2024-09-09 PROCEDURE — 1123F ACP DISCUSS/DSCN MKR DOCD: CPT | Performed by: INTERNAL MEDICINE

## 2024-09-09 RX ORDER — ESCITALOPRAM OXALATE 10 MG/1
10 TABLET ORAL DAILY
Qty: 30 TABLET | Refills: 3 | Status: SHIPPED | OUTPATIENT
Start: 2024-09-09

## 2024-10-01 RX ORDER — ESCITALOPRAM OXALATE 10 MG/1
10 TABLET ORAL DAILY
Qty: 90 TABLET | Refills: 3 | Status: SHIPPED | OUTPATIENT
Start: 2024-10-01

## 2024-10-01 NOTE — TELEPHONE ENCOUNTER
RX refill request from the patient/pharmacy. Patient last seen 09- with labs, and next appt. scheduled for 12-  Requested Prescriptions     Pending Prescriptions Disp Refills    escitalopram (LEXAPRO) 10 MG tablet [Pharmacy Med Name: ESCITALOPRAM 10 MG TABLET] 90 tablet 3     Sig: TAKE 1 TABLET BY MOUTH EVERY DAY    .

## 2024-11-08 ENCOUNTER — TELEPHONE (OUTPATIENT)
Facility: CLINIC | Age: 74
End: 2024-11-08

## 2024-11-08 DIAGNOSIS — F41.9 ANXIETY: ICD-10-CM

## 2024-11-08 RX ORDER — LORAZEPAM 1 MG/1
1 TABLET ORAL 2 TIMES DAILY PRN
Qty: 60 TABLET | Refills: 0 | Status: SHIPPED | OUTPATIENT
Start: 2024-11-08 | End: 2024-12-08

## 2024-11-08 NOTE — TELEPHONE ENCOUNTER
Pharmacy: [facility]      LORazepam (ATIVAN) 1 MG tablet [[id number]]     Order Details  Dose: 1 mg Route: Oral Frequency: 2 TIMES DAILY PRN for Anxiety   Dispense Quantity: 60 tablet Refills: 0          Sig: Take 1 tablet by mouth 2 times daily as needed for Anxiety for up to 30 days. Max Daily Amount: 2 mg      Road test sats 94% on room air

## 2024-11-08 NOTE — TELEPHONE ENCOUNTER
PCP: Alcon Bills MD    Last appt: 9/9/2024    Future Appointments   Date Time Provider Department Center   1/29/2025  9:10 AM Alcon Bills MD [facility] DEP       Requested Prescriptions     Pending Prescriptions Disp Refills    LORazepam (ATIVAN) 1 MG tablet [Pharmacy Med Name: LORAZEPAM 1 MG TABLET] 60 tablet 0     Sig: Take 1 tablet by mouth 2 times daily as needed for Anxiety for up to 30 days. Max Daily Amount: 2 mg

## 2024-12-31 DIAGNOSIS — I12.9 HYPERTENSION, RENAL DISEASE: Primary | ICD-10-CM

## 2024-12-31 DIAGNOSIS — F41.9 ANXIETY: ICD-10-CM

## 2024-12-31 RX ORDER — LORAZEPAM 1 MG/1
1 TABLET ORAL 2 TIMES DAILY PRN
Qty: 60 TABLET | Refills: 0 | Status: SHIPPED | OUTPATIENT
Start: 2024-12-31 | End: 2025-01-30

## 2024-12-31 NOTE — TELEPHONE ENCOUNTER
RX refill request from the patient/pharmacy. Patient last seen 09- with labs, and next appt. scheduled for 01-  Requested Prescriptions     Pending Prescriptions Disp Refills    LORazepam (ATIVAN) 1 MG tablet 60 tablet 0     Sig: Take 1 tablet by mouth 2 times daily as needed for Anxiety for up to 30 days. Max Daily Amount: 2 mg    .

## 2025-03-12 DIAGNOSIS — F41.9 ANXIETY: Primary | ICD-10-CM

## 2025-03-12 RX ORDER — LORAZEPAM 1 MG/1
TABLET ORAL
Qty: 60 TABLET | Refills: 0 | Status: SHIPPED | OUTPATIENT
Start: 2025-03-12 | End: 2025-04-11

## 2025-03-12 NOTE — TELEPHONE ENCOUNTER
RX refill request from the patient/pharmacy. Patient last seen 09- with labs, and next appt. scheduled for 04-  Requested Prescriptions     Pending Prescriptions Disp Refills    LORazepam (ATIVAN) 1 MG tablet 60 tablet 0     Sig: Take one tablet twice a day as needed    .

## 2025-03-12 NOTE — TELEPHONE ENCOUNTER
Disp Refills Start End     LORazepam (ATIVAN) 1 MG tablet 60 tablet 0 12/31/2024 1/30/2025    Sig - Route: Take 1 tablet by mouth 2 times daily as needed for Anxiety for up to 30 days. Max Daily Amount: 2 mg - Oral      Last visit 9/9/24  Future appt 4/7/25    Pharmacy [facility]

## 2025-04-07 ENCOUNTER — OFFICE VISIT (OUTPATIENT)
Facility: CLINIC | Age: 75
End: 2025-04-07
Payer: MEDICARE

## 2025-04-07 VITALS
OXYGEN SATURATION: 98 % | DIASTOLIC BLOOD PRESSURE: 82 MMHG | SYSTOLIC BLOOD PRESSURE: 124 MMHG | HEART RATE: 71 BPM | BODY MASS INDEX: 23.92 KG/M2 | WEIGHT: 162 LBS

## 2025-04-07 DIAGNOSIS — I12.9 HYPERTENSION, RENAL DISEASE: Primary | ICD-10-CM

## 2025-04-07 DIAGNOSIS — B18.2 CHRONIC HEPATITIS C WITHOUT HEPATIC COMA (HCC): ICD-10-CM

## 2025-04-07 DIAGNOSIS — F41.9 ANXIETY: ICD-10-CM

## 2025-04-07 DIAGNOSIS — E78.2 MIXED HYPERLIPIDEMIA: ICD-10-CM

## 2025-04-07 DIAGNOSIS — K21.9 GASTROESOPHAGEAL REFLUX DISEASE WITHOUT ESOPHAGITIS: ICD-10-CM

## 2025-04-07 DIAGNOSIS — N18.6 ESRD (END STAGE RENAL DISEASE) (HCC): ICD-10-CM

## 2025-04-07 LAB
ALBUMIN SERPL-MCNC: 3.6 G/DL (ref 3.5–5)
ALBUMIN/GLOB SERPL: 0.8 (ref 1.1–2.2)
ALP SERPL-CCNC: 107 U/L (ref 45–117)
ALT SERPL-CCNC: 14 U/L (ref 12–78)
ANION GAP SERPL CALC-SCNC: 6 MMOL/L (ref 2–12)
AST SERPL-CCNC: 14 U/L (ref 15–37)
BILIRUB SERPL-MCNC: 0.6 MG/DL (ref 0.2–1)
BUN SERPL-MCNC: 61 MG/DL (ref 6–20)
BUN/CREAT SERPL: 6 (ref 12–20)
CALCIUM SERPL-MCNC: 8.4 MG/DL (ref 8.5–10.1)
CHLORIDE SERPL-SCNC: 102 MMOL/L (ref 97–108)
CHOLEST SERPL-MCNC: 177 MG/DL
CK SERPL-CCNC: 176 U/L (ref 39–308)
CO2 SERPL-SCNC: 28 MMOL/L (ref 21–32)
CREAT SERPL-MCNC: 9.61 MG/DL (ref 0.7–1.3)
GLOBULIN SER CALC-MCNC: 4.4 G/DL (ref 2–4)
GLUCOSE SERPL-MCNC: 90 MG/DL (ref 65–100)
HDLC SERPL-MCNC: 62 MG/DL
HDLC SERPL: 2.9 (ref 0–5)
LDLC SERPL CALC-MCNC: 105 MG/DL (ref 0–100)
POTASSIUM SERPL-SCNC: 5.1 MMOL/L (ref 3.5–5.1)
PROT SERPL-MCNC: 8 G/DL (ref 6.4–8.2)
SODIUM SERPL-SCNC: 136 MMOL/L (ref 136–145)
TRIGL SERPL-MCNC: 50 MG/DL
VLDLC SERPL CALC-MCNC: 10 MG/DL

## 2025-04-07 PROCEDURE — 1160F RVW MEDS BY RX/DR IN RCRD: CPT | Performed by: INTERNAL MEDICINE

## 2025-04-07 PROCEDURE — G8427 DOCREV CUR MEDS BY ELIG CLIN: HCPCS | Performed by: INTERNAL MEDICINE

## 2025-04-07 PROCEDURE — 99214 OFFICE O/P EST MOD 30 MIN: CPT | Performed by: INTERNAL MEDICINE

## 2025-04-07 PROCEDURE — 1123F ACP DISCUSS/DSCN MKR DOCD: CPT | Performed by: INTERNAL MEDICINE

## 2025-04-07 PROCEDURE — 1036F TOBACCO NON-USER: CPT | Performed by: INTERNAL MEDICINE

## 2025-04-07 PROCEDURE — 1159F MED LIST DOCD IN RCRD: CPT | Performed by: INTERNAL MEDICINE

## 2025-04-07 PROCEDURE — G8420 CALC BMI NORM PARAMETERS: HCPCS | Performed by: INTERNAL MEDICINE

## 2025-04-07 PROCEDURE — 3017F COLORECTAL CA SCREEN DOC REV: CPT | Performed by: INTERNAL MEDICINE

## 2025-04-07 RX ORDER — AMOXICILLIN 500 MG/1
CAPSULE ORAL
COMMUNITY
Start: 2025-04-02 | End: 2025-04-07

## 2025-04-07 RX ORDER — CARVEDILOL 25 MG/1
TABLET ORAL
COMMUNITY
Start: 2025-01-17

## 2025-04-07 RX ORDER — PREDNISOLONE ACETATE 10 MG/ML
SUSPENSION/ DROPS OPHTHALMIC
COMMUNITY

## 2025-04-07 RX ORDER — LATANOPROST 50 UG/ML
SOLUTION/ DROPS OPHTHALMIC
COMMUNITY

## 2025-04-07 SDOH — ECONOMIC STABILITY: FOOD INSECURITY: WITHIN THE PAST 12 MONTHS, THE FOOD YOU BOUGHT JUST DIDN'T LAST AND YOU DIDN'T HAVE MONEY TO GET MORE.: NEVER TRUE

## 2025-04-07 SDOH — ECONOMIC STABILITY: FOOD INSECURITY: WITHIN THE PAST 12 MONTHS, YOU WORRIED THAT YOUR FOOD WOULD RUN OUT BEFORE YOU GOT MONEY TO BUY MORE.: NEVER TRUE

## 2025-04-07 ASSESSMENT — PATIENT HEALTH QUESTIONNAIRE - PHQ9
SUM OF ALL RESPONSES TO PHQ QUESTIONS 1-9: 0
2. FEELING DOWN, DEPRESSED OR HOPELESS: NOT AT ALL
1. LITTLE INTEREST OR PLEASURE IN DOING THINGS: NOT AT ALL

## 2025-04-07 NOTE — PROGRESS NOTES
Chief Complaint   Patient presents with    3 Month Follow-Up         Health Maintenance Due   Topic Date Due    Hepatitis A vaccine (1 of 2 - Risk 2-dose series) Never done    DTaP/Tdap/Td vaccine (1 - Tdap) Never done    Shingles vaccine (1 of 2) Never done    Hepatitis B vaccine (1 of 3 - Risk 3-dose series) Never done    Respiratory Syncytial Virus (RSV) Pregnant or age 60 yrs+ (1 - Risk 60-74 years 1-dose series) Never done    COVID-19 Vaccine (4 - 2024-25 season) 09/01/2024         \"Have you been to the ER, urgent care clinic since your last visit?  Hospitalized since your last visit?\"    NO    “Have you seen or consulted any other health care providers outside of [facility] since your last visit?”    NO            
synovitis, pulse 1+   INTEGUMENT: No unusual rashes or suspicious skin lesions noted. Nails appear normal.  PERIPHERAL VASCULAR: normal pulses femoral, PT and DP  NEUROLOGIC: non-focal exam, A & O X 3  PSYCHIATRIC:, appropriate affect     ASSESSMENT:   1. Hypertension, renal disease    2. Mixed hyperlipidemia    3. Gastroesophageal reflux disease without esophagitis    4. ESRD (end stage renal disease) (HCC)    5. Chronic hepatitis C without hepatic coma (HCC)    6. Anxiety      Impression  1.  Hypertension is controlled so continue current therapy reviewed with him.  2 hyperlipidemia prior lab reviewed repeat status pending I will adjust if needed.  3.  GERD stable  4 end-stage renal disease tolerating dialysis  5.  Chronic hepatitis C stable  6 anxiety stable  I will call with lab and follow-up stable continue same follow-up again in 3 months or sooner if there is a problem.    PLAN:  1. Hypertension, renal disease  2. Mixed hyperlipidemia  -     CK; Future  -     Lipid Panel; Future  -     Comprehensive Metabolic Panel; Future  3. Gastroesophageal reflux disease without esophagitis  4. ESRD (end stage renal disease) (HCC)  5. Chronic hepatitis C without hepatic coma (HCC)  6. Anxiety          ATTENTION:   This medical record was transcribed using an electronic medical records system.  Although proofread, it may and can contain electronic and spelling errors.  Other human spelling and other errors may be present.  Corrections may be executed at a later time.  Please feel free to contact us for any clarifications as needed.      No follow-up provider specified.    No results found for any visits on 04/07/25.    Alcon Bills MD    The patient verbalized understanding of the problems and plans as explained.

## 2025-04-08 ENCOUNTER — TELEPHONE (OUTPATIENT)
Facility: CLINIC | Age: 75
End: 2025-04-08

## 2025-04-08 ENCOUNTER — RESULTS FOLLOW-UP (OUTPATIENT)
Facility: CLINIC | Age: 75
End: 2025-04-08

## 2025-04-08 NOTE — TELEPHONE ENCOUNTER
Patient was seen yesterday and has a question about the change in his medication- carvedilol (COREG) 25 MG tablet. He would like to know why it was changed. Also his AVS states to stop taking amoxicillin. He states he has to take it when he has a dental procedure because he is a dialysis patient and has a portal in his arm. Please advise.    [phone]  Patient states he will not be home tomorrow around 1 pm due to an eye appt.

## 2025-05-07 DIAGNOSIS — F41.9 ANXIETY: Primary | ICD-10-CM

## 2025-05-07 RX ORDER — LORAZEPAM 1 MG/1
1 TABLET ORAL EVERY 6 HOURS PRN
Qty: 120 TABLET | Refills: 0 | Status: SHIPPED | OUTPATIENT
Start: 2025-05-07 | End: 2025-06-06

## 2025-05-07 RX ORDER — LORAZEPAM 1 MG/1
1 TABLET ORAL EVERY 6 HOURS PRN
COMMUNITY
End: 2025-05-07 | Stop reason: SDUPTHER

## 2025-05-07 NOTE — TELEPHONE ENCOUNTER
Patient is requesting refill on Lorazepam medication sent to [facility] on [address] [phone].       LORazepam (ATIVAN) 1 MG tablet [[id number]]

## 2025-05-12 DIAGNOSIS — F41.9 ANXIETY: ICD-10-CM

## 2025-05-12 RX ORDER — LORAZEPAM 1 MG/1
1 TABLET ORAL 2 TIMES DAILY
Qty: 60 TABLET | Refills: 0 | Status: SHIPPED | OUTPATIENT
Start: 2025-05-12 | End: 2025-06-11

## 2025-05-12 NOTE — TELEPHONE ENCOUNTER
Patient called in stating that the prescription for Lorazepam was called in incorrectly and that medicare will not pay for it as it is written. States he would like the original prescription to be called into the pharmacy for 1 tablet twice daily. Requesting refills.     LORazepam (ATIVAN) 1 MG tablet [[id number]]      Order Details  Dose, Route, Frequency: As Directed   Dispense Quantity:  tablet Refills: 0          Sig: Take one tablet twice a day as needed

## 2025-05-12 NOTE — TELEPHONE ENCOUNTER
RX refill request from the patient/pharmacy. Patient last seen 04/07/2025 with labs, and next appt. scheduled for 07/07/2025.    Requested Prescriptions     Pending Prescriptions Disp Refills    LORazepam (ATIVAN) 1 MG tablet 60 tablet 0     Sig: Take 1 tablet by mouth 2 times daily for 30 days. Max Daily Amount: 2 mg

## 2025-06-30 ENCOUNTER — TELEPHONE (OUTPATIENT)
Facility: CLINIC | Age: 75
End: 2025-06-30

## 2025-06-30 NOTE — TELEPHONE ENCOUNTER
Patient states he has severe cramps and would like to wean off lorazepam 1 mg. Please leave a message on his voicemail so he can refer back to the message later.      [phone]

## 2025-07-01 NOTE — PROGRESS NOTES
Chief Complaint   Patient presents with    Medication Adjustment     Patient would like to try and wean off of his Lorazepam, he is experiencing stomach cramps and believes he may started to become dependant on this medication.    Other     Patient reports not taking Lexapro       SUBJECTIVE:    Koko Jensen is a 74 y.o. male who returns today wanting to wean himself off the lorazepam which he has been taking a milligram a day long-term to get him through his dialysis.  He notes no other complaints.      Current Outpatient Medications   Medication Sig Dispense Refill    carvedilol (COREG) 25 MG tablet       latanoprost (XALATAN) 0.005 % ophthalmic solution 1 drop both eyes Ophthalmic at bedtime for 30 days      prednisoLONE acetate (PRED FORTE) 1 % ophthalmic suspension 1 drop into both eyes Ophthalmic Twice a day for 30 days      escitalopram (LEXAPRO) 10 MG tablet TAKE 1 TABLET BY MOUTH EVERY DAY 90 tablet 3    ciclopirox (PENLAC) 8 % solution Apply topically nightly. 1 each 3    lisinopril (PRINIVIL;ZESTRIL) 40 MG tablet Take 1 tablet by mouth daily 90 tablet 3    Azelastine HCl 137 MCG/SPRAY SOLN 1 (ONE) SPRAY TWO TIMES DAILY      amLODIPine (NORVASC) 10 MG tablet TAKE 1 TABLET BY MOUTH ONCE DAILY      Etelcalcetide HCl 2.5 MG/0.5ML SOLN Infuse intravenously      sevelamer (RENVELA) 800 MG tablet Take by mouth 3 times daily (with meals)      tiZANidine (ZANAFLEX) 4 MG tablet Take by mouth 3 times daily as needed       No current facility-administered medications for this visit.     Past Medical History:   Diagnosis Date    Arthritis     RA    Chronic kidney disease     Dialysis T, Thur, Sat @ [facility]    COVID 12/11/2022    GERD (gastroesophageal reflux disease)     Gout     Hepatitis C     Hypertension     Ill-defined condition     Gout    Iritis     Liver disease     Hep C    Other and unspecified alcohol dependence, unspecified drinking behavior     Acid reflux    Other ill-defined

## 2025-07-02 ENCOUNTER — OFFICE VISIT (OUTPATIENT)
Facility: CLINIC | Age: 75
End: 2025-07-02

## 2025-07-02 VITALS
HEIGHT: 69 IN | TEMPERATURE: 97.9 F | RESPIRATION RATE: 20 BRPM | BODY MASS INDEX: 23.55 KG/M2 | WEIGHT: 159 LBS | OXYGEN SATURATION: 98 % | HEART RATE: 76 BPM | DIASTOLIC BLOOD PRESSURE: 87 MMHG | SYSTOLIC BLOOD PRESSURE: 178 MMHG

## 2025-07-02 DIAGNOSIS — F41.9 ANXIETY: Primary | ICD-10-CM

## 2025-07-02 RX ORDER — LORAZEPAM 0.5 MG/1
TABLET ORAL
Qty: 21 TABLET | Refills: 0 | Status: SHIPPED | OUTPATIENT
Start: 2025-07-02 | End: 2025-08-02

## 2025-07-31 ENCOUNTER — TELEPHONE (OUTPATIENT)
Facility: CLINIC | Age: 75
End: 2025-07-31

## 2025-07-31 NOTE — PROGRESS NOTES
This is a Subsequent Medicare Annual Wellness Visit providing Personalized Prevention Plan Services (PPPS) (Performed 12 months after initial AWV and PPPS )    I have reviewed the patient's medical history in detail and updated the computerized patient record.  He presents today for his Medicare subsequent annual wellness examination and screening questionnaire.    He is also here in follow-up of his multiple medical problems include hypertension, hyperlipidemia, end-stage renal disease on dialysis 3 times weekly, history of gout, chronic hepatitis C, GERD, history of pneumonia, history of palpitations, anxiety which is increasing, DJD with chronic low back pain, and other multiple medical problems.  In addition to his chronic problems he has several more acute problems going on currently.  He has noted his anxiety is increasing with his social isolation seems to be caused him to get behind on things at home such as cleaning.  He has been wanting to wean his Ativan and has been doing that although I told him if his anxiety is increasing that he does not need to wean his Ativan so we have agreed to change his dosage from 1 mg to half a milligram.  He also notes he has some abdominal discomfort/pain when he is feeling stomach feels like it is empty even after eating about an hour after eating or get that empty feeling and when he wakes up in the morning his stomach feels empty.  He notes if he drinks or eats something that discomfort goes away.  Explained to him that he does have a history of GERD and is probably related to acid production which is probably increased with his anxiety and suggested that we consider something such as a PPI but he is not interested in taking another medicine.  He is concerned about weight loss as his weight has gone down significantly according to him.  In reviewing his chart and noted his weight 11 months ago was 163 pounds compared to 154 pounds today.  His weight is down 5 pounds

## 2025-08-01 ENCOUNTER — OFFICE VISIT (OUTPATIENT)
Facility: CLINIC | Age: 75
End: 2025-08-01

## 2025-08-01 VITALS
OXYGEN SATURATION: 97 % | WEIGHT: 154 LBS | BODY MASS INDEX: 22.74 KG/M2 | HEART RATE: 83 BPM | SYSTOLIC BLOOD PRESSURE: 138 MMHG | DIASTOLIC BLOOD PRESSURE: 84 MMHG | TEMPERATURE: 98.3 F

## 2025-08-01 DIAGNOSIS — I12.9 HYPERTENSION, RENAL DISEASE: Primary | ICD-10-CM

## 2025-08-01 DIAGNOSIS — M10.09 IDIOPATHIC GOUT OF MULTIPLE SITES, UNSPECIFIED CHRONICITY: ICD-10-CM

## 2025-08-01 DIAGNOSIS — R63.4 WEIGHT LOSS: ICD-10-CM

## 2025-08-01 DIAGNOSIS — M54.50 CHRONIC MIDLINE LOW BACK PAIN WITHOUT SCIATICA: ICD-10-CM

## 2025-08-01 DIAGNOSIS — Z12.5 PROSTATE CANCER SCREENING: ICD-10-CM

## 2025-08-01 DIAGNOSIS — E55.9 VITAMIN D DEFICIENCY: ICD-10-CM

## 2025-08-01 DIAGNOSIS — B18.2 CHRONIC HEPATITIS C WITHOUT HEPATIC COMA (HCC): ICD-10-CM

## 2025-08-01 DIAGNOSIS — N18.6 ESRD (END STAGE RENAL DISEASE) (HCC): ICD-10-CM

## 2025-08-01 DIAGNOSIS — F41.9 ANXIETY: ICD-10-CM

## 2025-08-01 DIAGNOSIS — E78.2 MIXED HYPERLIPIDEMIA: ICD-10-CM

## 2025-08-01 DIAGNOSIS — R10.84 GENERALIZED ABDOMINAL PAIN: ICD-10-CM

## 2025-08-01 DIAGNOSIS — M54.6 CHRONIC MIDLINE THORACIC BACK PAIN: ICD-10-CM

## 2025-08-01 DIAGNOSIS — G89.29 CHRONIC MIDLINE LOW BACK PAIN WITHOUT SCIATICA: ICD-10-CM

## 2025-08-01 DIAGNOSIS — K21.9 GASTROESOPHAGEAL REFLUX DISEASE WITHOUT ESOPHAGITIS: ICD-10-CM

## 2025-08-01 DIAGNOSIS — Z00.00 MEDICARE ANNUAL WELLNESS VISIT, SUBSEQUENT: ICD-10-CM

## 2025-08-01 DIAGNOSIS — Z13.39 ALCOHOL SCREENING: ICD-10-CM

## 2025-08-01 DIAGNOSIS — R00.2 PALPITATIONS: ICD-10-CM

## 2025-08-01 DIAGNOSIS — G89.29 CHRONIC MIDLINE THORACIC BACK PAIN: ICD-10-CM

## 2025-08-01 LAB
COMMENT:: NORMAL
SPECIMEN HOLD: NORMAL

## 2025-08-01 RX ORDER — LORAZEPAM 0.5 MG/1
0.5 TABLET ORAL 2 TIMES DAILY PRN
Qty: 60 TABLET | Refills: 2 | Status: SHIPPED | OUTPATIENT
Start: 2025-08-01 | End: 2025-10-30

## 2025-08-01 ASSESSMENT — PATIENT HEALTH QUESTIONNAIRE - PHQ9
2. FEELING DOWN, DEPRESSED OR HOPELESS: SEVERAL DAYS
SUM OF ALL RESPONSES TO PHQ QUESTIONS 1-9: 2
1. LITTLE INTEREST OR PLEASURE IN DOING THINGS: SEVERAL DAYS
SUM OF ALL RESPONSES TO PHQ QUESTIONS 1-9: 2

## 2025-08-01 ASSESSMENT — LIFESTYLE VARIABLES
HOW OFTEN DO YOU HAVE A DRINK CONTAINING ALCOHOL: NEVER
HOW MANY STANDARD DRINKS CONTAINING ALCOHOL DO YOU HAVE ON A TYPICAL DAY: PATIENT DOES NOT DRINK

## 2025-08-02 PROBLEM — Z12.5 PROSTATE CANCER SCREENING: Status: RESOLVED | Noted: 2017-10-25 | Resolved: 2025-08-02

## 2025-08-02 PROBLEM — Z00.00 MEDICARE ANNUAL WELLNESS VISIT, SUBSEQUENT: Status: RESOLVED | Noted: 2017-10-25 | Resolved: 2025-08-02

## 2025-08-02 LAB
25(OH)D3 SERPL-MCNC: 35.3 NG/ML (ref 30–100)
ALBUMIN SERPL-MCNC: 3.9 G/DL (ref 3.5–5.2)
ALBUMIN/GLOB SERPL: 1 (ref 1.1–2.2)
ALP SERPL-CCNC: 70 U/L (ref 40–129)
ALT SERPL-CCNC: 9 U/L (ref 10–50)
ANION GAP SERPL CALC-SCNC: 13 MMOL/L (ref 2–14)
APPEARANCE UR: CLEAR
AST SERPL-CCNC: 21 U/L (ref 10–50)
BACTERIA URNS QL MICRO: NEGATIVE /HPF
BASOPHILS # BLD: 0.02 K/UL (ref 0–0.1)
BASOPHILS NFR BLD: 0.4 % (ref 0–1)
BILIRUB SERPL-MCNC: 0.6 MG/DL (ref 0–1.2)
BILIRUB UR QL: NEGATIVE
BUN SERPL-MCNC: 42 MG/DL (ref 8–23)
BUN/CREAT SERPL: 6 (ref 12–20)
CALCIUM SERPL-MCNC: 8.8 MG/DL (ref 8.8–10.2)
CHLORIDE SERPL-SCNC: 97 MMOL/L (ref 98–107)
CHOLEST SERPL-MCNC: 180 MG/DL (ref 0–200)
CK SERPL-CCNC: 105 U/L (ref 39–308)
CO2 SERPL-SCNC: 29 MMOL/L (ref 20–29)
COLOR UR: ABNORMAL
CREAT SERPL-MCNC: 7.05 MG/DL (ref 0.7–1.2)
DIFFERENTIAL METHOD BLD: ABNORMAL
EOSINOPHIL # BLD: 0.08 K/UL (ref 0–0.4)
EOSINOPHIL NFR BLD: 1.4 % (ref 0–7)
EPITH CASTS URNS QL MICRO: ABNORMAL /LPF
ERYTHROCYTE [DISTWIDTH] IN BLOOD BY AUTOMATED COUNT: 12.8 % (ref 11.5–14.5)
GLOBULIN SER CALC-MCNC: 4 G/DL (ref 2–4)
GLUCOSE SERPL-MCNC: 92 MG/DL (ref 65–100)
GLUCOSE UR STRIP.AUTO-MCNC: NEGATIVE MG/DL
HCT VFR BLD AUTO: 36.2 % (ref 36.6–50.3)
HDLC SERPL-MCNC: 62 MG/DL (ref 40–60)
HDLC SERPL: 2.9
HGB BLD-MCNC: 11.5 G/DL (ref 12.1–17)
HGB UR QL STRIP: ABNORMAL
HYALINE CASTS URNS QL MICRO: ABNORMAL /LPF (ref 0–5)
IMM GRANULOCYTES # BLD AUTO: 0.02 K/UL (ref 0–0.04)
IMM GRANULOCYTES NFR BLD AUTO: 0.4 % (ref 0–0.5)
KETONES UR QL STRIP.AUTO: NEGATIVE MG/DL
LDLC SERPL CALC-MCNC: 110 MG/DL
LEUKOCYTE ESTERASE UR QL STRIP.AUTO: NEGATIVE
LIPASE SERPL-CCNC: 47 U/L (ref 13–60)
LYMPHOCYTES # BLD: 1.27 K/UL (ref 0.8–3.5)
LYMPHOCYTES NFR BLD: 22.4 % (ref 12–49)
MCH RBC QN AUTO: 32.6 PG (ref 26–34)
MCHC RBC AUTO-ENTMCNC: 31.8 G/DL (ref 30–36.5)
MCV RBC AUTO: 102.5 FL (ref 80–99)
MONOCYTES # BLD: 0.72 K/UL (ref 0–1)
MONOCYTES NFR BLD: 12.7 % (ref 5–13)
NEUTS SEG # BLD: 3.55 K/UL (ref 1.8–8)
NEUTS SEG NFR BLD: 62.7 % (ref 32–75)
NITRITE UR QL STRIP.AUTO: NEGATIVE
NRBC # BLD: 0 K/UL (ref 0–0.01)
NRBC BLD-RTO: 0 PER 100 WBC
PH UR STRIP: 8.5 (ref 5–8)
PLATELET # BLD AUTO: 158 K/UL (ref 150–400)
PMV BLD AUTO: 11 FL (ref 8.9–12.9)
POTASSIUM SERPL-SCNC: 4.7 MMOL/L (ref 3.5–5.1)
PROT SERPL-MCNC: 7.9 G/DL (ref 6.4–8.3)
PROT UR STRIP-MCNC: 100 MG/DL
PSA SERPL-MCNC: 1.5 NG/ML (ref 0–4.4)
RBC # BLD AUTO: 3.53 M/UL (ref 4.1–5.7)
RBC #/AREA URNS HPF: ABNORMAL /HPF (ref 0–5)
SODIUM SERPL-SCNC: 139 MMOL/L (ref 136–145)
SP GR UR REFRACTOMETRY: 1.01 (ref 1–1.03)
T4 FREE SERPL-MCNC: 1.4 NG/DL (ref 0.9–1.6)
TRIGL SERPL-MCNC: 38 MG/DL (ref 0–150)
TSH, 3RD GENERATION: 1.99 UIU/ML (ref 0.27–4.2)
UROBILINOGEN UR QL STRIP.AUTO: 0.2 EU/DL (ref 0.2–1)
VLDLC SERPL CALC-MCNC: 8 MG/DL
WBC # BLD AUTO: 5.7 K/UL (ref 4.1–11.1)
WBC URNS QL MICRO: ABNORMAL /HPF (ref 0–4)

## 2025-08-05 DIAGNOSIS — R10.84 GENERALIZED ABDOMINAL PAIN: Primary | ICD-10-CM

## 2025-08-20 ENCOUNTER — HOSPITAL ENCOUNTER (OUTPATIENT)
Facility: HOSPITAL | Age: 75
Discharge: HOME | End: 2025-08-23
Attending: INTERNAL MEDICINE
Payer: MEDICARE

## 2025-08-20 DIAGNOSIS — R10.84 GENERALIZED ABDOMINAL PAIN: ICD-10-CM

## 2025-08-20 PROCEDURE — 74176 CT ABD & PELVIS W/O CONTRAST: CPT

## 2025-08-25 ENCOUNTER — RESULTS FOLLOW-UP (OUTPATIENT)
Facility: CLINIC | Age: 75
End: 2025-08-25

## 2025-09-04 PROBLEM — H53.9 VISUAL DISTURBANCE: Status: ACTIVE | Noted: 2025-09-04

## 2025-09-04 PROBLEM — R63.2 EXCESSIVE HUNGER: Status: ACTIVE | Noted: 2025-09-04

## 2025-09-04 PROBLEM — R51.9 HEADACHE: Status: ACTIVE | Noted: 2025-09-04

## (undated) DEVICE — IV START KIT: Brand: MEDLINE

## (undated) DEVICE — SET ADMIN 16ML TBNG L100IN 2 Y INJ SITE IV PIGGY BK DISP

## (undated) DEVICE — TRAP ENDOSCP POLYP 2 CHMBR DRAWER TYP

## (undated) DEVICE — ELECTRODE,RADIOTRANSLUCENT,FOAM,5PK: Brand: MEDLINE

## (undated) DEVICE — NEEDLE HYPO 18GA L1.5IN PNK S STL HUB POLYPR SHLD REG BVL

## (undated) DEVICE — SYR 3ML LL TIP 1/10ML GRAD --

## (undated) DEVICE — CONTAINER SPEC 20 ML LID NEUT BUFF FORMALIN 10 % POLYPR STS

## (undated) DEVICE — BAG SPEC BIOHZRD 10 X 10 IN --

## (undated) DEVICE — CUFF BLD PRSS AD CLTH SGL TB W/ BAYNT CONN ROUNDED CORNER

## (undated) DEVICE — Device

## (undated) DEVICE — ENDOSCOPIC KIT COMPLIANCE ENDOKIT

## (undated) DEVICE — SYR 10ML LUER LOK 1/5ML GRAD --

## (undated) DEVICE — NEONATAL-ADULT SPO2 SENSOR: Brand: NELLCOR

## (undated) DEVICE — BLOCK BITE ENDOSCP AD 21 MM W/ DIL BLU LF DISP

## (undated) DEVICE — TIP SUCT TRNSPAR RIB SURF STD BLB RIG NVENT W/ 5IN1 CONN DYND50138] MEDLINE INDUSTRIES INC]

## (undated) DEVICE — FORCEPS BX L L240CM DIA2.4MM RAD JAW 4 HOT FOR POLYP DISP

## (undated) DEVICE — 1200 GUARD II KIT W/5MM TUBE W/O VAC TUBE: Brand: GUARDIAN

## (undated) DEVICE — YANKAUER,TAPERED BULBOUS TIP,W/O VENT: Brand: MEDLINE

## (undated) DEVICE — FORCEPS BX L160CM DIA8MM GRSP DISECT CUP TIP NONLOCKING ROT

## (undated) DEVICE — SOLIDIFIER MEDC 1200ML -- CONVERT TO 356117

## (undated) DEVICE — SNARE ENDOSCP POLYP MED STD AD 2.4X27X240 CM 2.8 MM OVL SENS

## (undated) DEVICE — ELECTRODE PT RET AD L9FT HI MOIST COND ADH HYDRGEL CORDED

## (undated) DEVICE — TOWEL 4 PLY TISS 19X30 SUE WHT

## (undated) DEVICE — CATH IV AUTOGRD BC PNK 20GA 25 -- INSYTE

## (undated) DEVICE — SET GRAV CK VLV NEEDLESS ST 3 GANGED 4WAY STPCOCK HI FLO 10

## (undated) DEVICE — BASIN EMSIS 16OZ GRAPHITE PLAS KID SHP MOLD GRAD FOR ORAL

## (undated) DEVICE — Z DISCONTINUED PER MEDLINE LINE GAS SAMPLING O2/CO2 LNG AD 13 FT NSL W/ TBNG FILTERLINE